# Patient Record
Sex: FEMALE | Race: WHITE | NOT HISPANIC OR LATINO | Employment: OTHER | URBAN - METROPOLITAN AREA
[De-identification: names, ages, dates, MRNs, and addresses within clinical notes are randomized per-mention and may not be internally consistent; named-entity substitution may affect disease eponyms.]

---

## 2017-01-19 ENCOUNTER — HOSPITAL ENCOUNTER (OUTPATIENT)
Dept: CARDIOLOGY | Facility: HOSPITAL | Age: 82
Setting detail: RECURRING SERIES
Discharge: HOME | End: 2017-01-19

## 2017-01-19 PROCEDURE — 85610 PROTHROMBIN TIME: CPT | Performed by: INTERNAL MEDICINE

## 2017-01-19 PROCEDURE — 36416 COLLJ CAPILLARY BLOOD SPEC: CPT | Performed by: INTERNAL MEDICINE

## 2017-01-25 RX ORDER — ANASTROZOLE 1 MG/1
TABLET ORAL
Qty: 30 TABLET | Refills: 3 | Status: SHIPPED | OUTPATIENT
Start: 2017-01-25 | End: 2017-06-14 | Stop reason: SDUPTHER

## 2017-02-16 ENCOUNTER — HOSPITAL ENCOUNTER (OUTPATIENT)
Dept: CARDIOLOGY | Facility: HOSPITAL | Age: 82
Setting detail: RECURRING SERIES
Discharge: HOME | End: 2017-02-16

## 2017-02-16 PROCEDURE — 36416 COLLJ CAPILLARY BLOOD SPEC: CPT

## 2017-02-16 PROCEDURE — 85610 PROTHROMBIN TIME: CPT

## 2017-02-21 ENCOUNTER — CLINICAL SUPPORT NO REQUIREMENTS (OUTPATIENT)
Dept: CARDIOLOGY | Facility: CLINIC | Age: 82
End: 2017-02-21

## 2017-02-27 ENCOUNTER — CLINICAL SUPPORT NO REQUIREMENTS (OUTPATIENT)
Dept: CARDIOLOGY | Facility: CLINIC | Age: 82
End: 2017-02-27

## 2017-02-27 DIAGNOSIS — I48.20 CHRONIC ATRIAL FIBRILLATION (HCC): Primary | ICD-10-CM

## 2017-02-27 PROCEDURE — 93296 REM INTERROG EVL PM/IDS: CPT | Performed by: INTERNAL MEDICINE

## 2017-02-27 PROCEDURE — 93294 REM INTERROG EVL PM/LDLS PM: CPT | Performed by: INTERNAL MEDICINE

## 2017-03-03 ENCOUNTER — HOSPITAL ENCOUNTER (OUTPATIENT)
Dept: CARDIOLOGY | Facility: HOSPITAL | Age: 82
Setting detail: RECURRING SERIES
Discharge: HOME | End: 2017-03-03

## 2017-03-03 PROCEDURE — 36416 COLLJ CAPILLARY BLOOD SPEC: CPT

## 2017-03-03 PROCEDURE — 85610 PROTHROMBIN TIME: CPT

## 2017-03-16 ENCOUNTER — HOSPITAL ENCOUNTER (OUTPATIENT)
Dept: CARDIOLOGY | Facility: HOSPITAL | Age: 82
Setting detail: RECURRING SERIES
Discharge: HOME | End: 2017-03-16

## 2017-03-16 PROCEDURE — 85610 PROTHROMBIN TIME: CPT

## 2017-03-16 PROCEDURE — 36416 COLLJ CAPILLARY BLOOD SPEC: CPT

## 2017-04-01 RX ORDER — WARFARIN SODIUM 2.5 MG/1
TABLET ORAL
Qty: 90 TABLET | Refills: 0 | Status: CANCELLED | OUTPATIENT
Start: 2017-04-01

## 2017-04-03 RX ORDER — WARFARIN SODIUM 2.5 MG/1
TABLET ORAL
Qty: 90 TABLET | Refills: 0 | Status: SHIPPED | OUTPATIENT
Start: 2017-04-03 | End: 2017-08-01 | Stop reason: SDUPTHER

## 2017-04-24 ENCOUNTER — HOSPITAL ENCOUNTER (OUTPATIENT)
Dept: CARDIOLOGY | Facility: HOSPITAL | Age: 82
Setting detail: RECURRING SERIES
Discharge: HOME | End: 2017-04-24

## 2017-04-24 PROCEDURE — 85610 PROTHROMBIN TIME: CPT

## 2017-04-24 PROCEDURE — 36416 COLLJ CAPILLARY BLOOD SPEC: CPT

## 2017-04-28 ENCOUNTER — OFFICE VISIT (OUTPATIENT)
Dept: ONCOLOGY | Facility: CLINIC | Age: 82
End: 2017-04-28

## 2017-04-28 ENCOUNTER — INFUSION (OUTPATIENT)
Dept: ONCOLOGY | Facility: HOSPITAL | Age: 82
End: 2017-04-28

## 2017-04-28 VITALS
TEMPERATURE: 97.5 F | DIASTOLIC BLOOD PRESSURE: 58 MMHG | WEIGHT: 148.6 LBS | HEART RATE: 82 BPM | OXYGEN SATURATION: 98 % | BODY MASS INDEX: 25.37 KG/M2 | SYSTOLIC BLOOD PRESSURE: 118 MMHG | RESPIRATION RATE: 16 BRPM | HEIGHT: 64 IN

## 2017-04-28 DIAGNOSIS — C50.112 MALIGNANT NEOPLASM OF CENTRAL PORTION OF LEFT FEMALE BREAST (HCC): Primary | ICD-10-CM

## 2017-04-28 DIAGNOSIS — M81.0 OSTEOPOROSIS: Primary | ICD-10-CM

## 2017-04-28 LAB
ALBUMIN SERPL-MCNC: 3.7 G/DL (ref 3.5–5.2)
ALBUMIN/GLOB SERPL: 1.1 G/DL (ref 1.1–2.4)
ALP SERPL-CCNC: 85 U/L (ref 38–116)
ALT SERPL W P-5'-P-CCNC: 13 U/L (ref 0–33)
ANION GAP SERPL CALCULATED.3IONS-SCNC: 12 MMOL/L
AST SERPL-CCNC: 29 U/L (ref 0–32)
BASOPHILS # BLD AUTO: 0.02 10*3/MM3 (ref 0–0.1)
BASOPHILS NFR BLD AUTO: 0.5 % (ref 0–1.1)
BILIRUB SERPL-MCNC: 0.7 MG/DL (ref 0.1–1.2)
BUN BLD-MCNC: 15 MG/DL (ref 6–20)
BUN/CREAT SERPL: 18.1 (ref 7.3–30)
CALCIUM SPEC-SCNC: 8.9 MG/DL (ref 8.5–10.2)
CHLORIDE SERPL-SCNC: 105 MMOL/L (ref 98–107)
CO2 SERPL-SCNC: 26 MMOL/L (ref 22–29)
CREAT BLD-MCNC: 0.83 MG/DL (ref 0.6–1.1)
DEPRECATED RDW RBC AUTO: 45.5 FL (ref 37–49)
EOSINOPHIL # BLD AUTO: 0.1 10*3/MM3 (ref 0–0.36)
EOSINOPHIL NFR BLD AUTO: 2.5 % (ref 1–5)
ERYTHROCYTE [DISTWIDTH] IN BLOOD BY AUTOMATED COUNT: 12.8 % (ref 11.7–14.5)
GFR SERPL CREATININE-BSD FRML MDRD: 65 ML/MIN/1.73
GLOBULIN UR ELPH-MCNC: 3.3 GM/DL (ref 1.8–3.5)
GLUCOSE BLD-MCNC: 79 MG/DL (ref 74–124)
HCT VFR BLD AUTO: 41.1 % (ref 34–45)
HGB BLD-MCNC: 13 G/DL (ref 11.5–14.9)
HOLD SPECIMEN: NORMAL
IMM GRANULOCYTES # BLD: 0.01 10*3/MM3 (ref 0–0.03)
IMM GRANULOCYTES NFR BLD: 0.2 % (ref 0–0.5)
LYMPHOCYTES # BLD AUTO: 1.43 10*3/MM3 (ref 1–3.5)
LYMPHOCYTES NFR BLD AUTO: 35.2 % (ref 20–49)
MCH RBC QN AUTO: 30.7 PG (ref 27–33)
MCHC RBC AUTO-ENTMCNC: 31.6 G/DL (ref 32–35)
MCV RBC AUTO: 96.9 FL (ref 83–97)
MONOCYTES # BLD AUTO: 0.67 10*3/MM3 (ref 0.25–0.8)
MONOCYTES NFR BLD AUTO: 16.5 % (ref 4–12)
NEUTROPHILS # BLD AUTO: 1.83 10*3/MM3 (ref 1.5–7)
NEUTROPHILS NFR BLD AUTO: 45.1 % (ref 39–75)
NRBC BLD MANUAL-RTO: 0 /100 WBC (ref 0–0)
PLATELET # BLD AUTO: 218 10*3/MM3 (ref 150–375)
PMV BLD AUTO: 9.7 FL (ref 8.9–12.1)
POTASSIUM BLD-SCNC: 4.3 MMOL/L (ref 3.5–4.7)
PROT SERPL-MCNC: 7 G/DL (ref 6.3–8)
RBC # BLD AUTO: 4.24 10*6/MM3 (ref 3.9–5)
SODIUM BLD-SCNC: 143 MMOL/L (ref 134–145)
WBC NRBC COR # BLD: 4.06 10*3/MM3 (ref 4–10)

## 2017-04-28 PROCEDURE — 99214 OFFICE O/P EST MOD 30 MIN: CPT | Performed by: INTERNAL MEDICINE

## 2017-04-28 PROCEDURE — 96365 THER/PROPH/DIAG IV INF INIT: CPT | Performed by: INTERNAL MEDICINE

## 2017-04-28 PROCEDURE — 36415 COLL VENOUS BLD VENIPUNCTURE: CPT | Performed by: INTERNAL MEDICINE

## 2017-04-28 PROCEDURE — 85025 COMPLETE CBC W/AUTO DIFF WBC: CPT | Performed by: INTERNAL MEDICINE

## 2017-04-28 PROCEDURE — 25010000002 ZOLEDRONIC ACID 5 MG/100ML SOLUTION: Performed by: INTERNAL MEDICINE

## 2017-04-28 PROCEDURE — 80053 COMPREHEN METABOLIC PANEL: CPT | Performed by: INTERNAL MEDICINE

## 2017-04-28 RX ORDER — ZOLEDRONIC ACID 5 MG/100ML
5 INJECTION, SOLUTION INTRAVENOUS ONCE
Status: CANCELLED | OUTPATIENT
Start: 2017-04-28

## 2017-04-28 RX ORDER — CYANOCOBALAMIN 1000 UG/ML
1000 INJECTION, SOLUTION INTRAMUSCULAR; SUBCUTANEOUS WEEKLY
COMMUNITY
Start: 2017-04-12 | End: 2017-10-16

## 2017-04-28 RX ORDER — ZOLEDRONIC ACID 5 MG/100ML
5 INJECTION, SOLUTION INTRAVENOUS ONCE
Status: COMPLETED | OUTPATIENT
Start: 2017-04-28 | End: 2017-04-28

## 2017-04-28 RX ORDER — ZOLEDRONIC ACID 5 MG/100ML
5 INJECTION, SOLUTION INTRAVENOUS ONCE
Status: CANCELLED | OUTPATIENT
Start: 2018-04-28

## 2017-04-28 RX ORDER — SODIUM CHLORIDE 9 MG/ML
250 INJECTION, SOLUTION INTRAVENOUS ONCE
Status: CANCELLED | OUTPATIENT
Start: 2017-04-28

## 2017-04-28 RX ORDER — SODIUM CHLORIDE 9 MG/ML
250 INJECTION, SOLUTION INTRAVENOUS ONCE
Status: COMPLETED | OUTPATIENT
Start: 2017-04-28 | End: 2017-04-28

## 2017-04-28 RX ORDER — SODIUM CHLORIDE 9 MG/ML
250 INJECTION, SOLUTION INTRAVENOUS ONCE
Status: CANCELLED | OUTPATIENT
Start: 2018-04-28

## 2017-04-28 RX ADMIN — ZOLEDRONIC ACID 5 MG: 0.05 INJECTION, SOLUTION INTRAVENOUS at 11:48

## 2017-04-28 RX ADMIN — SODIUM CHLORIDE 250 ML: 900 INJECTION, SOLUTION INTRAVENOUS at 11:48

## 2017-04-28 NOTE — PROGRESS NOTES
Subjective .     REASONS FOR FOLLOWUP:  Breast cancer    HISTORY OF PRESENT ILLNESS:  The patient is a 84 y.o. year old female  who is here for follow-up with the above-mentioned history.    Denies neurological symptoms.  Denies nausea   Denies weight loss.  Denies pain.  No significant problems with hot flashes.        Past Medical History:   Diagnosis Date   • Anemia    • Arthritis    • Asthma    • Atrial fibrillation     With a history of an atrial clot   • Breast cancer     Left, stage IIB   • Decreased cardiac ejection fraction    • Disease of thyroid gland     Hypothyroidism       HEMATOLOGIC/ONCOLOGIC HISTORY:  (History from previous dates can be found in the separate document.)    MEDICATIONS    Current Outpatient Prescriptions:   •  cyanocobalamin 1000 MCG/ML injection, Inject 1,000 mcg under the skin 1 (One) Time Per Week., Disp: , Rfl:   •  anastrozole (ARIMIDEX) 1 MG tablet, TAKE ONE TABLET BY MOUTH DAILY, Disp: 30 tablet, Rfl: 3  •  furosemide (LASIX) 20 MG tablet, TAKE ONE TABLET BY MOUTH DAILY, Disp: 90 tablet, Rfl: 1  •  levothyroxine (SYNTHROID, LEVOTHROID) 88 MCG tablet, Take  by mouth daily., Disp: , Rfl:   •  warfarin (COUMADIN) 2.5 MG tablet, TAKE 1 TABLET BY MOUTH DAILY OR AS DIRECTED, Disp: 90 tablet, Rfl: 0    ALLERGIES:     Allergies   Allergen Reactions   • Amiodarone Nausea Only   • Codeine Nausea Only   • Dabigatran Etexilate Mesylate Nausea Only   • Digoxin Nausea Only   • Iodinated Diagnostic Agents        SOCIAL HISTORY:       Social History     Social History   • Marital status:      Spouse name: N/A   • Number of children: N/A   • Years of education: N/A     Occupational History   •  Retired     Social History Main Topics   • Smoking status: Never Smoker   • Smokeless tobacco: Never Used   • Alcohol use 0.6 oz/week     1 Glasses of wine per week      Comment: social   • Drug use: Not on file   • Sexual activity: Not on file     Other Topics Concern   • Not on file     Social  "History Narrative         FAMILY HISTORY:  Family History   Problem Relation Age of Onset   • Colon cancer Mother 75   • Colon cancer Sister 79   • Hypertension Sister    • Cholelithiasis Sister      2 sisters       REVIEW OF SYSTEMS:  GENERAL: No change in appetite or weight;   No fevers, chills, sweats.    SKIN: No nonhealing lesions.   No rashes.  HEME/LYMPH: No easy bruising, bleeding.   No swollen nodes.   EYES: No vision changes or diplopia.   ENT: No tinnitus, hearing loss, gum bleeding, epistaxis, hoarseness or dysphagia.   RESPIRATORY: No cough, shortness of breath, hemoptysis or wheezing.   CVS: No chest pain, palpitations, orthopnea, dyspnea on exertion or PND.   GI: No melena or hematochezia.   No abdominal pain.  No nausea, vomiting, constipation, diarrhea  : No lower tract obstructive symptoms, dysuria or hematuria.   MUSCULOSKELETAL: No bone pain.  No joint stiffness.   NEUROLOGICAL: No global weakness, loss of consciousness or seizures.   PSYCHIATRIC: No increased nervousness, mood changes or depression.      Objective    Vitals:    04/28/17 1041   BP: 118/58   Pulse: 82   Resp: 16   Temp: 97.5 °F (36.4 °C)   TempSrc: Oral   SpO2: 98%   Weight: 148 lb 9.6 oz (67.4 kg)   Height: 64\" (162.6 cm)   PainSc: 0-No pain     Current Status 4/28/2017   ECOG score 0      PHYSICAL EXAM:    GENERAL:  Well-developed, well-nourished in no acute distress.   SKIN:  Warm, dry without rashes, purpura or petechiae.  HEAD:  Normocephalic.  EYES:  Pupils equal, round and reactive to light.  EOMs intact.  Conjunctivae normal.  EARS:  Hearing intact.  NOSE:  Septum midline.  No excoriations or nasal discharge.  MOUTH:  Tongue is well-papillated; no stomatitis or ulcers.  Lips normal.  THROAT:  Oropharynx without lesions or exudates.  NECK:  Supple with good range of motion; no thyromegaly or masses, no JVD.  LYMPHATICS:  No cervical, supraclavicular, axillary or inguinal adenopathy.  CHEST:  Lungs clear to percussion and " auscultation. Good airflow.  BREAST: no masses by palpation or inspection  CARDIAC:  Regular rate and rhythm without murmurs, rubs or gallops. Normal S1,S2.  ABDOMEN:  Soft, nontender with no organomegaly or masses.  EXTREMITIES:  No clubbing, cyanosis or edema.  NEUROLOGICAL:  Cranial Nerves II-XII grossly intact.  No focal neurological deficits.  PSYCHIATRIC:  Normal affect and mood.      RECENT LABS:        WBC   Date/Time Value Ref Range Status   04/28/2017 10:14 AM 4.06 4.00 - 10.00 10*3/mm3 Final     Hemoglobin   Date/Time Value Ref Range Status   04/28/2017 10:14 AM 13.0 11.5 - 14.9 g/dL Final     Platelets   Date/Time Value Ref Range Status   04/28/2017 10:14  150 - 375 10*3/mm3 Final       Assessment/Plan     ASSESSMENT:  Problem List Items Addressed This Visit        High    Malignant neoplasm of central portion of left female breast - Primary    Relevant Orders    CBC & Differential    Comprehensive Metabolic Panel         1. [id number] (stage IIB) left breast cancer, 5.1 cm, grade 2.  Arimidex through a planned 10/31/19 (did not tolerate tamoxifen due to altered taste with lack of desire to eat.  Has significant osteoporosis)     2.  Atrial fibrillation with a history of an atrial clot for which she is on Coumadin through other MDs    3.  Osteoporosis. Dr. Tinsley, her primary care physician, recommended Reclast annually 5 mg IV.  We administer.  She is on calcium and vitamin D.    PLAN:   · MD CBC CMP 6 months   · Annual Reclast (April)   · Today.  · (M.D. visit after next)  · Last mammogram, 11/9/16, BI-RADS 1  · Last DEXA, 11/9/16, average lumbar T score -2.4.  Osteopenia, without significant change.

## 2017-05-08 ENCOUNTER — HOSPITAL ENCOUNTER (OUTPATIENT)
Dept: CARDIOLOGY | Facility: HOSPITAL | Age: 82
Setting detail: RECURRING SERIES
Discharge: HOME | End: 2017-05-08

## 2017-05-08 PROCEDURE — 36416 COLLJ CAPILLARY BLOOD SPEC: CPT

## 2017-05-08 PROCEDURE — 85610 PROTHROMBIN TIME: CPT

## 2017-06-08 ENCOUNTER — HOSPITAL ENCOUNTER (OUTPATIENT)
Dept: CARDIOLOGY | Facility: HOSPITAL | Age: 82
Setting detail: RECURRING SERIES
Discharge: HOME | End: 2017-06-08

## 2017-06-08 PROCEDURE — 85610 PROTHROMBIN TIME: CPT

## 2017-06-08 PROCEDURE — 36416 COLLJ CAPILLARY BLOOD SPEC: CPT

## 2017-06-09 ENCOUNTER — CLINICAL SUPPORT NO REQUIREMENTS (OUTPATIENT)
Dept: CARDIOLOGY | Facility: CLINIC | Age: 82
End: 2017-06-09

## 2017-06-09 DIAGNOSIS — I48.20 CHRONIC ATRIAL FIBRILLATION (HCC): Primary | ICD-10-CM

## 2017-06-09 PROCEDURE — 93296 REM INTERROG EVL PM/IDS: CPT | Performed by: INTERNAL MEDICINE

## 2017-06-09 PROCEDURE — 93294 REM INTERROG EVL PM/LDLS PM: CPT | Performed by: INTERNAL MEDICINE

## 2017-06-14 RX ORDER — ANASTROZOLE 1 MG/1
TABLET ORAL
Qty: 30 TABLET | Refills: 2 | Status: SHIPPED | OUTPATIENT
Start: 2017-06-14 | End: 2017-06-19 | Stop reason: SDUPTHER

## 2017-06-19 ENCOUNTER — TELEPHONE (OUTPATIENT)
Dept: ONCOLOGY | Facility: CLINIC | Age: 82
End: 2017-06-19

## 2017-06-19 RX ORDER — ANASTROZOLE 1 MG/1
1 TABLET ORAL DAILY
Qty: 30 TABLET | Refills: 2 | Status: SHIPPED | OUTPATIENT
Start: 2017-06-19 | End: 2017-10-27 | Stop reason: SDUPTHER

## 2017-06-19 NOTE — TELEPHONE ENCOUNTER
Patient called saying [facility] can not get her Arimidex. Will send a new prescription to [facility] [place] [phone]. Patient will call if she has any other issues.

## 2017-06-22 ENCOUNTER — HOSPITAL ENCOUNTER (OUTPATIENT)
Dept: CARDIOLOGY | Facility: HOSPITAL | Age: 82
Setting detail: RECURRING SERIES
Discharge: HOME | End: 2017-06-22

## 2017-06-22 PROCEDURE — 36416 COLLJ CAPILLARY BLOOD SPEC: CPT

## 2017-06-22 PROCEDURE — 85610 PROTHROMBIN TIME: CPT

## 2017-07-03 ENCOUNTER — HOSPITAL ENCOUNTER (OUTPATIENT)
Dept: CARDIOLOGY | Facility: HOSPITAL | Age: 82
Setting detail: RECURRING SERIES
End: 2017-07-03

## 2017-07-06 ENCOUNTER — HOSPITAL ENCOUNTER (OUTPATIENT)
Dept: CARDIOLOGY | Facility: HOSPITAL | Age: 82
Setting detail: RECURRING SERIES
Discharge: HOME | End: 2017-07-06

## 2017-07-06 PROCEDURE — 36416 COLLJ CAPILLARY BLOOD SPEC: CPT

## 2017-07-06 PROCEDURE — 85610 PROTHROMBIN TIME: CPT

## 2017-07-14 ENCOUNTER — HOSPITAL ENCOUNTER (OUTPATIENT)
Dept: CARDIOLOGY | Facility: HOSPITAL | Age: 82
Setting detail: RECURRING SERIES
Discharge: HOME | End: 2017-07-14

## 2017-07-14 PROCEDURE — 36416 COLLJ CAPILLARY BLOOD SPEC: CPT

## 2017-07-14 PROCEDURE — 85610 PROTHROMBIN TIME: CPT

## 2017-07-23 RX ORDER — FUROSEMIDE 20 MG/1
TABLET ORAL
Qty: 90 TABLET | Refills: 0 | Status: SHIPPED | OUTPATIENT
Start: 2017-07-23 | End: 2017-10-27 | Stop reason: SDUPTHER

## 2017-07-27 ENCOUNTER — HOSPITAL ENCOUNTER (OUTPATIENT)
Dept: CARDIOLOGY | Facility: HOSPITAL | Age: 82
Setting detail: RECURRING SERIES
Discharge: HOME | End: 2017-07-27

## 2017-07-27 PROCEDURE — 85610 PROTHROMBIN TIME: CPT

## 2017-07-27 PROCEDURE — 36416 COLLJ CAPILLARY BLOOD SPEC: CPT

## 2017-08-01 RX ORDER — WARFARIN SODIUM 2.5 MG/1
TABLET ORAL
Qty: 90 TABLET | Refills: 0 | Status: SHIPPED | OUTPATIENT
Start: 2017-08-01 | End: 2017-11-27 | Stop reason: SDUPTHER

## 2017-08-24 ENCOUNTER — HOSPITAL ENCOUNTER (OUTPATIENT)
Dept: CARDIOLOGY | Facility: HOSPITAL | Age: 82
Setting detail: RECURRING SERIES
Discharge: HOME | End: 2017-08-24

## 2017-08-24 PROCEDURE — 36416 COLLJ CAPILLARY BLOOD SPEC: CPT

## 2017-08-24 PROCEDURE — 85610 PROTHROMBIN TIME: CPT

## 2017-09-07 ENCOUNTER — HOSPITAL ENCOUNTER (OUTPATIENT)
Dept: CARDIOLOGY | Facility: HOSPITAL | Age: 82
Setting detail: RECURRING SERIES
Discharge: HOME | End: 2017-09-07

## 2017-09-07 PROCEDURE — 85610 PROTHROMBIN TIME: CPT

## 2017-09-07 PROCEDURE — 36416 COLLJ CAPILLARY BLOOD SPEC: CPT

## 2017-09-29 ENCOUNTER — HOSPITAL ENCOUNTER (OUTPATIENT)
Dept: CARDIOLOGY | Facility: HOSPITAL | Age: 82
Setting detail: RECURRING SERIES
Discharge: HOME | End: 2017-09-29

## 2017-09-29 PROCEDURE — 36416 COLLJ CAPILLARY BLOOD SPEC: CPT

## 2017-09-29 PROCEDURE — 85610 PROTHROMBIN TIME: CPT

## 2017-10-06 ENCOUNTER — HOSPITAL ENCOUNTER (OUTPATIENT)
Dept: CARDIOLOGY | Facility: HOSPITAL | Age: 82
Setting detail: RECURRING SERIES
Discharge: HOME | End: 2017-10-06

## 2017-10-06 PROCEDURE — 36416 COLLJ CAPILLARY BLOOD SPEC: CPT

## 2017-10-06 PROCEDURE — 85610 PROTHROMBIN TIME: CPT

## 2017-10-16 ENCOUNTER — OFFICE VISIT (OUTPATIENT)
Dept: ONCOLOGY | Facility: CLINIC | Age: 82
End: 2017-10-16

## 2017-10-16 ENCOUNTER — TELEPHONE (OUTPATIENT)
Dept: ONCOLOGY | Facility: HOSPITAL | Age: 82
End: 2017-10-16

## 2017-10-16 ENCOUNTER — LAB (OUTPATIENT)
Dept: LAB | Facility: HOSPITAL | Age: 82
End: 2017-10-16

## 2017-10-16 VITALS
RESPIRATION RATE: 16 BRPM | SYSTOLIC BLOOD PRESSURE: 124 MMHG | TEMPERATURE: 97.4 F | DIASTOLIC BLOOD PRESSURE: 74 MMHG | HEART RATE: 79 BPM | OXYGEN SATURATION: 97 % | HEIGHT: 64 IN | BODY MASS INDEX: 24.34 KG/M2 | WEIGHT: 142.6 LBS

## 2017-10-16 DIAGNOSIS — C50.112 MALIGNANT NEOPLASM OF CENTRAL PORTION OF LEFT BREAST IN FEMALE, ESTROGEN RECEPTOR POSITIVE (HCC): Primary | ICD-10-CM

## 2017-10-16 DIAGNOSIS — C50.112 MALIGNANT NEOPLASM OF CENTRAL PORTION OF LEFT FEMALE BREAST (HCC): ICD-10-CM

## 2017-10-16 DIAGNOSIS — Z17.0 MALIGNANT NEOPLASM OF CENTRAL PORTION OF LEFT BREAST IN FEMALE, ESTROGEN RECEPTOR POSITIVE (HCC): Primary | ICD-10-CM

## 2017-10-16 LAB
ALBUMIN SERPL-MCNC: 4 G/DL (ref 3.5–5.2)
ALBUMIN/GLOB SERPL: 1.2 G/DL (ref 1.1–2.4)
ALP SERPL-CCNC: 74 U/L (ref 38–116)
ALT SERPL W P-5'-P-CCNC: 11 U/L (ref 0–33)
ANION GAP SERPL CALCULATED.3IONS-SCNC: 11.5 MMOL/L
AST SERPL-CCNC: 19 U/L (ref 0–32)
BASOPHILS # BLD AUTO: 0.03 10*3/MM3 (ref 0–0.1)
BASOPHILS NFR BLD AUTO: 0.5 % (ref 0–1.1)
BILIRUB SERPL-MCNC: 0.9 MG/DL (ref 0.1–1.2)
BUN BLD-MCNC: 16 MG/DL (ref 6–20)
BUN/CREAT SERPL: 14.7 (ref 7.3–30)
CALCIUM SPEC-SCNC: 10 MG/DL (ref 8.5–10.2)
CHLORIDE SERPL-SCNC: 101 MMOL/L (ref 98–107)
CO2 SERPL-SCNC: 27.5 MMOL/L (ref 22–29)
CREAT BLD-MCNC: 1.09 MG/DL (ref 0.6–1.1)
DEPRECATED RDW RBC AUTO: 48 FL (ref 37–49)
EOSINOPHIL # BLD AUTO: 0.08 10*3/MM3 (ref 0–0.36)
EOSINOPHIL NFR BLD AUTO: 1.2 % (ref 1–5)
ERYTHROCYTE [DISTWIDTH] IN BLOOD BY AUTOMATED COUNT: 13.4 % (ref 11.7–14.5)
GFR SERPL CREATININE-BSD FRML MDRD: 48 ML/MIN/1.73
GLOBULIN UR ELPH-MCNC: 3.3 GM/DL (ref 1.8–3.5)
GLUCOSE BLD-MCNC: 95 MG/DL (ref 74–124)
HCT VFR BLD AUTO: 44.3 % (ref 34–45)
HGB BLD-MCNC: 14.3 G/DL (ref 11.5–14.9)
IMM GRANULOCYTES # BLD: 0.01 10*3/MM3 (ref 0–0.03)
IMM GRANULOCYTES NFR BLD: 0.2 % (ref 0–0.5)
LYMPHOCYTES # BLD AUTO: 1.92 10*3/MM3 (ref 1–3.5)
LYMPHOCYTES NFR BLD AUTO: 29.2 % (ref 20–49)
MCH RBC QN AUTO: 31.2 PG (ref 27–33)
MCHC RBC AUTO-ENTMCNC: 32.3 G/DL (ref 32–35)
MCV RBC AUTO: 96.7 FL (ref 83–97)
MONOCYTES # BLD AUTO: 0.78 10*3/MM3 (ref 0.25–0.8)
MONOCYTES NFR BLD AUTO: 11.9 % (ref 4–12)
NEUTROPHILS # BLD AUTO: 3.75 10*3/MM3 (ref 1.5–7)
NEUTROPHILS NFR BLD AUTO: 57 % (ref 39–75)
NRBC BLD MANUAL-RTO: 0 /100 WBC (ref 0–0)
PLATELET # BLD AUTO: 223 10*3/MM3 (ref 150–375)
PMV BLD AUTO: 9.7 FL (ref 8.9–12.1)
POTASSIUM BLD-SCNC: 5.6 MMOL/L (ref 3.5–4.7)
PROT SERPL-MCNC: 7.3 G/DL (ref 6.3–8)
RBC # BLD AUTO: 4.58 10*6/MM3 (ref 3.9–5)
SODIUM BLD-SCNC: 140 MMOL/L (ref 134–145)
WBC NRBC COR # BLD: 6.57 10*3/MM3 (ref 4–10)

## 2017-10-16 PROCEDURE — 85025 COMPLETE CBC W/AUTO DIFF WBC: CPT | Performed by: INTERNAL MEDICINE

## 2017-10-16 PROCEDURE — 99214 OFFICE O/P EST MOD 30 MIN: CPT | Performed by: INTERNAL MEDICINE

## 2017-10-16 PROCEDURE — 36415 COLL VENOUS BLD VENIPUNCTURE: CPT | Performed by: INTERNAL MEDICINE

## 2017-10-16 PROCEDURE — 80053 COMPREHEN METABOLIC PANEL: CPT | Performed by: INTERNAL MEDICINE

## 2017-10-16 RX ORDER — UBIQUINOL 100 MG
750 CAPSULE ORAL
Status: ON HOLD | COMMUNITY
Start: 2017-06-21 | End: 2018-06-08

## 2017-10-16 RX ORDER — LANOLIN ALCOHOL/MO/W.PET/CERES
1000 CREAM (GRAM) TOPICAL DAILY
COMMUNITY
End: 2019-11-22

## 2017-10-16 NOTE — PROGRESS NOTES
Subjective .     REASONS FOR FOLLOWUP:  Breast cancer    HISTORY OF PRESENT ILLNESS:  The patient is a 85 y.o. year old female  who is here for follow-up with the above-mentioned history.  Denies neurological symptoms.  Denies nausea   Denies weight loss.  Denies pain.  No significant problems with hot flashes.        Past Medical History:   Diagnosis Date   • Anemia    • Arthritis    • Asthma    • Atrial fibrillation     With a history of an atrial clot   • Breast cancer     Left, stage IIB   • Decreased cardiac ejection fraction    • Disease of thyroid gland     Hypothyroidism   • Heart disease    • Heart failure    • Irritable bowel        HEMATOLOGIC/ONCOLOGIC HISTORY:  (History from previous dates can be found in the separate document.)    MEDICATIONS    Current Outpatient Prescriptions:   •  Glucosamine 750 MG tablet, Take 750 mg by mouth., Disp: , Rfl:   •  vitamin B-12 (CYANOCOBALAMIN) 1000 MCG tablet, Take 1,000 mcg by mouth Daily., Disp: , Rfl:   •  anastrozole (ARIMIDEX) 1 MG tablet, Take 1 tablet by mouth Daily., Disp: 30 tablet, Rfl: 2  •  furosemide (LASIX) 20 MG tablet, TAKE ONE TABLET BY MOUTH DAILY, Disp: 90 tablet, Rfl: 0  •  levothyroxine (SYNTHROID, LEVOTHROID) 88 MCG tablet, Take  by mouth daily., Disp: , Rfl:   •  warfarin (COUMADIN) 2.5 MG tablet, TAKE ONE TABLET BY MOUTH DAILY OR AS DIRECTED, Disp: 90 tablet, Rfl: 0    ALLERGIES:     Allergies   Allergen Reactions   • Amiodarone Nausea Only   • Codeine Nausea Only   • Dabigatran Etexilate Mesylate Nausea Only   • Digoxin Nausea Only   • Iodinated Diagnostic Agents        SOCIAL HISTORY:       Social History     Social History   • Marital status:      Spouse name: Rick   • Number of children: 8   • Years of education: N/A     Occupational History   •  Retired     Social History Main Topics   • Smoking status: Never Smoker   • Smokeless tobacco: Never Used   • Alcohol use 0.6 oz/week     1 Glasses of wine per week      Comment:  "social   • Drug use: No   • Sexual activity: Not on file     Other Topics Concern   • Not on file     Social History Narrative         FAMILY HISTORY:  Family History   Problem Relation Age of Onset   • Colon cancer Mother 75   • Colon cancer Sister 79   • Hypertension Sister    • Cholelithiasis Sister      2 sisters       REVIEW OF SYSTEMS:  GENERAL: No change in appetite or weight;   No fevers, chills, sweats.    SKIN: No nonhealing lesions.   No rashes.  HEME/LYMPH: No easy bruising, bleeding.   No swollen nodes.   EYES: No vision changes or diplopia.   ENT: No tinnitus, hearing loss, gum bleeding, epistaxis, hoarseness or dysphagia.   RESPIRATORY: No cough, shortness of breath, hemoptysis or wheezing.   CVS: No chest pain, palpitations, orthopnea, dyspnea on exertion or PND.   GI: No melena or hematochezia.   No abdominal pain.  No nausea, vomiting, constipation, diarrhea  : No lower tract obstructive symptoms, dysuria or hematuria.   MUSCULOSKELETAL: No bone pain.  No joint stiffness.   NEUROLOGICAL: No global weakness, loss of consciousness or seizures.   PSYCHIATRIC: No increased nervousness, mood changes or depression.      Objective    Vitals:    10/16/17 1437   BP: 124/74   Pulse: 79   Resp: 16   Temp: 97.4 °F (36.3 °C)   TempSrc: Oral   SpO2: 97%   Weight: 142 lb 9.6 oz (64.7 kg)   Height: 64\" (162.6 cm)   PainSc: 0-No pain     Current Status 10/16/2017   ECOG score 0      PHYSICAL EXAM:    GENERAL:  Well-developed, well-nourished in no acute distress.   SKIN:  Warm, dry without rashes, purpura or petechiae.  HEAD:  Normocephalic.  EYES:  Pupils equal, round and reactive to light.  EOMs intact.  Conjunctivae normal.  EARS:  Hearing intact.  NOSE:  Septum midline.  No excoriations or nasal discharge.  MOUTH:  Tongue is well-papillated; no stomatitis or ulcers.  Lips normal.  THROAT:  Oropharynx without lesions or exudates.  NECK:  Supple with good range of motion; no thyromegaly or masses, no " JVD.  LYMPHATICS:  No cervical, supraclavicular, axillary or inguinal adenopathy.  CHEST:  Lungs clear to percussion and auscultation. Good airflow.  BREAST: no masses by palpation or inspection  CARDIAC:  Regular rate and rhythm without murmurs, rubs or gallops. Normal S1,S2.  ABDOMEN:  Soft, nontender with no organomegaly or masses.  EXTREMITIES:  No clubbing, cyanosis or edema.  NEUROLOGICAL:  Cranial Nerves II-XII grossly intact.  No focal neurological deficits.  PSYCHIATRIC:  Normal affect and mood.      RECENT LABS:        WBC   Date/Time Value Ref Range Status   10/16/2017 02:24 PM 6.57 4.00 - 10.00 10*3/mm3 Final     Hemoglobin   Date/Time Value Ref Range Status   10/16/2017 02:24 PM 14.3 11.5 - 14.9 g/dL Final     Platelets   Date/Time Value Ref Range Status   10/16/2017 02:24  150 - 375 10*3/mm3 Final       Assessment/Plan     ASSESSMENT:  Problem List Items Addressed This Visit     None         1. [id number] (stage IIB) left breast cancer, 5.1 cm, grade 2.  Arimidex through a planned 10/31/19 (did not tolerate tamoxifen due to altered taste with lack of desire to eat.  Has significant osteoporosis)   Appears to remain in remission.    2.  Atrial fibrillation with a history of an atrial clot for which she is on Coumadin through other MDs    3.  Osteoporosis. Dr. Tinsley, her primary care physician, recommended Reclast annually 5 mg IV.  We administer.  She is on calcium and vitamin D.    PLAN:   · MD CBC CMP 6 months   · Annual Reclast (April)   · (Next M.D. visit)  · Last mammogram, 11/9/16, BI-RADS 1  · Last DEXA, 11/9/16, average lumbar T score -2.4.  Osteopenia, without significant change.

## 2017-10-16 NOTE — TELEPHONE ENCOUNTER
Labs faxed to patient's pcp. Notified patient. She is not taking potassium supplement. SHe will contact her pcp tomorrow to f/u on this.     ----- Message from Sherman Belcher II, MD sent at 10/16/2017  3:47 PM EDT -----   Please call her and tell her that the potassium was high from today.  Tell her she needs to follow this up, either with a recheck in our office or with her PCP.  I doubt her history of breast cancer or being on Arimidex would have anything to do with a high potassium level.  Therefore, she may be best served by following up with her PCP.  If that is what she wants to do, please fax these lab results to her PCP.  Also, copy this note for the patient's chart.  Thank you

## 2017-10-18 ENCOUNTER — HOSPITAL ENCOUNTER (OUTPATIENT)
Dept: CARDIOLOGY | Facility: HOSPITAL | Age: 82
Setting detail: RECURRING SERIES
Discharge: HOME | End: 2017-10-18

## 2017-10-18 PROCEDURE — 85610 PROTHROMBIN TIME: CPT

## 2017-10-18 PROCEDURE — 36416 COLLJ CAPILLARY BLOOD SPEC: CPT

## 2017-10-19 ENCOUNTER — TRANSCRIBE ORDERS (OUTPATIENT)
Dept: ADMINISTRATIVE | Facility: HOSPITAL | Age: 82
End: 2017-10-19

## 2017-10-19 DIAGNOSIS — Z12.31 ENCOUNTER FOR MAMMOGRAM TO ESTABLISH BASELINE MAMMOGRAM: Primary | ICD-10-CM

## 2017-10-27 RX ORDER — ANASTROZOLE 1 MG/1
TABLET ORAL
Qty: 30 TABLET | Refills: 0 | Status: SHIPPED | OUTPATIENT
Start: 2017-10-27 | End: 2017-11-25 | Stop reason: SDUPTHER

## 2017-10-27 RX ORDER — FUROSEMIDE 20 MG/1
TABLET ORAL
Qty: 90 TABLET | Refills: 0 | Status: SHIPPED | OUTPATIENT
Start: 2017-10-27 | End: 2018-02-16 | Stop reason: SDUPTHER

## 2017-11-15 ENCOUNTER — HOSPITAL ENCOUNTER (OUTPATIENT)
Dept: CARDIOLOGY | Facility: HOSPITAL | Age: 82
Setting detail: RECURRING SERIES
Discharge: HOME | End: 2017-11-15

## 2017-11-15 ENCOUNTER — OFFICE VISIT (OUTPATIENT)
Dept: CARDIOLOGY | Facility: CLINIC | Age: 82
End: 2017-11-15

## 2017-11-15 ENCOUNTER — CLINICAL SUPPORT NO REQUIREMENTS (OUTPATIENT)
Dept: CARDIOLOGY | Facility: CLINIC | Age: 82
End: 2017-11-15

## 2017-11-15 VITALS
SYSTOLIC BLOOD PRESSURE: 100 MMHG | DIASTOLIC BLOOD PRESSURE: 64 MMHG | BODY MASS INDEX: 24.07 KG/M2 | HEART RATE: 83 BPM | WEIGHT: 141 LBS | HEIGHT: 64 IN

## 2017-11-15 DIAGNOSIS — I34.0 NON-RHEUMATIC MITRAL REGURGITATION: ICD-10-CM

## 2017-11-15 DIAGNOSIS — I27.20 PULMONARY HYPERTENSION (HCC): ICD-10-CM

## 2017-11-15 DIAGNOSIS — I48.20 CHRONIC ATRIAL FIBRILLATION (HCC): Primary | ICD-10-CM

## 2017-11-15 DIAGNOSIS — I42.9 CARDIOMYOPATHY, UNSPECIFIED TYPE (HCC): ICD-10-CM

## 2017-11-15 DIAGNOSIS — Z95.0 PRESENCE OF CARDIAC PACEMAKER: ICD-10-CM

## 2017-11-15 PROCEDURE — 85610 PROTHROMBIN TIME: CPT

## 2017-11-15 PROCEDURE — 93279 PRGRMG DEV EVAL PM/LDLS PM: CPT | Performed by: INTERNAL MEDICINE

## 2017-11-15 PROCEDURE — 36416 COLLJ CAPILLARY BLOOD SPEC: CPT

## 2017-11-15 PROCEDURE — 99214 OFFICE O/P EST MOD 30 MIN: CPT | Performed by: INTERNAL MEDICINE

## 2017-11-15 NOTE — PROGRESS NOTES
Date of Office Visit: 11/15/2017  Encounter Provider: Rafaela Leija MD  Place of Service: [facility]  Patient Name: Diana Avalos  :1932      Patient ID:  Diana Avalos is a 85 y.o. female is here for  followup for a fib and CMP.         History of Present Illness    She was seen at [facility] on 2011 for atrial  fibrillation and was placed on IV Cardizem. She was reluctant to take  Coumadin but was willing to take Pradaxa. During her hospitalization her  ejection fraction was 45 to 50% with no significant valvular heart disease.  She had a transesophageal echocardiogram done on September 15, 2011 which  showed marked left atrial enlargement, and there was slow velocity through  the left atrial appendage and the left atrial appendage itself had a  thrombus. The left ventricle showed a moderate reduction in function with a  markedly dilated left atrium. At that time we decided to go ahead and  control her atrial fibrillation with rate and anticoagulation. However, we  had difficulty controlling her rate so she was put on amiodarone just for  rate control. She underwent a stress nuclear perfusion study on 2011 which was normal. On  she called back in because she  was having severe nausea. She was on Pradaxa, digoxin, and amiodarone at  that time. She went off of all those medications and felt better but on  2012 she started having atrial fibrillation with rapid  ventricular response, and was readmitted to the hospital.  She had a single chamber Medtronic generator placed with an AV node ablation, 2012.  She was in the hospital 2012 with acute bronchitis. She had an echocardiogram done  2012 showing an ejection fraction of 35-40%, severe left atrial volume enlargement,  severe dilation of the right atrium, moderate mitral insufficiency, mild tricuspid  insufficiency, pulmonary  artery pressure of 58 mmHg. She was started on Lasix 20 mg  daily, and this really does seem to help.      She had an echocardiogram done on 11/04/2015 which revealed an ejection fraction of 38%, moderate biatrial enlargement, mild-to-moderate mitral insufficiency, trivial aortic insufficiency, moderate-to-severe tricuspid insufficiency.  Her right ventricular systolic pressure was elevated at 58 mmHg.  This is unchanged from her prior echocardiogram in 09/2014.      She's here today for follow-up.  She did have a pacemaker interrogation today.  She is ventricularly pacing 98.5% at time.  She has underlying atrial fibrillation.  She has 4.5 years of battery left.  She has no difficulty breathing.  She's had no dizziness or syncope.  She's had no exertional chest tightness.  She overall is feeling quite well.  Her  did pass away within the last month and has been very difficult but she said he was very ill and suffering.    Past Medical History:   Diagnosis Date   • Anemia    • Arthritis    • Asthma    • Atrial fibrillation     With a history of an atrial clot   • Breast cancer     Left, stage IIB   • Decreased cardiac ejection fraction    • Disease of thyroid gland     Hypothyroidism   • Heart disease    • Heart failure    • Irritable bowel    • Mild tricuspid regurgitation    • Moderate mitral insufficiency          Past Surgical History:   Procedure Laterality Date   • CHOLECYSTECTOMY  2000   • EYE SURGERY  2005    cataracts, Dr. Miramontes   • MASTECTOMY Left 10/2014   • PACEMAKER IMPLANTATION  2012    Dr. Leija       Current Outpatient Prescriptions on File Prior to Visit   Medication Sig Dispense Refill   • anastrozole (ARIMIDEX) 1 MG tablet TAKE 1 TABLET BY MOUTH EVERY DAY 30 tablet 0   • furosemide (LASIX) 20 MG tablet TAKE ONE TABLET BY MOUTH DAILY 90 tablet 0   • Glucosamine 750 MG tablet Take 750 mg by mouth.     • levothyroxine (SYNTHROID, LEVOTHROID) 88 MCG tablet Take  by mouth daily.     • vitamin  "B-12 (CYANOCOBALAMIN) 1000 MCG tablet Take 1,000 mcg by mouth Daily.     • warfarin (COUMADIN) 2.5 MG tablet TAKE ONE TABLET BY MOUTH DAILY OR AS DIRECTED 90 tablet 0     No current facility-administered medications on file prior to visit.        Social History     Social History   • Marital status:      Spouse name: Rick   • Number of children: 8   • Years of education: N/A     Occupational History   •  Retired     Social History Main Topics   • Smoking status: Never Smoker   • Smokeless tobacco: Never Used   • Alcohol use 0.6 oz/week     1 Glasses of wine per week      Comment: social   • Drug use: No   • Sexual activity: Not on file     Other Topics Concern   • Not on file     Social History Narrative           Review of Systems   Constitution: Negative.   HENT: Negative for congestion.    Eyes: Negative for vision loss in left eye and vision loss in right eye.   Respiratory: Negative.  Negative for cough, hemoptysis, shortness of breath, sleep disturbances due to breathing, snoring, sputum production and wheezing.    Endocrine: Negative.    Hematologic/Lymphatic: Negative.    Skin: Negative for poor wound healing and rash.   Musculoskeletal: Negative for falls, gout, muscle cramps and myalgias.   Gastrointestinal: Negative for abdominal pain, diarrhea, dysphagia, hematemesis, melena, nausea and vomiting.   Neurological: Negative for excessive daytime sleepiness, dizziness, headaches, light-headedness, loss of balance, seizures and vertigo.   Psychiatric/Behavioral: Negative for depression and substance abuse. The patient is not nervous/anxious.        Procedures  Procedures       Objective:      Vitals:    11/15/17 1404   BP: 100/64   BP Location: Right arm   Patient Position: Sitting   Pulse: 83   Weight: 141 lb (64 kg)   Height: 64\" (162.6 cm)     Body mass index is 24.2 kg/(m^2).    Physical Exam   Constitutional: She is oriented to person, place, and time. She appears well-developed and " well-nourished. No distress.   HENT:   Head: Normocephalic and atraumatic.   Eyes: Conjunctivae are normal. No scleral icterus.   Neck: Neck supple. No JVD present. Carotid bruit is not present. No thyromegaly present.   Cardiovascular: Normal rate, regular rhythm, S1 normal, S2 normal, normal heart sounds and intact distal pulses.   No extrasystoles are present. PMI is not displaced.  Exam reveals no gallop.    No murmur heard.  Pulses:       Carotid pulses are 2+ on the right side, and 2+ on the left side.       Radial pulses are 2+ on the right side, and 2+ on the left side.        Dorsalis pedis pulses are 2+ on the right side, and 2+ on the left side.        Posterior tibial pulses are 2+ on the right side, and 2+ on the left side.   Pulmonary/Chest: Effort normal and breath sounds normal. No respiratory distress. She has no wheezes. She has no rhonchi. She has no rales. She exhibits no tenderness.   Abdominal: Soft. Bowel sounds are normal. She exhibits no distension, no abdominal bruit and no mass. There is no tenderness.   Musculoskeletal: She exhibits no edema or deformity.   Lymphadenopathy:     She has no cervical adenopathy.   Neurological: She is alert and oriented to person, place, and time. No cranial nerve deficit.   Skin: Skin is warm and dry. No rash noted. She is not diaphoretic. No cyanosis. No pallor. Nails show no clubbing.   Psychiatric: She has a normal mood and affect. Judgment normal.   Vitals reviewed.      Lab Review:       Assessment:      Diagnosis Plan   1. Chronic atrial fibrillation     2. Presence of cardiac pacemaker     3. Cardiomyopathy, unspecified type     4. Non-rheumatic mitral regurgitation     5. Pulmonary hypertension       1. Chronic permanent atrial fibrillation; status post AV node ablation with  Medtronic single chamber pacemaker on January 9, 2012. She is on chronic  Coumadin therapy only.   2. Hypertension, under good control. She is really on no medications  except  Lasix.   3. Moderate cardiomyopathy, nonischemic, LVEF 38%.    4. Moderate mitral insufficiency and moderate tricuspid insufficiency,  stable.   5. Hypothyroidism, stable.   6. Pulmonary hypertension, moderate.   7. Moderate pulmonary hypertension. This is source of dyspnea.      Plan:       See back in 1 year, no changes.     Atrial Fibrillation and Atrial Flutter  Assessment  • The patient has permanent atrial fibrillation  • This is non-valvular in etiology  • The patient's CHADS2-VASc score is 4  • A [id number] score of 2 or more is considered a high risk for a thromboembolic event  • Warfarin prescribed    Plan  • Continue in atrial fibrillation with rate control  • Continue warfarin for antithrombotic therapy, bleeding issues discussed

## 2017-11-22 ENCOUNTER — HOSPITAL ENCOUNTER (OUTPATIENT)
Dept: CARDIOLOGY | Facility: HOSPITAL | Age: 82
Setting detail: RECURRING SERIES
Discharge: HOME | End: 2017-11-22

## 2017-11-22 PROCEDURE — 36416 COLLJ CAPILLARY BLOOD SPEC: CPT

## 2017-11-22 PROCEDURE — 85610 PROTHROMBIN TIME: CPT

## 2017-11-27 RX ORDER — WARFARIN SODIUM 2.5 MG/1
2.5 TABLET ORAL
Qty: 90 TABLET | Refills: 0 | Status: SHIPPED | OUTPATIENT
Start: 2017-11-27 | End: 2018-02-25 | Stop reason: SDUPTHER

## 2017-11-27 RX ORDER — ANASTROZOLE 1 MG/1
TABLET ORAL
Qty: 30 TABLET | Refills: 0 | Status: SHIPPED | OUTPATIENT
Start: 2017-11-27 | End: 2018-01-10 | Stop reason: SDUPTHER

## 2017-12-06 ENCOUNTER — HOSPITAL ENCOUNTER (OUTPATIENT)
Dept: CARDIOLOGY | Facility: HOSPITAL | Age: 82
Setting detail: RECURRING SERIES
Discharge: HOME | End: 2017-12-06

## 2017-12-06 PROCEDURE — 85610 PROTHROMBIN TIME: CPT

## 2017-12-06 PROCEDURE — 36416 COLLJ CAPILLARY BLOOD SPEC: CPT

## 2018-01-04 ENCOUNTER — HOSPITAL ENCOUNTER (OUTPATIENT)
Dept: MAMMOGRAPHY | Facility: HOSPITAL | Age: 83
Discharge: HOME | End: 2018-01-04
Attending: INTERNAL MEDICINE | Admitting: INTERNAL MEDICINE

## 2018-01-04 ENCOUNTER — HOSPITAL ENCOUNTER (OUTPATIENT)
Dept: CARDIOLOGY | Facility: HOSPITAL | Age: 83
Setting detail: RECURRING SERIES
Discharge: HOME | End: 2018-01-04

## 2018-01-04 DIAGNOSIS — Z12.31 ENCOUNTER FOR MAMMOGRAM TO ESTABLISH BASELINE MAMMOGRAM: ICD-10-CM

## 2018-01-04 PROCEDURE — 77067 SCR MAMMO BI INCL CAD: CPT

## 2018-01-04 PROCEDURE — 77063 BREAST TOMOSYNTHESIS BI: CPT

## 2018-01-04 PROCEDURE — 85610 PROTHROMBIN TIME: CPT

## 2018-01-04 PROCEDURE — 36416 COLLJ CAPILLARY BLOOD SPEC: CPT

## 2018-01-10 RX ORDER — ANASTROZOLE 1 MG/1
TABLET ORAL
Qty: 30 TABLET | Refills: 0 | Status: SHIPPED | OUTPATIENT
Start: 2018-01-10 | End: 2018-02-08 | Stop reason: SDUPTHER

## 2018-02-01 ENCOUNTER — HOSPITAL ENCOUNTER (OUTPATIENT)
Dept: CARDIOLOGY | Facility: HOSPITAL | Age: 83
Setting detail: RECURRING SERIES
Discharge: HOME | End: 2018-02-01

## 2018-02-01 PROCEDURE — 85610 PROTHROMBIN TIME: CPT

## 2018-02-01 PROCEDURE — 36416 COLLJ CAPILLARY BLOOD SPEC: CPT

## 2018-02-09 RX ORDER — ANASTROZOLE 1 MG/1
TABLET ORAL
Qty: 30 TABLET | Refills: 6 | Status: SHIPPED | OUTPATIENT
Start: 2018-02-09 | End: 2018-10-04 | Stop reason: SDUPTHER

## 2018-02-16 RX ORDER — FUROSEMIDE 20 MG/1
TABLET ORAL
Qty: 90 TABLET | Refills: 0 | Status: SHIPPED | OUTPATIENT
Start: 2018-02-16 | End: 2018-05-24 | Stop reason: SDUPTHER

## 2018-02-26 RX ORDER — WARFARIN SODIUM 2.5 MG/1
TABLET ORAL
Qty: 90 TABLET | Refills: 0 | Status: SHIPPED | OUTPATIENT
Start: 2018-02-26 | End: 2018-06-05 | Stop reason: SDUPTHER

## 2018-02-28 ENCOUNTER — HOSPITAL ENCOUNTER (OUTPATIENT)
Dept: CARDIOLOGY | Facility: HOSPITAL | Age: 83
Setting detail: RECURRING SERIES
Discharge: HOME | End: 2018-02-28

## 2018-02-28 PROCEDURE — 36416 COLLJ CAPILLARY BLOOD SPEC: CPT

## 2018-02-28 PROCEDURE — 85610 PROTHROMBIN TIME: CPT

## 2018-03-28 ENCOUNTER — HOSPITAL ENCOUNTER (OUTPATIENT)
Dept: CARDIOLOGY | Facility: HOSPITAL | Age: 83
Setting detail: RECURRING SERIES
Discharge: HOME | End: 2018-03-28

## 2018-03-28 PROCEDURE — 36416 COLLJ CAPILLARY BLOOD SPEC: CPT

## 2018-03-28 PROCEDURE — 85610 PROTHROMBIN TIME: CPT

## 2018-04-25 ENCOUNTER — HOSPITAL ENCOUNTER (OUTPATIENT)
Dept: CARDIOLOGY | Facility: HOSPITAL | Age: 83
Setting detail: RECURRING SERIES
Discharge: HOME | End: 2018-04-25

## 2018-04-25 PROCEDURE — 36416 COLLJ CAPILLARY BLOOD SPEC: CPT

## 2018-04-25 PROCEDURE — 85610 PROTHROMBIN TIME: CPT

## 2018-04-30 DIAGNOSIS — C50.112 MALIGNANT NEOPLASM OF CENTRAL PORTION OF LEFT BREAST IN FEMALE, ESTROGEN RECEPTOR POSITIVE (HCC): Primary | ICD-10-CM

## 2018-04-30 DIAGNOSIS — M85.89 OSTEOPENIA OF MULTIPLE SITES: ICD-10-CM

## 2018-04-30 DIAGNOSIS — Z17.0 MALIGNANT NEOPLASM OF CENTRAL PORTION OF LEFT BREAST IN FEMALE, ESTROGEN RECEPTOR POSITIVE (HCC): Primary | ICD-10-CM

## 2018-04-30 RX ORDER — ZOLEDRONIC ACID 5 MG/100ML
5 INJECTION, SOLUTION INTRAVENOUS ONCE
Status: CANCELLED | OUTPATIENT
Start: 2018-05-02

## 2018-04-30 RX ORDER — SODIUM CHLORIDE 9 MG/ML
250 INJECTION, SOLUTION INTRAVENOUS ONCE
Status: CANCELLED | OUTPATIENT
Start: 2018-05-02

## 2018-05-01 ENCOUNTER — APPOINTMENT (OUTPATIENT)
Dept: ONCOLOGY | Facility: CLINIC | Age: 83
End: 2018-05-01

## 2018-05-01 ENCOUNTER — APPOINTMENT (OUTPATIENT)
Dept: LAB | Facility: HOSPITAL | Age: 83
End: 2018-05-01

## 2018-05-02 ENCOUNTER — APPOINTMENT (OUTPATIENT)
Dept: ONCOLOGY | Facility: HOSPITAL | Age: 83
End: 2018-05-02

## 2018-05-03 ENCOUNTER — TELEPHONE (OUTPATIENT)
Dept: CARDIOLOGY | Facility: CLINIC | Age: 83
End: 2018-05-03

## 2018-05-03 NOTE — TELEPHONE ENCOUNTER
Pt's daughter called and states that pt missed her pacemaker remote  Appointment. león wants to know when they can they do transmission...    [employer] can be reached at [phone]    Candice KENNEDY

## 2018-05-09 ENCOUNTER — CLINICAL SUPPORT NO REQUIREMENTS (OUTPATIENT)
Dept: CARDIOLOGY | Facility: CLINIC | Age: 83
End: 2018-05-09

## 2018-05-09 ENCOUNTER — HOSPITAL ENCOUNTER (OUTPATIENT)
Dept: CARDIOLOGY | Facility: HOSPITAL | Age: 83
Setting detail: RECURRING SERIES
Discharge: HOME | End: 2018-05-09

## 2018-05-09 DIAGNOSIS — I48.20 CHRONIC ATRIAL FIBRILLATION (HCC): Primary | ICD-10-CM

## 2018-05-09 PROCEDURE — 85610 PROTHROMBIN TIME: CPT

## 2018-05-09 PROCEDURE — 93294 REM INTERROG EVL PM/LDLS PM: CPT | Performed by: INTERNAL MEDICINE

## 2018-05-09 PROCEDURE — 36416 COLLJ CAPILLARY BLOOD SPEC: CPT

## 2018-05-09 PROCEDURE — 93296 REM INTERROG EVL PM/IDS: CPT | Performed by: INTERNAL MEDICINE

## 2018-05-16 ENCOUNTER — HOSPITAL ENCOUNTER (OUTPATIENT)
Dept: CARDIOLOGY | Facility: HOSPITAL | Age: 83
Setting detail: RECURRING SERIES
Discharge: HOME | End: 2018-05-16

## 2018-05-16 PROCEDURE — 36416 COLLJ CAPILLARY BLOOD SPEC: CPT

## 2018-05-16 PROCEDURE — 85610 PROTHROMBIN TIME: CPT

## 2018-05-22 ENCOUNTER — OFFICE VISIT (OUTPATIENT)
Dept: ONCOLOGY | Facility: CLINIC | Age: 83
End: 2018-05-22

## 2018-05-22 ENCOUNTER — INFUSION (OUTPATIENT)
Dept: ONCOLOGY | Facility: HOSPITAL | Age: 83
End: 2018-05-22

## 2018-05-22 VITALS
HEART RATE: 74 BPM | TEMPERATURE: 97.9 F | WEIGHT: 150.6 LBS | OXYGEN SATURATION: 97 % | SYSTOLIC BLOOD PRESSURE: 142 MMHG | RESPIRATION RATE: 14 BRPM | BODY MASS INDEX: 25.09 KG/M2 | HEIGHT: 65 IN | DIASTOLIC BLOOD PRESSURE: 86 MMHG

## 2018-05-22 DIAGNOSIS — Z17.0 MALIGNANT NEOPLASM OF CENTRAL PORTION OF LEFT BREAST IN FEMALE, ESTROGEN RECEPTOR POSITIVE (HCC): Primary | ICD-10-CM

## 2018-05-22 DIAGNOSIS — Z17.0 MALIGNANT NEOPLASM OF CENTRAL PORTION OF LEFT BREAST IN FEMALE, ESTROGEN RECEPTOR POSITIVE (HCC): ICD-10-CM

## 2018-05-22 DIAGNOSIS — C50.112 MALIGNANT NEOPLASM OF CENTRAL PORTION OF LEFT BREAST IN FEMALE, ESTROGEN RECEPTOR POSITIVE (HCC): Primary | ICD-10-CM

## 2018-05-22 DIAGNOSIS — C50.112 MALIGNANT NEOPLASM OF CENTRAL PORTION OF LEFT BREAST IN FEMALE, ESTROGEN RECEPTOR POSITIVE (HCC): ICD-10-CM

## 2018-05-22 DIAGNOSIS — M85.89 OSTEOPENIA OF MULTIPLE SITES: Primary | ICD-10-CM

## 2018-05-22 DIAGNOSIS — M81.0 OSTEOPOROSIS, UNSPECIFIED OSTEOPOROSIS TYPE, UNSPECIFIED PATHOLOGICAL FRACTURE PRESENCE: ICD-10-CM

## 2018-05-22 DIAGNOSIS — M85.89 OSTEOPENIA OF MULTIPLE SITES: ICD-10-CM

## 2018-05-22 LAB
ALBUMIN SERPL-MCNC: 4 G/DL (ref 3.5–5.2)
ALBUMIN/GLOB SERPL: 1.4 G/DL (ref 1.1–2.4)
ALP SERPL-CCNC: 77 U/L (ref 38–116)
ALT SERPL W P-5'-P-CCNC: 11 U/L (ref 0–33)
ANION GAP SERPL CALCULATED.3IONS-SCNC: 11 MMOL/L
AST SERPL-CCNC: 21 U/L (ref 0–32)
BASOPHILS # BLD AUTO: 0.03 10*3/MM3 (ref 0–0.1)
BASOPHILS NFR BLD AUTO: 0.7 % (ref 0–1.1)
BILIRUB SERPL-MCNC: 0.9 MG/DL (ref 0.1–1.2)
BUN BLD-MCNC: 16 MG/DL (ref 6–20)
BUN/CREAT SERPL: 16.3 (ref 7.3–30)
CALCIUM SPEC-SCNC: 9.1 MG/DL (ref 8.5–10.2)
CHLORIDE SERPL-SCNC: 103 MMOL/L (ref 98–107)
CO2 SERPL-SCNC: 27 MMOL/L (ref 22–29)
CREAT BLD-MCNC: 0.98 MG/DL (ref 0.6–1.1)
DEPRECATED RDW RBC AUTO: 46.5 FL (ref 37–49)
EOSINOPHIL # BLD AUTO: 0.1 10*3/MM3 (ref 0–0.36)
EOSINOPHIL NFR BLD AUTO: 2.3 % (ref 1–5)
ERYTHROCYTE [DISTWIDTH] IN BLOOD BY AUTOMATED COUNT: 13 % (ref 11.7–14.5)
GFR SERPL CREATININE-BSD FRML MDRD: 54 ML/MIN/1.73
GLOBULIN UR ELPH-MCNC: 2.9 GM/DL (ref 1.8–3.5)
GLUCOSE BLD-MCNC: 85 MG/DL (ref 74–124)
HCT VFR BLD AUTO: 44.6 % (ref 34–45)
HGB BLD-MCNC: 14.4 G/DL (ref 11.5–14.9)
IMM GRANULOCYTES # BLD: 0.01 10*3/MM3 (ref 0–0.03)
IMM GRANULOCYTES NFR BLD: 0.2 % (ref 0–0.5)
LYMPHOCYTES # BLD AUTO: 1.36 10*3/MM3 (ref 1–3.5)
LYMPHOCYTES NFR BLD AUTO: 30.6 % (ref 20–49)
MAGNESIUM SERPL-MCNC: 2 MG/DL (ref 1.8–2.5)
MCH RBC QN AUTO: 31.6 PG (ref 27–33)
MCHC RBC AUTO-ENTMCNC: 32.3 G/DL (ref 32–35)
MCV RBC AUTO: 97.8 FL (ref 83–97)
MONOCYTES # BLD AUTO: 0.74 10*3/MM3 (ref 0.25–0.8)
MONOCYTES NFR BLD AUTO: 16.7 % (ref 4–12)
NEUTROPHILS # BLD AUTO: 2.2 10*3/MM3 (ref 1.5–7)
NEUTROPHILS NFR BLD AUTO: 49.5 % (ref 39–75)
NRBC BLD MANUAL-RTO: 0 /100 WBC (ref 0–0)
PHOSPHATE SERPL-MCNC: 3.7 MG/DL (ref 2.5–4.5)
PLATELET # BLD AUTO: 206 10*3/MM3 (ref 150–375)
PMV BLD AUTO: 9.8 FL (ref 8.9–12.1)
POTASSIUM BLD-SCNC: 4.3 MMOL/L (ref 3.5–4.7)
PROT SERPL-MCNC: 6.9 G/DL (ref 6.3–8)
RBC # BLD AUTO: 4.56 10*6/MM3 (ref 3.9–5)
SODIUM BLD-SCNC: 141 MMOL/L (ref 134–145)
WBC NRBC COR # BLD: 4.44 10*3/MM3 (ref 4–10)

## 2018-05-22 PROCEDURE — 84100 ASSAY OF PHOSPHORUS: CPT

## 2018-05-22 PROCEDURE — 85025 COMPLETE CBC W/AUTO DIFF WBC: CPT

## 2018-05-22 PROCEDURE — 99214 OFFICE O/P EST MOD 30 MIN: CPT | Performed by: INTERNAL MEDICINE

## 2018-05-22 PROCEDURE — 25010000002 ZOLEDRONIC ACID 5 MG/100ML SOLUTION: Performed by: INTERNAL MEDICINE

## 2018-05-22 PROCEDURE — 80053 COMPREHEN METABOLIC PANEL: CPT

## 2018-05-22 PROCEDURE — 96365 THER/PROPH/DIAG IV INF INIT: CPT | Performed by: INTERNAL MEDICINE

## 2018-05-22 PROCEDURE — 83735 ASSAY OF MAGNESIUM: CPT

## 2018-05-22 RX ORDER — ZOLEDRONIC ACID 5 MG/100ML
5 INJECTION, SOLUTION INTRAVENOUS ONCE
Status: CANCELLED | OUTPATIENT
Start: 2018-05-22

## 2018-05-22 RX ORDER — SODIUM CHLORIDE 9 MG/ML
250 INJECTION, SOLUTION INTRAVENOUS ONCE
Status: COMPLETED | OUTPATIENT
Start: 2018-05-22 | End: 2018-05-22

## 2018-05-22 RX ORDER — ZOLEDRONIC ACID 5 MG/100ML
5 INJECTION, SOLUTION INTRAVENOUS ONCE
Status: COMPLETED | OUTPATIENT
Start: 2018-05-22 | End: 2018-05-22

## 2018-05-22 RX ORDER — SODIUM CHLORIDE 9 MG/ML
250 INJECTION, SOLUTION INTRAVENOUS ONCE
Status: CANCELLED | OUTPATIENT
Start: 2018-05-22

## 2018-05-22 RX ADMIN — SODIUM CHLORIDE 250 ML: 900 INJECTION, SOLUTION INTRAVENOUS at 14:08

## 2018-05-22 RX ADMIN — ZOLEDRONIC ACID 5 MG: 0.05 INJECTION, SOLUTION INTRAVENOUS at 14:08

## 2018-05-22 NOTE — PROGRESS NOTES
Subjective .     REASONS FOR FOLLOWUP:  Breast cancer    HISTORY OF PRESENT ILLNESS:  The patient is a 85 y.o. year old female  who is here for follow-up with the above-mentioned history.    Denies nausea, weight loss, pain, neurological symptoms.  Denies hot flashes.    Past Medical History:   Diagnosis Date   • Anemia    • Arthritis    • Asthma    • Atrial fibrillation     With a history of an atrial clot   • Breast cancer     Left, stage IIB   • Decreased cardiac ejection fraction    • Disease of thyroid gland     Hypothyroidism   • Heart disease    • Heart failure    • Irritable bowel    • Mild tricuspid regurgitation    • Moderate mitral insufficiency        HEMATOLOGIC/ONCOLOGIC HISTORY:  (History from previous dates can be found in the separate document.)    MEDICATIONS    Current Outpatient Prescriptions:   •  anastrozole (ARIMIDEX) 1 MG tablet, TAKE 1 TABLET BY MOUTH EVERY DAY, Disp: 30 tablet, Rfl: 6  •  furosemide (LASIX) 20 MG tablet, TAKE ONE TABLET BY MOUTH DAILY, Disp: 90 tablet, Rfl: 0  •  Glucosamine 750 MG tablet, Take 750 mg by mouth., Disp: , Rfl:   •  levothyroxine (SYNTHROID, LEVOTHROID) 88 MCG tablet, Take  by mouth daily., Disp: , Rfl:   •  vitamin B-12 (CYANOCOBALAMIN) 1000 MCG tablet, Take 1,000 mcg by mouth Daily., Disp: , Rfl:   •  warfarin (COUMADIN) 2.5 MG tablet, TAKE ONE TABLET BY MOUTH DAILY, Disp: 90 tablet, Rfl: 0    ALLERGIES:     Allergies   Allergen Reactions   • Amiodarone Nausea Only   • Codeine Nausea Only   • Dabigatran Etexilate Mesylate Nausea Only   • Digoxin Nausea Only   • Iodinated Diagnostic Agents        SOCIAL HISTORY:       Social History     Social History   • Marital status:      Spouse name: Rick   • Number of children: 8   • Years of education: N/A     Occupational History   •  Retired     Social History Main Topics   • Smoking status: Never Smoker   • Smokeless tobacco: Never Used   • Alcohol use 0.6 oz/week     1 Glasses of wine per week       "Comment: social   • Drug use: No   • Sexual activity: Not on file     Other Topics Concern   • Not on file     Social History Narrative   • No narrative on file         FAMILY HISTORY:  Family History   Problem Relation Age of Onset   • Colon cancer Mother 75   • Colon cancer Sister 79   • Hypertension Sister    • Cholelithiasis Sister         2 sisters       REVIEW OF SYSTEMS:  Review of Systems   Constitutional: Negative for activity change.   HENT: Negative for nosebleeds and trouble swallowing.    Respiratory: Negative for shortness of breath and wheezing.    Cardiovascular: Negative for chest pain and palpitations.   Gastrointestinal: Negative for constipation, diarrhea and nausea.   Genitourinary: Negative for dysuria and hematuria.   Musculoskeletal: Negative for arthralgias and myalgias.   Skin: Negative for rash and wound.   Neurological: Negative for seizures and syncope.   Hematological: Negative for adenopathy. Does not bruise/bleed easily.   Psychiatric/Behavioral: Negative for confusion.         Objective    Vitals:    05/22/18 1310   BP: 142/86   Pulse: 74   Resp: 14   Temp: 97.9 °F (36.6 °C)   TempSrc: Oral   SpO2: 97%   Weight: 68.3 kg (150 lb 9.6 oz)   Height: 164 cm (64.57\")   PainSc: 0-No pain     Current Status 5/22/2018   ECOG score 0      PHYSICAL EXAM:    CONSTITUTIONAL:  Vital signs reviewed.  No distress, looks comfortable.  EYES:  Conjunctiva and lids unremarkable.  PERRLA  EARS,NOSE,MOUTH,THROAT:  Ears and nose appear unremarkable.  Lips, teeth, gums appear unremarkable.  RESPIRATORY:  Normal respiratory effort.  Lungs clear to auscultation bilaterally.  CARDIOVASCULAR:  Normal S1, S2.  No murmurs rubs or gallops.  No significant lower extremity edema.  BREAST: no masses by palpation or inspection   GASTROINTESTINAL: Abdomen appears unremarkable.  Nontender.  No hepatomegaly.  No splenomegaly.  LYMPHATIC:  No cervical, supraclavicular, axillary lymphadenopathy.  SKIN:  Warm.  No " vianca.  PSYCHIATRIC:  Normal judgment and insight.  Normal mood and affect.     RECENT LABS:        WBC   Date/Time Value Ref Range Status   05/22/2018 12:52 PM 4.44 4.00 - 10.00 10*3/mm3 Final     Hemoglobin   Date/Time Value Ref Range Status   05/22/2018 12:52 PM 14.4 11.5 - 14.9 g/dL Final     Platelets   Date/Time Value Ref Range Status   05/22/2018 12:52  150 - 375 10*3/mm3 Final       Assessment/Plan     ASSESSMENT:  Problem List Items Addressed This Visit        High    Malignant neoplasm of central portion of left female breast - Primary    Relevant Orders    CBC & Differential    Comprehensive Metabolic Panel    DEXA Bone Density Axial       Unprioritized    Osteoporosis    Relevant Orders    CBC & Differential    Comprehensive Metabolic Panel    DEXA Bone Density Axial         * gB3P1O8 (stage IIB) left breast cancer, 5.1 cm, grade 2.  Arimidex through a planned 10/31/19 (did not tolerate tamoxifen due to altered taste with lack of desire to eat.  Has significant osteoporosis)   Appears to remain in remission.    *Atrial fibrillation with a history of an atrial clot for which she is on Coumadin through other MDs    *Osteoporosis. Dr. Tinsley, her primary care physician, recommended Reclast annually 5 mg IV.  We administer.  She is on calcium and vitamin D.    PLAN:   · MD CBC CMP 6 months; One week prior: DEXA scan  · Annual Reclast (April) (today)  · (M.D. after next visit)  · Last mammogram, 1/4/18, BI-RADS 1  · Last DEXA, 11/9/16, average lumbar T score -2.4.  Osteopenia, without significant change.     Daughter assisted with history.

## 2018-05-24 RX ORDER — FUROSEMIDE 20 MG/1
TABLET ORAL
Qty: 90 TABLET | Refills: 0 | Status: ON HOLD | OUTPATIENT
Start: 2018-05-24 | End: 2018-06-08

## 2018-05-30 ENCOUNTER — APPOINTMENT (OUTPATIENT)
Dept: GENERAL RADIOLOGY | Facility: HOSPITAL | Age: 83
End: 2018-05-30

## 2018-05-30 ENCOUNTER — HOSPITAL ENCOUNTER (EMERGENCY)
Facility: HOSPITAL | Age: 83
Discharge: HOME | End: 2018-05-30
Attending: EMERGENCY MEDICINE | Admitting: EMERGENCY MEDICINE

## 2018-05-30 ENCOUNTER — HOSPITAL ENCOUNTER (OUTPATIENT)
Dept: CARDIOLOGY | Facility: HOSPITAL | Age: 83
Setting detail: RECURRING SERIES
Discharge: HOME | End: 2018-05-30

## 2018-05-30 VITALS
BODY MASS INDEX: 21.69 KG/M2 | HEART RATE: 74 BPM | OXYGEN SATURATION: 97 % | RESPIRATION RATE: 18 BRPM | DIASTOLIC BLOOD PRESSURE: 90 MMHG | HEIGHT: 66 IN | SYSTOLIC BLOOD PRESSURE: 172 MMHG | WEIGHT: 135 LBS | TEMPERATURE: 98.1 F

## 2018-05-30 DIAGNOSIS — D68.9 COAGULOPATHY (HCC): ICD-10-CM

## 2018-05-30 DIAGNOSIS — I42.8 NONISCHEMIC CARDIOMYOPATHY (HCC): ICD-10-CM

## 2018-05-30 DIAGNOSIS — I27.20 PULMONARY HYPERTENSION (HCC): Primary | ICD-10-CM

## 2018-05-30 LAB
ALBUMIN SERPL-MCNC: 3.8 G/DL (ref 3.5–5.2)
ALBUMIN/GLOB SERPL: 1.3 G/DL
ALP SERPL-CCNC: 78 U/L (ref 39–117)
ALT SERPL W P-5'-P-CCNC: 11 U/L (ref 1–33)
ANION GAP SERPL CALCULATED.3IONS-SCNC: 10.1 MMOL/L
AST SERPL-CCNC: 21 U/L (ref 1–32)
BACTERIA UR QL AUTO: NORMAL /HPF
BASOPHILS # BLD AUTO: 0.02 10*3/MM3 (ref 0–0.2)
BASOPHILS NFR BLD AUTO: 0.3 % (ref 0–1.5)
BILIRUB SERPL-MCNC: 0.6 MG/DL (ref 0.1–1.2)
BILIRUB UR QL STRIP: NEGATIVE
BUN BLD-MCNC: 13 MG/DL (ref 8–23)
BUN/CREAT SERPL: 15.5 (ref 7–25)
CALCIUM SPEC-SCNC: 9 MG/DL (ref 8.6–10.5)
CHLORIDE SERPL-SCNC: 102 MMOL/L (ref 98–107)
CLARITY UR: CLEAR
CO2 SERPL-SCNC: 28.9 MMOL/L (ref 22–29)
COLOR UR: YELLOW
CREAT BLD-MCNC: 0.84 MG/DL (ref 0.57–1)
DEPRECATED RDW RBC AUTO: 46.9 FL (ref 37–54)
EOSINOPHIL # BLD AUTO: 0.1 10*3/MM3 (ref 0–0.7)
EOSINOPHIL NFR BLD AUTO: 1.7 % (ref 0.3–6.2)
ERYTHROCYTE [DISTWIDTH] IN BLOOD BY AUTOMATED COUNT: 13.2 % (ref 11.7–13)
GFR SERPL CREATININE-BSD FRML MDRD: 64 ML/MIN/1.73
GLOBULIN UR ELPH-MCNC: 3 GM/DL
GLUCOSE BLD-MCNC: 86 MG/DL (ref 65–99)
GLUCOSE UR STRIP-MCNC: NEGATIVE MG/DL
HCT VFR BLD AUTO: 42.6 % (ref 35.6–45.5)
HGB BLD-MCNC: 14.3 G/DL (ref 11.9–15.5)
HGB UR QL STRIP.AUTO: ABNORMAL
HOLD SPECIMEN: NORMAL
HOLD SPECIMEN: NORMAL
HYALINE CASTS UR QL AUTO: NORMAL /LPF
IMM GRANULOCYTES # BLD: 0.01 10*3/MM3 (ref 0–0.03)
IMM GRANULOCYTES NFR BLD: 0.2 % (ref 0–0.5)
INR PPP: 2.26 (ref 0.9–1.1)
KETONES UR QL STRIP: NEGATIVE
LEUKOCYTE ESTERASE UR QL STRIP.AUTO: NEGATIVE
LYMPHOCYTES # BLD AUTO: 1.98 10*3/MM3 (ref 0.9–4.8)
LYMPHOCYTES NFR BLD AUTO: 34 % (ref 19.6–45.3)
MAGNESIUM SERPL-MCNC: 2.2 MG/DL (ref 1.6–2.4)
MCH RBC QN AUTO: 32.4 PG (ref 26.9–32)
MCHC RBC AUTO-ENTMCNC: 33.6 G/DL (ref 32.4–36.3)
MCV RBC AUTO: 96.4 FL (ref 80.5–98.2)
MONOCYTES # BLD AUTO: 0.68 10*3/MM3 (ref 0.2–1.2)
MONOCYTES NFR BLD AUTO: 11.7 % (ref 5–12)
NEUTROPHILS # BLD AUTO: 3.04 10*3/MM3 (ref 1.9–8.1)
NEUTROPHILS NFR BLD AUTO: 52.3 % (ref 42.7–76)
NITRITE UR QL STRIP: NEGATIVE
PH UR STRIP.AUTO: <=5 [PH] (ref 5–8)
PLATELET # BLD AUTO: 204 10*3/MM3 (ref 140–500)
PMV BLD AUTO: 10.6 FL (ref 6–12)
POTASSIUM BLD-SCNC: 4.4 MMOL/L (ref 3.5–5.2)
PROT SERPL-MCNC: 6.8 G/DL (ref 6–8.5)
PROT UR QL STRIP: NEGATIVE
PROTHROMBIN TIME: 24.6 SECONDS (ref 11.7–14.2)
RBC # BLD AUTO: 4.42 10*6/MM3 (ref 3.9–5.2)
RBC # UR: NORMAL /HPF
REF LAB TEST METHOD: NORMAL
SODIUM BLD-SCNC: 141 MMOL/L (ref 136–145)
SP GR UR STRIP: 1.01 (ref 1–1.03)
SQUAMOUS #/AREA URNS HPF: NORMAL /HPF
TROPONIN T SERPL-MCNC: <0.01 NG/ML (ref 0–0.03)
UROBILINOGEN UR QL STRIP: ABNORMAL
WBC NRBC COR # BLD: 5.82 10*3/MM3 (ref 4.5–10.7)
WBC UR QL AUTO: NORMAL /HPF
WHOLE BLOOD HOLD SPECIMEN: NORMAL
WHOLE BLOOD HOLD SPECIMEN: NORMAL

## 2018-05-30 PROCEDURE — 93010 ELECTROCARDIOGRAM REPORT: CPT | Performed by: INTERNAL MEDICINE

## 2018-05-30 PROCEDURE — 93005 ELECTROCARDIOGRAM TRACING: CPT

## 2018-05-30 PROCEDURE — 85025 COMPLETE CBC W/AUTO DIFF WBC: CPT

## 2018-05-30 PROCEDURE — 71045 X-RAY EXAM CHEST 1 VIEW: CPT

## 2018-05-30 PROCEDURE — 71046 X-RAY EXAM CHEST 2 VIEWS: CPT

## 2018-05-30 PROCEDURE — 93005 ELECTROCARDIOGRAM TRACING: CPT | Performed by: EMERGENCY MEDICINE

## 2018-05-30 PROCEDURE — 85610 PROTHROMBIN TIME: CPT

## 2018-05-30 PROCEDURE — 99284 EMERGENCY DEPT VISIT MOD MDM: CPT

## 2018-05-30 PROCEDURE — 36416 COLLJ CAPILLARY BLOOD SPEC: CPT

## 2018-05-30 PROCEDURE — 80053 COMPREHEN METABOLIC PANEL: CPT

## 2018-05-30 PROCEDURE — 36415 COLL VENOUS BLD VENIPUNCTURE: CPT

## 2018-05-30 PROCEDURE — 83735 ASSAY OF MAGNESIUM: CPT

## 2018-05-30 PROCEDURE — 81001 URINALYSIS AUTO W/SCOPE: CPT

## 2018-05-30 PROCEDURE — 84484 ASSAY OF TROPONIN QUANT: CPT

## 2018-05-30 RX ORDER — SODIUM CHLORIDE 0.9 % (FLUSH) 0.9 %
10 SYRINGE (ML) INJECTION AS NEEDED
Status: DISCONTINUED | OUTPATIENT
Start: 2018-05-30 | End: 2018-05-30 | Stop reason: HOSPADM

## 2018-06-05 RX ORDER — WARFARIN SODIUM 2.5 MG/1
TABLET ORAL
Qty: 90 TABLET | Refills: 0 | Status: SHIPPED | OUTPATIENT
Start: 2018-06-05 | End: 2018-06-07 | Stop reason: SDUPTHER

## 2018-06-07 RX ORDER — WARFARIN SODIUM 2.5 MG/1
TABLET ORAL
Qty: 90 TABLET | Refills: 0 | Status: SHIPPED | OUTPATIENT
Start: 2018-06-07 | End: 2019-05-13 | Stop reason: SDUPTHER

## 2018-06-08 ENCOUNTER — APPOINTMENT (OUTPATIENT)
Dept: GENERAL RADIOLOGY | Facility: HOSPITAL | Age: 83
End: 2018-06-08
Attending: INTERNAL MEDICINE

## 2018-06-08 ENCOUNTER — HOSPITAL ENCOUNTER (OUTPATIENT)
Dept: CARDIOLOGY | Facility: HOSPITAL | Age: 83
Discharge: HOME | End: 2018-06-08

## 2018-06-08 ENCOUNTER — OFFICE VISIT (OUTPATIENT)
Dept: CARDIOLOGY | Facility: CLINIC | Age: 83
End: 2018-06-08

## 2018-06-08 ENCOUNTER — HOSPITAL ENCOUNTER (INPATIENT)
Facility: HOSPITAL | Age: 83
LOS: 4 days | Discharge: HOME | End: 2018-06-13
Attending: INTERNAL MEDICINE | Admitting: INTERNAL MEDICINE

## 2018-06-08 VITALS
BODY MASS INDEX: 24.33 KG/M2 | HEART RATE: 74 BPM | HEIGHT: 66 IN | SYSTOLIC BLOOD PRESSURE: 124 MMHG | WEIGHT: 151.4 LBS | OXYGEN SATURATION: 98 % | DIASTOLIC BLOOD PRESSURE: 70 MMHG

## 2018-06-08 VITALS
WEIGHT: 151 LBS | BODY MASS INDEX: 24.27 KG/M2 | SYSTOLIC BLOOD PRESSURE: 124 MMHG | HEIGHT: 66 IN | HEART RATE: 72 BPM | DIASTOLIC BLOOD PRESSURE: 70 MMHG

## 2018-06-08 DIAGNOSIS — I42.9 CARDIOMYOPATHY, UNSPECIFIED TYPE (HCC): ICD-10-CM

## 2018-06-08 DIAGNOSIS — I34.0 NON-RHEUMATIC MITRAL REGURGITATION: ICD-10-CM

## 2018-06-08 DIAGNOSIS — Z79.01 ON WARFARIN THERAPY: ICD-10-CM

## 2018-06-08 DIAGNOSIS — I36.1 NON-RHEUMATIC TRICUSPID VALVE INSUFFICIENCY: ICD-10-CM

## 2018-06-08 DIAGNOSIS — R06.02 SHORTNESS OF BREATH: ICD-10-CM

## 2018-06-08 DIAGNOSIS — Z95.0 PRESENCE OF CARDIAC PACEMAKER: ICD-10-CM

## 2018-06-08 DIAGNOSIS — E03.9 HYPOTHYROIDISM, UNSPECIFIED TYPE: ICD-10-CM

## 2018-06-08 DIAGNOSIS — I48.20 CHRONIC ATRIAL FIBRILLATION (HCC): ICD-10-CM

## 2018-06-08 DIAGNOSIS — R06.02 SHORTNESS OF BREATH: Primary | ICD-10-CM

## 2018-06-08 PROBLEM — I26.99 PULMONARY EMBOLISM: Status: ACTIVE | Noted: 2017-04-11

## 2018-06-08 PROBLEM — R91.1 PULMONARY NODULE: Status: ACTIVE | Noted: 2017-04-03

## 2018-06-08 PROBLEM — I07.1 TRICUSPID REGURGITATION: Status: ACTIVE | Noted: 2018-06-08

## 2018-06-08 LAB
ASCENDING AORTA: 2.5 CM
BH CV ECHO MEAS - ACS: 1.8 CM
BH CV ECHO MEAS - AO MAX PG (FULL): 2.6 MMHG
BH CV ECHO MEAS - AO MAX PG: 4.1 MMHG
BH CV ECHO MEAS - AO MEAN PG (FULL): 1.6 MMHG
BH CV ECHO MEAS - AO MEAN PG: 2.3 MMHG
BH CV ECHO MEAS - AO ROOT AREA (BSA CORRECTED): 1.8
BH CV ECHO MEAS - AO ROOT AREA: 7.5 CM^2
BH CV ECHO MEAS - AO ROOT DIAM: 3.1 CM
BH CV ECHO MEAS - AO V2 MAX: 101 CM/SEC
BH CV ECHO MEAS - AO V2 MEAN: 71.2 CM/SEC
BH CV ECHO MEAS - AO V2 VTI: 21.8 CM
BH CV ECHO MEAS - AVA(I,A): 1.8 CM^2
BH CV ECHO MEAS - AVA(I,D): 1.8 CM^2
BH CV ECHO MEAS - AVA(V,A): 2.1 CM^2
BH CV ECHO MEAS - AVA(V,D): 2.1 CM^2
BH CV ECHO MEAS - BSA(HAYCOCK): 1.7 M^2
BH CV ECHO MEAS - BSA: 1.7 M^2
BH CV ECHO MEAS - BZI_BMI: 21.8 KILOGRAMS/M^2
BH CV ECHO MEAS - BZI_METRIC_HEIGHT: 167.6 CM
BH CV ECHO MEAS - BZI_METRIC_WEIGHT: 61.2 KG
BH CV ECHO MEAS - CONTRAST EF (2CH): 38.9 ML/M^2
BH CV ECHO MEAS - CONTRAST EF 4CH: 35.3 ML/M^2
BH CV ECHO MEAS - EDV(MOD-SP2): 72 ML
BH CV ECHO MEAS - EDV(MOD-SP4): 51 ML
BH CV ECHO MEAS - EDV(TEICH): 140 ML
BH CV ECHO MEAS - EF(CUBED): 43.1 %
BH CV ECHO MEAS - EF(MOD-BP): 36 %
BH CV ECHO MEAS - EF(MOD-SP2): 38.9 %
BH CV ECHO MEAS - EF(MOD-SP4): 35.3 %
BH CV ECHO MEAS - EF(TEICH): 35.5 %
BH CV ECHO MEAS - ESV(MOD-SP2): 44 ML
BH CV ECHO MEAS - ESV(MOD-SP4): 33 ML
BH CV ECHO MEAS - ESV(TEICH): 90.3 ML
BH CV ECHO MEAS - FS: 17.1 %
BH CV ECHO MEAS - IVS/LVPW: 0.96
BH CV ECHO MEAS - IVSD: 0.85 CM
BH CV ECHO MEAS - LAT PEAK E' VEL: 8 CM/SEC
BH CV ECHO MEAS - LV DIASTOLIC VOL/BSA (35-75): 30.1 ML/M^2
BH CV ECHO MEAS - LV MASS(C)D: 169.8 GRAMS
BH CV ECHO MEAS - LV MASS(C)DI: 100.3 GRAMS/M^2
BH CV ECHO MEAS - LV MAX PG: 1.5 MMHG
BH CV ECHO MEAS - LV MEAN PG: 0.69 MMHG
BH CV ECHO MEAS - LV SYSTOLIC VOL/BSA (12-30): 19.5 ML/M^2
BH CV ECHO MEAS - LV V1 MAX: 60.6 CM/SEC
BH CV ECHO MEAS - LV V1 MEAN: 37.5 CM/SEC
BH CV ECHO MEAS - LV V1 VTI: 11.4 CM
BH CV ECHO MEAS - LVIDD: 5.4 CM
BH CV ECHO MEAS - LVIDS: 4.5 CM
BH CV ECHO MEAS - LVLD AP2: 6.5 CM
BH CV ECHO MEAS - LVLD AP4: 5.9 CM
BH CV ECHO MEAS - LVLS AP2: 6.3 CM
BH CV ECHO MEAS - LVLS AP4: 5.5 CM
BH CV ECHO MEAS - LVOT AREA (M): 3.5 CM^2
BH CV ECHO MEAS - LVOT AREA: 3.5 CM^2
BH CV ECHO MEAS - LVOT DIAM: 2.1 CM
BH CV ECHO MEAS - LVPWD: 0.88 CM
BH CV ECHO MEAS - MED PEAK E' VEL: 5 CM/SEC
BH CV ECHO MEAS - MR MAX PG: 39 MMHG
BH CV ECHO MEAS - MR MAX VEL: 312.4 CM/SEC
BH CV ECHO MEAS - MV A DUR: 0.15 SEC
BH CV ECHO MEAS - MV A MAX VEL: 33.5 CM/SEC
BH CV ECHO MEAS - MV DEC SLOPE: 398.9 CM/SEC^2
BH CV ECHO MEAS - MV DEC TIME: 0.19 SEC
BH CV ECHO MEAS - MV E MAX VEL: 68.9 CM/SEC
BH CV ECHO MEAS - MV E/A: 2.1
BH CV ECHO MEAS - MV MAX PG: 1.7 MMHG
BH CV ECHO MEAS - MV MEAN PG: 0.79 MMHG
BH CV ECHO MEAS - MV P1/2T MAX VEL: 69.8 CM/SEC
BH CV ECHO MEAS - MV P1/2T: 51.3 MSEC
BH CV ECHO MEAS - MV V2 MAX: 64.5 CM/SEC
BH CV ECHO MEAS - MV V2 MEAN: 41.6 CM/SEC
BH CV ECHO MEAS - MV V2 VTI: 17 CM
BH CV ECHO MEAS - MVA P1/2T LCG: 3.1 CM^2
BH CV ECHO MEAS - MVA(P1/2T): 4.3 CM^2
BH CV ECHO MEAS - MVA(VTI): 2.3 CM^2
BH CV ECHO MEAS - PA ACC TIME: 0.1 SEC
BH CV ECHO MEAS - PA MAX PG (FULL): 0.88 MMHG
BH CV ECHO MEAS - PA MAX PG: 1.7 MMHG
BH CV ECHO MEAS - PA PR(ACCEL): 33.1 MMHG
BH CV ECHO MEAS - PA V2 MAX: 65.2 CM/SEC
BH CV ECHO MEAS - PVA(V,A): 2 CM^2
BH CV ECHO MEAS - PVA(V,D): 2 CM^2
BH CV ECHO MEAS - QP/QS: 0.56
BH CV ECHO MEAS - RAP SYSTOLE: 15 MMHG
BH CV ECHO MEAS - RV MAX PG: 0.81 MMHG
BH CV ECHO MEAS - RV MEAN PG: 0.42 MMHG
BH CV ECHO MEAS - RV V1 MAX: 45.1 CM/SEC
BH CV ECHO MEAS - RV V1 MEAN: 29.5 CM/SEC
BH CV ECHO MEAS - RV V1 VTI: 7.8 CM
BH CV ECHO MEAS - RVOT AREA: 2.8 CM^2
BH CV ECHO MEAS - RVOT DIAM: 1.9 CM
BH CV ECHO MEAS - RVSP: 46 MMHG
BH CV ECHO MEAS - SI(AO): 96.8 ML/M^2
BH CV ECHO MEAS - SI(CUBED): 39.6 ML/M^2
BH CV ECHO MEAS - SI(LVOT): 23.3 ML/M^2
BH CV ECHO MEAS - SI(MOD-SP2): 16.5 ML/M^2
BH CV ECHO MEAS - SI(MOD-SP4): 10.6 ML/M^2
BH CV ECHO MEAS - SI(TEICH): 29.3 ML/M^2
BH CV ECHO MEAS - SUP REN AO DIAM: 1.5 CM
BH CV ECHO MEAS - SV(AO): 163.8 ML
BH CV ECHO MEAS - SV(CUBED): 67 ML
BH CV ECHO MEAS - SV(LVOT): 39.4 ML
BH CV ECHO MEAS - SV(MOD-SP2): 28 ML
BH CV ECHO MEAS - SV(MOD-SP4): 18 ML
BH CV ECHO MEAS - SV(RVOT): 22.1 ML
BH CV ECHO MEAS - SV(TEICH): 49.6 ML
BH CV ECHO MEAS - TAPSE (>1.6): 1.5 CM2
BH CV ECHO MEAS - TR MAX VEL: 281 CM/SEC
BH CV ECHO MEASUREMENTS AVERAGE E/E' RATIO: 10.6
BH CV XLRA - TDI S': 7 CM/SEC
D DIMER PPP FEU-MCNC: 0.34 MCGFEU/ML (ref 0–0.49)
INR PPP: 2.01 (ref 0.9–1.1)
LEFT ATRIUM VOLUME INDEX: 48 ML/M2
MAXIMAL PREDICTED HEART RATE: 135 BPM
NT-PROBNP SERPL-MCNC: 3186 PG/ML (ref 0–1800)
PROTHROMBIN TIME: 22.4 SECONDS (ref 11.7–14.2)
SINUS: 2.9 CM
STJ: 2.6 CM
STRESS TARGET HR: 115 BPM
TSH SERPL DL<=0.05 MIU/L-ACNC: 0.21 MIU/ML (ref 0.27–4.2)

## 2018-06-08 PROCEDURE — 93306 TTE W/DOPPLER COMPLETE: CPT

## 2018-06-08 PROCEDURE — 36415 COLL VENOUS BLD VENIPUNCTURE: CPT

## 2018-06-08 PROCEDURE — 36415 COLL VENOUS BLD VENIPUNCTURE: CPT | Performed by: NURSE PRACTITIONER

## 2018-06-08 PROCEDURE — G0378 HOSPITAL OBSERVATION PER HR: HCPCS

## 2018-06-08 PROCEDURE — 85610 PROTHROMBIN TIME: CPT | Performed by: NURSE PRACTITIONER

## 2018-06-08 PROCEDURE — 99220 PR INITIAL OBSERVATION CARE/DAY 70 MINUTES: CPT | Performed by: NURSE PRACTITIONER

## 2018-06-08 PROCEDURE — 0399T ADULT TRANSTHORACIC ECHO COMPLETE W/ CONT IF NECESSARY PER PROTOCOL: CPT | Performed by: INTERNAL MEDICINE

## 2018-06-08 PROCEDURE — 93306 TTE W/DOPPLER COMPLETE: CPT | Performed by: INTERNAL MEDICINE

## 2018-06-08 PROCEDURE — 84443 ASSAY THYROID STIM HORMONE: CPT | Performed by: NURSE PRACTITIONER

## 2018-06-08 PROCEDURE — 71046 X-RAY EXAM CHEST 2 VIEWS: CPT

## 2018-06-08 PROCEDURE — 93000 ELECTROCARDIOGRAM COMPLETE: CPT | Performed by: NURSE PRACTITIONER

## 2018-06-08 PROCEDURE — 85379 FIBRIN DEGRADATION QUANT: CPT | Performed by: NURSE PRACTITIONER

## 2018-06-08 PROCEDURE — 25010000002 FUROSEMIDE PER 20 MG: Performed by: INTERNAL MEDICINE

## 2018-06-08 PROCEDURE — 0399T HC MYOCARDL STRAIN IMAG QUAN ASSMT PER SESS: CPT

## 2018-06-08 PROCEDURE — 83880 ASSAY OF NATRIURETIC PEPTIDE: CPT | Performed by: NURSE PRACTITIONER

## 2018-06-08 RX ORDER — WARFARIN SODIUM 2.5 MG/1
1.25 TABLET ORAL NIGHTLY
COMMUNITY
End: 2019-05-13 | Stop reason: SDUPTHER

## 2018-06-08 RX ORDER — WARFARIN SODIUM 2.5 MG/1
1.25 TABLET ORAL 3 TIMES WEEKLY
Status: DISCONTINUED | OUTPATIENT
Start: 2018-06-08 | End: 2018-06-08

## 2018-06-08 RX ORDER — FUROSEMIDE 10 MG/ML
20 INJECTION INTRAMUSCULAR; INTRAVENOUS EVERY 12 HOURS
Status: DISCONTINUED | OUTPATIENT
Start: 2018-06-08 | End: 2018-06-10

## 2018-06-08 RX ORDER — WARFARIN SODIUM 2.5 MG/1
1.25 TABLET ORAL
Status: DISCONTINUED | OUTPATIENT
Start: 2018-06-08 | End: 2018-06-11

## 2018-06-08 RX ORDER — CHOLECALCIFEROL (VITAMIN D3) 125 MCG
1000 CAPSULE ORAL DAILY
Status: DISCONTINUED | OUTPATIENT
Start: 2018-06-08 | End: 2018-06-12

## 2018-06-08 RX ORDER — ANASTROZOLE 1 MG/1
1 TABLET ORAL DAILY
Status: DISCONTINUED | OUTPATIENT
Start: 2018-06-08 | End: 2018-06-13 | Stop reason: HOSPADM

## 2018-06-08 RX ORDER — TEMAZEPAM 7.5 MG/1
7.5 CAPSULE ORAL NIGHTLY PRN
Status: DISCONTINUED | OUTPATIENT
Start: 2018-06-08 | End: 2018-06-13 | Stop reason: HOSPADM

## 2018-06-08 RX ORDER — WARFARIN SODIUM 2.5 MG/1
2.5 TABLET ORAL
Status: DISCONTINUED | OUTPATIENT
Start: 2018-06-09 | End: 2018-06-08

## 2018-06-08 RX ORDER — WARFARIN SODIUM 2.5 MG/1
2.5 TABLET ORAL
Status: DISCONTINUED | OUTPATIENT
Start: 2018-06-09 | End: 2018-06-11

## 2018-06-08 RX ORDER — LEVOTHYROXINE SODIUM 0.07 MG/1
75 TABLET ORAL
Status: DISCONTINUED | OUTPATIENT
Start: 2018-06-09 | End: 2018-06-13 | Stop reason: HOSPADM

## 2018-06-08 RX ORDER — ACETAMINOPHEN 325 MG/1
650 TABLET ORAL EVERY 6 HOURS PRN
Status: DISCONTINUED | OUTPATIENT
Start: 2018-06-08 | End: 2018-06-13 | Stop reason: HOSPADM

## 2018-06-08 RX ADMIN — FUROSEMIDE 20 MG: 10 INJECTION, SOLUTION INTRAMUSCULAR; INTRAVENOUS at 17:58

## 2018-06-08 RX ADMIN — WARFARIN SODIUM 1.25 MG: 2.5 TABLET ORAL at 21:53

## 2018-06-08 RX ADMIN — TEMAZEPAM 7.5 MG: 7.5 CAPSULE ORAL at 22:24

## 2018-06-08 NOTE — PROGRESS NOTES
Date of Office Visit: 2018  Encounter Provider: KIKA Vidal  Place of Service: [facility] CARDIOLOGY  Patient Name: Diana Avalos  :1932    Chief Complaint   Patient presents with   • Follow-up   :     HPI: Diana Avalos is a 85 y.o. female who presents today for shortness of breath. She has a history of atrial fibrillation, AV node ablation (2012), and status post single-chamber [employer] pacemaker placement (2012).  She has a history of cardiomyopathy dating back to  with ejection fraction as low as 35% at one point.  She has a history of mitral valve insufficiency, tricuspid insufficiency, and pulmonary hypertension per echocardiogram 2015.  She is an established patient of Dr. Rafaela Leija and was last in the office 2017.    On  she presented to the [facility] emergency department with acute shortness of breath.  Upon review of the records, O2 sat 98%, blood pressure 156/94, heart rate 79 and temperature 98.1.  Comprehensive metabolic panel, troponin, magnesium, hemoglobin and hematocrit all normal.  INR was 2.26 and she is on warfarin.  Chest x-ray showed the heart size mildly enlarged and lungs clear.  EKG showed paced rhythm.  Orthostatic blood pressures were negative.  She was recommended to follow-up in our office for further evaluation.    She presents today for follow-up with her daughter accompanying her.  She is having worsening shortness of breath both with rest and exertion, as well as fatigue.  She denies chest pain, PND, orthopnea, palpitations, edema, dizziness, or syncope.  Her blood pressure and heart rate are both normal.  Her weights at home have been stable and she does not feel that she is filling up with fluid.    The following portion of the patient's history were reviewed and updated as appropriate: past medical history, past surgical history, past social history, past family history, allergies,  current medications, and problem list.    Past Medical History:   Diagnosis Date   • Anemia    • Arthritis    • Asthma    • Atrial fibrillation     With a history of an atrial clot   • Breast cancer     Left, stage IIB   • Decreased cardiac ejection fraction    • Disease of thyroid gland     Hypothyroidism   • Heart disease    • Heart failure    • Irritable bowel    • Mild tricuspid regurgitation    • Moderate mitral insufficiency        Past Surgical History:   Procedure Laterality Date   • CHOLECYSTECTOMY  2000   • EYE SURGERY  2005    cataracts, Dr. Miramontes   • MASTECTOMY Left 10/2014   • PACEMAKER IMPLANTATION  2012    Dr. Leija       Social History     Social History   • Marital status:      Spouse name: Rick   • Number of children: 8   • Years of education: N/A     Occupational History   •  Retired     Social History Main Topics   • Smoking status: Never Smoker   • Smokeless tobacco: Never Used      Comment: caffeine use-tea   • Alcohol use 0.6 oz/week     1 Glasses of wine per week      Comment: social   • Drug use: No   • Sexual activity: Not on file     Other Topics Concern   • Not on file     Social History Narrative   • No narrative on file       Family History   Problem Relation Age of Onset   • Colon cancer Mother 75   • Colon cancer Sister 79   • Hypertension Sister    • Cholelithiasis Sister         2 sisters       Review of Systems   Constitution: Positive for malaise/fatigue. Negative for chills, diaphoresis, fever, night sweats, weight gain and weight loss.   HENT: Negative for hearing loss, nosebleeds, sore throat and tinnitus.    Eyes: Negative for blurred vision, double vision, pain and visual disturbance.   Cardiovascular: Positive for dyspnea on exertion. Negative for chest pain, claudication, cyanosis, irregular heartbeat, leg swelling, near-syncope, orthopnea, palpitations, paroxysmal nocturnal dyspnea and syncope.   Respiratory: Positive for shortness of breath. Negative for  "cough, hemoptysis, snoring and wheezing.    Endocrine: Negative for cold intolerance, heat intolerance and polyuria.   Hematologic/Lymphatic: Negative for bleeding problem. Does not bruise/bleed easily.   Skin: Negative for color change, dry skin, flushing and itching.   Musculoskeletal: Positive for joint pain. Negative for falls, joint swelling, muscle cramps, muscle weakness and myalgias.   Gastrointestinal: Negative for abdominal pain, constipation, heartburn, melena, nausea and vomiting.   Genitourinary: Negative for dysuria and hematuria.   Neurological: Positive for headaches. Negative for excessive daytime sleepiness, dizziness, light-headedness, loss of balance, numbness, paresthesias, seizures and vertigo.   Psychiatric/Behavioral: Negative for altered mental status, depression, memory loss and substance abuse. The patient does not have insomnia and is not nervous/anxious.    Allergic/Immunologic: Negative for environmental allergies.       Allergies   Allergen Reactions   • Amiodarone Nausea Only   • Codeine Nausea Only   • Dabigatran Etexilate Mesylate Nausea Only   • Digoxin Nausea Only   • Iodinated Diagnostic Agents          Current Outpatient Prescriptions:   •  anastrozole (ARIMIDEX) 1 MG tablet, TAKE 1 TABLET BY MOUTH EVERY DAY, Disp: 30 tablet, Rfl: 6  •  furosemide (LASIX) 20 MG tablet, TAKE ONE TABLET BY MOUTH DAILY, Disp: 90 tablet, Rfl: 0  •  Glucosamine 750 MG tablet, Take 750 mg by mouth., Disp: , Rfl:   •  levothyroxine (SYNTHROID, LEVOTHROID) 88 MCG tablet, Take  by mouth daily., Disp: , Rfl:   •  vitamin B-12 (CYANOCOBALAMIN) 1000 MCG tablet, Take 1,000 mcg by mouth Daily., Disp: , Rfl:   •  warfarin (COUMADIN) 2.5 MG tablet, TAKE ONE TABLET BY MOUTH DAILY, Disp: 90 tablet, Rfl: 0      Objective:     Vitals:    06/08/18 1158 06/08/18 1159 06/08/18 1200 06/08/18 1225   BP:    124/70   Pulse: 71 78 74 74   SpO2: 98% 98% 98%    Weight:    68.7 kg (151 lb 6.4 oz)   Height:    167.6 cm (66\") "     Body mass index is 24.44 kg/m².    PHYSICAL EXAM:    Vitals Reviewed.   General Appearance: No acute distress, well developed and well nourished.   Eyes: Conjunctiva and lids: No erythema, swelling, or discharge. Sclera non-icteric.   HENT: Atraumatic, normocephalic. External eyes, ears, and nose normal. No hearing loss noted. Mucous membranes normal. Lips not cyanotic. Neck supple with no tenderness.  Respiratory: No signs of respiratory distress. Respiration rhythm and depth normal.   Clear to auscultation. No rales, crackles, rhonchi, or wheezing auscultated.   Cardiovascular:  Jugular Venous Pressure: Normal  Heart Rate and Rhythm: Normal, Heart Sounds: Normal S1 and S2. No S3 or S4 noted.  Murmurs: Left lower sternal border grade 2/6 diastolic murmurs noted. No rubs, thrills, or gallops.   Pacemaker present.  Arterial Pulses: Carotid pulses normal. No carotid bruit noted. Posterior tibialis and dorsalis pedis pulses normal.   Lower Extremities: No edema noted.  Gastrointestinal:  Abdomen soft, non-distended, non-tender. Normal bowel sounds. No hepatomegaly.   Musculoskeletal: Normal movement of extremities  Skin and Nails: General appearance normal. No pallor, cyanosis, diaphoresis. Skin temperature normal. No clubbing of fingernails.   Psychiatric: Patient alert and oriented to person, place, and time. Speech and behavior appropriate. Normal mood and affect.       ECG 12 Lead  Date/Time: 2018 11:14 AM  Performed by: RADHA CAIN  Authorized by: RADHA CAIN   Comparison: compared with previous ECG from 2018  Similar to previous ECG  Rhythm: paced  Rate: normal  BPM: 74  Conduction: conduction normal  Pacin% capture  Clinical impression: abnormal ECG              Assessment:       Diagnosis Plan   1. Shortness of breath  proBNP    D-dimer, Quantitative    Adult Transthoracic Echo Complete W/ Cont if Necessary Per Protocol    TSH    TSH   2. Cardiomyopathy, unspecified type     3.  Chronic atrial fibrillation  ECG 12 Lead   4. Presence of cardiac pacemaker     5. On warfarin therapy  Protime-INR   6. Non-rheumatic mitral regurgitation     7. Non-rheumatic tricuspid valve insufficiency     8. Hypothyroidism, unspecified type  TSH    TSH          Plan:       She presents today with acute shortness of breath over the past week.  She was initially evaluated in the emergency department and then sent home.  She says that her shortness of breath worsened yesterday.  She denies orthopnea, weight gain, lower extremity edema, or fluid retention.  I ordered a d-dimer which was normal, Pro BNP elevated at 3186, TSH abnormal at 0.207, pro time 22.4, and INR 2.01. 2-D echocardiogram revealed an EF of 36%, longitudinal LV strain 13%, left ventricle mildly dilated, LVH, marked dyssynchrony of the septum/apex/anterior wall presumably due to RV apical pacing, apex dyskinetic, moderate to severe mitral valve regurgitation, moderate to severe tricuspid valve regurgitation, and RVSP elevated at 46 mmHg.    I discussed the plan of care with Dr. Klever Melgar (Dr. Rafaela Leija is out of the office) and we have recommended admission to the hospital for further treatment of shortness of breath.  Our plan is for gentle diuresis with IV furosemide 20 mg twice daily and EP consultation for possible upgrade to a biventricular pacemaker.  I will also obtain a chest x-ray.  We do not feel that we need to proceed with any ischemic testing during the hospitalization.  Her TSH was abnormal lower her levothyroxine to 75 MCG daily.    All this was well explained to both the patient and her daughter, both verbalize understanding.  She will be transferred to [facility] via wheelchair.    As always, it has been a pleasure to participate in your patient's care.      Sincerely,         KIKA Castelan

## 2018-06-08 NOTE — H&P
This is copied/pasted from Ms. Phipps's note today.    Office Visit     2018  [facility] CARDIOLOGY   KIKA Jon   Cardiology   Shortness of breath +7 more   Dx   Follow-up   Reason for Visit    Progress Notes     Procedure Orders:   1. ECG 12 Lead [[id number]] ordered by KIKA Jon at 18 1105   Expand All Collapse All       Date of Office Visit: 2018  Encounter Provider: KIKA Vidal  Place of Service: [facility] CARDIOLOGY  Patient Name: Diana Avalos  :1932         Chief Complaint   Patient presents with   • Follow-up   :      HPI: Diana Avalos is a 85 y.o. female who presents today for shortness of breath. She has a history of atrial fibrillation, AV node ablation (2012), and status post single-chamber [employer] pacemaker placement (2012).  She has a history of cardiomyopathy dating back to  with ejection fraction as low as 35% at one point.  She has a history of mitral valve insufficiency, tricuspid insufficiency, and pulmonary hypertension per echocardiogram 2015.  She is an established patient of Dr. Rafaela Leija and was last in the office 2017.     On  she presented to the [facility] emergency department with acute shortness of breath.  Upon review of the records, O2 sat 98%, blood pressure 156/94, heart rate 79 and temperature 98.1.  Comprehensive metabolic panel, troponin, magnesium, hemoglobin and hematocrit all normal.  INR was 2.26 and she is on warfarin.  Chest x-ray showed the heart size mildly enlarged and lungs clear.  EKG showed paced rhythm.  Orthostatic blood pressures were negative.  She was recommended to follow-up in our office for further evaluation.     She presents today for follow-up with her daughter accompanying her.  She is having worsening shortness of breath both with rest and exertion, as well as fatigue.  She denies chest pain, PND,  orthopnea, palpitations, edema, dizziness, or syncope.  Her blood pressure and heart rate are both normal.  Her weights at home have been stable and she does not feel that she is filling up with fluid.     The following portion of the patient's history were reviewed and updated as appropriate: past medical history, past surgical history, past social history, past family history, allergies, current medications, and problem list.     Medical History        Past Medical History:   Diagnosis Date   • Anemia     • Arthritis     • Asthma     • Atrial fibrillation       With a history of an atrial clot   • Breast cancer       Left, stage IIB   • Decreased cardiac ejection fraction     • Disease of thyroid gland       Hypothyroidism   • Heart disease     • Heart failure     • Irritable bowel     • Mild tricuspid regurgitation     • Moderate mitral insufficiency              Surgical History         Past Surgical History:   Procedure Laterality Date   • CHOLECYSTECTOMY   2000   • EYE SURGERY   2005     cataracts, Dr. Miramontes   • MASTECTOMY Left 10/2014   • PACEMAKER IMPLANTATION   2012     Dr. Leija            Social History   Social History            Social History   • Marital status:        Spouse name: Rick   • Number of children: 8   • Years of education: N/A           Occupational History   •   Retired             Social History Main Topics   • Smoking status: Never Smoker   • Smokeless tobacco: Never Used         Comment: caffeine use-tea   • Alcohol use 0.6 oz/week       1 Glasses of wine per week         Comment: social   • Drug use: No   • Sexual activity: Not on file           Other Topics Concern   • Not on file          Social History Narrative   • No narrative on file                  Family History   Problem Relation Age of Onset   • Colon cancer Mother 75   • Colon cancer Sister 79   • Hypertension Sister     • Cholelithiasis Sister           2 sisters         Review of Systems   Constitution:  Positive for malaise/fatigue. Negative for chills, diaphoresis, fever, night sweats, weight gain and weight loss.   HENT: Negative for hearing loss, nosebleeds, sore throat and tinnitus.    Eyes: Negative for blurred vision, double vision, pain and visual disturbance.   Cardiovascular: Positive for dyspnea on exertion. Negative for chest pain, claudication, cyanosis, irregular heartbeat, leg swelling, near-syncope, orthopnea, palpitations, paroxysmal nocturnal dyspnea and syncope.   Respiratory: Positive for shortness of breath. Negative for cough, hemoptysis, snoring and wheezing.    Endocrine: Negative for cold intolerance, heat intolerance and polyuria.   Hematologic/Lymphatic: Negative for bleeding problem. Does not bruise/bleed easily.   Skin: Negative for color change, dry skin, flushing and itching.   Musculoskeletal: Positive for joint pain. Negative for falls, joint swelling, muscle cramps, muscle weakness and myalgias.   Gastrointestinal: Negative for abdominal pain, constipation, heartburn, melena, nausea and vomiting.   Genitourinary: Negative for dysuria and hematuria.   Neurological: Positive for headaches. Negative for excessive daytime sleepiness, dizziness, light-headedness, loss of balance, numbness, paresthesias, seizures and vertigo.   Psychiatric/Behavioral: Negative for altered mental status, depression, memory loss and substance abuse. The patient does not have insomnia and is not nervous/anxious.    Allergic/Immunologic: Negative for environmental allergies.              Allergies   Allergen Reactions   • Amiodarone Nausea Only   • Codeine Nausea Only   • Dabigatran Etexilate Mesylate Nausea Only   • Digoxin Nausea Only   • Iodinated Diagnostic Agents              Current Outpatient Prescriptions:   •  anastrozole (ARIMIDEX) 1 MG tablet, TAKE 1 TABLET BY MOUTH EVERY DAY, Disp: 30 tablet, Rfl: 6  •  furosemide (LASIX) 20 MG tablet, TAKE ONE TABLET BY MOUTH DAILY, Disp: 90 tablet, Rfl: 0  •   "Glucosamine 750 MG tablet, Take 750 mg by mouth., Disp: , Rfl:   •  levothyroxine (SYNTHROID, LEVOTHROID) 88 MCG tablet, Take  by mouth daily., Disp: , Rfl:   •  vitamin B-12 (CYANOCOBALAMIN) 1000 MCG tablet, Take 1,000 mcg by mouth Daily., Disp: , Rfl:   •  warfarin (COUMADIN) 2.5 MG tablet, TAKE ONE TABLET BY MOUTH DAILY, Disp: 90 tablet, Rfl: 0      Objective:      Vitals          Vitals:     06/08/18 1158 06/08/18 1159 06/08/18 1200 06/08/18 1225   BP:       124/70   Pulse: 71 78 74 74   SpO2: 98% 98% 98%     Weight:       68.7 kg (151 lb 6.4 oz)   Height:       167.6 cm (66\")         Body mass index is 24.44 kg/m².     PHYSICAL EXAM:     Vitals Reviewed.   General Appearance: No acute distress, well developed and well nourished.   Eyes: Conjunctiva and lids: No erythema, swelling, or discharge. Sclera non-icteric.   HENT: Atraumatic, normocephalic. External eyes, ears, and nose normal. No hearing loss noted. Mucous membranes normal. Lips not cyanotic. Neck supple with no tenderness.  Respiratory: No signs of respiratory distress. Respiration rhythm and depth normal.   Clear to auscultation. No rales, crackles, rhonchi, or wheezing auscultated.   Cardiovascular:  Jugular Venous Pressure: Normal  Heart Rate and Rhythm: Normal, Heart Sounds: Normal S1 and S2. No S3 or S4 noted.  Murmurs: Left lower sternal border grade 2/6 diastolic murmurs noted. No rubs, thrills, or gallops.   Pacemaker present.  Arterial Pulses: Carotid pulses normal. No carotid bruit noted. Posterior tibialis and dorsalis pedis pulses normal.   Lower Extremities: No edema noted.  Gastrointestinal:  Abdomen soft, non-distended, non-tender. Normal bowel sounds. No hepatomegaly.   Musculoskeletal: Normal movement of extremities  Skin and Nails: General appearance normal. No pallor, cyanosis, diaphoresis. Skin temperature normal. No clubbing of fingernails.   Psychiatric: Patient alert and oriented to person, place, and time. Speech and behavior " appropriate. Normal mood and affect.        ECG 12 Lead  Date/Time: 2018 11:14 AM  Performed by: RADHA CAIN  Authorized by: RADHA CAIN   Comparison: compared with previous ECG from 2018  Similar to previous ECG  Rhythm: paced  Rate: normal  BPM: 74  Conduction: conduction normal  Pacin% capture  Clinical impression: abnormal ECG             Assessment:        Diagnosis Plan   1. Shortness of breath  proBNP     D-dimer, Quantitative     Adult Transthoracic Echo Complete W/ Cont if Necessary Per Protocol     TSH     TSH   2. Cardiomyopathy, unspecified type      3. Chronic atrial fibrillation  ECG 12 Lead   4. Presence of cardiac pacemaker      5. On warfarin therapy  Protime-INR   6. Non-rheumatic mitral regurgitation      7. Non-rheumatic tricuspid valve insufficiency      8. Hypothyroidism, unspecified type  TSH     TSH            Plan:       She presents today with acute shortness of breath over the past week.  She was initially evaluated in the emergency department and then sent home.  She says that her shortness of breath worsened yesterday.  She denies orthopnea, weight gain, lower extremity edema, or fluid retention.  I ordered a d-dimer which was normal, Pro BNP elevated at 3186, TSH abnormal at 0.207, pro time 22.4, and INR 2.01. 2-D echocardiogram revealed an EF of 36%, longitudinal LV strain 13%, left ventricle mildly dilated, LVH, marked dyssynchrony of the septum/apex/anterior wall presumably due to RV apical pacing, apex dyskinetic, moderate to severe mitral valve regurgitation, moderate to severe tricuspid valve regurgitation, and RVSP elevated at 46 mmHg.     I discussed the plan of care with Dr. Klever Melgar (Dr. Rafaela Leija is out of the office) and we have recommended admission to the hospital for further treatment of shortness of breath.  Our plan is for gentle diuresis with IV furosemide 20 mg twice daily and EP consultation for possible upgrade to a biventricular  "pacemaker.  I will also obtain a chest x-ray.  We do not feel that we need to proceed with any ischemic testing during the hospitalization.  Her TSH was abnormal lower her levothyroxine to 75 MCG daily.     All this was well explained to both the patient and her daughter, both verbalize understanding.  She will be transferred to [facility] via wheelchair.     As always, it has been a pleasure to participate in your patient's care.        Sincerely,            KIKA Castelan                           Additional Documentation     Vitals:    /70    Pulse 74    Ht 167.6 cm (66\")    Wt 68.7 kg (151 lb 6.4 oz)    SpO2 98%    BMI 24.44 kg/m²    BSA 1.78 m²         More Vitals    Flowsheets:    Custom Formula Data       Encounter Info:    Billing Info,    History,    Allergies,    Detailed Report,    Prep for Surgery Order Report,    PAF,    Chart Review: Routing History,    Coding Summary,    Encounter Facesheet,    Link Facesheet       Orders Placed         proBNP       Protime-INR       TSH       D-dimer, Quantitative       Adult Transthoracic Echo Complete W/ Cont if Necessary Per Protocol       ECG 12 Lead      All Encounter Results   Medication Changes        None      Medication List   Visit Diagnoses         Shortness of breath      Cardiomyopathy, unspecified type      Chronic atrial fibrillation      Presence of cardiac pacemaker      On warfarin therapy      Non-rheumatic mitral regurgitation      Non-rheumatic tricuspid valve insufficiency      Hypothyroidism, unspecified type      Problem List    Current Medications     anastrozole (ARIMIDEX) 1 MG tablet   Disp: 30 tablet   Start: 2/9/2018   Sig: TAKE 1 TABLET BY MOUTH EVERY DAY   furosemide (LASIX) 20 MG tablet   Disp: 90 tablet   Start: 5/24/2018   Sig: TAKE ONE TABLET BY MOUTH DAILY   Glucosamine 750 MG tablet   Start: 6/21/2017   Sig - Route: Take 750 mg by mouth. - Oral   Class: Historical Med   levothyroxine (SYNTHROID, LEVOTHROID) 88 MCG " tablet   Start: 7/30/2012   Sig - Route: Take  by mouth daily. - Oral   Class: Historical Med   vitamin B-12 (CYANOCOBALAMIN) 1000 MCG tablet   Sig - Route: Take 1,000 mcg by mouth Daily. - Oral   Class: Historical Med   warfarin (COUMADIN) 2.5 MG tablet   Disp: 90 tablet   Start: 6/7/2018   Sig: TAKE ONE TABLET BY MOUTH DAILY

## 2018-06-08 NOTE — PLAN OF CARE
Problem: Patient Care Overview  Goal: Plan of Care Review  Outcome: Ongoing (interventions implemented as appropriate)   06/08/18 3597   Coping/Psychosocial   Plan of Care Reviewed With patient;daughter   Plan of Care Review   Progress no change   OTHER   Outcome Summary Direct admit from cardiology office. SOA on exertion. V Paced with underlying AFib and PVCs. Very pleasant and cooperative. Room Air. 20mg Lasix IV q12h. Family at bedside. Safety matntained, continue to monitor.      Goal: Individualization and Mutuality  Outcome: Ongoing (interventions implemented as appropriate)    Goal: Discharge Needs Assessment  Outcome: Ongoing (interventions implemented as appropriate)    Goal: Interprofessional Rounds/Family Conf  Outcome: Ongoing (interventions implemented as appropriate)      Problem: Fall Risk (Adult)  Goal: Identify Related Risk Factors and Signs and Symptoms  Outcome: Ongoing (interventions implemented as appropriate)    Goal: Absence of Fall  Outcome: Ongoing (interventions implemented as appropriate)      Problem: Arrhythmia/Dysrhythmia (Symptomatic) (Adult)  Goal: Signs and Symptoms of Listed Potential Problems Will be Absent, Minimized or Managed (Arrhythmia/Dysrhythmia)  Outcome: Ongoing (interventions implemented as appropriate)

## 2018-06-09 PROBLEM — I50.9 HEART FAILURE: Status: ACTIVE | Noted: 2018-06-09

## 2018-06-09 LAB
ANION GAP SERPL CALCULATED.3IONS-SCNC: 12.6 MMOL/L
BUN BLD-MCNC: 13 MG/DL (ref 8–23)
BUN/CREAT SERPL: 16.7 (ref 7–25)
CALCIUM SPEC-SCNC: 9.1 MG/DL (ref 8.6–10.5)
CHLORIDE SERPL-SCNC: 103 MMOL/L (ref 98–107)
CO2 SERPL-SCNC: 25.4 MMOL/L (ref 22–29)
CREAT BLD-MCNC: 0.78 MG/DL (ref 0.57–1)
DEPRECATED RDW RBC AUTO: 46.5 FL (ref 37–54)
ERYTHROCYTE [DISTWIDTH] IN BLOOD BY AUTOMATED COUNT: 12.9 % (ref 11.7–13)
GFR SERPL CREATININE-BSD FRML MDRD: 70 ML/MIN/1.73
GLUCOSE BLD-MCNC: 88 MG/DL (ref 65–99)
HCT VFR BLD AUTO: 44.2 % (ref 35.6–45.5)
HGB BLD-MCNC: 14.2 G/DL (ref 11.9–15.5)
INR PPP: 1.99 (ref 0.9–1.1)
MCH RBC QN AUTO: 31.4 PG (ref 26.9–32)
MCHC RBC AUTO-ENTMCNC: 32.1 G/DL (ref 32.4–36.3)
MCV RBC AUTO: 97.8 FL (ref 80.5–98.2)
PLATELET # BLD AUTO: 216 10*3/MM3 (ref 140–500)
PMV BLD AUTO: 10.4 FL (ref 6–12)
POTASSIUM BLD-SCNC: 3.7 MMOL/L (ref 3.5–5.2)
PROTHROMBIN TIME: 22.3 SECONDS (ref 11.7–14.2)
RBC # BLD AUTO: 4.52 10*6/MM3 (ref 3.9–5.2)
SODIUM BLD-SCNC: 141 MMOL/L (ref 136–145)
T4 FREE SERPL-MCNC: 1.79 NG/DL (ref 0.93–1.7)
TSH SERPL DL<=0.05 MIU/L-ACNC: 0.23 MIU/ML (ref 0.27–4.2)
WBC NRBC COR # BLD: 5.58 10*3/MM3 (ref 4.5–10.7)

## 2018-06-09 PROCEDURE — 84443 ASSAY THYROID STIM HORMONE: CPT | Performed by: INTERNAL MEDICINE

## 2018-06-09 PROCEDURE — 80048 BASIC METABOLIC PNL TOTAL CA: CPT | Performed by: INTERNAL MEDICINE

## 2018-06-09 PROCEDURE — 25010000002 FUROSEMIDE PER 20 MG: Performed by: INTERNAL MEDICINE

## 2018-06-09 PROCEDURE — 85610 PROTHROMBIN TIME: CPT | Performed by: INTERNAL MEDICINE

## 2018-06-09 PROCEDURE — 84439 ASSAY OF FREE THYROXINE: CPT | Performed by: INTERNAL MEDICINE

## 2018-06-09 PROCEDURE — 99233 SBSQ HOSP IP/OBS HIGH 50: CPT | Performed by: INTERNAL MEDICINE

## 2018-06-09 PROCEDURE — 85027 COMPLETE CBC AUTOMATED: CPT | Performed by: INTERNAL MEDICINE

## 2018-06-09 RX ADMIN — LEVOTHYROXINE SODIUM 75 MCG: 75 TABLET ORAL at 08:04

## 2018-06-09 RX ADMIN — TEMAZEPAM 7.5 MG: 7.5 CAPSULE ORAL at 21:20

## 2018-06-09 RX ADMIN — FUROSEMIDE 20 MG: 10 INJECTION, SOLUTION INTRAMUSCULAR; INTRAVENOUS at 05:20

## 2018-06-09 RX ADMIN — WARFARIN SODIUM 2.5 MG: 2.5 TABLET ORAL at 21:21

## 2018-06-09 RX ADMIN — FUROSEMIDE 20 MG: 10 INJECTION, SOLUTION INTRAMUSCULAR; INTRAVENOUS at 16:48

## 2018-06-09 RX ADMIN — ANASTROZOLE 1 MG: 1 TABLET, COATED ORAL at 11:09

## 2018-06-09 RX ADMIN — Medication 1000 MCG: at 11:09

## 2018-06-09 NOTE — PLAN OF CARE
Problem: Patient Care Overview  Goal: Plan of Care Review  Outcome: Ongoing (interventions implemented as appropriate)   06/09/18 0610   Coping/Psychosocial   Plan of Care Reviewed With patient   Plan of Care Review   Progress no change   OTHER   Outcome Summary NO ACUTE DISTRESS NOTED THIS SHIFT, VSS, NO COMPLAINT, SLEPT, CONTINUE PLAN OF CARE     Goal: Individualization and Mutuality  Outcome: Ongoing (interventions implemented as appropriate)    Goal: Discharge Needs Assessment  Outcome: Ongoing (interventions implemented as appropriate)      Problem: Fall Risk (Adult)  Goal: Identify Related Risk Factors and Signs and Symptoms  Outcome: Outcome(s) achieved Date Met: 06/09/18    Goal: Absence of Fall  Outcome: Ongoing (interventions implemented as appropriate)      Problem: Arrhythmia/Dysrhythmia (Symptomatic) (Adult)  Goal: Signs and Symptoms of Listed Potential Problems Will be Absent, Minimized or Managed (Arrhythmia/Dysrhythmia)  Outcome: Ongoing (interventions implemented as appropriate)

## 2018-06-09 NOTE — PROGRESS NOTES
"CC: Heart failure    Interval History:   States she is breathing better today no events last night.    Vital Signs  Temp:  [97.5 °F (36.4 °C)-98.1 °F (36.7 °C)] 97.8 °F (36.6 °C)  Heart Rate:  [] 71  Resp:  [18] 18  BP: (124-161)/(63-92) 128/74    Intake/Output Summary (Last 24 hours) at 06/09/18 0902  Last data filed at 06/08/18 1750   Gross per 24 hour   Intake              210 ml   Output                0 ml   Net              210 ml     Flowsheet Rows      First Filed Value   Admission Height  167 cm (65.75\") Documented at 06/08/2018 1506   Admission Weight  66.4 kg (146 lb 6.4 oz) Documented at 06/08/2018 1506          PHYSICAL EXAM:  General: No acute distress  Resp:NL Rate, unlabored, Basilar rales  CV:NL rate and rhythm, NL PMI, Nl S1 and S2,2/6 systolic Mumur, no gallop, no rub, No JVD. Normal pedal pulses  ABD:Nl sounds, no masses or tenderness, nondistended, no quarding or rebound  Neuro: alert,cooperative and oriented  Extr: No edema or cyanosis, moves all extremities      Results Review:      Results from last 7 days  Lab Units 06/09/18  0506   SODIUM mmol/L 141   POTASSIUM mmol/L 3.7   CHLORIDE mmol/L 103   CO2 mmol/L 25.4   BUN mg/dL 13   CREATININE mg/dL 0.78   GLUCOSE mg/dL 88   CALCIUM mg/dL 9.1           Results from last 7 days  Lab Units 06/09/18  0506   WBC 10*3/mm3 5.58   HEMOGLOBIN g/dL 14.2   HEMATOCRIT % 44.2   PLATELETS 10*3/mm3 216       Results from last 7 days  Lab Units 06/09/18  0506 06/08/18  1149   INR  1.99* 2.01*                 I reviewed the patient's new clinical results.  I personally viewed and interpreted the patient's EKG/Telemetry data        Medication Review:   Meds reviewed         Assessment/Plan  1.  Acute on chronic systolic heart failure.  Echocardiogram June 2018 left ventricular ejection fraction of 35%.  Intake and output not accurate.  We'll continue IV diuretics switched by mouth in the morning and recheck renal function the morning.  2.  Age fibrillation " chronic status post AV node ablation and single-chamber pacemaker.  INR therapeutic recheck that the morning.  3.  Valvular heart disease with moderate to severe mitral insufficiency, pulse tricuspid insufficiency with mild pulmonary hypertension.  4.  Hypothyroidism TSH is low recheck that today may need to decrease replacement.      Greg Woodward MD  06/09/18  9:02 AM

## 2018-06-10 LAB
ANION GAP SERPL CALCULATED.3IONS-SCNC: 10.2 MMOL/L
BUN BLD-MCNC: 19 MG/DL (ref 8–23)
BUN/CREAT SERPL: 18.3 (ref 7–25)
CALCIUM SPEC-SCNC: 9.1 MG/DL (ref 8.6–10.5)
CHLORIDE SERPL-SCNC: 100 MMOL/L (ref 98–107)
CO2 SERPL-SCNC: 30.8 MMOL/L (ref 22–29)
CREAT BLD-MCNC: 1.04 MG/DL (ref 0.57–1)
GFR SERPL CREATININE-BSD FRML MDRD: 50 ML/MIN/1.73
GLUCOSE BLD-MCNC: 93 MG/DL (ref 65–99)
POTASSIUM BLD-SCNC: 3.9 MMOL/L (ref 3.5–5.2)
SODIUM BLD-SCNC: 141 MMOL/L (ref 136–145)

## 2018-06-10 PROCEDURE — 80048 BASIC METABOLIC PNL TOTAL CA: CPT | Performed by: INTERNAL MEDICINE

## 2018-06-10 PROCEDURE — 99233 SBSQ HOSP IP/OBS HIGH 50: CPT | Performed by: INTERNAL MEDICINE

## 2018-06-10 PROCEDURE — 25010000002 FUROSEMIDE PER 20 MG: Performed by: INTERNAL MEDICINE

## 2018-06-10 RX ORDER — FUROSEMIDE 20 MG/1
20 TABLET ORAL DAILY
Status: DISCONTINUED | OUTPATIENT
Start: 2018-06-10 | End: 2018-06-13 | Stop reason: HOSPADM

## 2018-06-10 RX ADMIN — WARFARIN SODIUM 2.5 MG: 2.5 TABLET ORAL at 20:20

## 2018-06-10 RX ADMIN — FUROSEMIDE 20 MG: 10 INJECTION, SOLUTION INTRAMUSCULAR; INTRAVENOUS at 06:04

## 2018-06-10 RX ADMIN — ANASTROZOLE 1 MG: 1 TABLET, COATED ORAL at 08:15

## 2018-06-10 RX ADMIN — Medication 1000 MCG: at 08:15

## 2018-06-10 RX ADMIN — LEVOTHYROXINE SODIUM 75 MCG: 75 TABLET ORAL at 07:17

## 2018-06-10 NOTE — PROGRESS NOTES
"CC: Heart Failure    Interval History:   No complaints still breathing better.    Vital Signs  Temp:  [97.5 °F (36.4 °C)-98.2 °F (36.8 °C)] 97.5 °F (36.4 °C)  Heart Rate:  [69-78] 69  Resp:  [18] 18  BP: (124-142)/(67-79) 129/69    Intake/Output Summary (Last 24 hours) at 06/10/18 0905  Last data filed at 06/10/18 0640   Gross per 24 hour   Intake              210 ml   Output              900 ml   Net             -690 ml     Flowsheet Rows      First Filed Value   Admission Height  167 cm (65.75\") Documented at 06/08/2018 1506   Admission Weight  66.4 kg (146 lb 6.4 oz) Documented at 06/08/2018 1506          PHYSICAL EXAM:  General: No acute distress  Resp:NL Rate, unlabored, clear  CV:NL rate and rhythm, NL PMI, Nl S1 and S2, no Mumur, no gallop, no rub, No JVD. Normal pedal pulses  ABD:Nl sounds, no masses or tenderness, nondistended, no quarding or rebound  Neuro: alert,cooperative and oriented  Extr: No edema or cyanosis, moves all extremities      Results Review:      Results from last 7 days  Lab Units 06/10/18  0500   SODIUM mmol/L 141   POTASSIUM mmol/L 3.9   CHLORIDE mmol/L 100   CO2 mmol/L 30.8*   BUN mg/dL 19   CREATININE mg/dL 1.04*   GLUCOSE mg/dL 93   CALCIUM mg/dL 9.1           Results from last 7 days  Lab Units 06/09/18  0506   WBC 10*3/mm3 5.58   HEMOGLOBIN g/dL 14.2   HEMATOCRIT % 44.2   PLATELETS 10*3/mm3 216       Results from last 7 days  Lab Units 06/09/18  0506 06/08/18  1149   INR  1.99* 2.01*                 I reviewed the patient's new clinical results.  I personally viewed and interpreted the patient's EKG/Telemetry data        Medication Review:   Meds reviewed         Assessment/Plan  1.  Acute on chronic systolic heart failure.  Echocardiogram June 2018 left ventricular ejection fraction of 35%.  Output greater than input.  Switch to by mouth diuretics possible discharge in the morning.  2.  Atrial fibrillation chronic status post AV node ablation and single-chamber pacemaker.  INR " therapeutic recheck that the morning.  3.  Valvular heart disease with moderate to severe mitral insufficiency, pulse tricuspid insufficiency with mild pulmonary hypertension.  4.  Hypothyroidism TSH is low recheck that today may need to decrease replacement.    5.  Renal failure this is worse recheck this in the morning if stable discharge her.        Greg Woodward MD  06/10/18  9:05 AM

## 2018-06-10 NOTE — PLAN OF CARE
Problem: Patient Care Overview  Goal: Plan of Care Review  Outcome: Ongoing (interventions implemented as appropriate)   06/09/18 2038   Coping/Psychosocial   Plan of Care Reviewed With patient   Plan of Care Review   Progress improving   OTHER   Outcome Summary no falls. underlying afib and paced rhythm       Problem: Fall Risk (Adult)  Goal: Absence of Fall  Outcome: Ongoing (interventions implemented as appropriate)      Problem: Arrhythmia/Dysrhythmia (Symptomatic) (Adult)  Goal: Signs and Symptoms of Listed Potential Problems Will be Absent, Minimized or Managed (Arrhythmia/Dysrhythmia)  Outcome: Ongoing (interventions implemented as appropriate)

## 2018-06-10 NOTE — PLAN OF CARE
Problem: Patient Care Overview  Goal: Plan of Care Review  Outcome: Ongoing (interventions implemented as appropriate)   06/10/18 1770   Coping/Psychosocial   Plan of Care Reviewed With patient   Plan of Care Review   Progress improving   OTHER   Outcome Summary no resp distress. tired after sitting in chair. encourage out of bed/amb. no falls. vpaced and underlying afib rate controlled. watching creatinine and maybe home am.       Problem: Fall Risk (Adult)  Goal: Absence of Fall  Outcome: Ongoing (interventions implemented as appropriate)      Problem: Arrhythmia/Dysrhythmia (Symptomatic) (Adult)  Goal: Signs and Symptoms of Listed Potential Problems Will be Absent, Minimized or Managed (Arrhythmia/Dysrhythmia)  Outcome: Ongoing (interventions implemented as appropriate)

## 2018-06-10 NOTE — PLAN OF CARE
Problem: Patient Care Overview  Goal: Plan of Care Review  Outcome: Ongoing (interventions implemented as appropriate)   06/09/18 2115 06/10/18 0719   Coping/Psychosocial   Plan of Care Reviewed With patient --    Plan of Care Review   Progress --  improving   OTHER   Outcome Summary --  No acute events overnight. IV Lasix as ordered-MD mentioned he would probably switch to PO today. Lungs diminished-O2 stable on RA. POssible D/C soon     Goal: Individualization and Mutuality  Outcome: Ongoing (interventions implemented as appropriate)      Problem: Fall Risk (Adult)  Goal: Absence of Fall  Outcome: Ongoing (interventions implemented as appropriate)   06/10/18 0719   Fall Risk (Adult)   Absence of Fall achieves outcome  (SAFETY MAINTAINED)       Problem: Arrhythmia/Dysrhythmia (Symptomatic) (Adult)  Goal: Signs and Symptoms of Listed Potential Problems Will be Absent, Minimized or Managed (Arrhythmia/Dysrhythmia)  Outcome: Ongoing (interventions implemented as appropriate)   06/10/18 0719   Goal/Outcome Evaluation   Problems Assessed (Arrhythmia/Dysrhythmia) all   Problems Present (Dysrhythmia) none

## 2018-06-11 LAB
ANION GAP SERPL CALCULATED.3IONS-SCNC: 13.3 MMOL/L
BUN BLD-MCNC: 26 MG/DL (ref 8–23)
BUN/CREAT SERPL: 26.8 (ref 7–25)
CALCIUM SPEC-SCNC: 9 MG/DL (ref 8.6–10.5)
CHLORIDE SERPL-SCNC: 101 MMOL/L (ref 98–107)
CO2 SERPL-SCNC: 27.7 MMOL/L (ref 22–29)
CREAT BLD-MCNC: 0.97 MG/DL (ref 0.57–1)
GFR SERPL CREATININE-BSD FRML MDRD: 55 ML/MIN/1.73
GLUCOSE BLD-MCNC: 88 MG/DL (ref 65–99)
INR PPP: 1.93 (ref 0.9–1.1)
POTASSIUM BLD-SCNC: 4 MMOL/L (ref 3.5–5.2)
PROTHROMBIN TIME: 21.7 SECONDS (ref 11.7–14.2)
SODIUM BLD-SCNC: 142 MMOL/L (ref 136–145)

## 2018-06-11 PROCEDURE — 99221 1ST HOSP IP/OBS SF/LOW 40: CPT | Performed by: NURSE PRACTITIONER

## 2018-06-11 PROCEDURE — 80048 BASIC METABOLIC PNL TOTAL CA: CPT | Performed by: INTERNAL MEDICINE

## 2018-06-11 PROCEDURE — 85610 PROTHROMBIN TIME: CPT | Performed by: INTERNAL MEDICINE

## 2018-06-11 PROCEDURE — 0JH607Z INSERTION OF CARDIAC RESYNCHRONIZATION PACEMAKER PULSE GENERATOR INTO CHEST SUBCUTANEOUS TISSUE AND FASCIA, OPEN APPROACH: ICD-10-PCS | Performed by: INTERNAL MEDICINE

## 2018-06-11 PROCEDURE — 02HL3JZ INSERTION OF PACEMAKER LEAD INTO LEFT VENTRICLE, PERCUTANEOUS APPROACH: ICD-10-PCS | Performed by: INTERNAL MEDICINE

## 2018-06-11 PROCEDURE — 99232 SBSQ HOSP IP/OBS MODERATE 35: CPT | Performed by: NURSE PRACTITIONER

## 2018-06-11 PROCEDURE — 0JPT0PZ REMOVAL OF CARDIAC RHYTHM RELATED DEVICE FROM TRUNK SUBCUTANEOUS TISSUE AND FASCIA, OPEN APPROACH: ICD-10-PCS | Performed by: INTERNAL MEDICINE

## 2018-06-11 RX ORDER — VANCOMYCIN HYDROCHLORIDE 1 G/200ML
1000 INJECTION, SOLUTION INTRAVENOUS ONCE
Status: DISCONTINUED | OUTPATIENT
Start: 2018-06-12 | End: 2018-06-12

## 2018-06-11 RX ADMIN — LEVOTHYROXINE SODIUM 75 MCG: 75 TABLET ORAL at 09:55

## 2018-06-11 RX ADMIN — Medication 1000 MCG: at 09:55

## 2018-06-11 RX ADMIN — TEMAZEPAM 7.5 MG: 7.5 CAPSULE ORAL at 22:49

## 2018-06-11 RX ADMIN — ANASTROZOLE 1 MG: 1 TABLET, COATED ORAL at 13:01

## 2018-06-11 RX ADMIN — FUROSEMIDE 20 MG: 20 TABLET ORAL at 09:55

## 2018-06-11 NOTE — PLAN OF CARE
Problem: Patient Care Overview  Goal: Plan of Care Review  Outcome: Ongoing (interventions implemented as appropriate)   06/10/18 2020 06/11/18 0544   Coping/Psychosocial   Plan of Care Reviewed With patient --    Plan of Care Review   Progress --  improving   OTHER   Outcome Summary --  VSS. No new complaints from pt. Monitoring renal function, if better, will probably be D/C'd.      Goal: Individualization and Mutuality  Outcome: Ongoing (interventions implemented as appropriate)      Problem: Fall Risk (Adult)  Goal: Absence of Fall  Outcome: Ongoing (interventions implemented as appropriate)   06/11/18 0544   Fall Risk (Adult)   Absence of Fall achieves outcome  (SAFETY MAINTAINED)       Problem: Arrhythmia/Dysrhythmia (Symptomatic) (Adult)  Goal: Signs and Symptoms of Listed Potential Problems Will be Absent, Minimized or Managed (Arrhythmia/Dysrhythmia)  Outcome: Ongoing (interventions implemented as appropriate)   06/11/18 0544   Goal/Outcome Evaluation   Problems Assessed (Arrhythmia/Dysrhythmia) all   Problems Present (Dysrhythmia) none  (STABLE)

## 2018-06-11 NOTE — CONSULTS
Electrophysiology Hospital Consult            Patient Name: Diana Avalos  Age/Sex: 85 y.o. female  : 1932  MRN: [id number]    Date of Admission: 2018  Date of Encounter Visit: 18  Encounter Provider: KIKA Collado  Referring Provider: Klever Melgar MD  Place of Service: [facility]  Patient Care Team:  Javi Brand MD as PCP - General (Family Medicine)  Sherman Belcher II, MD as Consulting Physician (Hematology and Oncology)  Sidney Castellanos MD as Referring Physician (General Surgery)      Subjective:   EP Consultation for: BiV upgrade    Chief Complaint: SOB    History of Present Illness:  Diana Avalos is a 85 y.o. female with a history of permanent AF, s/p AV node ablation in 2012 (warfarin), s/p single chamber PPM in  (MDT), cardiomyopathy  with EF of 35% and pulmonary HTN. She is a patient of Dr. Riggs, who was admitted from office on 18 with increasing shortness of breath and fatigue. She had been seen in the ED on  with the same symptoms. Labs showed a proBNP of 3186. Renal function okay and INR 1.9 today. She has been diuresed and is now on her daily diuretic. Her echo shows dyssynchrony of the left ventricle. She ventricularly paces 100%. We have been asked to see for possible upgrade to CRT-P device.     She is very functional and independent at home. She has not had any chest pain, palpitations, dizziness of syncope.     Cardiac Testing:    Echo 18  · Left ventricular systolic function is moderately decreased. Calculated EF = 36%. Estimated EF was in agreement with the calculated EF. Global Longitudinal LV strain = -13%. Strain data was reviewed and speckle tracking was considered accurate. The left ventricular cavity is mildly dilated. Left ventricular wall thickness is consistent with moderate concentric hypertrophy. Left ventricular diastolic function was indeterminate. Elevated left atrial  pressure.  · There is marked dyssynchrony of the septum/apex/anterior wall, presumably due to RV apical pacing. However, the apex is essentially dyskinetic.  · Moderate-to-severe mitral valve regurgitation is present.  · Moderate to severe tricuspid valve regurgitation is present. Estimated right ventricular systolic pressure from tricuspid regurgitation is moderately elevated (45-55 mmHg). Calculated right ventricular systolic pressure from tricuspid regurgitation is 46 mmHg.    Past Medical History:  Past Medical History:   Diagnosis Date   • Anemia    • Arthritis    • Asthma    • Atrial fibrillation     With a history of an atrial clot   • Breast cancer     Left, stage IIB   • Decreased cardiac ejection fraction    • Disease of thyroid gland     Hypothyroidism   • Heart disease    • Heart failure    • Irritable bowel    • Mild tricuspid regurgitation    • Moderate mitral insufficiency        Past Surgical History:   Procedure Laterality Date   • CHOLECYSTECTOMY  2000   • EYE SURGERY  2005    cataracts, Dr. Miramontes   • MASTECTOMY Left 10/2014   • PACEMAKER IMPLANTATION  2012    Dr. Leija       Home Medications:   Prescriptions Prior to Admission   Medication Sig Dispense Refill Last Dose   • anastrozole (ARIMIDEX) 1 MG tablet TAKE 1 TABLET BY MOUTH EVERY DAY 30 tablet 6 6/8/2018 at Unknown time   • levothyroxine (SYNTHROID, LEVOTHROID) 88 MCG tablet Take  by mouth daily.   6/8/2018 at Unknown time   • vitamin B-12 (CYANOCOBALAMIN) 1000 MCG tablet Take 1,000 mcg by mouth Daily.   6/8/2018 at Unknown time   • warfarin (COUMADIN) 2.5 MG tablet TAKE ONE TABLET BY MOUTH DAILY (Patient taking differently: Nightly, Whole Tablet, 2.5 mg, Tues, Thurs, Sat, Sun) 90 tablet 0 6/7/2018 at 2330   • warfarin (COUMADIN) 2.5 MG tablet Take 1.25 mg by mouth Every Night. Monday, Wednesday, Friday   Past Week at Unknown time       Allergies:  Allergies   Allergen Reactions   • Amiodarone Nausea Only   • Codeine Nausea Only   •  Dabigatran Etexilate Mesylate Nausea Only   • Digoxin Nausea Only   • Iodinated Diagnostic Agents        Past Social History:  Social History     Social History   • Marital status:      Spouse name: Rick   • Number of children: 8   • Years of education: N/A     Occupational History   •  Retired     Social History Main Topics   • Smoking status: Never Smoker   • Smokeless tobacco: Never Used      Comment: caffeine use-tea   • Alcohol use 0.6 oz/week     1 Glasses of wine per week      Comment: social   • Drug use: No   • Sexual activity: Not on file     Other Topics Concern   • Not on file     Social History Narrative   • No narrative on file       Past Family History:  Family History   Problem Relation Age of Onset   • Colon cancer Mother 75   • Colon cancer Sister 79   • Hypertension Sister    • Cholelithiasis Sister         2 sisters       Review of Systems: All systems reviewed. Pertinent positives identified in HPI. All other systems are negative.     14 point ROS was performed and is negative except as outlined in HPI.     Objective:     Objective:  Vital Signs (last 24 hours)       06/10 0700  -  06/11 0659 06/11 0700  -  06/11 1437   Most Recent    Temp (°F) 97.5 -  98.3      97.4     97.4 (36.3)    Heart Rate 69 -  77      80     80    Resp   18      18     18    /60 -  129/72      135/75     135/75    SpO2 (%) 94 -  97      96     96        Temp:  [97.4 °F (36.3 °C)-98.3 °F (36.8 °C)] 98 °F (36.7 °C)  Heart Rate:  [73-80] 73  Resp:  [18] 18  BP: (125-135)/(65-75) 129/75  Body mass index is 23.77 kg/m².        Physical Exam:   Physical Exam   Constitutional: She is oriented to person, place, and time. She appears well-developed and well-nourished. No distress.   HENT:   Head: Normocephalic and atraumatic.   Eyes: Conjunctivae are normal.   Neck: Neck supple.   Cardiovascular: Normal rate and regular rhythm.    V paced   Pulmonary/Chest: Effort normal and breath sounds normal. No respiratory  distress.   Abdominal: Soft.   Musculoskeletal: Normal range of motion.   Neurological: She is alert and oriented to person, place, and time.   Skin: Skin is warm and dry.   Psychiatric: She has a normal mood and affect. Her behavior is normal.        Labs:   Lab Review:       Results from last 7 days  Lab Units 06/11/18  0529 06/10/18  0500 06/09/18  0506   SODIUM mmol/L 142 141 141   POTASSIUM mmol/L 4.0 3.9 3.7   CHLORIDE mmol/L 101 100 103   CO2 mmol/L 27.7 30.8* 25.4   BUN mg/dL 26* 19 13   CREATININE mg/dL 0.97 1.04* 0.78   GLUCOSE mg/dL 88 93 88   CALCIUM mg/dL 9.0 9.1 9.1           Results from last 7 days  Lab Units 06/09/18  0506   WBC 10*3/mm3 5.58   HEMOGLOBIN g/dL 14.2   HEMATOCRIT % 44.2   PLATELETS 10*3/mm3 216       Results from last 7 days  Lab Units 06/11/18  1500 06/09/18  0506 06/08/18  1149   INR  1.93* 1.99* 2.01*                   Results from last 7 days  Lab Units 06/08/18  1149   PROBNP pg/mL 3,186.0*           Results from last 7 days  Lab Units 06/09/18  0506   TSH mIU/mL 0.228*           Imaging:     Imaging Results (most recent)     Procedure Component Value Units Date/Time    XR Chest 2 View [[id number]] Collected:  06/08/18 1902     Updated:  06/08/18 1915    Narrative:       PA AND LATERAL CHEST     HISTORY: Shortness of air. History of breast cancer and dysrhythmia.     COMPARISON: Two-view chest 05/30/2018, 03/30/2012. CT chest without  contrast 05/14/2010.     FINDINGS: The heart size is mildly enlarged. There are areas of abnormal  air lucency and air cyst formation extending posterior to the right  hilum and overlying the superior segment of the right lower lobe. This  correlates with pneumatocele and bulla formation demonstrated on  previous CT 05/14/2010 and is a chronic finding. The lungs are otherwise  clear. There is no evidence for pulmonary edema or pleural effusion or  significant change compared to previous exams. A singe lead left  subclavian cardiac pacer is present.  Aortic vascular calcifications are  noted. There are cholecystectomy clips.       Impression:       No evidence for active disease in the chest     This report was finalized on 6/8/2018 7:12 PM by Dr. Garth Guidry M.D.               EKG:         I personally viewed and interpreted the patient's EKG/Telemetry data.    Assessment:     Active Problems:    Permanent atrial fibrillation    Presence of cardiac pacemaker    Mitral valve insufficiency    Shortness of breath    Tricuspid regurgitation    Heart failure        Plan:     Dr. David and I saw Ms. Avalos. Echo shows a severely dyssynchronous LV with decreased LV systolic function. She has class II-III HF. She ventricularly paces 100% of the time (hx of AV node ablation). Agree with Dr. Melgar that upgrading her to a CRT device could help her. Discussed with patient and family and she would like to proceed. Her INR is 1.9 so will hold her warfarin today and plan for procedure tomorrow.     Thank you for allowing me to participate in the care of Diana Avalos. Feel free to contact me directly with any further questions or concerns.    KIKA Collado  [facility]  06/11/18  5:25 PM

## 2018-06-11 NOTE — PROGRESS NOTES
"    Patient Name: Diana Avalos  :1932  85 y.o.      Patient Care Team:  Javi Brand MD as PCP - General (Family Medicine)  Sherman Belcher II, MD as Consulting Physician (Hematology and Oncology)  Sidney Castellanos MD as Referring Physician (General Surgery)    Chief Complaint: follow up heart failure    Interval History: she feels great. No dyspnea. No edema. She denies PND, orthopnea.        Objective   Vital Signs  Temp:  [97.4 °F (36.3 °C)-98.3 °F (36.8 °C)] 98 °F (36.7 °C)  Heart Rate:  [73-80] 73  Resp:  [18] 18  BP: (125-135)/(65-75) 129/75    Intake/Output Summary (Last 24 hours) at 18 1519  Last data filed at 18 0900   Gross per 24 hour   Intake              600 ml   Output              300 ml   Net              300 ml     Flowsheet Rows      First Filed Value   Admission Height  167 cm (65.75\") Documented at 2018 1506   Admission Weight  66.4 kg (146 lb 6.4 oz) Documented at 2018 1506          Physical Exam:   General Appearance:    Alert, cooperative, in no acute distress   Lungs:     Clear to auscultation.  Normal respiratory effort and rate.      Heart:    Regular rhythm and normal rate, normal S1 and S2, 3/6 systolic murmur, no gallops or rubs.     Chest Wall:    No abnormalities observed   Abdomen:     Soft, nontender, positive bowel sounds.     Extremities:   no cyanosis, clubbing or edema.  No marked joint deformities.  Adequate musculoskeletal strength.       Results Review:      Results from last 7 days  Lab Units 18  0529   SODIUM mmol/L 142   POTASSIUM mmol/L 4.0   CHLORIDE mmol/L 101   CO2 mmol/L 27.7   BUN mg/dL 26*   CREATININE mg/dL 0.97   GLUCOSE mg/dL 88   CALCIUM mg/dL 9.0           Results from last 7 days  Lab Units 18  0506   WBC 10*3/mm3 5.58   HEMOGLOBIN g/dL 14.2   HEMATOCRIT % 44.2   PLATELETS 10*3/mm3 216       Results from last 7 days  Lab Units 18  0506 18  1149   INR  1.99* 2.01*                       Medication " Review:     anastrozole 1 mg Oral Daily   furosemide 20 mg Oral Daily   levothyroxine 75 mcg Oral Q AM   vitamin B-12 1,000 mcg Oral Daily   warfarin 1.25 mg Oral Once per day on Mon Wed Fri   warfarin 2.5 mg Oral Once per day on Sun Tue Thu Sat             Assessment/Plan   1. Acute on chronic systolic heart failure - diuresed and now stable. Echocardiogram on 6/8 with EF 36%. RV pacing, low output. Awaiting EP evaluation for possible upgrade.     2. Atrial fibrillation s/p AVJ ablation and single chamber PPM. On warfarin. INR 1.99.     3. Hypothyroidism - TSH low. Dose was adjusted on admission. Follow up as an outpatient.     4. Valvular heart disease - moderate to severe MR, moderate to severe TR.     5. Pulmonary HTN RVSP 46 mmHg    KIKA Urbano  [facility]  06/11/18  3:19 PM

## 2018-06-11 NOTE — PROGRESS NOTES
Discharge Planning Assessment  [facility]     Patient Name: Diana Avalos  MRN: [id number]  Today's Date: 6/11/2018    Admit Date: 6/8/2018          Discharge Needs Assessment     Row Name 06/11/18 1417       Living Environment    Lives With child(mat), adult    Name(s) of Who Lives With Patient son lives in the basement    Current Living Arrangements home/apartment/condo    Primary Care Provided by self;child(mat)    Provides Primary Care For no one    Family Caregiver if Needed child(mat), adult    Quality of Family Relationships supportive    Able to Return to Prior Arrangements yes       Resource/Environmental Concerns    Resource/Environmental Concerns none    Transportation Concerns car, none       Transition Planning    Patient/Family Anticipates Transition to home    Patient/Family Anticipated Services at Transition none    Transportation Anticipated family or friend will provide       Discharge Needs Assessment    Concerns to be Addressed no discharge needs identified    Equipment Currently Used at Home bath bench    Anticipated Changes Related to Illness none    Equipment Needed After Discharge none            Discharge Plan    No documentation.       Destination     No service coordination in this encounter.      Durable Medical Equipment     No service coordination in this encounter.      Dialysis/Infusion     No service coordination in this encounter.      Home Medical Care     No service coordination in this encounter.      Social Care     No service coordination in this encounter.                Demographic Summary    No documentation.           Functional Status     Row Name 06/11/18 1417       Functional Status, IADL    Medications independent    Meal Preparation independent    Housekeeping independent    Laundry independent    Shopping independent       Mental Status    General Appearance WDL WDL       Mental Status Summary    Recent Changes in Mental Status/Cognitive Functioning no changes        Employment/    Employment Status retired            Psychosocial    No documentation.           Abuse/Neglect    No documentation.           Legal    No documentation.           Substance Abuse    No documentation.           Patient Forms    No documentation.         Mana Berrios RN

## 2018-06-12 ENCOUNTER — APPOINTMENT (OUTPATIENT)
Dept: GENERAL RADIOLOGY | Facility: HOSPITAL | Age: 83
End: 2018-06-12

## 2018-06-12 LAB
INR PPP: 1.87 (ref 0.9–1.1)
PROTHROMBIN TIME: 21.2 SECONDS (ref 11.7–14.2)

## 2018-06-12 PROCEDURE — C1892 INTRO/SHEATH,FIXED,PEEL-AWAY: HCPCS | Performed by: INTERNAL MEDICINE

## 2018-06-12 PROCEDURE — 93010 ELECTROCARDIOGRAM REPORT: CPT | Performed by: INTERNAL MEDICINE

## 2018-06-12 PROCEDURE — 0 IOPAMIDOL PER 1 ML: Performed by: INTERNAL MEDICINE

## 2018-06-12 PROCEDURE — 93005 ELECTROCARDIOGRAM TRACING: CPT | Performed by: INTERNAL MEDICINE

## 2018-06-12 PROCEDURE — C1769 GUIDE WIRE: HCPCS | Performed by: INTERNAL MEDICINE

## 2018-06-12 PROCEDURE — C2621 PMKR, OTHER THAN SING/DUAL: HCPCS | Performed by: INTERNAL MEDICINE

## 2018-06-12 PROCEDURE — 33229 REMV&REPLC PM GEN MULT LEADS: CPT | Performed by: INTERNAL MEDICINE

## 2018-06-12 PROCEDURE — 25010000002 VANCOMYCIN PER 500 MG: Performed by: INTERNAL MEDICINE

## 2018-06-12 PROCEDURE — 99153 MOD SED SAME PHYS/QHP EA: CPT | Performed by: INTERNAL MEDICINE

## 2018-06-12 PROCEDURE — 25010000002 FENTANYL CITRATE (PF) 100 MCG/2ML SOLUTION: Performed by: INTERNAL MEDICINE

## 2018-06-12 PROCEDURE — C1887 CATHETER, GUIDING: HCPCS | Performed by: INTERNAL MEDICINE

## 2018-06-12 PROCEDURE — C1900 LEAD, CORONARY VENOUS: HCPCS | Performed by: INTERNAL MEDICINE

## 2018-06-12 PROCEDURE — C1894 INTRO/SHEATH, NON-LASER: HCPCS | Performed by: INTERNAL MEDICINE

## 2018-06-12 PROCEDURE — 99152 MOD SED SAME PHYS/QHP 5/>YRS: CPT | Performed by: INTERNAL MEDICINE

## 2018-06-12 PROCEDURE — 33225 L VENTRIC PACING LEAD ADD-ON: CPT | Performed by: INTERNAL MEDICINE

## 2018-06-12 PROCEDURE — 71045 X-RAY EXAM CHEST 1 VIEW: CPT

## 2018-06-12 PROCEDURE — 25010000002 MIDAZOLAM PER 1 MG: Performed by: INTERNAL MEDICINE

## 2018-06-12 PROCEDURE — 25010000002 METHYLPREDNISOLONE PER 125 MG: Performed by: INTERNAL MEDICINE

## 2018-06-12 PROCEDURE — C1731 CATH, EP, 20 OR MORE ELEC: HCPCS | Performed by: INTERNAL MEDICINE

## 2018-06-12 PROCEDURE — 85610 PROTHROMBIN TIME: CPT | Performed by: INTERNAL MEDICINE

## 2018-06-12 DEVICE — LD ATTAIN PERFORMA S SHAPE LV 4598 88CM: Type: IMPLANTABLE DEVICE | Status: FUNCTIONAL

## 2018-06-12 DEVICE — GEN PM PERCEPTA MRI QUAD CRTP: Type: IMPLANTABLE DEVICE | Status: FUNCTIONAL

## 2018-06-12 RX ORDER — VANCOMYCIN HYDROCHLORIDE 1 G/200ML
INJECTION, SOLUTION INTRAVENOUS CONTINUOUS PRN
Status: COMPLETED | OUTPATIENT
Start: 2018-06-12 | End: 2018-06-12

## 2018-06-12 RX ORDER — HYDROCODONE BITARTRATE AND ACETAMINOPHEN 5; 325 MG/1; MG/1
1 TABLET ORAL EVERY 6 HOURS PRN
Status: DISCONTINUED | OUTPATIENT
Start: 2018-06-12 | End: 2018-06-13 | Stop reason: HOSPADM

## 2018-06-12 RX ORDER — METHYLPREDNISOLONE SODIUM SUCCINATE 125 MG/2ML
INJECTION, POWDER, LYOPHILIZED, FOR SOLUTION INTRAMUSCULAR; INTRAVENOUS AS NEEDED
Status: DISCONTINUED | OUTPATIENT
Start: 2018-06-12 | End: 2018-06-12 | Stop reason: HOSPADM

## 2018-06-12 RX ORDER — FENTANYL CITRATE 50 UG/ML
INJECTION, SOLUTION INTRAMUSCULAR; INTRAVENOUS AS NEEDED
Status: DISCONTINUED | OUTPATIENT
Start: 2018-06-12 | End: 2018-06-12 | Stop reason: HOSPADM

## 2018-06-12 RX ORDER — MIDAZOLAM HYDROCHLORIDE 1 MG/ML
INJECTION INTRAMUSCULAR; INTRAVENOUS AS NEEDED
Status: DISCONTINUED | OUTPATIENT
Start: 2018-06-12 | End: 2018-06-12 | Stop reason: HOSPADM

## 2018-06-12 RX ORDER — SODIUM CHLORIDE 9 MG/ML
INJECTION, SOLUTION INTRAVENOUS CONTINUOUS PRN
Status: COMPLETED | OUTPATIENT
Start: 2018-06-12 | End: 2018-06-12

## 2018-06-12 RX ORDER — LIDOCAINE HYDROCHLORIDE AND EPINEPHRINE 10; 10 MG/ML; UG/ML
INJECTION, SOLUTION INFILTRATION; PERINEURAL AS NEEDED
Status: DISCONTINUED | OUTPATIENT
Start: 2018-06-12 | End: 2018-06-12 | Stop reason: HOSPADM

## 2018-06-12 RX ADMIN — FUROSEMIDE 20 MG: 20 TABLET ORAL at 17:59

## 2018-06-12 RX ADMIN — LEVOTHYROXINE SODIUM 75 MCG: 75 TABLET ORAL at 08:43

## 2018-06-12 RX ADMIN — ACETAMINOPHEN 650 MG: 325 TABLET, FILM COATED ORAL at 14:19

## 2018-06-12 RX ADMIN — ANASTROZOLE 1 MG: 1 TABLET, COATED ORAL at 08:43

## 2018-06-12 RX ADMIN — HYDROCODONE BITARTRATE AND ACETAMINOPHEN 1 TABLET: 5; 325 TABLET ORAL at 15:59

## 2018-06-12 RX ADMIN — HYDROCODONE BITARTRATE AND ACETAMINOPHEN 1 TABLET: 5; 325 TABLET ORAL at 23:09

## 2018-06-12 NOTE — PLAN OF CARE
Problem: Patient Care Overview  Goal: Plan of Care Review  Outcome: Ongoing (interventions implemented as appropriate)   06/12/18 8026   Coping/Psychosocial   Plan of Care Reviewed With patient   Plan of Care Review   Progress improving   OTHER   Outcome Summary No changes during night. VSS. Sleeping pill given. NPO for pacemaker wire change (CRT-P).

## 2018-06-12 NOTE — PLAN OF CARE
Problem: Fall Risk (Adult)  Goal: Absence of Fall  Outcome: Ongoing (interventions implemented as appropriate)   06/12/18 1903   Fall Risk (Adult)   Absence of Fall making progress toward outcome       Problem: Arrhythmia/Dysrhythmia (Symptomatic) (Adult)  Goal: Signs and Symptoms of Listed Potential Problems Will be Absent, Minimized or Managed (Arrhythmia/Dysrhythmia)  Outcome: Ongoing (interventions implemented as appropriate)

## 2018-06-13 VITALS
HEIGHT: 66 IN | OXYGEN SATURATION: 95 % | SYSTOLIC BLOOD PRESSURE: 143 MMHG | RESPIRATION RATE: 16 BRPM | TEMPERATURE: 97.8 F | WEIGHT: 143 LBS | BODY MASS INDEX: 22.98 KG/M2 | HEART RATE: 94 BPM | DIASTOLIC BLOOD PRESSURE: 73 MMHG

## 2018-06-13 PROBLEM — I50.23 ACUTE ON CHRONIC SYSTOLIC CONGESTIVE HEART FAILURE: Status: ACTIVE | Noted: 2018-06-13

## 2018-06-13 PROCEDURE — 93010 ELECTROCARDIOGRAM REPORT: CPT | Performed by: INTERNAL MEDICINE

## 2018-06-13 PROCEDURE — 93005 ELECTROCARDIOGRAM TRACING: CPT | Performed by: INTERNAL MEDICINE

## 2018-06-13 PROCEDURE — 99239 HOSP IP/OBS DSCHRG MGMT >30: CPT | Performed by: INTERNAL MEDICINE

## 2018-06-13 RX ORDER — CARVEDILOL 3.12 MG/1
3.12 TABLET ORAL 2 TIMES DAILY
Qty: 60 TABLET | Refills: 1 | Status: SHIPPED | OUTPATIENT
Start: 2018-06-13 | End: 2018-06-17 | Stop reason: HOSPADM

## 2018-06-13 RX ORDER — ACETAMINOPHEN 325 MG/1
650 TABLET ORAL EVERY 6 HOURS PRN
Start: 2018-06-13 | End: 2021-03-02

## 2018-06-13 RX ORDER — FUROSEMIDE 20 MG/1
20 TABLET ORAL DAILY
Qty: 30 TABLET | Refills: 1 | Status: SHIPPED | OUTPATIENT
Start: 2018-06-14 | End: 2018-09-21 | Stop reason: SDUPTHER

## 2018-06-13 RX ADMIN — HYDROCODONE BITARTRATE AND ACETAMINOPHEN 1 TABLET: 5; 325 TABLET ORAL at 13:07

## 2018-06-13 RX ADMIN — ANASTROZOLE 1 MG: 1 TABLET, COATED ORAL at 08:22

## 2018-06-13 RX ADMIN — HYDROCODONE BITARTRATE AND ACETAMINOPHEN 1 TABLET: 5; 325 TABLET ORAL at 08:22

## 2018-06-13 RX ADMIN — FUROSEMIDE 20 MG: 20 TABLET ORAL at 08:21

## 2018-06-13 RX ADMIN — LEVOTHYROXINE SODIUM 75 MCG: 75 TABLET ORAL at 08:21

## 2018-06-13 NOTE — PLAN OF CARE
Problem: Patient Care Overview  Goal: Plan of Care Review  Outcome: Ongoing (interventions implemented as appropriate)   06/13/18 0511   Coping/Psychosocial   Plan of Care Reviewed With patient   Plan of Care Review   Progress improving   OTHER   Outcome Summary Minimal complaints of pain. VSS. AMbulating well with stand by assistance. Possible d/c home today. Continue to monitor.        Problem: Fall Risk (Adult)  Goal: Absence of Fall  Outcome: Ongoing (interventions implemented as appropriate)      Problem: Arrhythmia/Dysrhythmia (Symptomatic) (Adult)  Goal: Signs and Symptoms of Listed Potential Problems Will be Absent, Minimized or Managed (Arrhythmia/Dysrhythmia)  Outcome: Ongoing (interventions implemented as appropriate)

## 2018-06-13 NOTE — PLAN OF CARE
Problem: Patient Care Overview  Goal: Individualization and Mutuality  Outcome: Outcome(s) achieved Date Met: 06/13/18    Goal: Discharge Needs Assessment  Outcome: Outcome(s) achieved Date Met: 06/13/18    Goal: Interprofessional Rounds/Family Conf  Outcome: Outcome(s) achieved Date Met: 06/13/18

## 2018-06-13 NOTE — PLAN OF CARE
Problem: Cardiac: Heart Failure (Adult)  Goal: Signs and Symptoms of Listed Potential Problems Will be Absent, Minimized or Managed (Cardiac: Heart Failure)  Outcome: Outcome(s) achieved Date Met: 06/13/18

## 2018-06-13 NOTE — DISCHARGE SUMMARY
Date of Discharge:  6/13/2018  Date of Admit: 6/8/2018    Discharge Diagnosis:    1.  Acute on chronic systolic congestive heart failure  2.  NICM  3.  Permanent atrial fibrillation s/p AVJ ablation and single chamber PPM, complicated by pacemaker syndrome  3.  Moderately severe MR/TR/PHTN    Hospital Course:     She was admitted for optimization and did well with diuresis.  She was seen by Dr. David who agreed that she had profound LV dyssynchrony which was likely causing a much lower cardiac output than would be presumed, especially with significant MR/TR.  She was upgraded to CRT without issue.    She was started on low dose carvedilol and furosemide.  As an outpatient, we can see if she can tolerate ACE/ARB/spironolactone. Her LVEF is currently ~35%.    Procedures Performed  Procedure(s):  Device Upgrade-Upgrade to CRT-P-BIV Mytrus Has existing single chamber Medtronic pacemaker       Consults     Date and Time Order Name Status Description    6/11/2018 0739 Cardiac Electrophysiologist Inpatient Consult Completed     5/30/2018 2135 LCG (on-call MD unless specified) Completed             Physical Exam   Constitutional: She is oriented to person, place, and time. She appears well-developed and well-nourished.   HENT:   Head: Normocephalic.   Nose: Nose normal.   Mouth/Throat: Oropharynx is clear and moist.   Eyes: Conjunctivae and EOM are normal. Pupils are equal, round, and reactive to light.   Neck: Normal range of motion. No JVD present.   Cardiovascular: Normal rate, regular rhythm and intact distal pulses.    Murmur heard.   Systolic murmur is present with a grade of 2/6   Her incision looks great, she has mild bruising   Pulmonary/Chest: Effort normal and breath sounds normal.   Abdominal: Soft. She exhibits no mass. There is no tenderness.   Musculoskeletal: Normal range of motion. She exhibits no edema.   Neurological: She is alert and oriented to person, place, and time. No cranial nerve  deficit.   Skin: Skin is warm and dry. No erythema.   Psychiatric: She has a normal mood and affect. Her behavior is normal. Judgment and thought content normal.   Vitals reviewed.        Condition on Discharge: stable    Discharge Medications     Discharge Medications      New Medications      Instructions Start Date   acetaminophen 325 MG tablet  Commonly known as:  TYLENOL   650 mg, Oral, Every 6 Hours PRN      carvedilol 3.125 MG tablet  Commonly known as:  COREG   3.125 mg, Oral, 2 Times Daily      furosemide 20 MG tablet  Commonly known as:  LASIX   20 mg, Oral, Daily   Start Date:  6/14/2018        Changes to Medications      Instructions Start Date   warfarin 2.5 MG tablet  Commonly known as:  COUMADIN  What changed:  Another medication with the same name was changed. Make sure you understand how and when to take each.   1.25 mg, Oral, Nightly, Monday, Wednesday, Friday       warfarin 2.5 MG tablet  Commonly known as:  COUMADIN  What changed:  See the new instructions.   TAKE ONE TABLET BY MOUTH DAILY         Continue These Medications      Instructions Start Date   anastrozole 1 MG tablet  Commonly known as:  ARIMIDEX   TAKE 1 TABLET BY MOUTH EVERY DAY      levothyroxine 88 MCG tablet  Commonly known as:  SYNTHROID, LEVOTHROID   Oral, Daily      vitamin B-12 1000 MCG tablet  Commonly known as:  CYANOCOBALAMIN   1,000 mcg, Oral, Daily             Discharge Diet: no added sodium    Activity at Discharge: usual pacemaker precautions    Discharge disposition: home    Follow-up Appointments    She needs an INR check on Friday    Future Appointments  Date Time Provider Department Center   8/16/2018 12:00 AM MICAELA REMOTE, DARLIN WILHELMK CD [facility] None   11/15/2018 1:00 PM MICAELA IN OFFICE, DARLIN WILHELMK EILEEN [facility] None   11/15/2018 1:30 PM MD CHRIS Schwarz CD [facility] None   11/19/2018 1:00 PM NELSON DEXA 1  NELSON DEXA NELSON   11/26/2018 1:00 PM LAB CHAIR 3 HITESH HAGEN  LAB [facility]   11/26/2018 1:40 PM Sherman LONG  Code II, MD PEREZ CBC KRES  CBC Sadaf Melgar MD  06/13/18  11:09 AM    Time: Discharge 45 min

## 2018-06-14 ENCOUNTER — APPOINTMENT (OUTPATIENT)
Dept: CT IMAGING | Facility: HOSPITAL | Age: 83
End: 2018-06-14
Attending: EMERGENCY MEDICINE

## 2018-06-14 ENCOUNTER — HOSPITAL ENCOUNTER (INPATIENT)
Facility: HOSPITAL | Age: 83
LOS: 3 days | Discharge: HOME | End: 2018-06-17
Attending: EMERGENCY MEDICINE | Admitting: INTERNAL MEDICINE

## 2018-06-14 ENCOUNTER — APPOINTMENT (OUTPATIENT)
Dept: GENERAL RADIOLOGY | Facility: HOSPITAL | Age: 83
End: 2018-06-14

## 2018-06-14 DIAGNOSIS — D68.32 WARFARIN-INDUCED COAGULOPATHY (HCC): ICD-10-CM

## 2018-06-14 DIAGNOSIS — T45.515A WARFARIN-INDUCED COAGULOPATHY (HCC): ICD-10-CM

## 2018-06-14 DIAGNOSIS — J93.9 PNEUMOTHORAX, UNSPECIFIED TYPE: Primary | ICD-10-CM

## 2018-06-14 DIAGNOSIS — F03.90 DEMENTIA WITHOUT BEHAVIORAL DISTURBANCE, UNSPECIFIED DEMENTIA TYPE: ICD-10-CM

## 2018-06-14 LAB
ALBUMIN SERPL-MCNC: 4 G/DL (ref 3.5–5.2)
ALBUMIN/GLOB SERPL: 1.2 G/DL
ALP SERPL-CCNC: 79 U/L (ref 39–117)
ALT SERPL W P-5'-P-CCNC: 27 U/L (ref 1–33)
ANION GAP SERPL CALCULATED.3IONS-SCNC: 12.5 MMOL/L
AST SERPL-CCNC: 39 U/L (ref 1–32)
BASOPHILS # BLD AUTO: 0.03 10*3/MM3 (ref 0–0.2)
BASOPHILS NFR BLD AUTO: 0.4 % (ref 0–1.5)
BILIRUB SERPL-MCNC: 0.7 MG/DL (ref 0.1–1.2)
BUN BLD-MCNC: 25 MG/DL (ref 8–23)
BUN/CREAT SERPL: 29.1 (ref 7–25)
CALCIUM SPEC-SCNC: 9 MG/DL (ref 8.6–10.5)
CHLORIDE SERPL-SCNC: 101 MMOL/L (ref 98–107)
CO2 SERPL-SCNC: 26.5 MMOL/L (ref 22–29)
CREAT BLD-MCNC: 0.86 MG/DL (ref 0.57–1)
DEPRECATED RDW RBC AUTO: 46.3 FL (ref 37–54)
EOSINOPHIL # BLD AUTO: 0.07 10*3/MM3 (ref 0–0.7)
EOSINOPHIL NFR BLD AUTO: 0.9 % (ref 0.3–6.2)
ERYTHROCYTE [DISTWIDTH] IN BLOOD BY AUTOMATED COUNT: 13 % (ref 11.7–13)
GFR SERPL CREATININE-BSD FRML MDRD: 63 ML/MIN/1.73
GLOBULIN UR ELPH-MCNC: 3.3 GM/DL
GLUCOSE BLD-MCNC: 95 MG/DL (ref 65–99)
HCT VFR BLD AUTO: 47.2 % (ref 35.6–45.5)
HGB BLD-MCNC: 15.7 G/DL (ref 11.9–15.5)
IMM GRANULOCYTES # BLD: 0.02 10*3/MM3 (ref 0–0.03)
IMM GRANULOCYTES NFR BLD: 0.2 % (ref 0–0.5)
INR PPP: 1.61 (ref 0.9–1.1)
INR PPP: 1.66 (ref 0.9–1.1)
LYMPHOCYTES # BLD AUTO: 1.64 10*3/MM3 (ref 0.9–4.8)
LYMPHOCYTES NFR BLD AUTO: 19.9 % (ref 19.6–45.3)
MCH RBC QN AUTO: 32.3 PG (ref 26.9–32)
MCHC RBC AUTO-ENTMCNC: 33.3 G/DL (ref 32.4–36.3)
MCV RBC AUTO: 97.1 FL (ref 80.5–98.2)
MONOCYTES # BLD AUTO: 1.02 10*3/MM3 (ref 0.2–1.2)
MONOCYTES NFR BLD AUTO: 12.4 % (ref 5–12)
NEUTROPHILS # BLD AUTO: 5.47 10*3/MM3 (ref 1.9–8.1)
NEUTROPHILS NFR BLD AUTO: 66.4 % (ref 42.7–76)
NT-PROBNP SERPL-MCNC: 1707 PG/ML (ref 0–1800)
PLATELET # BLD AUTO: 209 10*3/MM3 (ref 140–500)
PMV BLD AUTO: 10.9 FL (ref 6–12)
POTASSIUM BLD-SCNC: 4.8 MMOL/L (ref 3.5–5.2)
PROT SERPL-MCNC: 7.3 G/DL (ref 6–8.5)
PROTHROMBIN TIME: 18.8 SECONDS (ref 11.7–14.2)
PROTHROMBIN TIME: 19.3 SECONDS (ref 11.7–14.2)
RBC # BLD AUTO: 4.86 10*6/MM3 (ref 3.9–5.2)
SODIUM BLD-SCNC: 140 MMOL/L (ref 136–145)
TROPONIN T SERPL-MCNC: <0.01 NG/ML (ref 0–0.03)
WBC NRBC COR # BLD: 8.23 10*3/MM3 (ref 4.5–10.7)

## 2018-06-14 PROCEDURE — 0W9B30Z DRAINAGE OF LEFT PLEURAL CAVITY WITH DRAINAGE DEVICE, PERCUTANEOUS APPROACH: ICD-10-PCS | Performed by: INTERNAL MEDICINE

## 2018-06-14 PROCEDURE — 99220 PR INITIAL OBSERVATION CARE/DAY 70 MINUTES: CPT | Performed by: NURSE PRACTITIONER

## 2018-06-14 PROCEDURE — C1729 CATH, DRAINAGE: HCPCS

## 2018-06-14 PROCEDURE — 80053 COMPREHEN METABOLIC PANEL: CPT | Performed by: EMERGENCY MEDICINE

## 2018-06-14 PROCEDURE — 99285 EMERGENCY DEPT VISIT HI MDM: CPT

## 2018-06-14 PROCEDURE — 77012 CT SCAN FOR NEEDLE BIOPSY: CPT

## 2018-06-14 PROCEDURE — 85610 PROTHROMBIN TIME: CPT | Performed by: EMERGENCY MEDICINE

## 2018-06-14 PROCEDURE — 83880 ASSAY OF NATRIURETIC PEPTIDE: CPT | Performed by: EMERGENCY MEDICINE

## 2018-06-14 PROCEDURE — 84484 ASSAY OF TROPONIN QUANT: CPT | Performed by: EMERGENCY MEDICINE

## 2018-06-14 PROCEDURE — 99218 PR INITIAL OBSERVATION CARE/DAY 30 MINUTES: CPT | Performed by: INTERNAL MEDICINE

## 2018-06-14 PROCEDURE — 93010 ELECTROCARDIOGRAM REPORT: CPT | Performed by: INTERNAL MEDICINE

## 2018-06-14 PROCEDURE — 85610 PROTHROMBIN TIME: CPT | Performed by: INTERNAL MEDICINE

## 2018-06-14 PROCEDURE — 93005 ELECTROCARDIOGRAM TRACING: CPT | Performed by: EMERGENCY MEDICINE

## 2018-06-14 PROCEDURE — 71045 X-RAY EXAM CHEST 1 VIEW: CPT

## 2018-06-14 PROCEDURE — 85025 COMPLETE CBC W/AUTO DIFF WBC: CPT | Performed by: EMERGENCY MEDICINE

## 2018-06-14 PROCEDURE — G0378 HOSPITAL OBSERVATION PER HR: HCPCS

## 2018-06-14 RX ORDER — HYDROCODONE BITARTRATE AND ACETAMINOPHEN 5; 325 MG/1; MG/1
1 TABLET ORAL EVERY 6 HOURS PRN
Status: DISCONTINUED | OUTPATIENT
Start: 2018-06-14 | End: 2018-06-17 | Stop reason: HOSPADM

## 2018-06-14 RX ORDER — ANASTROZOLE 1 MG/1
1 TABLET ORAL DAILY
Status: DISCONTINUED | OUTPATIENT
Start: 2018-06-14 | End: 2018-06-17 | Stop reason: HOSPADM

## 2018-06-14 RX ORDER — FUROSEMIDE 20 MG/1
20 TABLET ORAL DAILY
Status: DISCONTINUED | OUTPATIENT
Start: 2018-06-14 | End: 2018-06-17 | Stop reason: HOSPADM

## 2018-06-14 RX ORDER — CARVEDILOL 6.25 MG/1
6.25 TABLET ORAL 2 TIMES DAILY
Status: DISCONTINUED | OUTPATIENT
Start: 2018-06-14 | End: 2018-06-17 | Stop reason: HOSPADM

## 2018-06-14 RX ORDER — LIDOCAINE HYDROCHLORIDE 10 MG/ML
20 INJECTION, SOLUTION INFILTRATION; PERINEURAL ONCE
Status: DISCONTINUED | OUTPATIENT
Start: 2018-06-14 | End: 2018-06-14

## 2018-06-14 RX ORDER — WARFARIN SODIUM 2.5 MG/1
1.25 TABLET ORAL
Status: DISCONTINUED | OUTPATIENT
Start: 2018-06-15 | End: 2018-06-17 | Stop reason: HOSPADM

## 2018-06-14 RX ORDER — WARFARIN SODIUM 2.5 MG/1
2.5 TABLET ORAL
Status: DISCONTINUED | OUTPATIENT
Start: 2018-06-14 | End: 2018-06-17 | Stop reason: HOSPADM

## 2018-06-14 RX ORDER — ACETAMINOPHEN 325 MG/1
650 TABLET ORAL EVERY 6 HOURS PRN
Status: DISCONTINUED | OUTPATIENT
Start: 2018-06-14 | End: 2018-06-17 | Stop reason: HOSPADM

## 2018-06-14 RX ADMIN — HYDROCODONE BITARTRATE AND ACETAMINOPHEN 1 TABLET: 5; 325 TABLET ORAL at 17:27

## 2018-06-14 RX ADMIN — WARFARIN SODIUM 2.5 MG: 2.5 TABLET ORAL at 17:27

## 2018-06-14 RX ADMIN — ANASTROZOLE 1 MG: 1 TABLET, COATED ORAL at 17:27

## 2018-06-14 RX ADMIN — CARVEDILOL 6.25 MG: 6.25 TABLET, FILM COATED ORAL at 22:24

## 2018-06-15 ENCOUNTER — APPOINTMENT (OUTPATIENT)
Dept: GENERAL RADIOLOGY | Facility: HOSPITAL | Age: 83
End: 2018-06-15

## 2018-06-15 LAB
INR PPP: 1.51 (ref 0.9–1.1)
PROTHROMBIN TIME: 18 SECONDS (ref 11.7–14.2)

## 2018-06-15 PROCEDURE — 99233 SBSQ HOSP IP/OBS HIGH 50: CPT | Performed by: NURSE PRACTITIONER

## 2018-06-15 PROCEDURE — 99024 POSTOP FOLLOW-UP VISIT: CPT | Performed by: NURSE PRACTITIONER

## 2018-06-15 PROCEDURE — 71045 X-RAY EXAM CHEST 1 VIEW: CPT

## 2018-06-15 PROCEDURE — 85610 PROTHROMBIN TIME: CPT | Performed by: INTERNAL MEDICINE

## 2018-06-15 PROCEDURE — 94799 UNLISTED PULMONARY SVC/PX: CPT

## 2018-06-15 RX ORDER — WARFARIN SODIUM 2.5 MG/1
1.25 TABLET ORAL
Status: COMPLETED | OUTPATIENT
Start: 2018-06-15 | End: 2018-06-15

## 2018-06-15 RX ADMIN — HYDROCODONE BITARTRATE AND ACETAMINOPHEN 1 TABLET: 5; 325 TABLET ORAL at 05:14

## 2018-06-15 RX ADMIN — ACETAMINOPHEN 650 MG: 325 TABLET, FILM COATED ORAL at 15:06

## 2018-06-15 RX ADMIN — CARVEDILOL 6.25 MG: 6.25 TABLET, FILM COATED ORAL at 08:27

## 2018-06-15 RX ADMIN — ANASTROZOLE 1 MG: 1 TABLET, COATED ORAL at 11:05

## 2018-06-15 RX ADMIN — CARVEDILOL 6.25 MG: 6.25 TABLET, FILM COATED ORAL at 20:25

## 2018-06-15 RX ADMIN — FUROSEMIDE 20 MG: 20 TABLET ORAL at 08:27

## 2018-06-15 RX ADMIN — WARFARIN SODIUM 1.25 MG: 2.5 TABLET ORAL at 17:47

## 2018-06-15 RX ADMIN — WARFARIN SODIUM 1.25 MG: 2.5 TABLET ORAL at 17:48

## 2018-06-16 ENCOUNTER — APPOINTMENT (OUTPATIENT)
Dept: GENERAL RADIOLOGY | Facility: HOSPITAL | Age: 83
End: 2018-06-16

## 2018-06-16 LAB
INR PPP: 1.67 (ref 0.9–1.1)
PROTHROMBIN TIME: 19.4 SECONDS (ref 11.7–14.2)

## 2018-06-16 PROCEDURE — 99232 SBSQ HOSP IP/OBS MODERATE 35: CPT | Performed by: INTERNAL MEDICINE

## 2018-06-16 PROCEDURE — 85610 PROTHROMBIN TIME: CPT | Performed by: INTERNAL MEDICINE

## 2018-06-16 PROCEDURE — 71045 X-RAY EXAM CHEST 1 VIEW: CPT

## 2018-06-16 RX ADMIN — FUROSEMIDE 20 MG: 20 TABLET ORAL at 09:15

## 2018-06-16 RX ADMIN — HYDROCODONE BITARTRATE AND ACETAMINOPHEN 1 TABLET: 5; 325 TABLET ORAL at 09:21

## 2018-06-16 RX ADMIN — CARVEDILOL 6.25 MG: 6.25 TABLET, FILM COATED ORAL at 09:15

## 2018-06-16 RX ADMIN — CARVEDILOL 6.25 MG: 6.25 TABLET, FILM COATED ORAL at 20:21

## 2018-06-16 RX ADMIN — ANASTROZOLE 1 MG: 1 TABLET, COATED ORAL at 09:16

## 2018-06-16 RX ADMIN — WARFARIN SODIUM 2.5 MG: 2.5 TABLET ORAL at 17:26

## 2018-06-17 ENCOUNTER — APPOINTMENT (OUTPATIENT)
Dept: GENERAL RADIOLOGY | Facility: HOSPITAL | Age: 83
End: 2018-06-17

## 2018-06-17 VITALS
RESPIRATION RATE: 16 BRPM | SYSTOLIC BLOOD PRESSURE: 102 MMHG | BODY MASS INDEX: 24.66 KG/M2 | OXYGEN SATURATION: 95 % | HEART RATE: 72 BPM | TEMPERATURE: 97.7 F | WEIGHT: 148 LBS | DIASTOLIC BLOOD PRESSURE: 55 MMHG | HEIGHT: 65 IN

## 2018-06-17 LAB
ANION GAP SERPL CALCULATED.3IONS-SCNC: 9.3 MMOL/L
BASOPHILS # BLD AUTO: 0.04 10*3/MM3 (ref 0–0.2)
BASOPHILS NFR BLD AUTO: 0.5 % (ref 0–1.5)
BUN BLD-MCNC: 20 MG/DL (ref 8–23)
BUN/CREAT SERPL: 22.2 (ref 7–25)
CALCIUM SPEC-SCNC: 8.8 MG/DL (ref 8.6–10.5)
CHLORIDE SERPL-SCNC: 99 MMOL/L (ref 98–107)
CO2 SERPL-SCNC: 28.7 MMOL/L (ref 22–29)
CREAT BLD-MCNC: 0.9 MG/DL (ref 0.57–1)
DEPRECATED RDW RBC AUTO: 44 FL (ref 37–54)
EOSINOPHIL # BLD AUTO: 0.19 10*3/MM3 (ref 0–0.7)
EOSINOPHIL NFR BLD AUTO: 2.4 % (ref 0.3–6.2)
ERYTHROCYTE [DISTWIDTH] IN BLOOD BY AUTOMATED COUNT: 12.4 % (ref 11.7–13)
GFR SERPL CREATININE-BSD FRML MDRD: 60 ML/MIN/1.73
GLUCOSE BLD-MCNC: 92 MG/DL (ref 65–99)
HCT VFR BLD AUTO: 46.1 % (ref 35.6–45.5)
HGB BLD-MCNC: 15.2 G/DL (ref 11.9–15.5)
IMM GRANULOCYTES # BLD: 0 10*3/MM3 (ref 0–0.03)
IMM GRANULOCYTES NFR BLD: 0 % (ref 0–0.5)
INR PPP: 1.76 (ref 0.9–1.1)
LYMPHOCYTES # BLD AUTO: 1.99 10*3/MM3 (ref 0.9–4.8)
LYMPHOCYTES NFR BLD AUTO: 25 % (ref 19.6–45.3)
MCH RBC QN AUTO: 32.1 PG (ref 26.9–32)
MCHC RBC AUTO-ENTMCNC: 33 G/DL (ref 32.4–36.3)
MCV RBC AUTO: 97.5 FL (ref 80.5–98.2)
MONOCYTES # BLD AUTO: 1.01 10*3/MM3 (ref 0.2–1.2)
MONOCYTES NFR BLD AUTO: 12.7 % (ref 5–12)
NEUTROPHILS # BLD AUTO: 4.74 10*3/MM3 (ref 1.9–8.1)
NEUTROPHILS NFR BLD AUTO: 59.4 % (ref 42.7–76)
PLATELET # BLD AUTO: 180 10*3/MM3 (ref 140–500)
PMV BLD AUTO: 10.8 FL (ref 6–12)
POTASSIUM BLD-SCNC: 4.1 MMOL/L (ref 3.5–5.2)
PROTHROMBIN TIME: 20.3 SECONDS (ref 11.7–14.2)
RBC # BLD AUTO: 4.73 10*6/MM3 (ref 3.9–5.2)
SODIUM BLD-SCNC: 137 MMOL/L (ref 136–145)
WBC NRBC COR # BLD: 7.97 10*3/MM3 (ref 4.5–10.7)

## 2018-06-17 PROCEDURE — 80048 BASIC METABOLIC PNL TOTAL CA: CPT | Performed by: INTERNAL MEDICINE

## 2018-06-17 PROCEDURE — 85610 PROTHROMBIN TIME: CPT | Performed by: INTERNAL MEDICINE

## 2018-06-17 PROCEDURE — 99239 HOSP IP/OBS DSCHRG MGMT >30: CPT | Performed by: INTERNAL MEDICINE

## 2018-06-17 PROCEDURE — 85025 COMPLETE CBC W/AUTO DIFF WBC: CPT | Performed by: INTERNAL MEDICINE

## 2018-06-17 PROCEDURE — 71045 X-RAY EXAM CHEST 1 VIEW: CPT

## 2018-06-17 RX ORDER — CARVEDILOL 6.25 MG/1
6.25 TABLET ORAL 2 TIMES DAILY
Qty: 60 TABLET | Refills: 3 | Status: SHIPPED | OUTPATIENT
Start: 2018-06-17 | End: 2018-10-11

## 2018-06-17 RX ADMIN — ACETAMINOPHEN 650 MG: 325 TABLET, FILM COATED ORAL at 04:50

## 2018-06-17 RX ADMIN — FUROSEMIDE 20 MG: 20 TABLET ORAL at 09:14

## 2018-06-17 RX ADMIN — ANASTROZOLE 1 MG: 1 TABLET, COATED ORAL at 09:15

## 2018-06-22 ENCOUNTER — CLINICAL SUPPORT NO REQUIREMENTS (OUTPATIENT)
Dept: CARDIOLOGY | Facility: CLINIC | Age: 83
End: 2018-06-22

## 2018-06-22 DIAGNOSIS — I44.2 THIRD DEGREE AV BLOCK (HCC): ICD-10-CM

## 2018-06-22 DIAGNOSIS — I50.22 CHRONIC SYSTOLIC CONGESTIVE HEART FAILURE (HCC): Primary | ICD-10-CM

## 2018-06-22 DIAGNOSIS — I48.20 CHRONIC ATRIAL FIBRILLATION (HCC): ICD-10-CM

## 2018-06-22 PROCEDURE — 93280 PM DEVICE PROGR EVAL DUAL: CPT | Performed by: INTERNAL MEDICINE

## 2018-06-28 ENCOUNTER — HOSPITAL ENCOUNTER (OUTPATIENT)
Dept: CARDIOLOGY | Facility: HOSPITAL | Age: 83
Setting detail: RECURRING SERIES
Discharge: HOME | End: 2018-06-28

## 2018-06-28 ENCOUNTER — OFFICE VISIT (OUTPATIENT)
Dept: SURGERY | Facility: CLINIC | Age: 83
End: 2018-06-28

## 2018-06-28 ENCOUNTER — HOSPITAL ENCOUNTER (OUTPATIENT)
Dept: GENERAL RADIOLOGY | Facility: HOSPITAL | Age: 83
Discharge: HOME | End: 2018-06-28
Attending: THORACIC SURGERY (CARDIOTHORACIC VASCULAR SURGERY) | Admitting: THORACIC SURGERY (CARDIOTHORACIC VASCULAR SURGERY)

## 2018-06-28 VITALS
HEIGHT: 65 IN | WEIGHT: 148 LBS | HEART RATE: 79 BPM | DIASTOLIC BLOOD PRESSURE: 68 MMHG | BODY MASS INDEX: 24.66 KG/M2 | OXYGEN SATURATION: 98 % | SYSTOLIC BLOOD PRESSURE: 137 MMHG

## 2018-06-28 DIAGNOSIS — J95.811 POSTPROCEDURAL PNEUMOTHORAX: Primary | ICD-10-CM

## 2018-06-28 DIAGNOSIS — J93.9 PNEUMOTHORAX, UNSPECIFIED TYPE: ICD-10-CM

## 2018-06-28 PROCEDURE — 36416 COLLJ CAPILLARY BLOOD SPEC: CPT

## 2018-06-28 PROCEDURE — 99213 OFFICE O/P EST LOW 20 MIN: CPT | Performed by: NURSE PRACTITIONER

## 2018-06-28 PROCEDURE — 85610 PROTHROMBIN TIME: CPT

## 2018-06-28 PROCEDURE — 71046 X-RAY EXAM CHEST 2 VIEWS: CPT

## 2018-06-28 NOTE — PROGRESS NOTES
Subjective   Patient ID: Diana Avalos is a 85 y.o. female is here today for follow-up.    History of Present Illness  Dear Colleague,  Diana Avalos was seen in our office today for continued follow up and surveillance for recent hospitalization due to left pneumothorax.  She denies any complaints of fever, chills, cough, hemoptysis, pleuritic chest pain, night sweats, hoarseness, or unintentional weight loss.  She does admit to shortness of air and dyspnea with exertion which is baseline and is no worse than prior to her pneumothorax. Underlying medical conditions including her cardiac conditions and other diagnoses listed below remain stable.  She has no other somatic complaints or alleviating or exacerbating factors aside from those mentioned above.    The following portions of the patient's history were reviewed and updated as appropriate: allergies, current medications, past family history, past medical history, past social history, past surgical history and problem list.  Review of Systems   Constitution: Negative.   HENT: Negative.    Eyes: Negative.    Cardiovascular: Negative.    Respiratory: Positive for shortness of breath.         Dyspnea with exertion   Endocrine: Negative.    Hematologic/Lymphatic: Negative.    Skin: Negative.    Musculoskeletal: Negative.    Gastrointestinal: Negative.    Genitourinary: Negative.    Neurological: Negative.    Psychiatric/Behavioral: Negative.      Patient Active Problem List   Diagnosis   • Malignant neoplasm of central portion of left female breast   • Osteoporosis   • Permanent atrial fibrillation   • Presence of cardiac pacemaker   • Mitral valve insufficiency   • Pulmonary hypertension   • Osteopenia of multiple sites   • Malignant neoplasm of central portion of left breast in female, estrogen receptor positive   • Hypothyroidism   • Pulmonary embolism   • Pulmonary nodule   • Tricuspid regurgitation   • Acute on chronic systolic congestive heart failure   •  Pneumothorax     Past Medical History:   Diagnosis Date   • Anemia    • Arthritis    • Asthma    • Atrial fibrillation     With a history of an atrial clot   • Breast cancer     Left, stage IIB   • Decreased cardiac ejection fraction    • Disease of thyroid gland     Hypothyroidism   • Heart disease    • Heart failure    • Irritable bowel    • Mild tricuspid regurgitation    • Moderate mitral insufficiency      Past Surgical History:   Procedure Laterality Date   • CARDIAC ELECTROPHYSIOLOGY PROCEDURE N/A 6/12/2018    Procedure: Device Upgrade-Upgrade to CRT-P-BIV Pacamaker Medtronic Has existing single chamber Medtronic pacemaker;  Surgeon: Leonardo David MD;  Location: [facility] INVASIVE LOCATION;  Service: Cardiovascular   • CHOLECYSTECTOMY  2000   • EYE SURGERY  2005    cataracts, Dr. Miramontes   • MASTECTOMY Left 10/2014   • PACEMAKER IMPLANTATION  2012    Dr. Leija     Family History   Problem Relation Age of Onset   • Colon cancer Mother 75   • Colon cancer Sister 79   • Hypertension Sister    • Cholelithiasis Sister         2 sisters     Social History     Social History   • Marital status:      Spouse name: Rick   • Number of children: 8   • Years of education: N/A     Occupational History   •  Retired     Social History Main Topics   • Smoking status: Never Smoker   • Smokeless tobacco: Never Used      Comment: caffeine use-tea   • Alcohol use 0.6 oz/week     1 Glasses of wine per week      Comment: social   • Drug use: No   • Sexual activity: Not on file     Other Topics Concern   • Not on file     Social History Narrative   • No narrative on file       Current Outpatient Prescriptions:   •  acetaminophen (TYLENOL) 325 MG tablet, Take 2 tablets by mouth Every 6 (Six) Hours As Needed for Mild Pain ., Disp: , Rfl:   •  anastrozole (ARIMIDEX) 1 MG tablet, TAKE 1 TABLET BY MOUTH EVERY DAY, Disp: 30 tablet, Rfl: 6  •  carvedilol (COREG) 6.25 MG tablet, Take 1 tablet by mouth 2 (Two) Times a Day.,  Disp: 60 tablet, Rfl: 3  •  furosemide (LASIX) 20 MG tablet, Take 1 tablet by mouth Daily., Disp: 30 tablet, Rfl: 1  •  levothyroxine (SYNTHROID, LEVOTHROID) 88 MCG tablet, Take  by mouth daily., Disp: , Rfl:   •  vitamin B-12 (CYANOCOBALAMIN) 1000 MCG tablet, Take 1,000 mcg by mouth Daily., Disp: , Rfl:   •  warfarin (COUMADIN) 2.5 MG tablet, TAKE ONE TABLET BY MOUTH DAILY (Patient taking differently: Nightly, Whole Tablet, 2.5 mg, Tues, Thurs, Sat, Sun), Disp: 90 tablet, Rfl: 0  •  warfarin (COUMADIN) 2.5 MG tablet, Take 1.25 mg by mouth Every Night. Monday, Wednesday, Friday, Disp: , Rfl:   Allergies   Allergen Reactions   • Amiodarone Nausea Only   • Codeine Nausea Only   • Dabigatran Etexilate Mesylate Nausea Only   • Digoxin Nausea Only   • Iodinated Diagnostic Agents         Objective   Vitals:    06/28/18 1449   BP: 137/68   Pulse: 79   SpO2: 98%     Physical Exam   Constitutional: She is oriented to person, place, and time. She appears well-developed and well-nourished.   HENT:   Head: Normocephalic and atraumatic.   Eyes: Conjunctivae and EOM are normal. Pupils are equal, round, and reactive to light. No scleral icterus.   Neck: Normal range of motion. Neck supple. No JVD present. No tracheal deviation present.   Cardiovascular: Normal rate, regular rhythm, normal heart sounds and intact distal pulses.    No murmur heard.  Pulmonary/Chest: Effort normal and breath sounds normal. No stridor. No respiratory distress. She has no wheezes.   Abdominal: Soft. Bowel sounds are normal. She exhibits no distension and no mass.   Musculoskeletal: Normal range of motion.   Neurological: She is alert and oriented to person, place, and time.   Skin: Skin is warm and dry. Capillary refill takes less than 2 seconds.   Psychiatric: She has a normal mood and affect. Her behavior is normal. Judgment and thought content normal.     Independent Review of Radiographic Studies:    I have independently reviewed the patient's  chest x-ray performed today prior to her office appointment.  There is no pneumothorax and lungs are well-expanded.    Assessment/Plan   Assessment: Patient is being seen for follow-up visit after hospitalization due to a left pneumothorax which occurred after pacemaker was adjusted.  Chest x-ray has been reviewed and is stable.  Patient has no complaints at this time.      Plan: At this point the patient only needs to follow-up when necessary or if symptoms worsen or recur.    Diagnoses and all orders for this visit:    Postprocedural pneumothorax

## 2018-07-05 ENCOUNTER — LAB (OUTPATIENT)
Dept: LAB | Facility: HOSPITAL | Age: 83
End: 2018-07-05

## 2018-07-05 ENCOUNTER — OFFICE VISIT (OUTPATIENT)
Dept: CARDIOLOGY | Facility: CLINIC | Age: 83
End: 2018-07-05

## 2018-07-05 ENCOUNTER — TELEPHONE (OUTPATIENT)
Dept: CARDIOLOGY | Facility: CLINIC | Age: 83
End: 2018-07-05

## 2018-07-05 ENCOUNTER — CLINICAL SUPPORT NO REQUIREMENTS (OUTPATIENT)
Dept: CARDIOLOGY | Facility: CLINIC | Age: 83
End: 2018-07-05

## 2018-07-05 VITALS
WEIGHT: 149.8 LBS | BODY MASS INDEX: 24.96 KG/M2 | SYSTOLIC BLOOD PRESSURE: 108 MMHG | DIASTOLIC BLOOD PRESSURE: 60 MMHG | HEART RATE: 85 BPM | HEIGHT: 65 IN

## 2018-07-05 DIAGNOSIS — Z95.0 PRESENCE OF CARDIAC PACEMAKER: ICD-10-CM

## 2018-07-05 DIAGNOSIS — R39.89 SUSPECTED URINARY TRACT INFECTION: ICD-10-CM

## 2018-07-05 DIAGNOSIS — I48.20 CHRONIC ATRIAL FIBRILLATION (HCC): Primary | ICD-10-CM

## 2018-07-05 DIAGNOSIS — I27.20 PULMONARY HYPERTENSION (HCC): ICD-10-CM

## 2018-07-05 DIAGNOSIS — I48.21 PERMANENT ATRIAL FIBRILLATION (HCC): Primary | ICD-10-CM

## 2018-07-05 LAB
BACTERIA UR QL AUTO: ABNORMAL /HPF
BILIRUB UR QL STRIP: NEGATIVE
CLARITY UR: CLEAR
COLOR UR: YELLOW
GLUCOSE UR STRIP-MCNC: NEGATIVE MG/DL
HGB UR QL STRIP.AUTO: ABNORMAL
HYALINE CASTS UR QL AUTO: ABNORMAL /LPF
KETONES UR QL STRIP: NEGATIVE
LEUKOCYTE ESTERASE UR QL STRIP.AUTO: ABNORMAL
NITRITE UR QL STRIP: NEGATIVE
PH UR STRIP.AUTO: <=5 [PH] (ref 5–8)
PROT UR QL STRIP: NEGATIVE
RBC # UR: ABNORMAL /HPF
REF LAB TEST METHOD: ABNORMAL
SP GR UR STRIP: 1.01 (ref 1–1.03)
SQUAMOUS #/AREA URNS HPF: ABNORMAL /HPF
UROBILINOGEN UR QL STRIP: ABNORMAL
WBC UR QL AUTO: ABNORMAL /HPF

## 2018-07-05 PROCEDURE — 99214 OFFICE O/P EST MOD 30 MIN: CPT | Performed by: INTERNAL MEDICINE

## 2018-07-05 PROCEDURE — 81001 URINALYSIS AUTO W/SCOPE: CPT

## 2018-07-05 NOTE — TELEPHONE ENCOUNTER
----- Message from Rafaela Leija MD sent at 7/5/2018  1:50 PM EDT -----  pls let pt know that her urine looks like UTI - call pcp

## 2018-07-05 NOTE — PROGRESS NOTES
Date of Office Visit: 2018  Encounter Provider: Rafaela Leija MD  Place of Service: [facility]  Patient Name: Diana Avalos  :1932      Patient ID:  Diana Avalos is a 86 y.o. female is here for  followup for hospital follow-up        History of Present Illness  She was seen at [facility] on 2011 for atrial  fibrillation and was placed on IV Cardizem. She was reluctant to take  Coumadin but was willing to take Pradaxa. During her hospitalization her  ejection fraction was 45 to 50% with no significant valvular heart disease.  She had a transesophageal echocardiogram done on September 15, 2011 which  showed marked left atrial enlargement, and there was slow velocity through  the left atrial appendage and the left atrial appendage itself had a  thrombus. The left ventricle showed a moderate reduction in function with a  markedly dilated left atrium. At that time we decided to go ahead and  control her atrial fibrillation with rate and anticoagulation. However, we  had difficulty controlling her rate so she was put on amiodarone just for  rate control. She underwent a stress nuclear perfusion study on 2011 which was normal. On  she called back in because she  was having severe nausea. She was on Pradaxa, digoxin, and amiodarone at  that time. She went off of all those medications and felt better but on  2012 she started having atrial fibrillation with rapid  ventricular response, and was readmitted to the hospital.  She had a single chamber Medtronic generator placed with an AV node ablation, 2012.  She was in the hospital 2012 with acute bronchitis. She had an echocardiogram done  2012 showing an ejection fraction of 35-40%, severe left atrial volume enlargement,  severe dilation of the right atrium, moderate mitral insufficiency, mild tricuspid  insufficiency, pulmonary  artery pressure of 58 mmHg. She was started on Lasix 20 mg  daily, and this really does seem to help.       She had an echocardiogram done on 11/04/2015 which revealed an ejection fraction of 38%, moderate biatrial enlargement, mild-to-moderate mitral insufficiency, trivial aortic insufficiency, moderate-to-severe tricuspid insufficiency.  Her right ventricular systolic pressure was elevated at 58 mmHg.  This is unchanged from her prior echocardiogram in 09/2014.       She was admitted June 2018 with acute on chronic systolic heart failure.  She was found to have profound lethargy due to synchrony on echo.  We felt that upgrading her pacing device to CRT would be helpful.  Urinary had a single lead device in status post AV junction ablation for permanent atrial fibrillation which is not well rate controlled.  Her echocardiogram which was done 6/8/18 showed ejection fraction 36% with moderate concentric LVH, marked synchrony of the septum and chronic apex to the RV pacing, moderate to severe mitral insufficiency, moderate to severe tricuspid insufficiency and RVSP of 46 mmHg.  After the upgrade to CRT, she did develop a pneumothorax.  She was hospitalized for a left iatrogenic pneumothorax requiring a chest tube placement.  This did resolve.    She now has very mild exertional dyspnea that she's noted over the past couple of days.  She is more active and is definitely better than she was the beginning of June.  She does believe she is getting urinary tract infection.  She's had no dizziness or syncope.  She's had no chest pain or pressure.  She's had no falls.  She is taking her medications as directed.    Past Medical History:   Diagnosis Date   • Anemia    • Arthritis    • Asthma    • Atrial fibrillation (CMS/HCC)     With a history of an atrial clot   • Breast cancer (CMS/HCC)     Left, stage IIB   • Decreased cardiac ejection fraction    • Disease of thyroid gland     Hypothyroidism   • Heart disease    • Heart  failure (CMS/HCC)    • Hypothyroidism    • Irritable bowel    • Mild tricuspid regurgitation    • Moderate mitral insufficiency    • NICM (nonischemic cardiomyopathy) (CMS/HCC)    • Non-rheumatic mitral regurgitation    • On warfarin therapy    • Pulmonary hypertension    • Shortness of breath          Past Surgical History:   Procedure Laterality Date   • CARDIAC ELECTROPHYSIOLOGY PROCEDURE N/A 6/12/2018    Procedure: Device Upgrade-Upgrade to CRT-P-BIV Pacamaker Medtronic Has existing single chamber Medtronic pacemaker;  Surgeon: Leonardo David MD;  Location: [facility] INVASIVE LOCATION;  Service: Cardiovascular   • CHOLECYSTECTOMY  2000   • EYE SURGERY  2005    cataracts, Dr. Miramontes   • MASTECTOMY Left 10/2014   • PACEMAKER IMPLANTATION  2012    Dr. Leija       Current Outpatient Prescriptions on File Prior to Visit   Medication Sig Dispense Refill   • acetaminophen (TYLENOL) 325 MG tablet Take 2 tablets by mouth Every 6 (Six) Hours As Needed for Mild Pain .     • anastrozole (ARIMIDEX) 1 MG tablet TAKE 1 TABLET BY MOUTH EVERY DAY 30 tablet 6   • carvedilol (COREG) 6.25 MG tablet Take 1 tablet by mouth 2 (Two) Times a Day. 60 tablet 3   • furosemide (LASIX) 20 MG tablet Take 1 tablet by mouth Daily. 30 tablet 1   • levothyroxine (SYNTHROID, LEVOTHROID) 88 MCG tablet Take  by mouth daily.     • vitamin B-12 (CYANOCOBALAMIN) 1000 MCG tablet Take 1,000 mcg by mouth Daily.     • warfarin (COUMADIN) 2.5 MG tablet TAKE ONE TABLET BY MOUTH DAILY (Patient taking differently: Nightly, Whole Tablet, 2.5 mg, Tues, Thurs, Sat, Sun) 90 tablet 0   • warfarin (COUMADIN) 2.5 MG tablet Take 1.25 mg by mouth Every Night. Monday, Wednesday, Friday       No current facility-administered medications on file prior to visit.        Social History     Social History   • Marital status:      Spouse name: Rick   • Number of children: 8   • Years of education: N/A     Occupational History   •  Retired     Social History Main  "Topics   • Smoking status: Never Smoker   • Smokeless tobacco: Never Used      Comment: caffeine use-tea   • Alcohol use 0.6 oz/week     1 Glasses of wine per week      Comment: social   • Drug use: No   • Sexual activity: Not on file     Other Topics Concern   • Not on file     Social History Narrative   • No narrative on file           Review of Systems   Constitution: Negative.   HENT: Negative for congestion.    Eyes: Negative for vision loss in left eye and vision loss in right eye.   Cardiovascular: Positive for dyspnea on exertion.   Respiratory: Negative.  Negative for cough, hemoptysis, shortness of breath, sleep disturbances due to breathing, snoring, sputum production and wheezing.    Endocrine: Negative.    Hematologic/Lymphatic: Negative.    Skin: Negative for poor wound healing and rash.   Musculoskeletal: Negative for falls, gout, muscle cramps and myalgias.   Gastrointestinal: Negative for abdominal pain, diarrhea, dysphagia, hematemesis, melena, nausea and vomiting.   Neurological: Negative for excessive daytime sleepiness, dizziness, headaches, light-headedness, loss of balance, seizures and vertigo.   Psychiatric/Behavioral: Negative for depression and substance abuse. The patient is not nervous/anxious.        Procedures  Procedures        Objective:      Vitals:    07/05/18 1132   BP: 108/60   BP Location: Right arm   Patient Position: Sitting   Pulse: 85   Weight: 67.9 kg (149 lb 12.8 oz)   Height: 165.1 cm (65\")     Body mass index is 24.93 kg/m².    Physical Exam   Constitutional: She is oriented to person, place, and time. She appears well-developed and well-nourished. No distress.   HENT:   Head: Normocephalic and atraumatic.   Eyes: Conjunctivae are normal. No scleral icterus.   Neck: Neck supple. No JVD present. Carotid bruit is not present. No thyromegaly present.   Cardiovascular: Normal rate, regular rhythm, S1 normal, S2 normal, normal heart sounds and intact distal pulses.   No " extrasystoles are present. PMI is not displaced.    No murmur heard.  Pulses:       Carotid pulses are 2+ on the right side, and 2+ on the left side.       Radial pulses are 2+ on the right side, and 2+ on the left side.        Dorsalis pedis pulses are 2+ on the right side, and 2+ on the left side.        Posterior tibial pulses are 2+ on the right side, and 2+ on the left side.   Pulmonary/Chest: Effort normal and breath sounds normal. No respiratory distress. She has no wheezes. She has no rhonchi. She has no rales. She exhibits no tenderness.   Abdominal: Soft. Bowel sounds are normal. She exhibits no distension, no abdominal bruit and no mass. There is no tenderness.   Musculoskeletal: She exhibits no edema or deformity.   Lymphadenopathy:     She has no cervical adenopathy.   Neurological: She is alert and oriented to person, place, and time. No cranial nerve deficit.   Skin: Skin is warm and dry. No rash noted. She is not diaphoretic. No cyanosis. No pallor. Nails show no clubbing.   Psychiatric: She has a normal mood and affect. Judgment normal.   Vitals reviewed.      Lab Review:       Assessment:      Diagnosis Plan   1. Permanent atrial fibrillation (CMS/HCC)     2. Presence of cardiac pacemaker     3. Pulmonary hypertension       1. Chronic permanent atrial fibrillation; status post AV node ablation with  Medtronic single chamber pacemaker on January 9, 2012 with upgrade to CRT 6/2018. . She is on chronic  Coumadin therapy only.   2. Hypertension, under good control. She is really on no medications except  Lasix.   3. Moderate cardiomyopathy, nonischemic, LVEF 38%.  has septal dyssynchrony.   4. Moderate mitral insufficiency and moderate tricuspid insufficiency,  stable.   5. Hypothyroidism, stable.   6. Pulmonary hypertension, moderate.   7. Moderate pulmonary hypertension. This is source of dyspnea.      Plan:       Take extra 20mg lasix for the next 2 days then go back to 20mg daily - thinks she might  have a UTI, ordered a U/A.      See yan in 6 weeks.     Atrial Fibrillation and Atrial Flutter  Assessment  • The patient has permanent atrial fibrillation  • This is non-valvular in etiology  • The patient's CHADS2-VASc score is 4  • A [id number] score of 2 or more is considered a high risk for a thromboembolic event  • Warfarin prescribed    Plan  • Continue in atrial fibrillation with rate control  • Continue warfarin for antithrombotic therapy, bleeding issues discussed  • Continue beta blocker for rate control    Heart Failure  Assessment  • NYHA class III-A - There is limitation of physical activity. The patient is comfortable at rest, but ordinary activity causes fatigue, palpitations or shortness of breath.  • Beta blocker prescribed  • Diuretics prescribed  • Left ventricular function is moderately reduced by qualitative assessment    Plan  • The heart failure care plan was discussed with the patient today including: continuing the current program    Subjective/Objective    • Physical exam findings negative for rales, peripheral edema and elevated JVP.  • The patient has a CRT implant

## 2018-07-26 ENCOUNTER — HOSPITAL ENCOUNTER (OUTPATIENT)
Dept: CARDIOLOGY | Facility: HOSPITAL | Age: 83
Setting detail: RECURRING SERIES
Discharge: HOME | End: 2018-07-26

## 2018-07-26 PROCEDURE — 36416 COLLJ CAPILLARY BLOOD SPEC: CPT

## 2018-07-26 PROCEDURE — 85610 PROTHROMBIN TIME: CPT

## 2018-07-31 ENCOUNTER — CLINICAL SUPPORT NO REQUIREMENTS (OUTPATIENT)
Dept: CARDIOLOGY | Facility: CLINIC | Age: 83
End: 2018-07-31

## 2018-07-31 ENCOUNTER — OFFICE VISIT (OUTPATIENT)
Dept: CARDIOLOGY | Facility: CLINIC | Age: 83
End: 2018-07-31

## 2018-07-31 VITALS
HEIGHT: 65 IN | SYSTOLIC BLOOD PRESSURE: 122 MMHG | WEIGHT: 149 LBS | DIASTOLIC BLOOD PRESSURE: 80 MMHG | BODY MASS INDEX: 24.83 KG/M2 | HEART RATE: 73 BPM

## 2018-07-31 DIAGNOSIS — Z95.0 STATUS POST BIVENTRICULAR PACEMAKER: ICD-10-CM

## 2018-07-31 DIAGNOSIS — I50.23 ACUTE ON CHRONIC SYSTOLIC CONGESTIVE HEART FAILURE (HCC): ICD-10-CM

## 2018-07-31 DIAGNOSIS — Z98.890 S/P AV NODAL ABLATION: ICD-10-CM

## 2018-07-31 DIAGNOSIS — I44.2 THIRD DEGREE AV BLOCK (HCC): Primary | ICD-10-CM

## 2018-07-31 DIAGNOSIS — I48.21 PERMANENT ATRIAL FIBRILLATION (HCC): Primary | ICD-10-CM

## 2018-07-31 PROCEDURE — 93000 ELECTROCARDIOGRAM COMPLETE: CPT | Performed by: NURSE PRACTITIONER

## 2018-07-31 PROCEDURE — 99024 POSTOP FOLLOW-UP VISIT: CPT | Performed by: NURSE PRACTITIONER

## 2018-07-31 NOTE — PROGRESS NOTES
Date of Office Visit: 2018  Encounter Provider: KIKA Collado  Place of Service: [facility] CARDIOLOGY  Patient Name: Diana Avalos  :1932    Chief Complaint   Patient presents with   • Atrial Fibrillation   • Pacemaker Check   • Congestive Heart Failure   :     HPI: Diana Avalos is a 86 y.o. female who is a patient of Dr. Leija's with a history of perm. AF, s/p AV node ablation and PPM. She had recent issues with recurrent heart failure and it was felt this may be due to/exacerbated by her 100% ventricular pacing. Dr. David saw for evaluation for upgrade to CRT-P. She was felt to be an appropriate candidate and underwent upgrade to CRT-P on 2018. She did well post procedure and was discharged home the next day but did return to ED with acute onset shortness of breath and was found to have a pneumothorax. She had a CT placed, pneumothorax resolved and she was discharged home. She presents today for follow up and device check.    She is doing well. She says she definitely feels better since the device upgrade. Her breathing is good although she still has some dyspnea with exertion. She has no chest pain, edema, dizziness of syncope.    Device interrogation shows normal device function and testing. She had 1 NST episode, 7 beats in duration on 18, asymptomatic.       Past Medical History:   Diagnosis Date   • Anemia    • Arthritis    • Asthma    • Atrial fibrillation (CMS/HCC)     With a history of an atrial clot   • Breast cancer (CMS/HCC)     Left, stage IIB   • Decreased cardiac ejection fraction    • Disease of thyroid gland     Hypothyroidism   • Heart disease    • Heart failure (CMS/HCC)    • Hypothyroidism    • Irritable bowel    • Mild tricuspid regurgitation    • Moderate mitral insufficiency    • NICM (nonischemic cardiomyopathy) (CMS/HCC)    • Non-rheumatic mitral regurgitation    • On warfarin therapy    • Pulmonary hypertension    •  Shortness of breath        Past Surgical History:   Procedure Laterality Date   • CARDIAC ELECTROPHYSIOLOGY PROCEDURE N/A 6/12/2018    Procedure: Device Upgrade-Upgrade to CRT-P-BIV Pacamaker Medtronic Has existing single chamber Medtronic pacemaker;  Surgeon: Leonardo David MD;  Location: [facility] INVASIVE LOCATION;  Service: Cardiovascular   • CHOLECYSTECTOMY  2000   • EYE SURGERY  2005    cataracts, Dr. Miramontes   • MASTECTOMY Left 10/2014   • PACEMAKER IMPLANTATION  2012    Dr. Leija       Social History     Social History   • Marital status:      Spouse name: Rick   • Number of children: 8   • Years of education: N/A     Occupational History   •  Retired     Social History Main Topics   • Smoking status: Never Smoker   • Smokeless tobacco: Never Used      Comment: caffeine use-tea   • Alcohol use 0.6 oz/week     1 Glasses of wine per week      Comment: social   • Drug use: No   • Sexual activity: Not on file     Other Topics Concern   • Not on file     Social History Narrative   • No narrative on file       Family History   Problem Relation Age of Onset   • Colon cancer Mother 75   • Colon cancer Sister 79   • Hypertension Sister    • Cholelithiasis Sister         2 sisters       Review of Systems   Constitution: Negative for chills, fever and malaise/fatigue.   Cardiovascular: Positive for dyspnea on exertion (improved). Negative for chest pain, leg swelling, near-syncope, orthopnea, palpitations, paroxysmal nocturnal dyspnea and syncope.   Respiratory: Negative for cough and shortness of breath.    Hematologic/Lymphatic: Negative.    Musculoskeletal: Negative for joint pain, joint swelling and myalgias.   Gastrointestinal: Negative for abdominal pain, diarrhea, melena, nausea and vomiting.   Genitourinary: Negative for frequency and hematuria.   Neurological: Negative for light-headedness, numbness, paresthesias and seizures.   Allergic/Immunologic: Negative.    All other systems reviewed and  "are negative.      Allergies   Allergen Reactions   • Amiodarone Nausea Only   • Codeine Nausea Only   • Dabigatran Etexilate Mesylate Nausea Only   • Digoxin Nausea Only   • Iodinated Diagnostic Agents          Current Outpatient Prescriptions:   •  acetaminophen (TYLENOL) 325 MG tablet, Take 2 tablets by mouth Every 6 (Six) Hours As Needed for Mild Pain ., Disp: , Rfl:   •  anastrozole (ARIMIDEX) 1 MG tablet, TAKE 1 TABLET BY MOUTH EVERY DAY, Disp: 30 tablet, Rfl: 6  •  carvedilol (COREG) 6.25 MG tablet, Take 1 tablet by mouth 2 (Two) Times a Day., Disp: 60 tablet, Rfl: 3  •  furosemide (LASIX) 20 MG tablet, Take 1 tablet by mouth Daily., Disp: 30 tablet, Rfl: 1  •  levothyroxine (SYNTHROID, LEVOTHROID) 88 MCG tablet, Take  by mouth daily., Disp: , Rfl:   •  vitamin B-12 (CYANOCOBALAMIN) 1000 MCG tablet, Take 1,000 mcg by mouth Daily., Disp: , Rfl:   •  warfarin (COUMADIN) 2.5 MG tablet, TAKE ONE TABLET BY MOUTH DAILY (Patient taking differently: Nightly, Whole Tablet, 2.5 mg, Tues, Thurs, Sat, Sun), Disp: 90 tablet, Rfl: 0  •  warfarin (COUMADIN) 2.5 MG tablet, Take 1.25 mg by mouth Every Night. Monday, Wednesday, Friday, Disp: , Rfl:       Objective:     Vitals:    07/31/18 1030   BP: 122/80   Pulse: 73   Weight: 67.6 kg (149 lb)   Height: 165.1 cm (65\")     Body mass index is 24.79 kg/m².    PHYSICAL EXAM:    Vitals Reviewed.   General Appearance: No acute distress, well developed and well nourished.   Eyes: Conjunctiva and lids: No erythema, swelling, or discharge. Sclera non-icteric.   HENT: Atraumatic, normocephalic. External eyes, ears, and nose normal.   Respiratory: No signs of respiratory distress. Respiration rhythm and depth normal.   Clear to auscultation. No rales, crackles, rhonchi, or wheezing auscultated.   Cardiovascular:  Jugular Venous Pressure: Normal  Heart Rate and Rhythm: Normal, Heart Sounds: Normal S1 and S2. No S3 or S4 noted.  Murmurs: No murmurs noted. No rubs, thrills, or gallops. "   Arterial Pulses:  Posterior tibialis and dorsalis pedis pulses normal.   Lower Extremities: No edema noted.  Gastrointestinal:  Abdomen soft, non-distended, non-tender.   Musculoskeletal: Normal movement of extremities  Skin and Nails: General appearance normal. No pallor, cyanosis, diaphoresis. Skin temperature normal. No clubbing of fingernails.   Psychiatric: Patient alert and oriented to person, place, and time. Speech and behavior appropriate. Normal mood and affect.       ECG 12 Lead  Date/Time: 2018 11:11 AM  Performed by: JOE GRIMALDO  Authorized by: JOE GRIMALDO   Comparison: compared with previous ECG from 2018  Similar to previous ECG  Rhythm: atrial fibrillation and paced  BPM: 73  Pacin% capture  Comments: Good CRT pacing              Assessment:       Diagnosis Plan   1. Permanent atrial fibrillation (CMS/HCC)     2. S/P AV daniela ablation     3. Status post biventricular pacemaker     4. Acute on chronic systolic congestive heart failure (CMS/HCC)            Plan:       1.-3. Atrial Fibrillation and Atrial Flutter  Assessment  • The patient has permanent atrial fibrillation  • The patient's CHADS2-VASc score is 4  • A [id number] score of 2 or more is considered a high risk for a thromboembolic event  • Warfarin prescribed  • Permanent AF    Hx of AV node ablation, recent upgrade to CRT-P device.    Warfarin for AC, no bleeding issues.     4. Heart Failure  Assessment  • NYHA class III-A - There is limitation of physical activity. The patient is comfortable at rest, but ordinary activity causes fatigue, palpitations or shortness of breath.  • Beta blocker prescribed  • Diuretics prescribed  • The most recent ejection fraction is 36%  • The left ventricle was last assessed on 2018    Plan  • The heart failure care plan was discussed with the patient today including: continuing the current program    Subjective/Objective    • Physical exam findings negative for rales,  peripheral edema and elevated JVP.  • The patient has a CRT implanted on 6/12/2018      Follow up with KIKA Castelan as scheduled. Follow up with Dr. David in 1 year.     As always, it has been a pleasure to participate in your patient's care.      Sincerely,         KIKA Marcial

## 2018-08-23 ENCOUNTER — HOSPITAL ENCOUNTER (OUTPATIENT)
Dept: CARDIOLOGY | Facility: HOSPITAL | Age: 83
Setting detail: RECURRING SERIES
Discharge: HOME | End: 2018-08-23

## 2018-08-23 PROCEDURE — 85610 PROTHROMBIN TIME: CPT

## 2018-08-23 PROCEDURE — 36416 COLLJ CAPILLARY BLOOD SPEC: CPT

## 2018-09-04 ENCOUNTER — HOSPITAL ENCOUNTER (EMERGENCY)
Facility: HOSPITAL | Age: 83
Discharge: HOME | End: 2018-09-04
Attending: EMERGENCY MEDICINE | Admitting: EMERGENCY MEDICINE

## 2018-09-04 ENCOUNTER — APPOINTMENT (OUTPATIENT)
Dept: GENERAL RADIOLOGY | Facility: HOSPITAL | Age: 83
End: 2018-09-04

## 2018-09-04 VITALS
OXYGEN SATURATION: 96 % | RESPIRATION RATE: 18 BRPM | SYSTOLIC BLOOD PRESSURE: 157 MMHG | BODY MASS INDEX: 23.3 KG/M2 | WEIGHT: 145 LBS | HEART RATE: 75 BPM | DIASTOLIC BLOOD PRESSURE: 90 MMHG | TEMPERATURE: 97.7 F | HEIGHT: 66 IN

## 2018-09-04 DIAGNOSIS — R06.09 DYSPNEA ON EXERTION: Primary | ICD-10-CM

## 2018-09-04 LAB
ALBUMIN SERPL-MCNC: 3.9 G/DL (ref 3.5–5.2)
ALBUMIN/GLOB SERPL: 1.4 G/DL
ALP SERPL-CCNC: 71 U/L (ref 39–117)
ALT SERPL W P-5'-P-CCNC: 12 U/L (ref 1–33)
ANION GAP SERPL CALCULATED.3IONS-SCNC: 9 MMOL/L
AST SERPL-CCNC: 22 U/L (ref 1–32)
BASOPHILS # BLD AUTO: 0.03 10*3/MM3 (ref 0–0.2)
BASOPHILS NFR BLD AUTO: 0.5 % (ref 0–1.5)
BILIRUB SERPL-MCNC: 0.8 MG/DL (ref 0.1–1.2)
BUN BLD-MCNC: 12 MG/DL (ref 8–23)
BUN/CREAT SERPL: 15.2 (ref 7–25)
CALCIUM SPEC-SCNC: 8.8 MG/DL (ref 8.6–10.5)
CHLORIDE SERPL-SCNC: 106 MMOL/L (ref 98–107)
CO2 SERPL-SCNC: 27 MMOL/L (ref 22–29)
CREAT BLD-MCNC: 0.79 MG/DL (ref 0.57–1)
DEPRECATED RDW RBC AUTO: 46 FL (ref 37–54)
EOSINOPHIL # BLD AUTO: 0.14 10*3/MM3 (ref 0–0.7)
EOSINOPHIL NFR BLD AUTO: 2.5 % (ref 0.3–6.2)
ERYTHROCYTE [DISTWIDTH] IN BLOOD BY AUTOMATED COUNT: 13.1 % (ref 11.7–13)
GFR SERPL CREATININE-BSD FRML MDRD: 69 ML/MIN/1.73
GLOBULIN UR ELPH-MCNC: 2.7 GM/DL
GLUCOSE BLD-MCNC: 97 MG/DL (ref 65–99)
HCT VFR BLD AUTO: 44.3 % (ref 35.6–45.5)
HGB BLD-MCNC: 14.3 G/DL (ref 11.9–15.5)
HOLD SPECIMEN: NORMAL
HOLD SPECIMEN: NORMAL
IMM GRANULOCYTES # BLD: 0 10*3/MM3 (ref 0–0.03)
IMM GRANULOCYTES NFR BLD: 0 % (ref 0–0.5)
INR PPP: 2.2 (ref 0.9–1.1)
LYMPHOCYTES # BLD AUTO: 1.93 10*3/MM3 (ref 0.9–4.8)
LYMPHOCYTES NFR BLD AUTO: 34.4 % (ref 19.6–45.3)
MAGNESIUM SERPL-MCNC: 2.2 MG/DL (ref 1.6–2.4)
MCH RBC QN AUTO: 31.3 PG (ref 26.9–32)
MCHC RBC AUTO-ENTMCNC: 32.3 G/DL (ref 32.4–36.3)
MCV RBC AUTO: 96.9 FL (ref 80.5–98.2)
MONOCYTES # BLD AUTO: 0.7 10*3/MM3 (ref 0.2–1.2)
MONOCYTES NFR BLD AUTO: 12.5 % (ref 5–12)
NEUTROPHILS # BLD AUTO: 2.81 10*3/MM3 (ref 1.9–8.1)
NEUTROPHILS NFR BLD AUTO: 50.1 % (ref 42.7–76)
NT-PROBNP SERPL-MCNC: 2022 PG/ML (ref 0–1800)
PLATELET # BLD AUTO: 201 10*3/MM3 (ref 140–500)
PMV BLD AUTO: 10.6 FL (ref 6–12)
POTASSIUM BLD-SCNC: 4.7 MMOL/L (ref 3.5–5.2)
PROT SERPL-MCNC: 6.6 G/DL (ref 6–8.5)
PROTHROMBIN TIME: 24.1 SECONDS (ref 11.7–14.2)
RBC # BLD AUTO: 4.57 10*6/MM3 (ref 3.9–5.2)
SODIUM BLD-SCNC: 142 MMOL/L (ref 136–145)
TROPONIN T SERPL-MCNC: <0.01 NG/ML (ref 0–0.03)
WBC NRBC COR # BLD: 5.61 10*3/MM3 (ref 4.5–10.7)
WHOLE BLOOD HOLD SPECIMEN: NORMAL
WHOLE BLOOD HOLD SPECIMEN: NORMAL

## 2018-09-04 PROCEDURE — 85610 PROTHROMBIN TIME: CPT | Performed by: NURSE PRACTITIONER

## 2018-09-04 PROCEDURE — 80053 COMPREHEN METABOLIC PANEL: CPT | Performed by: NURSE PRACTITIONER

## 2018-09-04 PROCEDURE — 83735 ASSAY OF MAGNESIUM: CPT | Performed by: NURSE PRACTITIONER

## 2018-09-04 PROCEDURE — 85025 COMPLETE CBC W/AUTO DIFF WBC: CPT | Performed by: NURSE PRACTITIONER

## 2018-09-04 PROCEDURE — 93010 ELECTROCARDIOGRAM REPORT: CPT | Performed by: INTERNAL MEDICINE

## 2018-09-04 PROCEDURE — 93005 ELECTROCARDIOGRAM TRACING: CPT | Performed by: NURSE PRACTITIONER

## 2018-09-04 PROCEDURE — 84484 ASSAY OF TROPONIN QUANT: CPT | Performed by: NURSE PRACTITIONER

## 2018-09-04 PROCEDURE — 83880 ASSAY OF NATRIURETIC PEPTIDE: CPT | Performed by: NURSE PRACTITIONER

## 2018-09-04 PROCEDURE — 71046 X-RAY EXAM CHEST 2 VIEWS: CPT

## 2018-09-04 PROCEDURE — 99284 EMERGENCY DEPT VISIT MOD MDM: CPT

## 2018-09-04 RX ORDER — SODIUM CHLORIDE 0.9 % (FLUSH) 0.9 %
10 SYRINGE (ML) INJECTION AS NEEDED
Status: DISCONTINUED | OUTPATIENT
Start: 2018-09-04 | End: 2018-09-04 | Stop reason: HOSPADM

## 2018-09-04 NOTE — ED NOTES
Patient presents with shortness of breath since Thursday intermittently.  SOA worsened this am upon waking at 0400 this am per patient and family.  Patient notes a worsening of shortness of breath with exertion.  Patient denies any fever, cough, or chills.  Denies any chest pain.  Patient has history of CHF.      Gorge Garza RN  09/04/18 104

## 2018-09-04 NOTE — DISCHARGE INSTRUCTIONS
Follow-up with your primary care doctor later this week if symptoms persist.  Return to the emergency department for worsening shortness of breath, chest pain, leg swelling, weight gain, or other concern.

## 2018-09-04 NOTE — ED PROVIDER NOTES
EMERGENCY DEPARTMENT ENCOUNTER    CHIEF COMPLAINT  Chief Complaint: SOA  History given by: pt   History limited by: none  Room Number: 30/30  PMD: Javi Brand MD      HPI:  Pt is a 86 y.o. female who presents complaining of SOA that worsened at 0300. Per daughter, she started having SOA since Thursday. Pt denies fever and chills. Pt states that exerting herself makes the SOA worse. When the pt relaxes the SOA is relieved. Pt has headaches. Denies weight change and chest pain. Pt has hx of heart problems.    Duration:  X 4-5 days  Onset: gradual  Timing: intermittent  Intensity/Severity: moderate  Progression: worsened  Associated Symptoms: headach  Aggravating Factors: exerting herself   Alleviating Factors: Resting makes her sx better   Previous Episodes: Pt has hx of heart problems.      PAST MEDICAL HISTORY  Active Ambulatory Problems     Diagnosis Date Noted   • Malignant neoplasm of central portion of left female breast (CMS/HCC) 04/25/2016   • Osteoporosis 04/27/2016   • Permanent atrial fibrillation (CMS/HCC) 10/02/2013   • Status post biventricular pacemaker 10/02/2013   • Mitral valve insufficiency 11/15/2016   • Pulmonary hypertension 11/15/2016   • Osteopenia of multiple sites 04/30/2018   • Malignant neoplasm of central portion of left breast in female, estrogen receptor positive (CMS/HCC) 04/30/2018   • Hypothyroidism 10/02/2013   • Pulmonary embolism (CMS/HCC) 04/11/2017   • Pulmonary nodule 04/03/2017   • Tricuspid regurgitation 06/08/2018   • Acute on chronic systolic congestive heart failure (CMS/HCC) 06/13/2018   • Pneumothorax 06/14/2018   • S/P AV daniela ablation 07/31/2018     Resolved Ambulatory Problems     Diagnosis Date Noted   • Cardiomyopathy (CMS/HCC) 11/15/2016     Past Medical History:   Diagnosis Date   • Anemia    • Arthritis    • Asthma    • Atrial fibrillation (CMS/HCC)    • Breast cancer (CMS/HCC)    • Decreased cardiac ejection fraction    • Disease of thyroid gland     • Heart disease    • Heart failure (CMS/HCC)    • Hypothyroidism    • Irritable bowel    • Mild tricuspid regurgitation    • Moderate mitral insufficiency    • NICM (nonischemic cardiomyopathy) (CMS/HCC)    • Non-rheumatic mitral regurgitation    • On warfarin therapy    • Pulmonary hypertension    • Shortness of breath        PAST SURGICAL HISTORY  Past Surgical History:   Procedure Laterality Date   • CARDIAC ELECTROPHYSIOLOGY PROCEDURE N/A 6/12/2018    Procedure: Device Upgrade-Upgrade to CRT-P-BIV Pacamaker Medtronic Has existing single chamber Medtronic pacemaker;  Surgeon: Leonardo David MD;  Location: [facility] INVASIVE LOCATION;  Service: Cardiovascular   • CHOLECYSTECTOMY  2000   • EYE SURGERY  2005    cataracts, Dr. Miramontes   • MASTECTOMY Left 10/2014   • PACEMAKER IMPLANTATION  2012    Dr. Leija       FAMILY HISTORY  Family History   Problem Relation Age of Onset   • Colon cancer Mother 75   • Colon cancer Sister 79   • Hypertension Sister    • Cholelithiasis Sister         2 sisters       SOCIAL HISTORY  Social History     Social History   • Marital status:      Spouse name: Rick   • Number of children: 8   • Years of education: N/A     Occupational History   •  Retired     Social History Main Topics   • Smoking status: Never Smoker   • Smokeless tobacco: Never Used      Comment: caffeine use-tea   • Alcohol use 0.6 oz/week     1 Glasses of wine per week      Comment: social   • Drug use: No   • Sexual activity: Not on file     Other Topics Concern   • Not on file     Social History Narrative   • No narrative on file       ALLERGIES  Amiodarone; Codeine; Dabigatran etexilate mesylate; Digoxin; and Iodinated diagnostic agents    REVIEW OF SYSTEMS  Review of Systems   Constitutional: Negative for fever.   HENT: Negative for sore throat.    Eyes: Negative.    Respiratory: Positive for shortness of breath. Negative for cough.    Cardiovascular: Negative for chest pain.   Gastrointestinal:  Negative for abdominal pain, diarrhea and vomiting.   Genitourinary: Negative for dysuria.   Musculoskeletal: Negative for neck pain.   Skin: Negative for rash.   Allergic/Immunologic: Negative.    Neurological: Positive for headaches. Negative for weakness and numbness.   Hematological: Negative.    Psychiatric/Behavioral: Negative.    All other systems reviewed and are negative.      PHYSICAL EXAM  ED Triage Vitals [09/04/18 0910]   Temp Heart Rate Resp BP SpO2   97.7 °F (36.5 °C) 79 18 147/88 98 %      Temp src Heart Rate Source Patient Position BP Location FiO2 (%)   Tympanic -- -- -- --       Physical Exam   Constitutional: She is oriented to person, place, and time. No distress.   HENT:   Head: Normocephalic and atraumatic.   Eyes: Pupils are equal, round, and reactive to light. EOM are normal.   Neck: Normal range of motion. Neck supple.   Cardiovascular: Normal rate, regular rhythm and normal heart sounds.    Pulmonary/Chest: Effort normal and breath sounds normal. No respiratory distress.   Abdominal: Soft. There is no tenderness. There is no rebound and no guarding.   Musculoskeletal: Normal range of motion. She exhibits no edema.   Neurological: She is alert and oriented to person, place, and time. She has normal sensation and normal strength.   Skin: Skin is warm and dry. No rash noted.   Psychiatric: Mood and affect normal.   Nursing note and vitals reviewed.      LAB RESULTS  Lab Results (last 24 hours)     Procedure Component Value Units Date/Time    CBC & Differential [[id number]] Collected:  09/04/18 0923    Specimen:  Blood Updated:  09/04/18 0956    Narrative:       The following orders were created for panel order CBC & Differential.  Procedure                               Abnormality         Status                     ---------                               -----------         ------                     CBC Auto Differential[[id number]]        Abnormal            Final result                  Please view results for these tests on the individual orders.    Comprehensive Metabolic Panel [[id number]] Collected:  09/04/18 0923    Specimen:  Blood Updated:  09/04/18 1016     Glucose 97 mg/dL      BUN 12 mg/dL      Creatinine 0.79 mg/dL      Sodium 142 mmol/L      Potassium 4.7 mmol/L      Chloride 106 mmol/L      CO2 27.0 mmol/L      Calcium 8.8 mg/dL      Total Protein 6.6 g/dL      Albumin 3.90 g/dL      ALT (SGPT) 12 U/L      AST (SGOT) 22 U/L      Comment: Specimen hemolyzed.  Results may be affected.        Alkaline Phosphatase 71 U/L      Total Bilirubin 0.8 mg/dL      eGFR Non African Amer 69 mL/min/1.73      Globulin 2.7 gm/dL      A/G Ratio 1.4 g/dL      BUN/Creatinine Ratio 15.2     Anion Gap 9.0 mmol/L     Narrative:       The MDRD GFR formula is only valid for adults with stable renal function between ages 18 and 70.    Protime-INR [[id number]]  (Abnormal) Collected:  09/04/18 0923    Specimen:  Blood Updated:  09/04/18 1015     Protime 24.1 (H) Seconds      INR 2.20 (H)    BNP [[id number]]  (Abnormal) Collected:  09/04/18 0923    Specimen:  Blood Updated:  09/04/18 1007     proBNP 2,022.0 (H) pg/mL     Narrative:       Among patients with dyspnea, NT-proBNP is highly sensitive for the detection of acute congestive heart failure. In addition NT-proBNP of <300 pg/ml effectively rules out acute congestive heart failure with 99% negative predictive value.    Troponin [[id number]]  (Normal) Collected:  09/04/18 0923    Specimen:  Blood Updated:  09/04/18 1008     Troponin T <0.010 ng/mL     Narrative:       Troponin T Reference Ranges:  Less than 0.03 ng/mL:    Negative for AMI  0.03 to 0.09 ng/mL:      Indeterminant for AMI  Greater than 0.09 ng/mL: Positive for AMI    Magnesium [[id number]]  (Normal) Collected:  09/04/18 0923    Specimen:  Blood Updated:  09/04/18 1008     Magnesium 2.2 mg/dL     CBC Auto Differential [[id number]]  (Abnormal) Collected:  09/04/18 0923    Specimen:  Blood Updated:   09/04/18 0956     WBC 5.61 10*3/mm3      RBC 4.57 10*6/mm3      Hemoglobin 14.3 g/dL      Hematocrit 44.3 %      MCV 96.9 fL      MCH 31.3 pg      MCHC 32.3 (L) g/dL      RDW 13.1 (H) %      RDW-SD 46.0 fl      MPV 10.6 fL      Platelets 201 10*3/mm3      Neutrophil % 50.1 %      Lymphocyte % 34.4 %      Monocyte % 12.5 (H) %      Eosinophil % 2.5 %      Basophil % 0.5 %      Immature Grans % 0.0 %      Neutrophils, Absolute 2.81 10*3/mm3      Lymphocytes, Absolute 1.93 10*3/mm3      Monocytes, Absolute 0.70 10*3/mm3      Eosinophils, Absolute 0.14 10*3/mm3      Basophils, Absolute 0.03 10*3/mm3      Immature Grans, Absolute 0.00 10*3/mm3           I ordered the above labs and reviewed the results    RADIOLOGY  XR Chest 2 View   Final Result   Nothing acute        I ordered the above noted radiological studies. Interpreted by radiologist.. Reviewed by me in PACS.       PROCEDURES  Procedures  EKG           EKG time: 1051  Rhythm/Rate: Ventricular paced rhythm, 73   P waves and FL: irregular P, vary KRIS  QRS, axis: LAD, IVCD   ST and T waves: non specific S     Interpreted Contemporaneously by me, independently viewed  unchanged compared to prior 6/14/18      PROGRESS AND CONSULTS  ED Course as of Sep 04 1511   Tue Sep 04, 2018   0938 SOA x 5 days, no cough , no cold symptoms. Hx CHF. On coumadin for afib.   [JS]   1233 1700 two months ago proBNP: (!) 2,022.0 []      ED Course User Index  [JS] Christianne Valverde APRN  [] Inder Moody MD     1323- Rechecked pt.Pt ambulated with ease, and is feeling better. Notified pt of BNP level and radiology results . Discussed the plan to follow up with Dr. Brand. Pt understands and agrees with the plan, all questions answered.      MEDICAL DECISION MAKING  Results were reviewed/discussed with the patient and they were also made aware of online access. Pt also made aware that some labs, such as cultures, will not be resulted during ER visit and follow up with PMD  is necessary.     MDM  Number of Diagnoses or Management Options  Dyspnea on exertion:   Diagnosis management comments: Patient complained of dyspnea on exertion.  Oxygen saturation was normal on room air.  On exam, her lungs were clear.  There was no pedal edema.  Chest x-ray did not show any evidence of CHF.  ProBNP was minimally elevated.  EKG was unchanged.  Patient was able ambulate in the ER and her sats remained in the upper 90s.  Patient will be discharged and advised to follow-up with her primary care doctor.       Amount and/or Complexity of Data Reviewed  Clinical lab tests: reviewed and ordered (Troponin- negative  INR- 2.20  BNP- elevated)  Tests in the radiology section of CPT®: reviewed and ordered (CXR- nothing acute)  Decide to obtain previous medical records or to obtain history from someone other than the patient: yes  Review and summarize past medical records: yes (Saw cariology in July for A-fib and CHF. In June, pt had a new pace maker placed by Frankie (cardiology). )  Independent visualization of images, tracings, or specimens: yes           DIAGNOSIS  Final diagnoses:   Dyspnea on exertion       DISPOSITION  DISCHARGE    Patient discharged in stable condition.    Reviewed implications of results, diagnosis, meds, responsibility to follow up, warning signs and symptoms of possible worsening, potential complications and reasons to return to ER.    Patient/Family voiced understanding of above instructions.    Discussed plan for discharge, as there is no emergent indication for admission. Patient referred to primary care provider for BP management due to today's BP. Pt/family is agreeable and understands need for follow up and repeat testing.  Pt is aware that discharge does not mean that nothing is wrong but it indicates no emergency is present that requires admission and they must continue care with follow-up as given below or physician of their choice.     FOLLOW-UP  Javi Brand,  MD  [address] [facility] [address]  [facility] [address]  [phone]    Call in 2 days  If symptoms persist         Medication List      Changed    * warfarin 2.5 MG tablet  Commonly known as:  COUMADIN  What changed:  Another medication with the same name was changed. Make   sure you understand how and when to take each.     * warfarin 2.5 MG tablet  Commonly known as:  COUMADIN  TAKE ONE TABLET BY MOUTH DAILY  What changed:  See the new instructions.        * This list has 2 medication(s) that are the same as other medications   prescribed for you. Read the directions carefully, and ask your doctor or   other care provider to review them with you.                  Latest Documented Vital Signs:  As of 3:11 PM  BP- 157/90 HR- 75 Temp- 97.7 °F (36.5 °C) (Tympanic) O2 sat- 96%    --  Documentation assistance provided by roro Olivares for Dr. Moody.  Information recorded by the scribe was done at my direction and has been verified and validated by me.            Jose Olivares  09/04/18 1321       Inder Moody MD  09/04/18 7672

## 2018-09-05 ENCOUNTER — TELEPHONE (OUTPATIENT)
Dept: CARDIOLOGY | Facility: CLINIC | Age: 83
End: 2018-09-05

## 2018-09-05 NOTE — PROGRESS NOTES
Date of Office Visit: 2018  Encounter Provider: KIKA Vidal  Place of Service: [facility] CARDIOLOGY  Patient Name: Diana Avalos  :1932    Chief Complaint   Patient presents with   • Shortness of Breath   :     HPI: Diana Avalos is a 86 y.o. female who presents today for cardiac evaluation.  She is a new patient to me and her previous records have been reviewed.  She has a history of anemia, asthma, breast cancer, thyroid disease, and irritable bowel syndrome.    She was diagnosed with atrial fibrillation in 2011 and a rate control strategy and anticoagulation were elected.  She was placed on amiodarone, digoxin, and dabigatran.  She then underwent AV node ablation and single-chamber Medtronic pacemaker in 2012.  In 2012 she was hospitalized and noted to have a reduced ejection fraction of 35-40%, mitral insufficiency, and pulmonary artery hypertension.      She did well on her medication until 2018 she was admitted with chronic systolic heart failure.  She had profound lethargy due to take her knee on echocardiogram.  She underwent upgrade to a CRT device.  Echocardiogram in 2018 revealed an EF of 36%, moderate LVH, marked dysynchrony of the septum and chronic apex to the RV pacing, moderate to severe mitral insufficiency, moderate to severe tricuspid insufficiency, and moderate pulmonary hypertension.  After the upgrade to CRT she developed a pneumothorax requiring a chest tube placement.  This ultimately resolved.  She then followed up with Dr. Rafaela Leija on  and reported some mild dyspnea.  Furosemide was increased her couple of days and she was recommended to follow-up with me in 6 weeks.    She then was evaluated by KIKA Marcial on  and reported that she was feeling better with the device upgrade.  She reported some mild dyspnea with exertion.  Device interrogation showed normal device function and  one nonsustained episode 7 beats in duration on 7/20 in which she was asymptomatic.  She was recommended to follow-up with the electrophysiology team, Dr. Leonardo David in one year.    On 9/4 she presented to the [facility] emergency department for evaluation of shortness of breath.  She reports that she woke up at 3 AM feeling short of breath and did not report that to her daughter until 8 AM the following morning.  Upon review of the ED records, blood pressure was 147/88,, and oxygen saturation 98%.  Comprehensive metabolic panel was normal, pro BNP elevated at 2022, troponin normal, magnesium normal, hemoglobin and hematocrit normal.  EKG was interpreted as a paced heart rhythm.  PA and lateral chest x-ray showed lungs well-expanded, free of infiltrate or masses, no pleural effusions, pacemaker in satisfactory position, and heart mildly enlarged.  She was recommended to follow-up in our office for further evaluation.    She continues to experience dyspnea upon exertion and talking, paroxysmal nocturnal dyspnea, and orthopnea.  She denies chest pain, cough, edema, palpitations, dizziness, or syncope.  She remains on warfarin and her INRs are checked here at [facility].  Her blood pressure and heart rate are within normal limits today and her weight is up 1 pound since her visit on 7/5 (utilizing the same scale).    The following portion of the patient's history were reviewed and updated as appropriate: past medical history, past surgical history, past social history, past family history, allergies, current medications, and problem list.    Past Medical History:   Diagnosis Date   • Anemia    • Arthritis    • Asthma    • Atrial fibrillation (CMS/HCC)     With a history of an atrial clot   • Breast cancer (CMS/HCC)     Left, stage IIB   • Disease of thyroid gland     Hypothyroidism   • Heart failure (CMS/HCC)    • Irritable bowel    • Mild tricuspid regurgitation    • Moderate mitral insufficiency    • NICM  (nonischemic cardiomyopathy) (CMS/HCC)    • On warfarin therapy    • Pulmonary hypertension    • Shortness of breath        Past Surgical History:   Procedure Laterality Date   • CARDIAC ELECTROPHYSIOLOGY PROCEDURE N/A 6/12/2018    Procedure: Device Upgrade-Upgrade to CRT-P-BIV Pacamaker Medtronic Has existing single chamber Medtronic pacemaker;  Surgeon: Leonardo David MD;  Location: [facility] INVASIVE LOCATION;  Service: Cardiovascular   • CHOLECYSTECTOMY  2000   • EYE SURGERY  2005    cataracts, Dr. Miramontes   • MASTECTOMY Left 10/2014   • PACEMAKER IMPLANTATION  2012    Dr. Leija       Social History     Social History   • Marital status:      Spouse name: Rick   • Number of children: 8   • Years of education: N/A     Occupational History   •  Retired     Social History Main Topics   • Smoking status: Never Smoker   • Smokeless tobacco: Never Used      Comment: caffeine use-tea   • Alcohol use 0.6 oz/week     1 Glasses of wine per week      Comment: social   • Drug use: No   • Sexual activity: Not on file       Family History   Problem Relation Age of Onset   • Colon cancer Mother 75   • Colon cancer Sister 79   • Hypertension Sister    • Cholelithiasis Sister         2 sisters       Review of Systems   Constitution: Positive for malaise/fatigue. Negative for chills, diaphoresis, fever, night sweats, weight gain and weight loss.   HENT: Negative for hearing loss, nosebleeds, sore throat and tinnitus.    Eyes: Negative for blurred vision, double vision, pain and visual disturbance.   Cardiovascular: Positive for dyspnea on exertion, orthopnea and paroxysmal nocturnal dyspnea. Negative for chest pain, claudication, cyanosis, irregular heartbeat, leg swelling, near-syncope, palpitations and syncope.   Respiratory: Positive for shortness of breath. Negative for cough, hemoptysis, snoring and wheezing.    Endocrine: Negative for cold intolerance, heat intolerance and polyuria.   Hematologic/Lymphatic:  Negative for bleeding problem. Does not bruise/bleed easily.   Skin: Negative for color change, dry skin, flushing and itching.   Musculoskeletal: Negative for falls, joint pain, joint swelling, muscle cramps, muscle weakness and myalgias.   Gastrointestinal: Negative for abdominal pain, constipation, heartburn, melena, nausea and vomiting.   Genitourinary: Negative for dysuria and hematuria.   Neurological: Positive for excessive daytime sleepiness and headaches. Negative for dizziness, light-headedness, loss of balance, numbness, paresthesias, seizures and vertigo.   Psychiatric/Behavioral: Negative for altered mental status, depression, memory loss and substance abuse. The patient does not have insomnia and is not nervous/anxious.    Allergic/Immunologic: Negative for environmental allergies.       Allergies   Allergen Reactions   • Amiodarone Nausea Only   • Codeine Nausea Only   • Dabigatran Etexilate Mesylate Nausea Only   • Digoxin Nausea Only   • Iodinated Diagnostic Agents          Current Outpatient Prescriptions:   •  acetaminophen (TYLENOL) 325 MG tablet, Take 2 tablets by mouth Every 6 (Six) Hours As Needed for Mild Pain ., Disp: , Rfl:   •  anastrozole (ARIMIDEX) 1 MG tablet, TAKE 1 TABLET BY MOUTH EVERY DAY, Disp: 30 tablet, Rfl: 6  •  carvedilol (COREG) 6.25 MG tablet, Take 1 tablet by mouth 2 (Two) Times a Day., Disp: 60 tablet, Rfl: 3  •  furosemide (LASIX) 20 MG tablet, Take 1 tablet by mouth Daily., Disp: 30 tablet, Rfl: 1  •  levothyroxine (SYNTHROID, LEVOTHROID) 88 MCG tablet, Take  by mouth daily., Disp: , Rfl:   •  vitamin B-12 (CYANOCOBALAMIN) 1000 MCG tablet, Take 1,000 mcg by mouth Daily., Disp: , Rfl:   •  warfarin (COUMADIN) 2.5 MG tablet, TAKE ONE TABLET BY MOUTH DAILY (Patient taking differently: Nightly, Whole Tablet, 2.5 mg, Tues, Thurs, Sat, Sun), Disp: 90 tablet, Rfl: 0  •  warfarin (COUMADIN) 2.5 MG tablet, Take 1.25 mg by mouth Every Night. Monday, Wednesday, Friday, Disp: , Rfl:  "      Objective:     Vitals:    18 1311   BP: 120/70   Pulse: 71   Weight: 68 kg (150 lb)   Height: 167.6 cm (66\")     Body mass index is 24.21 kg/m².    PHYSICAL EXAM:    Vitals Reviewed.   General Appearance: No acute distress, well developed and well nourished.   Eyes: Conjunctiva and lids: No erythema, swelling, or discharge. Sclera non-icteric.  Glasses.  HENT: Atraumatic, normocephalic. External eyes, ears, and nose normal. No hearing loss noted. Mucous membranes normal. Lips not cyanotic. Neck supple with no tenderness.  Respiratory: No signs of respiratory distress. Respiration rhythm and depth normal.   Clear to auscultation. No rales, crackles, rhonchi, or wheezing auscultated.   Cardiovascular:  Jugular Venous Pressure: Normal  Heart Rate and Rhythm: Irregular, paced rhythm.  Heart Sounds: Normal S1 and S2. No S3 or S4 noted.  Murmurs: No murmurs noted. No rubs, thrills, or gallops.   Arterial Pulses: Carotid pulses normal. No carotid bruit noted. Posterior tibialis and dorsalis pedis pulses normal.   Lower Extremities: No edema noted.  Gastrointestinal:  Abdomen soft, non-distended, non-tender. Normal bowel sounds. No hepatomegaly.   Musculoskeletal: Normal movement of extremities  Skin and Nails: General appearance normal. No pallor, cyanosis, diaphoresis. Skin temperature normal. No clubbing of fingernails.   Psychiatric: Patient alert and oriented to person, place, and time. Speech and behavior appropriate. Normal mood and affect.       ECG 12 Lead  Date/Time: 2018 1:08 PM  Performed by: RADHA CAIN  Authorized by: RADHA CAIN   Comparison: compared with previous ECG from 2018  Similar to previous ECG  Rhythm: atrial fibrillation  Rate: normal  BPM: 71  Conduction: conduction normal  ST Segments: ST segments normal  Pacin% capture                Assessment:       Diagnosis Plan   1. Acute on chronic systolic congestive heart failure (CMS/HCC)     2. Dyspnea on exertion   "   3. Permanent atrial fibrillation (CMS/HCC)     4. On warfarin therapy     5. Status post biventricular pacemaker     6. Non-rheumatic mitral regurgitation     7. Non-rheumatic tricuspid valve insufficiency     8. Pulmonary hypertension            Plan:       1.  Acute on Chronic Systolic Heart Failure: Ejection fraction noted to be 36% on last echocardiogram.  Her pro-BMP was recently elevated above 2000.  She continues to experience dyspnea when talking and with exertion, paroxysmal nocturnal dyspnea, and orthopnea.  I've recommended that we increase her furosemide to 40 mg daily.  She will need a repeat BMP in  one week as well as a follow-up appointment with me.  In the event that she has any worsening symptoms, I have asked her to contact the office.  At the upgrade of the CRT device, I will discuss with Dr. Leija if and when we need to recheck a 2-D echocardiogram.    Heart Failure  Assessment  • NYHA class III-B - There is significant limitation of physical activity. The patient is comfortable at rest, but minimal activity causes fatigue, palpitations or shortness of breath.  • Beta blocker prescribed  • Diuretics prescribed  • Left ventricular function is moderately reduced by qualitative assessment    Plan  • The patient has received heart failure education on the following topics: dietary sodium restriction, medication instructions, symptom management and weight monitoring    Subjective/Objective  • The patient reports dyspnea and orthopnea        2.  Permanent Atrial Fibrillation: Status post AV node ablation with Medtronic single chamber pacemaker January 2012 and recent upgrade to CRT device in June 2018.  She remains on chronic warfarin therapy with INRs checked here at [facility].    Atrial Fibrillation and Atrial Flutter  Assessment  • The patient has permanent atrial fibrillation  • The patient's CHADS2-VASc score is 5  • A [id number] score of 2 or more is considered a high risk for a  thromboembolic event  • Warfarin prescribed    Plan  • Continue in atrial fibrillation with rate control  • Continue warfarin for antithrombotic therapy, bleeding issues discussed  • Continue beta blocker for rate control    Subjective - Objective  • The patient had atrial fibrillation ablation         3.  Hypertension: Blood pressure well-controlled today.    4.  Valvular Disease: Noted to have moderate to severe mitral insufficiency and tricuspid insufficiency.  Continue to monitor.    5.  Pulmonary Hypertension: Moderate per echocardiogram and may also be contributing to the shortness of breath.  Continue diuretic therapy.    6.  Follow-up with me in 7-10 days.    As always, it has been a pleasure to participate in your patient's care.      Sincerely,         KIKA Castelan

## 2018-09-05 NOTE — TELEPHONE ENCOUNTER
----- Message from Sarmad Tatum sent at 9/5/2018  9:31 AM EDT -----  PT was seen in the ER yesterday for SOA and was told to follow up with RM within the next few days. Is there anything I can do?     Thanks  Jacqueline Garcia

## 2018-09-05 NOTE — TELEPHONE ENCOUNTER
Pt has already been scheduled with KIKA Castelan on Thurs 09/06 at 1:00.  Confirmed date and time with pt, she verbalized understanding.

## 2018-09-06 ENCOUNTER — OFFICE VISIT (OUTPATIENT)
Dept: CARDIOLOGY | Facility: CLINIC | Age: 83
End: 2018-09-06

## 2018-09-06 VITALS
HEART RATE: 71 BPM | BODY MASS INDEX: 24.11 KG/M2 | WEIGHT: 150 LBS | SYSTOLIC BLOOD PRESSURE: 120 MMHG | DIASTOLIC BLOOD PRESSURE: 70 MMHG | HEIGHT: 66 IN

## 2018-09-06 DIAGNOSIS — R06.09 DYSPNEA ON EXERTION: ICD-10-CM

## 2018-09-06 DIAGNOSIS — I50.23 ACUTE ON CHRONIC SYSTOLIC CONGESTIVE HEART FAILURE (HCC): Primary | ICD-10-CM

## 2018-09-06 DIAGNOSIS — I48.21 PERMANENT ATRIAL FIBRILLATION (HCC): ICD-10-CM

## 2018-09-06 DIAGNOSIS — I34.0 NON-RHEUMATIC MITRAL REGURGITATION: ICD-10-CM

## 2018-09-06 DIAGNOSIS — I27.20 PULMONARY HYPERTENSION (HCC): ICD-10-CM

## 2018-09-06 DIAGNOSIS — Z79.01 ON WARFARIN THERAPY: ICD-10-CM

## 2018-09-06 DIAGNOSIS — I36.1 NON-RHEUMATIC TRICUSPID VALVE INSUFFICIENCY: ICD-10-CM

## 2018-09-06 DIAGNOSIS — Z95.0 STATUS POST BIVENTRICULAR PACEMAKER: ICD-10-CM

## 2018-09-06 PROCEDURE — 93000 ELECTROCARDIOGRAM COMPLETE: CPT | Performed by: NURSE PRACTITIONER

## 2018-09-06 PROCEDURE — 99214 OFFICE O/P EST MOD 30 MIN: CPT | Performed by: NURSE PRACTITIONER

## 2018-09-11 PROBLEM — I26.99 PULMONARY EMBOLISM: Status: RESOLVED | Noted: 2017-04-11 | Resolved: 2018-09-11

## 2018-09-11 PROBLEM — Z98.890 S/P AV NODAL ABLATION: Status: RESOLVED | Noted: 2018-07-31 | Resolved: 2018-09-11

## 2018-09-14 ENCOUNTER — LAB (OUTPATIENT)
Dept: LAB | Facility: HOSPITAL | Age: 83
End: 2018-09-14

## 2018-09-14 ENCOUNTER — CLINICAL SUPPORT NO REQUIREMENTS (OUTPATIENT)
Dept: CARDIOLOGY | Facility: CLINIC | Age: 83
End: 2018-09-14

## 2018-09-14 ENCOUNTER — OFFICE VISIT (OUTPATIENT)
Dept: CARDIOLOGY | Facility: CLINIC | Age: 83
End: 2018-09-14

## 2018-09-14 VITALS
DIASTOLIC BLOOD PRESSURE: 72 MMHG | HEIGHT: 66 IN | SYSTOLIC BLOOD PRESSURE: 124 MMHG | OXYGEN SATURATION: 99 % | WEIGHT: 148 LBS | HEART RATE: 71 BPM | BODY MASS INDEX: 23.78 KG/M2

## 2018-09-14 DIAGNOSIS — I50.23 ACUTE ON CHRONIC SYSTOLIC CONGESTIVE HEART FAILURE (HCC): Primary | ICD-10-CM

## 2018-09-14 DIAGNOSIS — I48.21 PERMANENT ATRIAL FIBRILLATION (HCC): Primary | ICD-10-CM

## 2018-09-14 DIAGNOSIS — I50.23 ACUTE ON CHRONIC SYSTOLIC CONGESTIVE HEART FAILURE (HCC): ICD-10-CM

## 2018-09-14 DIAGNOSIS — R06.09 DYSPNEA ON EXERTION: ICD-10-CM

## 2018-09-14 LAB
ANION GAP SERPL CALCULATED.3IONS-SCNC: 9.9 MMOL/L
BUN BLD-MCNC: 17 MG/DL (ref 8–23)
BUN/CREAT SERPL: 19.1 (ref 7–25)
CALCIUM SPEC-SCNC: 9.2 MG/DL (ref 8.6–10.5)
CHLORIDE SERPL-SCNC: 103 MMOL/L (ref 98–107)
CO2 SERPL-SCNC: 28.1 MMOL/L (ref 22–29)
CREAT BLD-MCNC: 0.89 MG/DL (ref 0.57–1)
GFR SERPL CREATININE-BSD FRML MDRD: 60 ML/MIN/1.73
GLUCOSE BLD-MCNC: 89 MG/DL (ref 65–99)
POTASSIUM BLD-SCNC: 4.2 MMOL/L (ref 3.5–5.2)
SODIUM BLD-SCNC: 141 MMOL/L (ref 136–145)

## 2018-09-14 PROCEDURE — 93283 PRGRMG EVAL IMPLANTABLE DFB: CPT | Performed by: INTERNAL MEDICINE

## 2018-09-14 PROCEDURE — 80048 BASIC METABOLIC PNL TOTAL CA: CPT

## 2018-09-14 PROCEDURE — 93000 ELECTROCARDIOGRAM COMPLETE: CPT | Performed by: PHYSICIAN ASSISTANT

## 2018-09-14 PROCEDURE — 99214 OFFICE O/P EST MOD 30 MIN: CPT | Performed by: PHYSICIAN ASSISTANT

## 2018-09-14 PROCEDURE — 36415 COLL VENOUS BLD VENIPUNCTURE: CPT

## 2018-09-14 NOTE — PROGRESS NOTES
Date of Office Visit: 2018  Encounter Provider: TORSTEN Hinojosa  Place of Service: [facility] CARDIOLOGY  Patient Name: Diana Avalos  :1932    Chief Complaint   Patient presents with   • Congestive Heart Failure     1 week follow up   • Atrial Fibrillation   :     HPI: Diana Avalos is a 86 y.o. female , new to me, who presents today for follow-up.  Old records have been obtained and reviewed by me.  She is a patient of Dr. Leija's with a past cardiac history significant for atrial fibrillation and chronic systolic heart failure.  She is status post AV node ablation and single chamber pacemaker implantation in 2012.  She eventually underwent an upgrade to a CRT device in 2018.  At that time, she had an echocardiogram which showed an EF of 36%, moderate to severe mitral regurgitation, moderate to severe tricuspid regurgitation, and moderate pulmonary hypertension.  This prompted the CRT upgrade.  She did develop a pneumothorax after her CRT upgrade and required chest tube placement, this ultimately resolved.  Her diuretics were titrated up around the same time with good result.  She was last in our office to see Mercedes Winston on 2018.  At that visit, she continued to experience shortness of breath on exertion and with talking.  As well as PND and orthopnea.  Her weight was up only 1 pound since her visit in July, this was on the same scale.  At that visit, Mercedes increased her furosemide to 40 mg daily and recommended a repeat BMP in 1 week as well as a 1 week follow-up appointment.  She is here today for that visit.  She did have a BMP today, this was unremarkable.  She did have her device interrogated today, this showed biventricular pacing at 85 bpm with effective CRT pacing 98.7% of the time.  She had 2 episodes of nonsustained tachycardia the longest of which was 18 beats at 168 bpm.  I did have Dr. David review her ECG as well as her  pacemaker interrogation, from his standpoint her CRT is functioning very well.   Since her Lasix was increased from 20 mg to 40 mg daily, she has not felt any difference.  She is still short of breath with minimal exertion.  She still has orthopnea and PND, and has to sleep propped up.  She denies any chest pain, edema, dizziness, or syncope.  She has lost about 2 pounds over the last week.      Past Medical History:   Diagnosis Date   • Anemia    • Arthritis    • Asthma    • Atrial fibrillation (CMS/HCC)     With a history of an atrial clot   • Breast cancer (CMS/HCC)     Left, stage IIB   • Disease of thyroid gland     Hypothyroidism   • Heart failure (CMS/HCC)    • Irritable bowel    • Mild tricuspid regurgitation    • Moderate mitral insufficiency    • NICM (nonischemic cardiomyopathy) (CMS/HCC)    • On warfarin therapy    • Pulmonary hypertension    • Shortness of breath        Past Surgical History:   Procedure Laterality Date   • CARDIAC ELECTROPHYSIOLOGY PROCEDURE N/A 6/12/2018    Procedure: Device Upgrade-Upgrade to CRT-P-BIV Pacamaker Medtronic Has existing single chamber Medtronic pacemaker;  Surgeon: Leonardo David MD;  Location: [facility] INVASIVE LOCATION;  Service: Cardiovascular   • CHOLECYSTECTOMY  2000   • EYE SURGERY  2005    cataracts, Dr. Miramontes   • MASTECTOMY Left 10/2014   • PACEMAKER IMPLANTATION  2012    Dr. Leija       Social History     Social History   • Marital status:      Spouse name: Rick   • Number of children: 8   • Years of education: N/A     Occupational History   •  Retired     Social History Main Topics   • Smoking status: Never Smoker   • Smokeless tobacco: Never Used      Comment: caffeine use-tea   • Alcohol use 0.6 oz/week     1 Glasses of wine per week      Comment: social   • Drug use: No   • Sexual activity: Defer     Other Topics Concern   • Not on file     Social History Narrative   • No narrative on file       Family History   Problem Relation Age of  Onset   • Colon cancer Mother 75   • Colon cancer Sister 79   • Hypertension Sister    • Cholelithiasis Sister         2 sisters       Review of Systems   Constitution: Negative for chills, fever and malaise/fatigue.   Cardiovascular: Positive for dyspnea on exertion, orthopnea and paroxysmal nocturnal dyspnea. Negative for chest pain, leg swelling, near-syncope, palpitations and syncope.   Respiratory: Positive for shortness of breath. Negative for cough.    Musculoskeletal: Negative for joint pain, joint swelling and myalgias.   Gastrointestinal: Negative for abdominal pain, diarrhea, melena, nausea and vomiting.   Genitourinary: Negative for frequency and hematuria.   Neurological: Negative for light-headedness, numbness, paresthesias and seizures.   Allergic/Immunologic: Negative.    All other systems reviewed and are negative.      Allergies   Allergen Reactions   • Amiodarone Nausea Only   • Codeine Nausea Only   • Dabigatran Etexilate Mesylate Nausea Only   • Digoxin Nausea Only   • Iodinated Diagnostic Agents          Current Outpatient Prescriptions:   •  acetaminophen (TYLENOL) 325 MG tablet, Take 2 tablets by mouth Every 6 (Six) Hours As Needed for Mild Pain ., Disp: , Rfl:   •  anastrozole (ARIMIDEX) 1 MG tablet, TAKE 1 TABLET BY MOUTH EVERY DAY, Disp: 30 tablet, Rfl: 6  •  carvedilol (COREG) 6.25 MG tablet, Take 1 tablet by mouth 2 (Two) Times a Day., Disp: 60 tablet, Rfl: 3  •  furosemide (LASIX) 20 MG tablet, Take 1 tablet by mouth Daily. (Patient taking differently: Take 40 mg by mouth Daily.), Disp: 30 tablet, Rfl: 1  •  levothyroxine (SYNTHROID, LEVOTHROID) 88 MCG tablet, Take 88 mcg by mouth Daily., Disp: , Rfl:   •  vitamin B-12 (CYANOCOBALAMIN) 1000 MCG tablet, Take 1,000 mcg by mouth Daily., Disp: , Rfl:   •  warfarin (COUMADIN) 2.5 MG tablet, TAKE ONE TABLET BY MOUTH DAILY (Patient taking differently: Nightly, Whole Tablet, 2.5 mg, Tues, Thurs, Sat, Sun), Disp: 90 tablet, Rfl: 0  •  warfarin  "(COUMADIN) 2.5 MG tablet, Take 1.25 mg by mouth Every Night. Monday, Wednesday, Friday, Disp: , Rfl:       Objective:     Vitals:    09/14/18 1417   BP: 124/72   BP Location: Right arm   Pulse: 71   SpO2: 99%   Weight: 67.1 kg (148 lb)   Height: 167.6 cm (66\")     Body mass index is 23.89 kg/m².    PHYSICAL EXAM:    Physical Exam   Constitutional: She is oriented to person, place, and time. She appears well-developed and well-nourished. No distress.   HENT:   Head: Normocephalic and atraumatic.   Eyes: Pupils are equal, round, and reactive to light.   Neck: No JVD present. No thyromegaly present.   Cardiovascular: Normal rate, regular rhythm, normal heart sounds and intact distal pulses.    No murmur heard.  Pulmonary/Chest: Effort normal and breath sounds normal. No respiratory distress. She has no rales.   Abdominal: Soft. Bowel sounds are normal. She exhibits no distension and no ascites. There is no splenomegaly or hepatomegaly. There is no tenderness.   Musculoskeletal: Normal range of motion. She exhibits no edema.   Neurological: She is alert and oriented to person, place, and time.   Skin: Skin is warm and dry. She is not diaphoretic. No erythema.   Psychiatric: She has a normal mood and affect. Her behavior is normal. Judgment normal.         ECG 12 Lead  Date/Time: 9/14/2018 3:23 PM  Performed by: MEJIA CASTAÑEDA  Authorized by: MEJIA CASTAÑEDA   Comparison: compared with previous ECG from 9/6/2018  Similar to previous ECG  Rhythm: paced  BPM: 73  Clinical impression: abnormal ECG  Comments: Indication: Atrial fibrillation              Assessment:       Diagnosis Plan   1. Permanent atrial fibrillation (CMS/HCC)  ECG 12 Lead   2. Acute on chronic systolic congestive heart failure (CMS/HCC)  ECG 12 Lead     Orders Placed This Encounter   Procedures   • ECG 12 Lead     This order was created via procedure documentation          Plan:       1.  Atrial Fibrillation and Atrial Flutter  Assessment  • The " patient has permanent atrial fibrillation  • This is valvular in etiology  • The patient's CHADS2-VASc score is 4  • A [id number] score of 2 or more is considered a high risk for a thromboembolic event  • Warfarin prescribed    Plan  • Continue in atrial fibrillation with rate control  • Continue warfarin for antithrombotic therapy, bleeding issues discussed    Subjective - Objective  • She is status post AV node ablation with recent CRT upgrade.  She is ventricularly paced.  She is anticoagulated with warfarin.  Continue current medical regimen.    2.  Heart Failure  Assessment  • NYHA class III-A - There is limitation of physical activity. The patient is comfortable at rest, but ordinary activity causes fatigue, palpitations or shortness of breath.  • Beta blocker prescribed  • Diuretics prescribed  • The most recent ejection fraction is 36%  • Left ventricular function is moderately reduced by qualitative assessment    Plan  • The heart failure care plan was discussed with the patient today including: continuing the current program    Subjective/Objective  • The patient reports dyspnea and orthopnea    • Physical exam findings negative for rales, peripheral edema, elevated JVP, hepatomegaly and ascites.  • The patient has a CRT implant  • She will does not feel much better since her CRT upgrade or increase in Lasix.  According to Dr. David, her CRT device is optimized and functioning well.  At this point, I think that her shortness of breath, orthopnea, and PND are either related to her valvular disease or possibly coronary disease.  I cannot see that she's ever had an ischemic workup.  I did discuss the plan of care with Mercedes Winston.  As of right now, I think that she stable.  I'm going to keep her on the Lasix 40 mg daily, her BMP today was unremarkable.  Mercedes is then going to discuss this with Dr. Leija next week and come up with a plan.  Certainly an ischemic workup or a repeat echocardiogram is  probably on the horizon.  I discussed this at length with the patient and her daughter, they indicated that they understood.      As always, it has been a pleasure to participate in your patient's care.      Sincerely,         Natalia Burt PA-C

## 2018-09-17 ENCOUNTER — TELEPHONE (OUTPATIENT)
Dept: CARDIOLOGY | Facility: CLINIC | Age: 83
End: 2018-09-17

## 2018-09-17 NOTE — TELEPHONE ENCOUNTER
----- Message from TORSTEN Hinojosa sent at 9/14/2018  3:27 PM EDT -----  I copied you on this note.  When you and Dr. Leija decide what your next move is, the patient would like you to call her daughter Magui Cassidy.  The number is area code [phone].    Thanks!  P

## 2018-09-17 NOTE — TELEPHONE ENCOUNTER
"  BMP normal. Patient informed.     Patient was seen again in the office this past Friday and still not feeling well.  I offered changing her medications or further testing and she has declined.  She states \"it's just my age and I'm fine\".    Zackery-please arrange a follow-up appointment with Dr. Leija in the next 3-4 weeks.  Please let me know when this is scheduled.  "

## 2018-09-21 RX ORDER — FUROSEMIDE 20 MG/1
40 TABLET ORAL DAILY
Qty: 60 TABLET | Refills: 0 | Status: SHIPPED | OUTPATIENT
Start: 2018-09-21 | End: 2018-10-23 | Stop reason: SDUPTHER

## 2018-09-21 NOTE — TELEPHONE ENCOUNTER
Diana is really SOB with minimal exertion. When I talked to her last, she did not want to do anything further. Please call daughter and arrange an appt for Dr. Leija to see the patient next available. I am worried about her SOB. Thanks.

## 2018-09-21 NOTE — PROGRESS NOTES
I was worried about her shortness of breath when I saw her.  Natalia then saw her.  Please review her note.  I called her and she said she is not worried about her shortness of breath anymore and she is fine.  She is scheduled to see you in October. Do you want to do anything before then?

## 2018-09-25 ENCOUNTER — TELEPHONE (OUTPATIENT)
Dept: CARDIOLOGY | Facility: CLINIC | Age: 83
End: 2018-09-25

## 2018-09-25 DIAGNOSIS — I48.21 PERMANENT ATRIAL FIBRILLATION (HCC): ICD-10-CM

## 2018-09-25 DIAGNOSIS — I50.23 ACUTE ON CHRONIC SYSTOLIC CONGESTIVE HEART FAILURE (HCC): Primary | ICD-10-CM

## 2018-09-25 DIAGNOSIS — I27.20 PULMONARY HYPERTENSION (HCC): ICD-10-CM

## 2018-09-25 DIAGNOSIS — I34.0 NON-RHEUMATIC MITRAL REGURGITATION: ICD-10-CM

## 2018-09-25 NOTE — TELEPHONE ENCOUNTER
Dr. Leija would like the patient to have a cardiac PET scan and 2-D echocardiogram completed before her appointment with her.    Please call patient and schedule.

## 2018-09-27 ENCOUNTER — ANTICOAGULATION VISIT (OUTPATIENT)
Dept: PHARMACY | Facility: HOSPITAL | Age: 83
End: 2018-09-27

## 2018-09-27 DIAGNOSIS — Z79.01 LONG-TERM (CURRENT) USE OF ANTICOAGULANTS: ICD-10-CM

## 2018-09-27 LAB
INR PPP: 2 (ref 0.91–1.09)
PROTHROMBIN TIME: 24.1 SECONDS (ref 10–13.8)

## 2018-09-27 PROCEDURE — 85610 PROTHROMBIN TIME: CPT

## 2018-09-27 PROCEDURE — G0463 HOSPITAL OUTPT CLINIC VISIT: HCPCS

## 2018-09-27 PROCEDURE — 36416 COLLJ CAPILLARY BLOOD SPEC: CPT

## 2018-09-27 NOTE — PROGRESS NOTES
Anticoagulation Clinic Progress Note  Anticoagulation Summary  As of 2018    INR goal:   2.0-3.0   TTR:   --   Today's INR:   2.0   Warfarin maintenance plan:   1.25 mg on Mon, Wed, Fri; 2.5 mg all other days   Weekly warfarin total:   13.75 mg   Plan last modified:   Nikole Tiwari RPH (2018)   Next INR check:   10/25/2018   Priority:   Maintenance   Target end date:   Indefinite    Indications    Long-term (current) use of anticoagulants [Z79.01]             Anticoagulation Episode Summary     INR check location:       Preferred lab:       Send INR reminders to:    [facility]    Comments:         Anticoagulation Care Providers     Provider Role Specialty Phone number    Rafaela Leija MD Referring Cardiology [phone]    Nikole Tiwari RP Responsible Pharmacy             Drug interactions: NONE  Diet: CONSISTENT    Clinic Interview:  Patient Findings     Positives:   Change in medications    Negatives:   Signs/symptoms of thrombosis, Signs/symptoms of bleeding,   Laboratory test error suspected, Change in health, Change in alcohol use,   Change in activity, Upcoming invasive procedure, Emergency department   visit, Upcoming dental procedure, Missed doses, Extra doses, Change in   diet/appetite, Hospital admission, Bruising, Other complaints    Comments:   Patient has increased her furosemide from 20mg daily to 40mg   daily      Clinical Outcomes     Negatives:   Major bleeding event, Thromboembolic event,   Anticoagulation-related hospital admission, Anticoagulation-related ED   visit, Anticoagulation-related fatality    Comments:   Patient has increased her furosemide from 20mg daily to 40mg   daily        Education:  Diana Avalos is a new start in the [facility]. We discussed the followin) Warfarin's indication, mechanism, and dosing  2) Enforced the importance of taking warfarin as instructed and at the same time every day, preferably in the  evening so that we can make dose adjustments more easily following subsequent clinic visits  3) What she should do about a missed dose; pts can take missed doses within about 12 hours of their usual scheduled dose, but she was instructed on the importance of not doubling up on doses unless told to do so by the [facility]  4) Explained possible side effects of warfarin therapy, including increased risk of bleeding, s/sx of bleeding and s/sx of any additional clots/PE/CVA.   5) Discussed monitoring of warfarin, the INR, goal INR range, and the frequency of monitoring  6) Reviewed drug/food/tobacco/EtOH interactions and provided written information covering these topics in more detail, explaining that green, leafy vegetables interact most heavily with warfarin  7) Instructed the pt not to take or discontinue any medications without informing her physician/pharmacist and reminded her to inform us of any dietary changes, as well  8) Explained that she would be coming into the clinic more frequently in these first few weeks of therapy as we try to adjust her dose and achieve a therapeutic INR x 2 consecutive readings. Once that is achieved, patient will follow up in clinic every 4 weeks, on average.    She stated no problems with transportation or scheduling clinic appts in this manner. she expressed understanding of the information provided and has no additional questions at this time.    Diana Avalos was presented with a copy of the Patients Rights and Responsibilities. she expressed verbal consent and agreement to receive care in the [facility] under the current collaborative care agreement with [facility].       INR History:  Previous INR data from [facility] is recorded in [facility] and scanned into the patient's chart in [facility]. These results have been analyzed and reviewed.      Plan:  1. INR is Therapeutic today- see above in Anticoagulation Summary.    Will instruct Diana Avalos to Continue their warfarin regimen- see above in Anticoagulation Summary.  2. Follow up in 1 month  3. Patient declines warfarin refills.  4. Verbal and written information provided. Patient expresses understanding and has no further questions at this time.    Nikole Tiwari, [facility]

## 2018-09-28 NOTE — TELEPHONE ENCOUNTER
Pts daughter was contacted today with appt dates for her echo and PET. These are scheduled for 10/4/18.    Magui pts daughter would like to know if you feel that these tests are appropriate    Magui can be reached at [phone]

## 2018-10-04 ENCOUNTER — HOSPITAL ENCOUNTER (OUTPATIENT)
Dept: CARDIOLOGY | Facility: HOSPITAL | Age: 83
Discharge: HOME | End: 2018-10-04

## 2018-10-04 ENCOUNTER — HOSPITAL ENCOUNTER (OUTPATIENT)
Dept: CARDIOLOGY | Facility: HOSPITAL | Age: 83
Discharge: HOME | End: 2018-10-04
Admitting: NURSE PRACTITIONER

## 2018-10-04 VITALS
SYSTOLIC BLOOD PRESSURE: 140 MMHG | HEART RATE: 95 BPM | BODY MASS INDEX: 23.78 KG/M2 | HEIGHT: 66 IN | DIASTOLIC BLOOD PRESSURE: 80 MMHG | WEIGHT: 148 LBS

## 2018-10-04 DIAGNOSIS — I27.20 PULMONARY HYPERTENSION (HCC): ICD-10-CM

## 2018-10-04 DIAGNOSIS — I34.0 NON-RHEUMATIC MITRAL REGURGITATION: ICD-10-CM

## 2018-10-04 DIAGNOSIS — I48.21 PERMANENT ATRIAL FIBRILLATION (HCC): ICD-10-CM

## 2018-10-04 DIAGNOSIS — I50.23 ACUTE ON CHRONIC SYSTOLIC CONGESTIVE HEART FAILURE (HCC): ICD-10-CM

## 2018-10-04 LAB
ASCENDING AORTA: 2.7 CM
BH CV ECHO MEAS - ACS: 1.6 CM
BH CV ECHO MEAS - AO MAX PG (FULL): 4.9 MMHG
BH CV ECHO MEAS - AO MAX PG: 7.1 MMHG
BH CV ECHO MEAS - AO MEAN PG (FULL): 2.3 MMHG
BH CV ECHO MEAS - AO MEAN PG: 3.8 MMHG
BH CV ECHO MEAS - AO ROOT AREA (BSA CORRECTED): 1.9
BH CV ECHO MEAS - AO ROOT AREA: 9.1 CM^2
BH CV ECHO MEAS - AO ROOT DIAM: 3.4 CM
BH CV ECHO MEAS - AO V2 MAX: 133.6 CM/SEC
BH CV ECHO MEAS - AO V2 MEAN: 92.2 CM/SEC
BH CV ECHO MEAS - AO V2 VTI: 28.2 CM
BH CV ECHO MEAS - AVA(I,A): 1.6 CM^2
BH CV ECHO MEAS - AVA(I,D): 1.6 CM^2
BH CV ECHO MEAS - AVA(V,A): 1.7 CM^2
BH CV ECHO MEAS - AVA(V,D): 1.7 CM^2
BH CV ECHO MEAS - BSA(HAYCOCK): 1.8 M^2
BH CV ECHO MEAS - BSA: 1.8 M^2
BH CV ECHO MEAS - BZI_BMI: 23.9 KILOGRAMS/M^2
BH CV ECHO MEAS - BZI_METRIC_HEIGHT: 167.6 CM
BH CV ECHO MEAS - BZI_METRIC_WEIGHT: 67.1 KG
BH CV ECHO MEAS - EDV(MOD-SP2): 82 ML
BH CV ECHO MEAS - EDV(MOD-SP4): 63 ML
BH CV ECHO MEAS - EDV(TEICH): 159.4 ML
BH CV ECHO MEAS - EF(CUBED): 49.7 %
BH CV ECHO MEAS - EF(MOD-BP): 39 %
BH CV ECHO MEAS - EF(MOD-SP2): 36.6 %
BH CV ECHO MEAS - EF(MOD-SP4): 39.7 %
BH CV ECHO MEAS - EF(TEICH): 41.2 %
BH CV ECHO MEAS - ESV(MOD-SP2): 52 ML
BH CV ECHO MEAS - ESV(MOD-SP4): 38 ML
BH CV ECHO MEAS - ESV(TEICH): 93.7 ML
BH CV ECHO MEAS - IVS/LVPW: 1.1
BH CV ECHO MEAS - IVSD: 0.96 CM
BH CV ECHO MEAS - LAT PEAK E' VEL: 10 CM/SEC
BH CV ECHO MEAS - LV DIASTOLIC VOL/BSA (35-75): 35.8 ML/M^2
BH CV ECHO MEAS - LV MASS(C)D: 201.1 GRAMS
BH CV ECHO MEAS - LV MASS(C)DI: 114.3 GRAMS/M^2
BH CV ECHO MEAS - LV MAX PG: 2.2 MMHG
BH CV ECHO MEAS - LV MEAN PG: 1.5 MMHG
BH CV ECHO MEAS - LV SYSTOLIC VOL/BSA (12-30): 21.6 ML/M^2
BH CV ECHO MEAS - LV V1 MAX: 74.7 CM/SEC
BH CV ECHO MEAS - LV V1 MEAN: 59.2 CM/SEC
BH CV ECHO MEAS - LV V1 VTI: 14.9 CM
BH CV ECHO MEAS - LVIDD: 5.7 CM
BH CV ECHO MEAS - LVIDS: 4.5 CM
BH CV ECHO MEAS - LVLD AP2: 6.4 CM
BH CV ECHO MEAS - LVLD AP4: 6.5 CM
BH CV ECHO MEAS - LVLS AP2: 6 CM
BH CV ECHO MEAS - LVLS AP4: 5.9 CM
BH CV ECHO MEAS - LVOT AREA (M): 3.1 CM^2
BH CV ECHO MEAS - LVOT AREA: 3.1 CM^2
BH CV ECHO MEAS - LVOT DIAM: 2 CM
BH CV ECHO MEAS - LVPWD: 0.87 CM
BH CV ECHO MEAS - MED PEAK E' VEL: 9 CM/SEC
BH CV ECHO MEAS - MR MAX PG: 45.3 MMHG
BH CV ECHO MEAS - MR MAX VEL: 336.6 CM/SEC
BH CV ECHO MEAS - MV DEC SLOPE: 482.2 CM/SEC^2
BH CV ECHO MEAS - MV DEC TIME: 0.15 SEC
BH CV ECHO MEAS - MV E MAX VEL: 80.5 CM/SEC
BH CV ECHO MEAS - MV MAX PG: 3.2 MMHG
BH CV ECHO MEAS - MV MEAN PG: 1.3 MMHG
BH CV ECHO MEAS - MV P1/2T MAX VEL: 81.5 CM/SEC
BH CV ECHO MEAS - MV P1/2T: 49.5 MSEC
BH CV ECHO MEAS - MV V2 MAX: 89.2 CM/SEC
BH CV ECHO MEAS - MV V2 MEAN: 53.2 CM/SEC
BH CV ECHO MEAS - MV V2 VTI: 21.9 CM
BH CV ECHO MEAS - MVA P1/2T LCG: 2.7 CM^2
BH CV ECHO MEAS - MVA(P1/2T): 4.4 CM^2
BH CV ECHO MEAS - MVA(VTI): 2.1 CM^2
BH CV ECHO MEAS - PA ACC TIME: 0.11 SEC
BH CV ECHO MEAS - PA MAX PG (FULL): 0.58 MMHG
BH CV ECHO MEAS - PA MAX PG: 1.7 MMHG
BH CV ECHO MEAS - PA PR(ACCEL): 31.5 MMHG
BH CV ECHO MEAS - PA V2 MAX: 65.2 CM/SEC
BH CV ECHO MEAS - PVA(V,A): 2.5 CM^2
BH CV ECHO MEAS - PVA(V,D): 2.5 CM^2
BH CV ECHO MEAS - QP/QS: 0.54
BH CV ECHO MEAS - RAP SYSTOLE: 15 MMHG
BH CV ECHO MEAS - RV MAX PG: 1.1 MMHG
BH CV ECHO MEAS - RV MEAN PG: 0.71 MMHG
BH CV ECHO MEAS - RV V1 MAX: 53 CM/SEC
BH CV ECHO MEAS - RV V1 MEAN: 40.1 CM/SEC
BH CV ECHO MEAS - RV V1 VTI: 8.2 CM
BH CV ECHO MEAS - RVOT AREA: 3.1 CM^2
BH CV ECHO MEAS - RVOT DIAM: 2 CM
BH CV ECHO MEAS - RVSP: 49 MMHG
BH CV ECHO MEAS - SI(AO): 145.3 ML/M^2
BH CV ECHO MEAS - SI(CUBED): 52 ML/M^2
BH CV ECHO MEAS - SI(LVOT): 26.1 ML/M^2
BH CV ECHO MEAS - SI(MOD-SP2): 17 ML/M^2
BH CV ECHO MEAS - SI(MOD-SP4): 14.2 ML/M^2
BH CV ECHO MEAS - SI(TEICH): 37.3 ML/M^2
BH CV ECHO MEAS - SUP REN AO DIAM: 1.6 CM
BH CV ECHO MEAS - SV(AO): 255.7 ML
BH CV ECHO MEAS - SV(CUBED): 91.5 ML
BH CV ECHO MEAS - SV(LVOT): 45.9 ML
BH CV ECHO MEAS - SV(MOD-SP2): 30 ML
BH CV ECHO MEAS - SV(MOD-SP4): 25 ML
BH CV ECHO MEAS - SV(RVOT): 24.9 ML
BH CV ECHO MEAS - SV(TEICH): 65.7 ML
BH CV ECHO MEAS - TAPSE (>1.6): 1.2 CM2
BH CV ECHO MEAS - TR MAX VEL: 294 CM/SEC
BH CV ECHO MEASUREMENTS AVERAGE E/E' RATIO: 8.47
BH CV NUCLEAR PRIOR STUDY: 2
BH CV STRESS BP STAGE 1: NORMAL
BH CV STRESS COMMENTS STAGE 1: NORMAL
BH CV STRESS DOSE REGADENOSON STAGE 1: 0.4
BH CV STRESS DURATION MIN STAGE 1: 0
BH CV STRESS DURATION SEC STAGE 1: 10
BH CV STRESS HR STAGE 1: 71
BH CV STRESS PROTOCOL 1: NORMAL
BH CV STRESS RECOVERY BP: NORMAL MMHG
BH CV STRESS RECOVERY HR: 69 BPM
BH CV STRESS STAGE 1: 1
BH CV XLRA - TDI S': 8 CM/SEC
LEFT ATRIUM VOLUME INDEX: 43 ML/M2
LV EF 2D ECHO EST: 40 %
LV EF NUC BP: 54 %
MAXIMAL PREDICTED HEART RATE: 134 BPM
MAXIMAL PREDICTED HEART RATE: 134 BPM
PERCENT MAX PREDICTED HR: 52.99 %
SINUS: 3 CM
STJ: 2.6 CM
STRESS BASELINE BP: NORMAL MMHG
STRESS BASELINE HR: 69 BPM
STRESS PERCENT HR: 62 %
STRESS POST EXERCISE DUR SEC: 10 SEC
STRESS POST PEAK BP: NORMAL MMHG
STRESS POST PEAK HR: 71 BPM
STRESS TARGET HR: 114 BPM
STRESS TARGET HR: 114 BPM

## 2018-10-04 PROCEDURE — 0 RUBIDIUM CHLORIDE

## 2018-10-04 PROCEDURE — 25010000002 PERFLUTREN (DEFINITY) 8.476 MG IN SODIUM CHLORIDE 0.9 % 10 ML INJECTION: Performed by: NURSE PRACTITIONER

## 2018-10-04 PROCEDURE — 78492 MYOCRD IMG PET MLT RST&STRS: CPT

## 2018-10-04 PROCEDURE — 93018 CV STRESS TEST I&R ONLY: CPT | Performed by: INTERNAL MEDICINE

## 2018-10-04 PROCEDURE — 25010000002 REGADENOSON 0.4 MG/5ML SOLUTION: Performed by: NURSE PRACTITIONER

## 2018-10-04 PROCEDURE — A9555 RB82 RUBIDIUM: HCPCS | Performed by: NURSE PRACTITIONER

## 2018-10-04 PROCEDURE — 0 RUBIDIUM CHLORIDE: Performed by: NURSE PRACTITIONER

## 2018-10-04 PROCEDURE — 93306 TTE W/DOPPLER COMPLETE: CPT

## 2018-10-04 PROCEDURE — 78492 MYOCRD IMG PET MLT RST&STRS: CPT | Performed by: INTERNAL MEDICINE

## 2018-10-04 PROCEDURE — 0 TECHNETIUM TETROFOSMIN KIT: Performed by: NURSE PRACTITIONER

## 2018-10-04 PROCEDURE — 93306 TTE W/DOPPLER COMPLETE: CPT | Performed by: INTERNAL MEDICINE

## 2018-10-04 PROCEDURE — A9502 TC99M TETROFOSMIN: HCPCS | Performed by: NURSE PRACTITIONER

## 2018-10-04 PROCEDURE — A9555 RB82 RUBIDIUM: HCPCS

## 2018-10-04 PROCEDURE — 93017 CV STRESS TEST TRACING ONLY: CPT

## 2018-10-04 PROCEDURE — 93016 CV STRESS TEST SUPVJ ONLY: CPT | Performed by: INTERNAL MEDICINE

## 2018-10-04 RX ORDER — WARFARIN SODIUM 2.5 MG/1
TABLET ORAL
Qty: 90 TABLET | Refills: 0 | Status: SHIPPED | OUTPATIENT
Start: 2018-10-04 | End: 2019-01-05

## 2018-10-04 RX ORDER — ANASTROZOLE 1 MG/1
TABLET ORAL
Qty: 30 TABLET | Refills: 2 | Status: SHIPPED | OUTPATIENT
Start: 2018-10-04 | End: 2019-01-05

## 2018-10-04 RX ADMIN — TETROFOSMIN 1 DOSE: 1.38 INJECTION, POWDER, LYOPHILIZED, FOR SOLUTION INTRAVENOUS at 11:30

## 2018-10-04 RX ADMIN — REGADENOSON 0.4 MG: 0.08 INJECTION, SOLUTION INTRAVENOUS at 11:42

## 2018-10-04 RX ADMIN — RUBIDIUM CHLORIDE RB-82 1 DOSE: 150 INJECTION, SOLUTION INTRAVENOUS at 11:42

## 2018-10-04 RX ADMIN — PERFLUTREN 1.5 ML: 6.52 INJECTION, SUSPENSION INTRAVENOUS at 11:13

## 2018-10-05 ENCOUNTER — TELEPHONE (OUTPATIENT)
Dept: CARDIOLOGY | Facility: CLINIC | Age: 83
End: 2018-10-05

## 2018-10-05 NOTE — TELEPHONE ENCOUNTER
Echocardiogram results:    · Estimated EF = 40%. There is hypokinesis of the mid and basal lateral walls.  · Right ventricular cavity is mild-to-moderately dilated.  · Left atrial cavity size is moderately dilated.  · Right atrial cavity size is severely dilated.  · Mild mitral valve regurgitation is present  · Severe tricuspid valve regurgitation is present.  · Calculated right ventricular systolic pressure from tricuspid regurgitation is 49 mmHg.    Echo 6/8/18: EF 36%, moderate to severe MR, moderate to severe TR, and RVSP 46 mmHg    Cardiac PET Scan Results:    · Left ventricular ejection fraction is normal (Calculated EF = 54%).  · Myocardial perfusion imaging indicates a normal myocardial perfusion  study with no evidence of ischemia.  · Impressions are consistent with a low risk study  · LVEF with stress is 60%.    ______________________________________    Here are her results.  Please advise next steps.

## 2018-10-05 NOTE — TELEPHONE ENCOUNTER
----- Message from KIKA Jon sent at 9/25/2018  4:34 PM EDT -----    Follow-up on cardiac PET scan and 2-D echocardiogram

## 2018-10-11 ENCOUNTER — OFFICE VISIT (OUTPATIENT)
Dept: CARDIOLOGY | Facility: CLINIC | Age: 83
End: 2018-10-11

## 2018-10-11 VITALS
BODY MASS INDEX: 24.04 KG/M2 | HEART RATE: 73 BPM | DIASTOLIC BLOOD PRESSURE: 68 MMHG | SYSTOLIC BLOOD PRESSURE: 108 MMHG | WEIGHT: 153.2 LBS | HEIGHT: 67 IN

## 2018-10-11 DIAGNOSIS — I50.22 CHRONIC SYSTOLIC CONGESTIVE HEART FAILURE (HCC): ICD-10-CM

## 2018-10-11 DIAGNOSIS — I48.21 PERMANENT ATRIAL FIBRILLATION (HCC): Primary | ICD-10-CM

## 2018-10-11 PROBLEM — I50.23 ACUTE ON CHRONIC SYSTOLIC CONGESTIVE HEART FAILURE: Status: RESOLVED | Noted: 2018-06-13 | Resolved: 2018-10-11

## 2018-10-11 PROCEDURE — 99214 OFFICE O/P EST MOD 30 MIN: CPT | Performed by: INTERNAL MEDICINE

## 2018-10-11 RX ORDER — CARVEDILOL 3.12 MG/1
3.12 TABLET ORAL 2 TIMES DAILY
Qty: 180 TABLET | Refills: 0 | Status: SHIPPED | OUTPATIENT
Start: 2018-10-11 | End: 2019-01-05

## 2018-10-11 NOTE — PROGRESS NOTES
Date of Office Visit: 10/11/2018  Encounter Provider: Rafaela Leija MD  Place of Service: [facility] CARDIOLOGY  Patient Name: Diana Avalos  :1932      Patient ID:  Diana Avalos is a 86 y.o. female is here for  followup for CHF and atrial fibrillation.         History of Present Illness    She was seen at [facility] on 2011 for atrial  fibrillation and was placed on IV Cardizem. She was reluctant to take  Coumadin but was willing to take Pradaxa. During her hospitalization her  ejection fraction was 45 to 50% with no significant valvular heart disease.  She had a transesophageal echocardiogram done on September 15, 2011 which  showed marked left atrial enlargement, and there was slow velocity through  the left atrial appendage and the left atrial appendage itself had a  thrombus. The left ventricle showed a moderate reduction in function with a  markedly dilated left atrium. At that time we decided to go ahead and  control her atrial fibrillation with rate and anticoagulation. However, we  had difficulty controlling her rate so she was put on amiodarone just for  rate control. She underwent a stress nuclear perfusion study on 2011 which was normal. On  she called back in because she  was having severe nausea. She was on Pradaxa, digoxin, and amiodarone at  that time. She went off of all those medications and felt better but on  2012 she started having atrial fibrillation with rapid  ventricular response, and was readmitted to the hospital.  She had a single chamber Medtronic generator placed with an AV node ablation, 2012.  She was in the hospital 2012 with acute bronchitis. She had an echocardiogram done  2012 showing an ejection fraction of 35-40%, severe left atrial volume enlargement,  severe dilation of the right atrium, moderate mitral insufficiency, mild tricuspid  insufficiency,  pulmonary artery pressure of 58 mmHg. She was started on Lasix 20 mg  daily, and this really does seem to help.         She was admitted June 2018 with acute on chronic systolic heart failure.  She was found to have profound septal dyssynchrony on echo.  We felt that upgrading her pacing device to CRT would be helpful.  she had a prior single lead pacer with AV node ablation for permanent atrial fibrillation which is not well rate controlled.  Her echocardiogram which was done 6/8/18 showed ejection fraction 36% with moderate concentric LVH, marked synchrony of the septum and chronic apex to the RV pacing, moderate to severe mitral insufficiency, moderate to severe tricuspid insufficiency and RVSP of 46 mmHg.  After the upgrade to CRT, she did develop a pneumothorax.  She was hospitalized for a left iatrogenic pneumothorax requiring a chest tube placement.  This did resolve.     She had a nonischemic stress PET done 10/4/18.  She had an echo done 10/4/18 showing LVEF 40%, hypokinesis of the mid and basal lateral walls, mild to moderate right ventricular dilation, moderate left atrial dilation, severe right atrial dilation, mild mitral insufficiency, severe tricuspid insufficiency and RVSP of 49 mmHg.    She still has dyspnea but does activities that she enjoys and rests more frequently.  She's had no tachycardia.  She has no orthopnea or PND.  She has no lower extremity edema.  She is somewhat fatigued.  She has no chest tightness or pressure.  She's had no dizziness or syncope.    Past Medical History:   Diagnosis Date   • Acute on chronic systolic congestive heart failure (CMS/HCC)    • Anemia    • Arthritis    • Asthma    • Atrial fibrillation (CMS/HCC)     With a history of an atrial clot   • Breast cancer (CMS/HCC)     Left, stage IIB   • Disease of thyroid gland     Hypothyroidism   • Heart failure (CMS/HCC)    • Irritable bowel    • Mild tricuspid regurgitation    • Moderate mitral insufficiency    • NICM  (nonischemic cardiomyopathy) (CMS/HCC)    • On warfarin therapy    • Pulmonary hypertension (CMS/HCC)    • Shortness of breath          Past Surgical History:   Procedure Laterality Date   • CARDIAC ELECTROPHYSIOLOGY PROCEDURE N/A 6/12/2018    Procedure: Device Upgrade-Upgrade to CRT-P-BIV Pacamaker Medtronic Has existing single chamber Medtronic pacemaker;  Surgeon: Leonardo David MD;  Location: [facility] INVASIVE LOCATION;  Service: Cardiovascular   • CHOLECYSTECTOMY  2000   • EYE SURGERY  2005    cataracts, Dr. Miramontes   • MASTECTOMY Left 10/2014   • PACEMAKER IMPLANTATION  2012    Dr. Leija       Current Outpatient Prescriptions on File Prior to Visit   Medication Sig Dispense Refill   • acetaminophen (TYLENOL) 325 MG tablet Take 2 tablets by mouth Every 6 (Six) Hours As Needed for Mild Pain .     • anastrozole (ARIMIDEX) 1 MG tablet TAKE ONE TABLET BY MOUTH DAILY 30 tablet 2   • carvedilol (COREG) 6.25 MG tablet Take 1 tablet by mouth 2 (Two) Times a Day. (Patient taking differently: Take 3.125 mg by mouth 2 (Two) Times a Day.) 60 tablet 3   • furosemide (LASIX) 20 MG tablet Take 2 tablets by mouth Daily. 60 tablet 0   • levothyroxine (SYNTHROID, LEVOTHROID) 88 MCG tablet Take 88 mcg by mouth Daily.     • vitamin B-12 (CYANOCOBALAMIN) 1000 MCG tablet Take 1,000 mcg by mouth Daily.     • warfarin (COUMADIN) 2.5 MG tablet TAKE ONE TABLET BY MOUTH DAILY (Patient taking differently: Nightly, Whole Tablet, 2.5 mg, Tues, Thurs, Sat, Sun) 90 tablet 0   • warfarin (COUMADIN) 2.5 MG tablet Take 1.25 mg by mouth Every Night. Monday, Wednesday, Friday     • warfarin (COUMADIN) 2.5 MG tablet TAKE ONE TABLET BY MOUTH DAILY EXCEPT TAKE ONE-HALF TABLET DAILY ON MONDAY, WEDNESDAY, AND FRIDAY 90 tablet 0     No current facility-administered medications on file prior to visit.        Social History     Social History   • Marital status:      Spouse name: Rick   • Number of children: 8   • Years of education: N/A  "    Occupational History   •  Retired     Social History Main Topics   • Smoking status: Never Smoker   • Smokeless tobacco: Never Used      Comment: caffeine use-tea   • Alcohol use 0.6 oz/week     1 Glasses of wine per week      Comment: social   • Drug use: No   • Sexual activity: Defer     Other Topics Concern   • Not on file     Social History Narrative   • No narrative on file           Review of Systems   Constitution: Positive for malaise/fatigue.   HENT: Negative for congestion.    Eyes: Negative for vision loss in left eye and vision loss in right eye.   Cardiovascular: Positive for dyspnea on exertion.   Respiratory: Negative.  Negative for cough, hemoptysis, shortness of breath, sleep disturbances due to breathing, snoring, sputum production and wheezing.    Endocrine: Negative.    Hematologic/Lymphatic: Negative.    Skin: Negative for poor wound healing and rash.   Musculoskeletal: Positive for joint pain. Negative for falls, gout, muscle cramps and myalgias.   Gastrointestinal: Negative for abdominal pain, diarrhea, dysphagia, hematemesis, melena, nausea and vomiting.   Neurological: Negative for excessive daytime sleepiness, dizziness, headaches, light-headedness, loss of balance, seizures and vertigo.   Psychiatric/Behavioral: Negative for depression and substance abuse. The patient is not nervous/anxious.        Procedures  Procedures        Objective:      Vitals:    10/11/18 1225   BP: 108/68   BP Location: Right arm   Patient Position: Sitting   Pulse: 73   Weight: 69.5 kg (153 lb 3.2 oz)   Height: 170.2 cm (67\")     Body mass index is 23.99 kg/m².    Physical Exam   Constitutional: She is oriented to person, place, and time. She appears well-developed and well-nourished. No distress.   HENT:   Head: Normocephalic and atraumatic.   Eyes: Conjunctivae are normal. No scleral icterus.   Neck: Neck supple. No JVD present. Carotid bruit is not present. No thyromegaly present.   Cardiovascular: Normal " rate, regular rhythm, S1 normal, S2 normal, normal heart sounds and intact distal pulses.   No extrasystoles are present. PMI is not displaced.  Exam reveals no gallop.    No murmur heard.  Pulses:       Carotid pulses are 2+ on the right side, and 2+ on the left side.       Radial pulses are 2+ on the right side, and 2+ on the left side.        Dorsalis pedis pulses are 2+ on the right side, and 2+ on the left side.        Posterior tibial pulses are 2+ on the right side, and 2+ on the left side.   Pulmonary/Chest: Effort normal and breath sounds normal. No respiratory distress. She has no wheezes. She has no rhonchi. She has no rales. She exhibits no tenderness.   Abdominal: Soft. Bowel sounds are normal. She exhibits no distension, no abdominal bruit and no mass. There is no tenderness.   Musculoskeletal: She exhibits no edema or deformity.   Lymphadenopathy:     She has no cervical adenopathy.   Neurological: She is alert and oriented to person, place, and time. No cranial nerve deficit.   Skin: Skin is warm and dry. No rash noted. She is not diaphoretic. No cyanosis. No pallor. Nails show no clubbing.   Psychiatric: She has a normal mood and affect. Judgment normal.   Vitals reviewed.      Lab Review:       Assessment:      Diagnosis Plan   1. Permanent atrial fibrillation (CMS/HCC)     2. Chronic systolic congestive heart failure (CMS/HCC)       1. Chronic permanent atrial fibrillation; status post AV node ablation with  Medtronic single chamber pacemaker on January 9, 2012 with upgrade to CRT 6/2018 . She is on chronic  Coumadin therapy only.   2. Hypertension, under good control. She is really on no medications except  Lasix.   3. Mild to moderate cardiomyopathy, nonischemic, LVEF 40%.  has septal dyssynchrony.   4. Moderate mitral insufficiency and moderate tricuspid insufficiency,  stable.   5. Hypothyroidism, stable.   6. Pulmonary hypertension, moderate.   7. Moderate pulmonary hypertension. This is  source of dyspnea.      Plan:       See ayn in 3 months.  Decrease coreg to 3.125mg BID.     Atrial Fibrillation and Atrial Flutter  Assessment  • The patient has permanent atrial fibrillation  • This is non-valvular in etiology  • The patient's CHADS2-VASc score is 4  • A [id number]-JQUe score of 2 or more is considered a high risk for a thromboembolic event  • Warfarin prescribed    Plan  • Continue in atrial fibrillation with rate control  • Continue warfarin for antithrombotic therapy, bleeding issues discussed  • Continue beta blocker for rate control      Heart Failure  Assessment  • NYHA class II - There is slight limitation of physical activity. The patient is comfortable at rest, but physical activity results in fatigue, palpitations or shortness of breath.  • Beta blocker prescribed  • Diuretics prescribed  • Left ventricular function is mildly reduced by qualitative assessment    Plan  • The heart failure care plan was discussed with the patient today including: continuing the current program    Subjective/Objective    • Physical exam findings negative for rales and elevated JVP.  • The patient has a CRT implant

## 2018-10-11 NOTE — TELEPHONE ENCOUNTER
Daughter returned my call this am. Patient is scheduled to see Dr. Leija today for further evaluation and discussion of test findings.

## 2018-10-24 RX ORDER — FUROSEMIDE 20 MG/1
TABLET ORAL
Qty: 60 TABLET | Refills: 3 | Status: SHIPPED | OUTPATIENT
Start: 2018-10-24 | End: 2019-01-05

## 2018-10-25 ENCOUNTER — ANTICOAGULATION VISIT (OUTPATIENT)
Dept: PHARMACY | Facility: HOSPITAL | Age: 83
End: 2018-10-25

## 2018-10-25 DIAGNOSIS — Z79.01 LONG TERM CURRENT USE OF ANTICOAGULANT THERAPY: ICD-10-CM

## 2018-10-25 LAB
INR PPP: 2.2 (ref 0.91–1.09)
PROTHROMBIN TIME: 26 SECONDS (ref 10–13.8)

## 2018-10-25 PROCEDURE — 85610 PROTHROMBIN TIME: CPT

## 2018-10-25 PROCEDURE — 36416 COLLJ CAPILLARY BLOOD SPEC: CPT

## 2018-10-25 NOTE — PROGRESS NOTES
Anticoagulation Clinic Progress Note    Anticoagulation Summary  As of 10/25/2018    INR goal:   2.0-3.0   TTR:   100.0 % (2.6 wk)   Today's INR:   2.2   Warfarin maintenance plan:   1.25 mg on Mon, Wed, Fri; 2.5 mg all other days   Weekly warfarin total:   13.75 mg   No change documented:   Debi Tyler   Plan last modified:   Nikole Tiwari [facility] (9/27/2018)   Next INR check:   11/22/2018   Priority:   Maintenance   Target end date:   Indefinite    Indications    Long-term (current) use of anticoagulants [Z79.01]             Anticoagulation Episode Summary     INR check location:       Preferred lab:       Send INR reminders to:    [facility]    Comments:         Anticoagulation Care Providers     Provider Role Specialty Phone number    Rafaela Leija MD Referring Cardiology [phone]    Nikole Tiwari [facility] Responsible Pharmacy           Drug interactions: has remained unchanged.  Diet: has remained unchanged.    Clinic Interview:  Patient Findings     Negatives:   Signs/symptoms of thrombosis, Signs/symptoms of bleeding,   Laboratory test error suspected, Change in health, Change in alcohol use,   Change in activity, Upcoming invasive procedure, Emergency department   visit, Upcoming dental procedure, Missed doses, Extra doses, Change in   medications, Change in diet/appetite, Hospital admission, Bruising, Other   complaints      Clinical Outcomes     Negatives:   Major bleeding event, Thromboembolic event,   Anticoagulation-related hospital admission, Anticoagulation-related ED   visit, Anticoagulation-related fatality        INR History:  Anticoagulation Monitoring 9/27/2018 10/25/2018   INR 2.0 2.2   INR Date 9/27/2018 10/25/2018   INR Goal 2.0-3.0 2.0-3.0   Trend - Same   Last Week Total 0 mg 13.75 mg   Next Week Total 13.75 mg 13.75 mg   Sun 2.5 mg 2.5 mg   Mon 1.25 mg 1.25 mg   Tue 2.5 mg 2.5 mg   Wed 1.25 mg 1.25 mg   Thu 2.5 mg 2.5 mg   Fri 1.25 mg 1.25 mg   Sat 2.5 mg 2.5  mg   Visit Report - -       Plan:  1. INR is therapeutic today- see above in Anticoagulation Summary.   Will instruct Diana Avalos to continue their warfarin regimen- see above in Anticoagulation Summary.  2. Follow up in 4 weeks.  3. Patient declines warfarin refills.  4. Verbal and written information provided. Patient expresses understanding and has no further questions at this time.    Debi Tyler

## 2018-10-29 RX ORDER — CARVEDILOL 6.25 MG/1
TABLET ORAL
Qty: 60 TABLET | Refills: 2 | Status: SHIPPED | OUTPATIENT
Start: 2018-10-29 | End: 2019-01-05

## 2018-11-07 ENCOUNTER — CLINICAL SUPPORT NO REQUIREMENTS (OUTPATIENT)
Dept: CARDIOLOGY | Facility: CLINIC | Age: 83
End: 2018-11-07

## 2018-11-07 DIAGNOSIS — I50.9 CONGESTIVE HEART FAILURE, UNSPECIFIED HF CHRONICITY, UNSPECIFIED HEART FAILURE TYPE (HCC): Primary | ICD-10-CM

## 2018-11-07 PROCEDURE — 93294 REM INTERROG EVL PM/LDLS PM: CPT | Performed by: INTERNAL MEDICINE

## 2018-11-07 PROCEDURE — 93296 REM INTERROG EVL PM/IDS: CPT | Performed by: INTERNAL MEDICINE

## 2018-11-07 PROCEDURE — 93297 REM INTERROG DEV EVAL ICPMS: CPT | Performed by: INTERNAL MEDICINE

## 2018-11-19 ENCOUNTER — HOSPITAL ENCOUNTER (OUTPATIENT)
Dept: BONE DENSITY | Facility: HOSPITAL | Age: 83
Discharge: HOME | End: 2018-11-19
Attending: INTERNAL MEDICINE | Admitting: INTERNAL MEDICINE

## 2018-11-19 DIAGNOSIS — M81.0 OSTEOPOROSIS, UNSPECIFIED OSTEOPOROSIS TYPE, UNSPECIFIED PATHOLOGICAL FRACTURE PRESENCE: ICD-10-CM

## 2018-11-19 DIAGNOSIS — C50.112 MALIGNANT NEOPLASM OF CENTRAL PORTION OF LEFT BREAST IN FEMALE, ESTROGEN RECEPTOR POSITIVE (HCC): ICD-10-CM

## 2018-11-19 DIAGNOSIS — Z17.0 MALIGNANT NEOPLASM OF CENTRAL PORTION OF LEFT BREAST IN FEMALE, ESTROGEN RECEPTOR POSITIVE (HCC): ICD-10-CM

## 2018-11-19 PROCEDURE — 77080 DXA BONE DENSITY AXIAL: CPT

## 2018-11-26 ENCOUNTER — APPOINTMENT (OUTPATIENT)
Dept: ONCOLOGY | Facility: CLINIC | Age: 83
End: 2018-11-26

## 2018-11-26 ENCOUNTER — APPOINTMENT (OUTPATIENT)
Dept: LAB | Facility: HOSPITAL | Age: 83
End: 2018-11-26

## 2018-12-03 ENCOUNTER — ANTICOAGULATION VISIT (OUTPATIENT)
Dept: PHARMACY | Facility: HOSPITAL | Age: 83
End: 2018-12-03

## 2018-12-03 DIAGNOSIS — Z79.01 LONG TERM CURRENT USE OF ANTICOAGULANT THERAPY: ICD-10-CM

## 2018-12-03 LAB
INR PPP: 3 (ref 0.91–1.09)
PROTHROMBIN TIME: 36.6 SECONDS (ref 10–13.8)

## 2018-12-03 PROCEDURE — 85610 PROTHROMBIN TIME: CPT

## 2018-12-03 PROCEDURE — 36416 COLLJ CAPILLARY BLOOD SPEC: CPT

## 2018-12-03 NOTE — PROGRESS NOTES
Anticoagulation Clinic Progress Note    Anticoagulation Summary  As of 12/3/2018    INR goal:   2.0-3.0   TTR:   100.0 % (1.9 mo)   INR used for dosing:   3.0 (12/3/2018)   Warfarin maintenance plan:   1.25 mg on Mon, Wed, Fri; 2.5 mg all other days   Weekly warfarin total:   13.75 mg   No change documented:   Debi Tyler   Plan last modified:   Nikole Tiwari [facility] (9/27/2018)   Next INR check:   1/7/2019   Priority:   Maintenance   Target end date:   Indefinite    Indications    Long-term (current) use of anticoagulants [Z79.01]             Anticoagulation Episode Summary     INR check location:       Preferred lab:       Send INR reminders to:    [facility]EL[facility]    Comments:         Anticoagulation Care Providers     Provider Role Specialty Phone number    Rafaela Leija MD Referring Cardiology [phone]    Nikole Tiwari [facility] Responsible Pharmacy           Drug interactions: has remained unchanged.  Diet: has remained unchanged.    Clinic Interview:  Patient Findings     Negatives:   Signs/symptoms of thrombosis, Signs/symptoms of bleeding,   Laboratory test error suspected, Change in health, Change in alcohol use,   Change in activity, Upcoming invasive procedure, Emergency department   visit, Upcoming dental procedure, Missed doses, Extra doses, Change in   medications, Change in diet/appetite, Hospital admission, Bruising, Other   complaints      Clinical Outcomes     Negatives:   Major bleeding event, Thromboembolic event,   Anticoagulation-related hospital admission, Anticoagulation-related ED   visit, Anticoagulation-related fatality        INR History:  Anticoagulation Monitoring 9/27/2018 10/25/2018 12/3/2018   INR 2.0 2.2 3.0   INR Date 9/27/2018 10/25/2018 12/3/2018   INR Goal 2.0-3.0 2.0-3.0 2.0-3.0   Trend - Same Same   Last Week Total 0 mg 13.75 mg 13.75 mg   Next Week Total 13.75 mg 13.75 mg 13.75 mg   Sun 2.5 mg 2.5 mg 2.5 mg   Mon 1.25 mg 1.25 mg 1.25 mg   Tue 2.5 mg  2.5 mg 2.5 mg   Wed 1.25 mg 1.25 mg 1.25 mg   Thu 2.5 mg 2.5 mg 2.5 mg   Fri 1.25 mg 1.25 mg 1.25 mg   Sat 2.5 mg 2.5 mg 2.5 mg   Visit Report - - -   Some recent data might be hidden       Plan:  1. INR is therapeutic today- see above in Anticoagulation Summary.   Will instruct Diana Avalos to continue their warfarin regimen- see above in Anticoagulation Summary.  2. Follow up in 4 weeks.  3. Patient declines warfarin refills.  4. Verbal and written information provided. Patient expresses understanding and has no further questions at this time.    Debi Tyler

## 2018-12-17 ENCOUNTER — APPOINTMENT (OUTPATIENT)
Dept: LAB | Facility: HOSPITAL | Age: 83
End: 2018-12-17

## 2018-12-17 ENCOUNTER — APPOINTMENT (OUTPATIENT)
Dept: ONCOLOGY | Facility: CLINIC | Age: 83
End: 2018-12-17

## 2018-12-17 ENCOUNTER — TELEPHONE (OUTPATIENT)
Dept: ONCOLOGY | Facility: HOSPITAL | Age: 83
End: 2018-12-17

## 2018-12-17 NOTE — TELEPHONE ENCOUNTER
----- Message from Chelsie Tomas sent at 12/17/2018 11:28 AM EST -----  Contact: [phone]  Pt's daughter is calling about her PET Scan results, because she cancelled her appointment today and says she is not coming in here today.  Magui Cassidy

## 2018-12-18 ENCOUNTER — DOCUMENTATION (OUTPATIENT)
Dept: ONCOLOGY | Facility: CLINIC | Age: 83
End: 2018-12-18

## 2018-12-18 ENCOUNTER — TELEPHONE (OUTPATIENT)
Dept: ONCOLOGY | Facility: HOSPITAL | Age: 83
End: 2018-12-18

## 2018-12-18 NOTE — TELEPHONE ENCOUNTER
Called and notified daughter. She will make sure pt gets back in to see MD soon.       ----- Message from Sherman Belcher II, MD sent at 12/18/2018 12:02 PM EST -----  Contact: [phone]  Please call her back and tell her the bone density test looks about the same overall.  I would maintain the same plan.  Try to make sure follow-up is arranged with me sometime in the next month or so if she is able.  ----- Message -----  From: Asya Morton RN  Sent: 12/18/2018  11:49 AM  To: Sherman Belcher II, MD    Patients daughter calling to go over bone scan results. She had to cancel her appnt for yesterday.

## 2018-12-18 NOTE — TELEPHONE ENCOUNTER
Called daughter back, wanting bone scan results. Told her we would send a msg to  for him to call and go over or get back with us to interpret results.         ----- Message from Felix Ramirez sent at 12/18/2018 11:04 AM EST -----  Contact: [phone]  Return call from today daughter  blossom

## 2018-12-18 NOTE — PROGRESS NOTES
Please call her back and tell her the bone density test looks about the same overall.  I would maintain the same plan.  Try to make sure follow-up is arranged with me sometime in the next month or so if she is able.  ===View-only below this line===    ----- Message -----  From: Asya Morton RN  Sent: 12/18/2018  11:49 AM  To: Sherman Belcher II, MD    Patients daughter calling to go over bone scan results. She had to cancel her appnt for yesterday.

## 2019-01-05 ENCOUNTER — APPOINTMENT (OUTPATIENT)
Dept: GENERAL RADIOLOGY | Facility: HOSPITAL | Age: 84
End: 2019-01-05

## 2019-01-05 ENCOUNTER — CLINICAL SUPPORT NO REQUIREMENTS (OUTPATIENT)
Dept: CARDIOLOGY | Facility: CLINIC | Age: 84
End: 2019-01-05

## 2019-01-05 ENCOUNTER — HOSPITAL ENCOUNTER (OUTPATIENT)
Facility: HOSPITAL | Age: 84
Setting detail: OBSERVATION
Discharge: HOME | End: 2019-01-07
Attending: EMERGENCY MEDICINE | Admitting: EMERGENCY MEDICINE

## 2019-01-05 DIAGNOSIS — I50.9 CONGESTIVE HEART FAILURE, UNSPECIFIED HF CHRONICITY, UNSPECIFIED HEART FAILURE TYPE (HCC): Primary | ICD-10-CM

## 2019-01-05 DIAGNOSIS — R07.9 CHEST PAIN, UNSPECIFIED TYPE: Primary | ICD-10-CM

## 2019-01-05 LAB
ALBUMIN SERPL-MCNC: 3.6 G/DL (ref 3.5–5.2)
ALBUMIN/GLOB SERPL: 1.1 G/DL
ALP SERPL-CCNC: 84 U/L (ref 39–117)
ALT SERPL W P-5'-P-CCNC: 10 U/L (ref 1–33)
ANION GAP SERPL CALCULATED.3IONS-SCNC: 12.5 MMOL/L
APTT PPP: 26.4 SECONDS (ref 22.7–35.4)
AST SERPL-CCNC: 22 U/L (ref 1–32)
BASOPHILS # BLD AUTO: 0.04 10*3/MM3 (ref 0–0.2)
BASOPHILS NFR BLD AUTO: 0.6 % (ref 0–1.5)
BILIRUB SERPL-MCNC: 0.6 MG/DL (ref 0.1–1.2)
BUN BLD-MCNC: 16 MG/DL (ref 8–23)
BUN/CREAT SERPL: 16.7 (ref 7–25)
CALCIUM SPEC-SCNC: 9 MG/DL (ref 8.6–10.5)
CHLORIDE SERPL-SCNC: 103 MMOL/L (ref 98–107)
CO2 SERPL-SCNC: 27.5 MMOL/L (ref 22–29)
CREAT BLD-MCNC: 0.96 MG/DL (ref 0.57–1)
DEPRECATED RDW RBC AUTO: 44.9 FL (ref 37–54)
EOSINOPHIL # BLD AUTO: 0.15 10*3/MM3 (ref 0–0.7)
EOSINOPHIL NFR BLD AUTO: 2.3 % (ref 0.3–6.2)
ERYTHROCYTE [DISTWIDTH] IN BLOOD BY AUTOMATED COUNT: 13.1 % (ref 11.7–13)
GFR SERPL CREATININE-BSD FRML MDRD: 55 ML/MIN/1.73
GLOBULIN UR ELPH-MCNC: 3.3 GM/DL
GLUCOSE BLD-MCNC: 83 MG/DL (ref 65–99)
HCT VFR BLD AUTO: 39.6 % (ref 35.6–45.5)
HGB BLD-MCNC: 13.5 G/DL (ref 11.9–15.5)
IMM GRANULOCYTES # BLD AUTO: 0.01 10*3/MM3 (ref 0–0.03)
IMM GRANULOCYTES NFR BLD AUTO: 0.2 % (ref 0–0.5)
INR PPP: 1.75 (ref 0.9–1.1)
LYMPHOCYTES # BLD AUTO: 2.18 10*3/MM3 (ref 0.9–4.8)
LYMPHOCYTES NFR BLD AUTO: 33.7 % (ref 19.6–45.3)
MCH RBC QN AUTO: 32 PG (ref 26.9–32)
MCHC RBC AUTO-ENTMCNC: 34.1 G/DL (ref 32.4–36.3)
MCV RBC AUTO: 93.8 FL (ref 80.5–98.2)
MONOCYTES # BLD AUTO: 0.74 10*3/MM3 (ref 0.2–1.2)
MONOCYTES NFR BLD AUTO: 11.5 % (ref 5–12)
NEUTROPHILS # BLD AUTO: 3.35 10*3/MM3 (ref 1.9–8.1)
NEUTROPHILS NFR BLD AUTO: 51.9 % (ref 42.7–76)
NT-PROBNP SERPL-MCNC: 1326 PG/ML (ref 0–1800)
PLATELET # BLD AUTO: 202 10*3/MM3 (ref 140–500)
PMV BLD AUTO: 10 FL (ref 6–12)
POTASSIUM BLD-SCNC: 3.6 MMOL/L (ref 3.5–5.2)
PROT SERPL-MCNC: 6.9 G/DL (ref 6–8.5)
PROTHROMBIN TIME: 20.1 SECONDS (ref 11.7–14.2)
RBC # BLD AUTO: 4.22 10*6/MM3 (ref 3.9–5.2)
SODIUM BLD-SCNC: 143 MMOL/L (ref 136–145)
TROPONIN T SERPL-MCNC: <0.01 NG/ML (ref 0–0.03)
WBC NRBC COR # BLD: 6.46 10*3/MM3 (ref 4.5–10.7)

## 2019-01-05 PROCEDURE — 85025 COMPLETE CBC W/AUTO DIFF WBC: CPT | Performed by: EMERGENCY MEDICINE

## 2019-01-05 PROCEDURE — 99285 EMERGENCY DEPT VISIT HI MDM: CPT

## 2019-01-05 PROCEDURE — G0378 HOSPITAL OBSERVATION PER HR: HCPCS

## 2019-01-05 PROCEDURE — 93005 ELECTROCARDIOGRAM TRACING: CPT | Performed by: EMERGENCY MEDICINE

## 2019-01-05 PROCEDURE — 71046 X-RAY EXAM CHEST 2 VIEWS: CPT

## 2019-01-05 PROCEDURE — 93010 ELECTROCARDIOGRAM REPORT: CPT | Performed by: INTERNAL MEDICINE

## 2019-01-05 PROCEDURE — 83880 ASSAY OF NATRIURETIC PEPTIDE: CPT | Performed by: EMERGENCY MEDICINE

## 2019-01-05 PROCEDURE — 85610 PROTHROMBIN TIME: CPT | Performed by: EMERGENCY MEDICINE

## 2019-01-05 PROCEDURE — 80053 COMPREHEN METABOLIC PANEL: CPT | Performed by: EMERGENCY MEDICINE

## 2019-01-05 PROCEDURE — 84484 ASSAY OF TROPONIN QUANT: CPT | Performed by: EMERGENCY MEDICINE

## 2019-01-05 PROCEDURE — 85730 THROMBOPLASTIN TIME PARTIAL: CPT | Performed by: EMERGENCY MEDICINE

## 2019-01-05 RX ORDER — FUROSEMIDE 20 MG/1
40 TABLET ORAL DAILY
COMMUNITY
End: 2019-01-17 | Stop reason: SDUPTHER

## 2019-01-05 RX ORDER — FUROSEMIDE 20 MG/1
20 TABLET ORAL DAILY
Status: DISCONTINUED | OUTPATIENT
Start: 2019-01-06 | End: 2019-01-07 | Stop reason: HOSPADM

## 2019-01-05 RX ORDER — CARVEDILOL 6.25 MG/1
6.25 TABLET ORAL EVERY 12 HOURS SCHEDULED
Status: DISCONTINUED | OUTPATIENT
Start: 2019-01-05 | End: 2019-01-07 | Stop reason: HOSPADM

## 2019-01-05 RX ORDER — WARFARIN SODIUM 3 MG/1
1.5 TABLET ORAL 3 TIMES WEEKLY
Status: DISCONTINUED | OUTPATIENT
Start: 2019-01-05 | End: 2019-01-07 | Stop reason: HOSPADM

## 2019-01-05 RX ORDER — NITROGLYCERIN 0.4 MG/1
0.4 TABLET SUBLINGUAL
Status: DISCONTINUED | OUTPATIENT
Start: 2019-01-05 | End: 2019-01-07 | Stop reason: HOSPADM

## 2019-01-05 RX ORDER — ANASTROZOLE 1 MG/1
1 TABLET ORAL DAILY
COMMUNITY
End: 2019-01-17

## 2019-01-05 RX ORDER — SODIUM CHLORIDE 0.9 % (FLUSH) 0.9 %
10 SYRINGE (ML) INJECTION AS NEEDED
Status: DISCONTINUED | OUTPATIENT
Start: 2019-01-05 | End: 2019-01-07 | Stop reason: HOSPADM

## 2019-01-05 RX ORDER — ANASTROZOLE 1 MG/1
1 TABLET ORAL DAILY
Status: DISCONTINUED | OUTPATIENT
Start: 2019-01-06 | End: 2019-01-07 | Stop reason: HOSPADM

## 2019-01-05 RX ORDER — CARVEDILOL 6.25 MG/1
6.25 TABLET ORAL 2 TIMES DAILY WITH MEALS
COMMUNITY
End: 2019-01-17 | Stop reason: SDUPTHER

## 2019-01-05 RX ORDER — SODIUM PHOSPHATE,MONO-DIBASIC 19G-7G/118
1 ENEMA (ML) RECTAL DAILY
COMMUNITY
End: 2019-11-04

## 2019-01-05 RX ORDER — CHOLECALCIFEROL (VITAMIN D3) 125 MCG
1000 CAPSULE ORAL DAILY
Status: DISCONTINUED | OUTPATIENT
Start: 2019-01-06 | End: 2019-01-07 | Stop reason: HOSPADM

## 2019-01-05 RX ORDER — ACETAMINOPHEN 325 MG/1
650 TABLET ORAL EVERY 6 HOURS PRN
Status: DISCONTINUED | OUTPATIENT
Start: 2019-01-05 | End: 2019-01-07 | Stop reason: HOSPADM

## 2019-01-05 RX ORDER — WARFARIN SODIUM 2.5 MG/1
2.5 TABLET ORAL
Status: DISCONTINUED | OUTPATIENT
Start: 2019-01-06 | End: 2019-01-07 | Stop reason: HOSPADM

## 2019-01-05 RX ORDER — LEVOTHYROXINE SODIUM 88 UG/1
88 TABLET ORAL
Status: DISCONTINUED | OUTPATIENT
Start: 2019-01-06 | End: 2019-01-07 | Stop reason: HOSPADM

## 2019-01-05 RX ADMIN — CARVEDILOL 6.25 MG: 6.25 TABLET, FILM COATED ORAL at 23:07

## 2019-01-05 RX ADMIN — NITROGLYCERIN 0.4 MG: 0.4 TABLET, ORALLY DISINTEGRATING SUBLINGUAL at 20:15

## 2019-01-05 RX ADMIN — WARFARIN SODIUM 1.5 MG: 3 TABLET ORAL at 23:07

## 2019-01-05 NOTE — ED PROVIDER NOTES
EMERGENCY DEPARTMENT ENCOUNTER    CHIEF COMPLAINT  Chief Complaint: Chest pain  History given by: patient   History limited by: n/a  Room Number: E661/1  PMD: Javi Brand MD      HPI:  Pt is a 86 y.o. female who presents complaining of left sided chest pain that began this afternoon while at rest. Pt also complains of pain radiating down the RUE. Pt states that the left sided chest pain has partially improved. Pt reports associated SOA and fatigue. She denies nausea, vomiting, or any other sx. Hx of a-fib, on coumadin     Onset: gradual  Timing: constant   Location: left chest   Radiation: into the RUE  Quality: pain  Intensity/Severity: moderate  Progression: unchanged   Associated Symptoms: SOA, fatigue   Aggravating Factors: none   Alleviating Factors: none    PAST MEDICAL HISTORY  Active Ambulatory Problems     Diagnosis Date Noted   • Malignant neoplasm of central portion of left female breast (CMS/HCC) 04/25/2016   • Osteoporosis 04/27/2016   • Permanent atrial fibrillation (CMS/HCC) 10/02/2013   • Status post biventricular pacemaker 10/02/2013   • Mitral valve insufficiency 11/15/2016   • Pulmonary hypertension (CMS/HCC) 11/15/2016   • Osteopenia of multiple sites 04/30/2018   • Malignant neoplasm of central portion of left breast in female, estrogen receptor positive (CMS/HCC) 04/30/2018   • Hypothyroidism 10/02/2013   • Pulmonary nodule 04/03/2017   • Tricuspid regurgitation 06/08/2018   • Chronic systolic congestive heart failure (CMS/HCC) 06/13/2018   • Pneumothorax 06/14/2018   • Long-term (current) use of anticoagulants 09/27/2018     Resolved Ambulatory Problems     Diagnosis Date Noted   • Cardiomyopathy (CMS/HCC) 11/15/2016   • Pulmonary embolism (CMS/HCC) 04/11/2017   • S/P AV daniela ablation 07/31/2018     Past Medical History:   Diagnosis Date   • Acute on chronic systolic congestive heart failure (CMS/HCC)    • Anemia    • Arthritis    • Asthma    • Atrial fibrillation (CMS/HCC)    •  Breast cancer (CMS/HCC)    • Disease of thyroid gland    • Heart failure (CMS/HCC)    • Irritable bowel    • Mild tricuspid regurgitation    • Moderate mitral insufficiency    • NICM (nonischemic cardiomyopathy) (CMS/HCC)    • On warfarin therapy    • Pulmonary hypertension (CMS/HCC)    • Shortness of breath        PAST SURGICAL HISTORY  Past Surgical History:   Procedure Laterality Date   • CARDIAC ELECTROPHYSIOLOGY PROCEDURE N/A 6/12/2018    Procedure: Device Upgrade-Upgrade to CRT-P-BIV Pacamaker Medtronic Has existing single chamber Medtronic pacemaker;  Surgeon: Leonardo David MD;  Location: [facility] INVASIVE LOCATION;  Service: Cardiovascular   • CHOLECYSTECTOMY  2000   • EYE SURGERY  2005    cataracts, Dr. Miramontes   • MASTECTOMY Left 10/2014   • PACEMAKER IMPLANTATION  2012    Dr. Leija       FAMILY HISTORY  Family History   Problem Relation Age of Onset   • Colon cancer Mother 75   • Colon cancer Sister 79   • Hypertension Sister    • Cholelithiasis Sister         2 sisters       SOCIAL HISTORY  Social History     Socioeconomic History   • Marital status:      Spouse name: Rick   • Number of children: 8   • Years of education: Not on file   • Highest education level: Not on file   Social Needs   • Financial resource strain: Not on file   • Food insecurity - worry: Not on file   • Food insecurity - inability: Not on file   • Transportation needs - medical: Not on file   • Transportation needs - non-medical: Not on file   Occupational History     Employer: RETIRED   Tobacco Use   • Smoking status: Never Smoker   • Smokeless tobacco: Never Used   • Tobacco comment: caffeine use-tea   Substance and Sexual Activity   • Alcohol use: Yes     Alcohol/week: 0.6 oz     Types: 1 Glasses of wine per week     Comment: social   • Drug use: No   • Sexual activity: Defer   Other Topics Concern   • Not on file   Social History Narrative   • Not on file       ALLERGIES  Iodinated diagnostic agents; Amiodarone;  Codeine; Dabigatran etexilate mesylate; and Digoxin    REVIEW OF SYSTEMS  Review of Systems   Constitutional: Positive for fatigue. Negative for fever.   HENT: Negative for sore throat.    Eyes: Negative.    Respiratory: Positive for shortness of breath. Negative for cough.    Cardiovascular: Positive for chest pain.   Gastrointestinal: Negative for abdominal pain, diarrhea and vomiting.   Genitourinary: Negative for dysuria.   Musculoskeletal: Negative for neck pain.   Skin: Negative for rash.   Allergic/Immunologic: Negative.    Neurological: Negative for weakness, numbness and headaches.   Hematological: Negative.    Psychiatric/Behavioral: Negative.    All other systems reviewed and are negative.      PHYSICAL EXAM  ED Triage Vitals   Temp Heart Rate Resp BP SpO2   01/05/19 1759 01/05/19 1759 01/05/19 1759 01/05/19 1818 01/05/19 1759   97.4 °F (36.3 °C) 79 16 150/77 94 %      Temp src Heart Rate Source Patient Position BP Location FiO2 (%)   01/05/19 1759 01/05/19 1759 01/05/19 1818 01/05/19 1818 --   Tympanic Monitor Sitting Right arm        Physical Exam   Constitutional: She is oriented to person, place, and time. No distress.   HENT:   Head: Normocephalic and atraumatic.   Eyes: EOM are normal. Pupils are equal, round, and reactive to light.   Neck: Normal range of motion. Neck supple.   Cardiovascular: Normal rate, regular rhythm and normal heart sounds.   Pulmonary/Chest: Effort normal and breath sounds normal. No respiratory distress.   Abdominal: Soft. There is no tenderness. There is no rebound and no guarding.   Musculoskeletal: Normal range of motion. She exhibits no edema.   Neurological: She is alert and oriented to person, place, and time. She has normal sensation and normal strength.   Skin: Skin is warm and dry. No rash noted.   Psychiatric: Mood and affect normal.   Nursing note and vitals reviewed.      LAB RESULTS  Lab Results (last 24 hours)     Procedure Component Value Units Date/Time     CBC & Differential [[id number]] Collected:  01/05/19 1836    Specimen:  Blood Updated:  01/05/19 1854    Narrative:       The following orders were created for panel order CBC & Differential.  Procedure                               Abnormality         Status                     ---------                               -----------         ------                     CBC Auto Differential[[id number]]        Abnormal            Final result                 Please view results for these tests on the individual orders.    Comprehensive Metabolic Panel [[id number]]  (Abnormal) Collected:  01/05/19 1836    Specimen:  Blood Updated:  01/05/19 1919     Glucose 83 mg/dL      BUN 16 mg/dL      Creatinine 0.96 mg/dL      Sodium 143 mmol/L      Potassium 3.6 mmol/L      Chloride 103 mmol/L      CO2 27.5 mmol/L      Calcium 9.0 mg/dL      Total Protein 6.9 g/dL      Albumin 3.60 g/dL      ALT (SGPT) 10 U/L      AST (SGOT) 22 U/L      Alkaline Phosphatase 84 U/L      Total Bilirubin 0.6 mg/dL      eGFR Non African Amer 55 mL/min/1.73      Globulin 3.3 gm/dL      A/G Ratio 1.1 g/dL      BUN/Creatinine Ratio 16.7     Anion Gap 12.5 mmol/L     Narrative:       The MDRD GFR formula is only valid for adults with stable renal function between ages 18 and 70.    Protime-INR [[id number]]  (Abnormal) Collected:  01/05/19 1836    Specimen:  Blood Updated:  01/05/19 1912     Protime 20.1 Seconds      INR 1.75    aPTT [[id number]]  (Normal) Collected:  01/05/19 1836    Specimen:  Blood Updated:  01/05/19 1912     PTT 26.4 seconds     BNP [[id number]]  (Normal) Collected:  01/05/19 1836    Specimen:  Blood Updated:  01/05/19 1917     proBNP 1,326.0 pg/mL     Narrative:       Among patients with dyspnea, NT-proBNP is highly sensitive for the detection of acute congestive heart failure. In addition NT-proBNP of <300 pg/ml effectively rules out acute congestive heart failure with 99% negative predictive value.    Troponin [[id number]]  (Normal)  Collected:  01/05/19 1836    Specimen:  Blood Updated:  01/05/19 1918     Troponin T <0.010 ng/mL     Narrative:       Troponin T Reference Ranges:  Less than 0.03 ng/mL:    Negative for AMI  0.03 to 0.09 ng/mL:      Indeterminant for AMI  Greater than 0.09 ng/mL: Positive for AMI    CBC Auto Differential [[id number]]  (Abnormal) Collected:  01/05/19 1836    Specimen:  Blood Updated:  01/05/19 1854     WBC 6.46 10*3/mm3      RBC 4.22 10*6/mm3      Hemoglobin 13.5 g/dL      Hematocrit 39.6 %      MCV 93.8 fL      MCH 32.0 pg      MCHC 34.1 g/dL      RDW 13.1 %      RDW-SD 44.9 fl      MPV 10.0 fL      Platelets 202 10*3/mm3      Neutrophil % 51.9 %      Lymphocyte % 33.7 %      Monocyte % 11.5 %      Eosinophil % 2.3 %      Basophil % 0.6 %      Immature Grans % 0.2 %      Neutrophils, Absolute 3.35 10*3/mm3      Lymphocytes, Absolute 2.18 10*3/mm3      Monocytes, Absolute 0.74 10*3/mm3      Eosinophils, Absolute 0.15 10*3/mm3      Basophils, Absolute 0.04 10*3/mm3      Immature Grans, Absolute 0.01 10*3/mm3           I ordered the above labs and reviewed the results    RADIOLOGY  XR Chest 2 View   Final Result   COPD, no active disease       This report was finalized on 1/5/2019 8:24 PM by Joao Meade M.D.               I ordered the above noted radiological studies. Interpreted by radiologist. Reviewed by me in PACS.       PROCEDURES  Procedures    EKG          EKG time: 18:10  Rhythm/Rate: Ventricular paced 78  P waves and IL: n/a  QRS, axis: widened QRS, LAD   ST and T waves: nml     Interpreted Contemporaneously by me, independently viewed  unchanged compared to prior 9/4/18    PROGRESS AND CONSULTS       18:06  CMP, CBC, PT/INR, APTT, BNP, troponin, EKG, and CXR ordered.     18:35  BP- 150/77 HR- 69 Temp- 97.4 °F (36.3 °C) (Tympanic) O2 sat- 98%  Informed pt and family that the EKG shows  of the plan for labs and CXR. Pt understands and agrees with the plan, all questions answered.    19:31  NTG ordered to  treat CP.     20:33  Call placed to cardiology for consult.     20:53  Discussed pt's case with Dr Fung (cardiology), who agrees to admit the pt.     20:58  BP- 160/90 HR- 74 Temp- 97.4 °F (36.3 °C) (Tympanic) O2 sat- 98%  Rechecked the patient who is in NAD and is resting comfortably. Pt denies CP at this time. Informed pt that the workup in the ED shows NAD. Informed her of my consult with Dr Fung, and the plan for admission. Pt understands and agrees with the plan, all questions answered.    MEDICAL DECISION MAKING  Results were reviewed/discussed with the patient and they were also made aware of online access. Pt also made aware that some labs, such as cultures, will not be resulted during ER visit and follow up with PMD is necessary.     MDM  Number of Diagnoses or Management Options     Amount and/or Complexity of Data Reviewed  Clinical lab tests: ordered and reviewed (Troponin is <0.010)  Tests in the radiology section of CPT®: reviewed and ordered (CXR shows NAD)  Tests in the medicine section of CPT®: ordered and reviewed (See EKG procedure note. )  Decide to obtain previous medical records or to obtain history from someone other than the patient: yes  Review and summarize past medical records: yes (Pt was seen in the ED on 9/4/18 s/t SOA. Workup at that time was unremarkable. )  Discuss the patient with other providers: yes (Dr Fung, cardiology)    Patient Progress  Patient progress: stable         DIAGNOSIS  Final diagnoses:   Chest pain, unspecified type       DISPOSITION  ADMISSION    Discussed treatment plan and reason for admission with pt/family and admitting physician.  Pt/family voiced understanding of the plan for admission for further testing/treatment as needed.     Latest Documented Vital Signs:  As of 11:11 PM  BP- 168/93 HR- 88 Temp- 97.9 °F (36.6 °C) (Oral) O2 sat- 97%    --  Documentation assistance provided by roro Baltazar for Dr Moore.  Information  recorded by the scribe was done at my direction and has been verified and validated by me.     Eugenia Baltazar  01/05/19 1370       Ankit Moore MD  01/05/19 5928

## 2019-01-06 LAB
INR PPP: 1.58 (ref 0.9–1.1)
PROTHROMBIN TIME: 18.6 SECONDS (ref 11.7–14.2)
TROPONIN T SERPL-MCNC: <0.01 NG/ML (ref 0–0.03)

## 2019-01-06 PROCEDURE — 84484 ASSAY OF TROPONIN QUANT: CPT | Performed by: INTERNAL MEDICINE

## 2019-01-06 PROCEDURE — 85610 PROTHROMBIN TIME: CPT | Performed by: INTERNAL MEDICINE

## 2019-01-06 PROCEDURE — 93010 ELECTROCARDIOGRAM REPORT: CPT | Performed by: INTERNAL MEDICINE

## 2019-01-06 PROCEDURE — 93005 ELECTROCARDIOGRAM TRACING: CPT | Performed by: INTERNAL MEDICINE

## 2019-01-06 PROCEDURE — G0378 HOSPITAL OBSERVATION PER HR: HCPCS

## 2019-01-06 PROCEDURE — 99220 PR INITIAL OBSERVATION CARE/DAY 70 MINUTES: CPT | Performed by: INTERNAL MEDICINE

## 2019-01-06 RX ADMIN — Medication 1000 MCG: at 08:10

## 2019-01-06 RX ADMIN — ANASTROZOLE 1 MG: 1 TABLET, COATED ORAL at 08:10

## 2019-01-06 RX ADMIN — FUROSEMIDE 20 MG: 20 TABLET ORAL at 08:10

## 2019-01-06 RX ADMIN — LEVOTHYROXINE SODIUM 88 MCG: 88 TABLET ORAL at 06:27

## 2019-01-06 RX ADMIN — CARVEDILOL 6.25 MG: 6.25 TABLET, FILM COATED ORAL at 08:10

## 2019-01-06 RX ADMIN — WARFARIN SODIUM 2.5 MG: 2.5 TABLET ORAL at 18:01

## 2019-01-06 RX ADMIN — CARVEDILOL 6.25 MG: 6.25 TABLET, FILM COATED ORAL at 19:29

## 2019-01-06 NOTE — NURSING NOTE
Per family and patient, while coming back from the bath room she started to feel very tired and faint.  Family assisted pt back to be and she is now resting.  Notified cardiology of this event.  Still waiting for echo.  Will continue to monitor.  MAHNAZ Rodriguez RN

## 2019-01-06 NOTE — PROGRESS NOTES
ADDENDUM:    She had an episode of significant weakness while getting up to bathroom. Now back in bed and feeling well.  HR and BP back to normal.      Otf Saucedo MD

## 2019-01-06 NOTE — H&P
Patient Name: Diana Avalos  :1932  86 y.o.    Date of Admission: 2019  Date of Consultation:  19  Encounter Provider: Otf Saucedo MD  Place of Service: [facility] CARDIOLOGY  Referring Provider: No ref. provider found  Patient Care Team:  Javi Brand MD as PCP - General (Family Medicine)  Ye, Sherman LONG II, MD as Consulting Physician (Hematology and Oncology)  Sidney Castellanos MD as Referring Physician (General Surgery)  Tomeka Day [facility] as Pharmacist (Pharmacy)  Eve Bauer [facility] as Pharmacist      Chief complaint: chest pain    History of Present Illness:  Ms. Avalos is an 81 year old woman who follows with Dr. Leija and has history of Afib s/p AVN ablation and Medtronic pacemaker. She was admitted in 2011 with AFib. VICK showed thrombus of left atrial appendage. She had a normal nuclear stress test. She was started on Pradaxa, digoxin, and amiodarone but then these were stopped due to severe nausea. She was rehospitalized in 2012 for rapid Afib and underwent AVN ablation with single chamber Medtronic pacemaker implant. She was placed on warfarin. Echocardiogram in 2012 showed EF 35-40%, severe LA enlargement, severe dilation of RA, moderate mitral insufficiency, mild tricuspid insufficiency, and PAP 58mmHg. She was started on lasix at that time. At office follow-up in 2015, she was noted to be in persistent Afib; pacemaker should V-pacing at 97%. Echocardiogram in 2015 showed EF 38%, moderate bi-atrial enlargement, and moderate to severe tricuspid regurgitation with RVSP 58mmhg. She was seen in [facility] ED in May 2018 with dyspnea. She had a follow-up echocardiogram which showed EF 36%. Global LV strain -13%, mildly dilated LV, dyskinetic apex, moderate to severe mitral and tricuspid regurgitation, and RVSP 46mmHg. She then underwent upgrade to Bi-V CRT Medtronic device at that time. She did experience  pneumothorax requiring chest tube which did resolved by follow-up in July 2018. She was seen in ED in September 2018 for dyspnea. Her lasix was increased without improvement. She then had myocardial perfusion stress test which showed EF 54% and no evidence of ischemia. An Echocardiogram showed EF 40%, bi-atrial dilation, severe tricuspid regurgitation, and RVSP 49mmhg. She was last seen in office on 10/11/18 and no changes were made at that time.     She presented to ED on 1/5/19 with left side chest pain with radiation to right arm, dyspnea, and fatigue. Upon arrival, /77 with HR 79; ekg showed Afib with v-pacing. Labs: BUN 16, Crea 0.96, INR 1.75, proBNP 1326, troponin <0.010. CXR showed no active disease. Repeat troponin is <0.010    Cardiac Testing:  Myocardial Perfusion Stress Test 10/4/18  Interpretation Summary     · Left ventricular ejection fraction is normal (Calculated EF = 54%).  · Myocardial perfusion imaging indicates a normal myocardial perfusion study with no evidence of ischemia.  · Impressions are consistent with a low risk study.  · LVEF with stress is 60%.     Echocardiogram 10/4/18  Interpretation Summary     · Estimated EF = 40%. There is hypokinesis of the mid and basal lateral walls.  · Right ventricular cavity is mild-to-moderately dilated.  · Left atrial cavity size is moderately dilated.  · Right atrial cavity size is severely dilated.  · Mild mitral valve regurgitation is present  · Severe tricuspid valve regurgitation is present.  · Calculated right ventricular systolic pressure from tricuspid regurgitation is 49 mmHg       Echocardiogram 6/8/18  Interpretation Summary     · Left ventricular systolic function is moderately decreased. Calculated EF = 36%. Estimated EF was in agreement with the calculated EF. Global Longitudinal LV strain = -13%. Strain data was reviewed and speckle tracking was considered accurate. The left ventricular cavity is mildly dilated. Left ventricular wall  thickness is consistent with moderate concentric hypertrophy. Left ventricular diastolic function was indeterminate. Elevated left atrial pressure.  · There is marked dyssynchrony of the septum/apex/anterior wall, presumably due to RV apical pacing. However, the apex is essentially dyskinetic.  · Moderate-to-severe mitral valve regurgitation is present.  · Moderate to severe tricuspid valve regurgitation is present. Estimated right ventricular systolic pressure from tricuspid regurgitation is moderately elevated (45-55 mmHg). Calculated right ventricular systolic pressure from tricuspid regurgitation is 46 mmHg.     Echocardiogram 11/4/15      Past Medical History:   Diagnosis Date   • Acute on chronic systolic congestive heart failure (CMS/HCC)    • Anemia    • Arthritis    • Asthma    • Atrial fibrillation (CMS/HCC)     With a history of an atrial clot   • Breast cancer (CMS/HCC)     Left, stage IIB   • Disease of thyroid gland     Hypothyroidism   • Heart failure (CMS/HCC)    • Irritable bowel    • Mild tricuspid regurgitation    • Moderate mitral insufficiency    • NICM (nonischemic cardiomyopathy) (CMS/HCC)    • On warfarin therapy    • Pulmonary hypertension (CMS/HCC)    • Shortness of breath        Past Surgical History:   Procedure Laterality Date   • CARDIAC ELECTROPHYSIOLOGY PROCEDURE N/A 6/12/2018    Procedure: Device Upgrade-Upgrade to CRT-P-BIV Pacamaker Medtronic Has existing single chamber Medtronic pacemaker;  Surgeon: Leonardo David MD;  Location: [facility] INVASIVE LOCATION;  Service: Cardiovascular   • CHOLECYSTECTOMY  2000   • EYE SURGERY  2005    cataracts, Dr. Miramontes   • MASTECTOMY Left 10/2014   • PACEMAKER IMPLANTATION  2012    Dr. Leija         Prior to Admission medications    Medication Sig Start Date End Date Taking? Authorizing Provider   anastrozole (ARIMIDEX) 1 MG tablet Take 1 mg by mouth Daily.   Yes Provider, MD Cortez   carvedilol (COREG) 6.25 MG tablet Take 6.25 mg by  mouth 2 (Two) Times a Day With Meals.   Yes Cortez Lui MD   furosemide (LASIX) 20 MG tablet Take 40 mg by mouth Daily.   Yes Cortez Lui MD   glucosamine-chondroitin 500-400 MG capsule capsule Take 1 capsule by mouth Daily.   Yes Cortez Lui MD   acetaminophen (TYLENOL) 325 MG tablet Take 2 tablets by mouth Every 6 (Six) Hours As Needed for Mild Pain . 6/13/18   Klever Melgar MD   levothyroxine (SYNTHROID, LEVOTHROID) 88 MCG tablet Take 88 mcg by mouth Daily. 7/30/12   Cortez Lui MD   vitamin B-12 (CYANOCOBALAMIN) 1000 MCG tablet Take 1,000 mcg by mouth Daily.    Cortez Lui MD   warfarin (COUMADIN) 2.5 MG tablet TAKE ONE TABLET BY MOUTH DAILY  Patient taking differently: Nightly, Whole Tablet, 2.5 mg, Tues, Thurs, Sat, Sun 6/7/18   Rafaela Leija MD   warfarin (COUMADIN) 2.5 MG tablet Take 1.25 mg by mouth Every Night. Monday, Wednesday, Friday    Cortez Lui MD       Allergies   Allergen Reactions   • Iodinated Diagnostic Agents Hives   • Amiodarone Nausea Only   • Codeine Nausea Only   • Dabigatran Etexilate Mesylate Nausea Only   • Digoxin Nausea Only       Social History     Socioeconomic History   • Marital status:      Spouse name: Rick   • Number of children: 8   • Years of education: Not on file   • Highest education level: Not on file   Occupational History     Employer: RETIRED   Tobacco Use   • Smoking status: Never Smoker   • Smokeless tobacco: Never Used   • Tobacco comment: caffeine use-tea   Substance and Sexual Activity   • Alcohol use: Yes     Alcohol/week: 0.6 oz     Types: 1 Glasses of wine per week     Comment: social   • Drug use: No   • Sexual activity: Defer       Family History   Problem Relation Age of Onset   • Colon cancer Mother 75   • Colon cancer Sister 79   • Hypertension Sister    • Cholelithiasis Sister         2 sisters       REVIEW OF SYSTEMS:   All systems reviewed.  Pertinent positives identified in  "HPI.  All other systems are negative.      Objective:     Vitals:    01/05/19 2117 01/05/19 2150 01/05/19 2354 01/06/19 0810   BP: 161/93 168/93 144/70 135/80   BP Location:  Right arm Right arm Right arm   Patient Position:  Sitting Lying Lying   Pulse: 75 88 69 84   Resp:  16 16 16   Temp:  97.9 °F (36.6 °C) 98 °F (36.7 °C) 97.5 °F (36.4 °C)   TempSrc:  Oral Oral Oral   SpO2: 95% 97% 97% 97%   Weight:       Height:  167.6 cm (66\")       Body mass index is 24.77 kg/m².    General Appearance:    Alert, cooperative, in no acute distress   Head:    Normocephalic, without obvious abnormality, atraumatic   Eyes:            Lids and lashes normal, conjunctivae and sclerae normal, no   icterus, no pallor, corneas clear, PERRLA   Ears:    Ears appear intact with no abnormalities noted   Throat:   No oral lesions, no thrush, oral mucosa moist   Neck:   No adenopathy, supple, trachea midline, no thyromegaly, no   carotid bruit, no JVD   Back:     No kyphosis present, no scoliosis present, no skin lesions, erythema or scars, no tenderness to percussion or palpation, range of motion normal   Lungs:     Clear to auscultation,respirations regular, even and unlabored    Heart:    Regular rhythm and normal rate, normal S1 and S2, no murmur, no gallop, no rub, no click   Chest Wall:    No abnormalities observed   Abdomen:     Normal bowel sounds, no masses, no organomegaly, soft        non-tender, non-distended, no guarding, no rebound  tenderness   Extremities:   Moves all extremities well, no edema, no cyanosis, no redness   Pulses:   Pulses palpable and equal bilaterally. Normal radial, carotid, femoral, dorsalis pedis and posterior tibial pulses bilaterally. Normal abdominal aorta   Skin:  Psychiatric:   No bleeding, bruising or rash    Alert and oriented x 3, normal mood and affect   Lab Review:     Results from last 7 days   Lab Units  01/05/19   1836   SODIUM mmol/L  143   POTASSIUM mmol/L  3.6   CHLORIDE mmol/L  103   CO2 " mmol/L  27.5   BUN mg/dL  16   CREATININE mg/dL  0.96   CALCIUM mg/dL  9.0   BILIRUBIN mg/dL  0.6   ALK PHOS U/L  84   ALT (SGPT) U/L  10   AST (SGOT) U/L  22   GLUCOSE mg/dL  83     Results from last 7 days   Lab Units  01/06/19   0702  01/05/19   1836   TROPONIN T ng/mL  <0.010  <0.010     Results from last 7 days   Lab Units  01/05/19   1836   WBC 10*3/mm3  6.46   HEMOGLOBIN g/dL  13.5   HEMATOCRIT %  39.6   PLATELETS 10*3/mm3  202     Results from last 7 days   Lab Units  01/06/19   0702  01/05/19   1836   INR   1.58*  1.75*   APTT seconds   --   26.4                    Telemetry:        EKG:            I personally viewed and interpreted the patient's EKG/Telemetry data.        Assessment and Plan:       AF, cardiomyopathy.  S/p AVN ablation with implantation of biventricular pacer.  On anticoagulation.    Now with acute chest pain.  She had a negative stress test 10/18.  Her echocardiogram from that time showed EF 40%, severe TR.    She has a repeat echocardiogram ordered.  If this does not show any significant change, I would discharge her, as her symptoms have resolved.    Otf Saucedo MD  01/06/19  10:12 AM

## 2019-01-06 NOTE — NURSING NOTE
Spoke with Cardiology nurse.  She is aware of how pt is feeling now.  No new orders at this time.  Will continue to monitor.  MAHNAZ Rodriguez RN

## 2019-01-06 NOTE — PROGRESS NOTES
Discharge Planning Assessment  [facility]     Patient Name: Diana Avalos  MRN: [id number]  Today's Date: 1/6/2019    Admit Date: 1/5/2019    Discharge Needs Assessment     Row Name 01/06/19 1800       Living Environment    Lives With  child(mat), adult    Name(s) of Who Lives With Patient  Dajuan Avalos    Current Living Arrangements  home/apartment/condo    Primary Care Provided by  self;child(mat)    Provides Primary Care For  no one    Family Caregiver if Needed  child(mat), adult    Family Caregiver Names  daughters Kalli and Magui and her son Dajuan    Quality of Family Relationships  helpful;supportive    Able to Return to Prior Arrangements  yes       Resource/Environmental Concerns    Resource/Environmental Concerns  none    Transportation Concerns  car, none       Transition Planning    Patient/Family Anticipates Transition to  home with family    Patient/Family Anticipated Services at Transition  none    Transportation Anticipated  family or friend will provide       Discharge Needs Assessment    Readmission Within the Last 30 Days  no previous admission in last 30 days    Concerns to be Addressed  discharge planning    Equipment Currently Used at Home  none    Anticipated Changes Related to Illness  none    Equipment Needed After Discharge  none    Outpatient/Agency/Support Group Needs  homecare agency    Discharge Facility/Level of Care Needs  home with home health    Offered/Gave Vendor List  yes    Patient's Choice of Community Agency(s)  family would like to consider HH at [place]. Left HH list at bedside for family to review    Current Discharge Risk  dependent with mobility/activities of daily living        Discharge Plan     Row Name 01/06/19 1802       Plan    Plan  Home via private auto. Family considering HH at [facility]    Patient/Family in Agreement with Plan  yes spoke with pt and her dtrs Kalli and Magui at bedside    Plan Comments  Introduced self/explained role of CCP. Face sheet data verified. CMS  MCRAE letter discussed and signed by dtr/PATTI [place]. Pt lives at home with her son Dajuan. Dajuan lives in an apartment in the basement. He works but is able to assist with shopping and picking up meds. Pt is otherwise IADLs. Denies use of DME at home. No Hx of HH or SNF use. Dtr Magui is interested in HH list for possible needs at [place]. Left HH list at bedside for family and pt to review. CCP to follow..................JW        Destination      No service coordination in this encounter.      Durable Medical Equipment      No service coordination in this encounter.      Dialysis/Infusion      No service coordination in this encounter.      Home Medical Care      No service coordination in this encounter.      Community Resources      No service coordination in this encounter.          Demographic Summary     Row Name 01/06/19 2445       General Information    Admission Type  observation    Arrived From  home    Required Notices Provided  Observation Status Notice    Referral Source  admission list    Reason for Consult  discharge planning    Preferred Language  English     Used During This Interaction  no       Contact Information    Permission Granted to Share Info With  ;family/designee        Functional Status     Row Name 01/06/19 1754       Functional Status    Usual Activity Tolerance  good    Current Activity Tolerance  poor       Functional Status, IADL    Medications  assistive person    Meal Preparation  independent    Housekeeping  independent    Laundry  independent    Shopping  assistive person    IADL Comments  states she is IADLs but does not drive       Mental Status    General Appearance WDL  WDL        Psychosocial    No documentation.       Abuse/Neglect    No documentation.       Legal    No documentation.       Substance Abuse    No documentation.       Patient Forms     Row Name 01/06/19 180       Patient Forms    Provider Choice List  Delivered HH list    Delivered to  Support  person    Method of delivery  In person    Row Name 01/06/19 1756       Patient Forms    Patient Observation Letter  Delivered    Delivered to  Support person    Method of delivery  In person            Vicky Lee RN

## 2019-01-06 NOTE — PLAN OF CARE
Problem: Fall Risk (Adult)  Goal: Identify Related Risk Factors and Signs and Symptoms  Outcome: Ongoing (interventions implemented as appropriate)    Goal: Absence of Fall  Outcome: Ongoing (interventions implemented as appropriate)      Problem: Patient Care Overview  Goal: Plan of Care Review  Outcome: Ongoing (interventions implemented as appropriate)   01/06/19 1502   OTHER   Outcome Summary Pt alert and oriented. No c/o chest pain this shift. Family at bedside. Awaiting echo. Family updated. Pt stated she did not feel well while up to the bathroom this afternoon. Cardiology aware and no new orders received at this time. VSS. Will continue to monitor.     Goal: Individualization and Mutuality  Outcome: Ongoing (interventions implemented as appropriate)    Goal: Discharge Needs Assessment  Outcome: Ongoing (interventions implemented as appropriate)    Goal: Interprofessional Rounds/Family Conf  Outcome: Ongoing (interventions implemented as appropriate)      Problem: Cardiac: ACS (Acute Coronary Syndrome) (Adult)  Goal: Signs and Symptoms of Listed Potential Problems Will be Absent, Minimized or Managed (Cardiac: ACS)  Outcome: Ongoing (interventions implemented as appropriate)      Problem: Pain, Acute (Adult)  Goal: Identify Related Risk Factors and Signs and Symptoms  Outcome: Ongoing (interventions implemented as appropriate)    Goal: Acceptable Pain Control/Comfort Level  Outcome: Ongoing (interventions implemented as appropriate)

## 2019-01-06 NOTE — PROGRESS NOTES
Clinical Pharmacy Services: Medication History    Diana Avalos is a 86 y.o. female presenting to [facility] for   Chief Complaint   Patient presents with   • Chest Pain     left side - not breathing well - per daughter pt appeared [place], was disoriented pta.  now resolved       She  has a past medical history of Acute on chronic systolic congestive heart failure (CMS/HCC), Anemia, Arthritis, Asthma, Atrial fibrillation (CMS/HCC), Breast cancer (CMS/HCC), Disease of thyroid gland, Heart failure (CMS/HCC), Irritable bowel, Mild tricuspid regurgitation, Moderate mitral insufficiency, NICM (nonischemic cardiomyopathy) (CMS/HCC), On warfarin therapy, Pulmonary hypertension (CMS/HCC), and Shortness of breath.    Allergies as of 01/05/2019 - Reviewed 01/05/2019   Allergen Reaction Noted   • Iodinated diagnostic agents  03/21/2016   • Amiodarone Nausea Only 03/21/2016   • Codeine Nausea Only 03/21/2016   • Dabigatran etexilate mesylate Nausea Only 03/21/2016   • Digoxin Nausea Only 03/21/2016       Medication information was obtained from: Patient's family  [phone]    Prior to Admission Medications     Prescriptions Last Dose Informant Patient Reported? Taking?    anastrozole (ARIMIDEX) 1 MG tablet  Family Member Yes Yes    Take 1 mg by mouth Daily.    carvedilol (COREG) 6.25 MG tablet  Family Member Yes Yes    Take 6.25 mg by mouth 2 (Two) Times a Day With Meals.    furosemide (LASIX) 20 MG tablet  Family Member Yes Yes    Take 40 mg by mouth Daily.    acetaminophen (TYLENOL) 325 MG tablet  Family Member No No    Take 2 tablets by mouth Every 6 (Six) Hours As Needed for Mild Pain .    levothyroxine (SYNTHROID, LEVOTHROID) 88 MCG tablet  Family Member Yes No    Take 88 mcg by mouth Daily.    vitamin B-12 (CYANOCOBALAMIN) 1000 MCG tablet  Family Member Yes No    Take 1,000 mcg by mouth Daily.    warfarin (COUMADIN) 2.5 MG tablet  Family Member No No    TAKE ONE  TABLET BY MOUTH DAILY    Patient taking differently:  Nightly, Whole Tablet, 2.5 mg, Tues, Thurs, Sat, Sun    warfarin (COUMADIN) 2.5 MG tablet  Family Member Yes No    Take 1.25 mg by mouth Every Night. Monday, Wednesday, Friday            Medication notes:   Removed per family: Carvedilol 3.125 mg (dose change).  Added per family: Glucosamine    This medication list is complete to the best of my knowledge as of 1/5/2019    Please call if questions.    Pablo Horne  1/5/2019 9:32 PM

## 2019-01-06 NOTE — ED NOTES
Nursing report ED to floor  Diana Avalos  86 y.o.  female    HPI (triage note):   Chief Complaint   Patient presents with   • Chest Pain     left side - not breathing well - per daughter pt appeared [place], was disoriented pta.  now resolved       Admitting doctor:   Samantha Fung MD    Admitting diagnosis:   The encounter diagnosis was Chest pain, unspecified type.    Code status:   Current Code Status     Date Active Code Status Order ID Comments User Context       Prior          Allergies:   Iodinated diagnostic agents; Amiodarone; Codeine; Dabigatran etexilate mesylate; and Digoxin    Weight:       01/05/19 1818   Weight: 69.6 kg (153 lb 7 oz)       Most recent vitals:   Vitals:    01/05/19 1917 01/05/19 1948 01/05/19 2019 01/05/19 2047   BP: 153/81 159/65 160/90 154/83   BP Location:  Right arm Right arm Right arm   Patient Position:  Sitting Sitting Sitting   Pulse: 71 69 74 71   Resp:       Temp:       TempSrc:       SpO2: 96% 96% 98% 97%   Weight:       Height:           Active LDAs/IV Access:   Lines, Drains & Airways    Active LDAs     Name:   Placement date:   Placement time:   Site:   Days:    Peripheral IV 01/05/19 1834 Right Antecubital   01/05/19 1834    Antecubital   less than 1                Labs (abnormal labs have a star):   Labs Reviewed   COMPREHENSIVE METABOLIC PANEL - Abnormal; Notable for the following components:       Result Value    eGFR Non  Amer 55 (*)     All other components within normal limits    Narrative:     The MDRD GFR formula is only valid for adults with stable renal function between ages 18 and 70.   PROTIME-INR - Abnormal; Notable for the following components:    Protime 20.1 (*)     INR 1.75 (*)     All other components within normal limits   CBC WITH AUTO DIFFERENTIAL - Abnormal; Notable for the following components:    RDW 13.1 (*)     All other components within normal limits   APTT - Normal   BNP (IN-HOUSE) - Normal    Narrative:     Among patients  with dyspnea, NT-proBNP is highly sensitive for the detection of acute congestive heart failure. In addition NT-proBNP of <300 pg/ml effectively rules out acute congestive heart failure with 99% negative predictive value.   TROPONIN (IN-HOUSE) - Normal    Narrative:     Troponin T Reference Ranges:  Less than 0.03 ng/mL:    Negative for AMI  0.03 to 0.09 ng/mL:      Indeterminant for AMI  Greater than 0.09 ng/mL: Positive for AMI   CBC AND DIFFERENTIAL    Narrative:     The following orders were created for panel order CBC & Differential.  Procedure                               Abnormality         Status                     ---------                               -----------         ------                     CBC Auto Differential[[id number]]        Abnormal            Final result                 Please view results for these tests on the individual orders.       EKG:   ECG 12 Lead             Meds given in ED:   Medications   sodium chloride 0.9 % flush 10 mL (not administered)   nitroglycerin (NITROSTAT) SL tablet 0.4 mg (0.4 mg Sublingual Given 1/5/19 2015)       Imaging results:  Xr Chest 2 View    Result Date: 1/5/2019  COPD, no active disease  This report was finalized on 1/5/2019 8:24 PM by Joao Meade M.D.        Ambulatory status:   - up with assist    Social issues:   Social History     Socioeconomic History   • Marital status:      Spouse name: Rick   • Number of children: 8   • Years of education: Not on file   • Highest education level: Not on file   Social Needs   • Financial resource strain: Not on file   • Food insecurity - worry: Not on file   • Food insecurity - inability: Not on file   • Transportation needs - medical: Not on file   • Transportation needs - non-medical: Not on file   Occupational History     Employer: RETIRED   Tobacco Use   • Smoking status: Never Smoker   • Smokeless tobacco: Never Used   • Tobacco comment: caffeine use-tea   Substance and Sexual Activity   •  Alcohol use: Yes     Alcohol/week: 0.6 oz     Types: 1 Glasses of wine per week     Comment: social   • Drug use: No   • Sexual activity: Defer   Other Topics Concern   • Not on file   Social History Narrative   • Not on file        Anna Sena RN  01/05/19 5622

## 2019-01-07 ENCOUNTER — APPOINTMENT (OUTPATIENT)
Dept: PHARMACY | Facility: HOSPITAL | Age: 84
End: 2019-01-07

## 2019-01-07 ENCOUNTER — TELEPHONE (OUTPATIENT)
Dept: CARDIOLOGY | Facility: CLINIC | Age: 84
End: 2019-01-07

## 2019-01-07 ENCOUNTER — APPOINTMENT (OUTPATIENT)
Dept: CARDIOLOGY | Facility: HOSPITAL | Age: 84
End: 2019-01-07
Attending: INTERNAL MEDICINE

## 2019-01-07 VITALS
DIASTOLIC BLOOD PRESSURE: 71 MMHG | OXYGEN SATURATION: 99 % | HEIGHT: 66 IN | RESPIRATION RATE: 18 BRPM | BODY MASS INDEX: 24.59 KG/M2 | WEIGHT: 153 LBS | HEART RATE: 85 BPM | SYSTOLIC BLOOD PRESSURE: 118 MMHG | TEMPERATURE: 98.6 F

## 2019-01-07 LAB
AORTIC DIMENSIONLESS INDEX: 0.6 (DI)
BH CV ECHO MEAS - ACS: 1.7 CM
BH CV ECHO MEAS - AO MAX PG: 3 MMHG
BH CV ECHO MEAS - AO MEAN PG (FULL): 2 MMHG
BH CV ECHO MEAS - AO MEAN PG: 2 MMHG
BH CV ECHO MEAS - AO ROOT AREA (BSA CORRECTED): 1.6
BH CV ECHO MEAS - AO ROOT AREA: 6.6 CM^2
BH CV ECHO MEAS - AO ROOT DIAM: 2.9 CM
BH CV ECHO MEAS - AO V2 MAX: 82.8 CM/SEC
BH CV ECHO MEAS - AO V2 MEAN: 61 CM/SEC
BH CV ECHO MEAS - AO V2 VTI: 15.4 CM
BH CV ECHO MEAS - AVA(I,A): 1.8 CM^2
BH CV ECHO MEAS - AVA(I,D): 1.8 CM^2
BH CV ECHO MEAS - BSA(HAYCOCK): 1.8 M^2
BH CV ECHO MEAS - BSA: 1.8 M^2
BH CV ECHO MEAS - BZI_BMI: 24.7 KILOGRAMS/M^2
BH CV ECHO MEAS - BZI_METRIC_HEIGHT: 167.6 CM
BH CV ECHO MEAS - BZI_METRIC_WEIGHT: 69.4 KG
BH CV ECHO MEAS - EDV(CUBED): 35.9 ML
BH CV ECHO MEAS - EDV(MOD-SP2): 59 ML
BH CV ECHO MEAS - EDV(MOD-SP4): 61 ML
BH CV ECHO MEAS - EDV(TEICH): 44.1 ML
BH CV ECHO MEAS - EF(CUBED): 70.4 %
BH CV ECHO MEAS - EF(MOD-BP): 63 %
BH CV ECHO MEAS - EF(MOD-SP2): 61 %
BH CV ECHO MEAS - EF(MOD-SP4): 65.6 %
BH CV ECHO MEAS - EF(TEICH): 63.3 %
BH CV ECHO MEAS - ESV(CUBED): 10.6 ML
BH CV ECHO MEAS - ESV(MOD-SP2): 23 ML
BH CV ECHO MEAS - ESV(MOD-SP4): 21 ML
BH CV ECHO MEAS - ESV(TEICH): 16.2 ML
BH CV ECHO MEAS - FS: 33.3 %
BH CV ECHO MEAS - IVS/LVPW: 1
BH CV ECHO MEAS - IVSD: 1.4 CM
BH CV ECHO MEAS - LAT PEAK E' VEL: 8 CM/SEC
BH CV ECHO MEAS - LV DIASTOLIC VOL/BSA (35-75): 34.2 ML/M^2
BH CV ECHO MEAS - LV MASS(C)D: 159.5 GRAMS
BH CV ECHO MEAS - LV MASS(C)DI: 89.4 GRAMS/M^2
BH CV ECHO MEAS - LV SYSTOLIC VOL/BSA (12-30): 11.8 ML/M^2
BH CV ECHO MEAS - LV V1 MAX: 48.9 CM/SEC
BH CV ECHO MEAS - LV V1 MEAN: 32.3 CM/SEC
BH CV ECHO MEAS - LV V1 VTI: 8.9 CM
BH CV ECHO MEAS - LVIDD: 3.3 CM
BH CV ECHO MEAS - LVIDS: 2.2 CM
BH CV ECHO MEAS - LVLD AP2: 6.6 CM
BH CV ECHO MEAS - LVLD AP4: 5.8 CM
BH CV ECHO MEAS - LVLS AP2: 6 CM
BH CV ECHO MEAS - LVLS AP4: 5 CM
BH CV ECHO MEAS - LVOT AREA (M): 3.1 CM^2
BH CV ECHO MEAS - LVOT AREA: 3.1 CM^2
BH CV ECHO MEAS - LVOT DIAM: 2 CM
BH CV ECHO MEAS - LVPWD: 1.4 CM
BH CV ECHO MEAS - MED PEAK E' VEL: 8 CM/SEC
BH CV ECHO MEAS - MR MAX PG: 102.4 MMHG
BH CV ECHO MEAS - MR MAX VEL: 506 CM/SEC
BH CV ECHO MEAS - MV A DUR: 0.08 SEC
BH CV ECHO MEAS - MV A MAX VEL: 21.3 CM/SEC
BH CV ECHO MEAS - MV DEC SLOPE: 354 CM/SEC^2
BH CV ECHO MEAS - MV DEC TIME: 0.19 SEC
BH CV ECHO MEAS - MV E MAX VEL: 61.4 CM/SEC
BH CV ECHO MEAS - MV E/A: 2.9
BH CV ECHO MEAS - MV MEAN PG: 1 MMHG
BH CV ECHO MEAS - MV P1/2T MAX VEL: 73 CM/SEC
BH CV ECHO MEAS - MV P1/2T: 60.4 MSEC
BH CV ECHO MEAS - MV V2 MEAN: 41.5 CM/SEC
BH CV ECHO MEAS - MV V2 VTI: 15.7 CM
BH CV ECHO MEAS - MVA P1/2T LCG: 3 CM^2
BH CV ECHO MEAS - MVA(P1/2T): 3.6 CM^2
BH CV ECHO MEAS - MVA(VTI): 1.8 CM^2
BH CV ECHO MEAS - PA ACC SLOPE: 710 CM/SEC^2
BH CV ECHO MEAS - PA ACC TIME: 0.07 SEC
BH CV ECHO MEAS - PA MAX PG (FULL): 0.72 MMHG
BH CV ECHO MEAS - PA MAX PG: 1.2 MMHG
BH CV ECHO MEAS - PA PR(ACCEL): 45.7 MMHG
BH CV ECHO MEAS - PA V2 MAX: 53.7 CM/SEC
BH CV ECHO MEAS - PULM A REVS DUR: 0.07 SEC
BH CV ECHO MEAS - PULM A REVS VEL: 19.9 CM/SEC
BH CV ECHO MEAS - PULM DIAS VEL: 57.6 CM/SEC
BH CV ECHO MEAS - PULM S/D: 0.56
BH CV ECHO MEAS - PULM SYS VEL: 32.5 CM/SEC
BH CV ECHO MEAS - PVA(V,A): 1.9 CM^2
BH CV ECHO MEAS - PVA(V,D): 1.9 CM^2
BH CV ECHO MEAS - QP/QS: 0.86
BH CV ECHO MEAS - RAP SYSTOLE: 3 MMHG
BH CV ECHO MEAS - RV MAX PG: 0.43 MMHG
BH CV ECHO MEAS - RV MEAN PG: 0 MMHG
BH CV ECHO MEAS - RV V1 MAX: 32.9 CM/SEC
BH CV ECHO MEAS - RV V1 MEAN: 26.1 CM/SEC
BH CV ECHO MEAS - RV V1 VTI: 7.7 CM
BH CV ECHO MEAS - RVOT AREA: 3.1 CM^2
BH CV ECHO MEAS - RVOT DIAM: 2 CM
BH CV ECHO MEAS - RVSP: 28 MMHG
BH CV ECHO MEAS - SI(AO): 57 ML/M^2
BH CV ECHO MEAS - SI(CUBED): 14.2 ML/M^2
BH CV ECHO MEAS - SI(LVOT): 15.7 ML/M^2
BH CV ECHO MEAS - SI(MOD-SP2): 20.2 ML/M^2
BH CV ECHO MEAS - SI(MOD-SP4): 22.4 ML/M^2
BH CV ECHO MEAS - SI(TEICH): 15.6 ML/M^2
BH CV ECHO MEAS - SV(AO): 101.7 ML
BH CV ECHO MEAS - SV(CUBED): 25.3 ML
BH CV ECHO MEAS - SV(LVOT): 28.1 ML
BH CV ECHO MEAS - SV(MOD-SP2): 36 ML
BH CV ECHO MEAS - SV(MOD-SP4): 40 ML
BH CV ECHO MEAS - SV(RVOT): 24.2 ML
BH CV ECHO MEAS - SV(TEICH): 27.9 ML
BH CV ECHO MEAS - TAPSE (>1.6): 1.1 CM2
BH CV ECHO MEAS - TR MAX VEL: 251 CM/SEC
BH CV ECHO MEASUREMENTS AVERAGE E/E' RATIO: 7.68
BH CV VAS BP LEFT ARM: NORMAL MMHG
BH CV XLRA - RV BASE: 4 CM
BH CV XLRA - TDI S': 8.8 CM/SEC
INR PPP: 1.48 (ref 0.9–1.1)
LEFT ATRIUM VOLUME INDEX: 42.1 ML/M2
LV EF 2D ECHO EST: 63 %
MAXIMAL PREDICTED HEART RATE: 134 BPM
PROTHROMBIN TIME: 17.7 SECONDS (ref 11.7–14.2)
STRESS TARGET HR: 114 BPM

## 2019-01-07 PROCEDURE — 93306 TTE W/DOPPLER COMPLETE: CPT

## 2019-01-07 PROCEDURE — 99217 PR OBSERVATION CARE DISCHARGE MANAGEMENT: CPT | Performed by: NURSE PRACTITIONER

## 2019-01-07 PROCEDURE — 93306 TTE W/DOPPLER COMPLETE: CPT | Performed by: INTERNAL MEDICINE

## 2019-01-07 PROCEDURE — 85610 PROTHROMBIN TIME: CPT | Performed by: INTERNAL MEDICINE

## 2019-01-07 PROCEDURE — G0378 HOSPITAL OBSERVATION PER HR: HCPCS

## 2019-01-07 RX ORDER — NITROGLYCERIN 0.4 MG/1
0.4 TABLET SUBLINGUAL
Qty: 5 TABLET | Refills: 0 | Status: SHIPPED | OUTPATIENT
Start: 2019-01-07 | End: 2021-08-04 | Stop reason: SDUPTHER

## 2019-01-07 RX ADMIN — FUROSEMIDE 20 MG: 20 TABLET ORAL at 10:24

## 2019-01-07 RX ADMIN — ANASTROZOLE 1 MG: 1 TABLET, COATED ORAL at 12:20

## 2019-01-07 RX ADMIN — CARVEDILOL 6.25 MG: 6.25 TABLET, FILM COATED ORAL at 10:23

## 2019-01-07 RX ADMIN — Medication 1000 MCG: at 10:23

## 2019-01-07 RX ADMIN — LEVOTHYROXINE SODIUM 88 MCG: 88 TABLET ORAL at 07:17

## 2019-01-07 NOTE — DISCHARGE SUMMARY
Hospital Discharge    Patient Name: Diana Avalos  Age/Sex: 86 y.o. female  : 1932  MRN: [id number]    Encounter Provider: KIKA Urbano  Referring Provider: No ref. provider found  Place of Service: [facility] CARDIOLOGY  Patient Care Team:  Javi Brand MD as PCP - General (Family Medicine)  Ye, Sherman LONG II, MD as Consulting Physician (Hematology and Oncology)  Sidney Castellanos MD as Referring Physician (General Surgery)  Eve B[facility] as Pharmacist         Date of Discharge:  2019   Date of Admit: 2019    Discharge Condition: Stable  Discharge Diagnosis:    Chest pain      Hospital Course:   Diana Avalos is a 86 y.o. female followed by Dr. 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 She is on warfarin. She also has mild to moderate MR which is medically managed. She had a stress in 10/2018 that showed no ischemia. Echo at that time showed mildly reduced LV systolic function. Patient presented to the emergency room on  with left sided chest pain and right arm pain. She felt short of breath. She had been active at the grocery store earlier that day and did not have any symptoms. The symptoms occurred at rest. She was seen in the ER at which time symptoms had almost resolved and have not recurred. She ruled out for myocardial infarction. Repeat echo shower normal LV systolic function and otherwise no significant change from last echo. She feels well this morning and is stable for discharge. She was supposed to see KIKA Castelan in office tomorrow with device check. This will be rescheduled for next week. Her INR is subtherapeutic. She has been instructed to increase dose today and tomorrow and will recheck on Wednesday. INR clinic has been notified as well.     Objective:  Temp:  [97.6 °F (36.4 °C)-98.6 °F (37 °C)] 98.6 °F (37 °C)  Heart Rate:  [70-85] 85  Resp:  [18] 18  BP: (114-126)/(54-71)  118/71    Intake/Output Summary (Last 24 hours) at 1/7/2019 1353  Last data filed at 1/6/2019 2300  Gross per 24 hour   Intake 360 ml   Output --   Net 360 ml     Body mass index is 24.69 kg/m².      01/05/19  1818 01/07/19  0928   Weight: 69.6 kg (153 lb 7 oz) 69.4 kg (153 lb)     Weight change:     Physical Exam:  Constitutional: She is oriented to person, place, and time. She appears well-developed. She does not appear ill.   Neck: No JVD present. Carotid bruit is not present.   Cardiovascular: Normal rate, regular rhythm and normal heart sounds.    Pulses:       Posterior tibial pulses are 2+ on the right side, and 2+ on the left side.   Pulmonary/Chest: Effort normal and breath sounds normal.   Abdominal: Soft. Normal appearance and bowel sounds are normal. There is no tenderness.   Musculoskeletal: Normal range of motion.        Right shoulder: She exhibits no deformity.        Left shoulder: She exhibits no deformity.   Neurological: She is alert and oriented to person, place, and time. She has normal strength.   Skin: Skin is warm, dry and intact. No rash noted.   Psychiatric: She has a normal mood and affect. Her behavior is normal. Thought content normal.   Vitals reviewed      Procedures Performed  Echo 1/7/19  · Left ventricular systolic function is normal. Calculated EF = 63.0%. Estimated EF was in agreement with the calculated EF. Estimated EF = 63%. Normal left ventricular cavity size noted. All left ventricular wall segments contract normally. Left ventricular wall thickness is consistent with mild concentric hypertrophy. Left ventricular diastolic dysfunction is noted (grade II w/high LAP) consistent with pseudonormalization.  · Electronic lead present in the ventricle.  · Left atrial volume is severely increased. Right atrial cavity size is moderate-to-severely dilated.  · Mild MAC is present. Mild-to-moderate mitral valve regurgitation is present.  · Tricuspid Valve: The tricuspid valve is normal.  Mild tricuspid valve regurgitation is present. Estimated right ventricular systolic pressure from tricuspid regurgitation is normal (<35 mmHg). Calculated right ventricular systolic pressure from tricuspid regurgitation is 28 mmHg.       Consults:  Consults     Date and Time Order Name Status Description    1/5/2019 2033 DARLIN (on-call MD unless specified) Completed           Pertinent Test Results:  Results from last 7 days   Lab Units  01/05/19   1836   SODIUM mmol/L  143   POTASSIUM mmol/L  3.6   CHLORIDE mmol/L  103   CO2 mmol/L  27.5   BUN mg/dL  16   CREATININE mg/dL  0.96   GLUCOSE mg/dL  83   CALCIUM mg/dL  9.0   AST (SGOT) U/L  22   ALT (SGPT) U/L  10     Results from last 7 days   Lab Units  01/06/19   0702  01/05/19   1836   TROPONIN T ng/mL  <0.010  <0.010     Results from last 7 days   Lab Units  01/05/19   1836   WBC 10*3/mm3  6.46   HEMOGLOBIN g/dL  13.5   HEMATOCRIT %  39.6   PLATELETS 10*3/mm3  202     Results from last 7 days   Lab Units  01/07/19   1051  01/06/19   0702  01/05/19   1836   INR   1.48*  1.58*  1.75*   APTT seconds   --    --   26.4               Invalid input(s): LDLCALC  Results from last 7 days   Lab Units  01/05/19   1836   PROBNP pg/mL  1,326.0           Discharge Medications     Discharge Medications      New Medications      Instructions Start Date   nitroglycerin 0.4 MG SL tablet  Commonly known as:  NITROSTAT   0.4 mg, Sublingual, Every 5 Minutes PRN, Take no more than 3 doses in 15 minutes.         Changes to Medications      Instructions Start Date   warfarin 2.5 MG tablet  Commonly known as:  COUMADIN  What changed:  Another medication with the same name was changed. Make sure you understand how and when to take each.   1.25 mg, Oral, Nightly, Monday, Wednesday, Friday      warfarin 2.5 MG tablet  Commonly known as:  COUMADIN  What changed:    · how much to take  · how to take this  · when to take this   TAKE ONE TABLET BY MOUTH DAILY         Continue These Medications       Instructions Start Date   acetaminophen 325 MG tablet  Commonly known as:  TYLENOL   650 mg, Oral, Every 6 Hours PRN      anastrozole 1 MG tablet  Commonly known as:  ARIMIDEX   1 mg, Oral, Daily      carvedilol 6.25 MG tablet  Commonly known as:  COREG   6.25 mg, Oral, 2 Times Daily With Meals      furosemide 20 MG tablet  Commonly known as:  LASIX   40 mg, Oral, Daily      glucosamine-chondroitin 500-400 MG capsule capsule   1 capsule, Oral, Daily      levothyroxine 88 MCG tablet  Commonly known as:  SYNTHROID, LEVOTHROID   88 mcg, Oral, Daily      vitamin B-12 1000 MCG tablet  Commonly known as:  CYANOCOBALAMIN   1,000 mcg, Oral, Daily             Discharge Diet:    Dietary Orders (From admission, onward)    Start     Ordered    01/06/19 1108  Diet Regular; Thin; Cardiac  Diet Effective Now     Question Answer Comment   Diet Texture / Consistency Regular    Fluid Consistency Thin    Common Modifiers Cardiac        01/06/19 1109          Activity at Discharge:   as tolerated    Discharge disposition: home     Discharge Instructions and Follow ups:  Future Appointments   Date Time Provider Department Center   1/9/2019  2:15 PM ANTI COAG CLINIC [facility] None   1/11/2019  2:30 PM Mercedes Phipps APRN [facility] None   4/8/2019 12:00 AM MICAELA REMOTE, DARLIN [facility] None   7/31/2019 11:00 AM MICAELA IN OFFICE, [facility] None   7/31/2019 11:40 AM Leonardo David MD [facility] None     Follow-up Information     Javi Brand MD .    Specialty:  Family Medicine  Contact information:  [facility] [facility] [address] [phone]                   Test Results Pending at Discharge: INR Wednesday     KIKA Urbano  01/07/19  1:53 PM

## 2019-01-07 NOTE — PLAN OF CARE
Problem: Fall Risk (Adult)  Goal: Identify Related Risk Factors and Signs and Symptoms  Outcome: Ongoing (interventions implemented as appropriate)    Goal: Absence of Fall  Outcome: Ongoing (interventions implemented as appropriate)      Problem: Patient Care Overview  Goal: Plan of Care Review  Outcome: Ongoing (interventions implemented as appropriate)   01/07/19 0518   Coping/Psychosocial   Plan of Care Reviewed With patient   Plan of Care Review   Progress no change   OTHER   Outcome Summary Pt A&O. Some STML per pt family. No c/o pain or distress. No chest pain. Awaiting echo. No stress test to be done- pt just got one done not long ago. Pt got up around 0400 to move over to chair- states she uncomfortable in bed. Has not been callling out for help. VSS, will continue to monitor.     Goal: Individualization and Mutuality  Outcome: Ongoing (interventions implemented as appropriate)    Goal: Discharge Needs Assessment  Outcome: Ongoing (interventions implemented as appropriate)    Goal: Interprofessional Rounds/Family Conf  Outcome: Ongoing (interventions implemented as appropriate)      Problem: Cardiac: ACS (Acute Coronary Syndrome) (Adult)  Goal: Signs and Symptoms of Listed Potential Problems Will be Absent, Minimized or Managed (Cardiac: ACS)  Outcome: Ongoing (interventions implemented as appropriate)      Problem: Pain, Acute (Adult)  Goal: Identify Related Risk Factors and Signs and Symptoms  Outcome: Ongoing (interventions implemented as appropriate)    Goal: Acceptable Pain Control/Comfort Level  Outcome: Ongoing (interventions implemented as appropriate)

## 2019-01-07 NOTE — PROGRESS NOTES
Case Management Discharge Note    Final Note: Pt discharged home.  CANDIE Carrera    Destination      No service has been selected for the patient.      Durable Medical Equipment      No service has been selected for the patient.      Dialysis/Infusion      No service has been selected for the patient.      Home Medical Care      No service has been selected for the patient.      Community Resources      No service has been selected for the patient.        Other: Other(Private Auto)    Final Discharge Disposition Code: 01 - home or self-care

## 2019-01-08 ENCOUNTER — READMISSION MANAGEMENT (OUTPATIENT)
Dept: CALL CENTER | Facility: HOSPITAL | Age: 84
End: 2019-01-08

## 2019-01-08 NOTE — OUTREACH NOTE
Prep Survey      Responses   Facility patient discharged from?  [facility]   Is patient eligible?  Yes   Discharge diagnosis   Chest pain   Does the patient have one of the following disease processes/diagnoses(primary or secondary)?  Other   Does the patient have Home health ordered?  No   Is there a DME ordered?  No   Prep survey completed?  Yes          Vicky Boateng RN

## 2019-01-09 ENCOUNTER — ANTICOAGULATION VISIT (OUTPATIENT)
Dept: PHARMACY | Facility: HOSPITAL | Age: 84
End: 2019-01-09

## 2019-01-09 ENCOUNTER — READMISSION MANAGEMENT (OUTPATIENT)
Dept: CALL CENTER | Facility: HOSPITAL | Age: 84
End: 2019-01-09

## 2019-01-09 DIAGNOSIS — Z79.01 LONG TERM CURRENT USE OF ANTICOAGULANT THERAPY: ICD-10-CM

## 2019-01-09 LAB
INR PPP: 2.3 (ref 0.91–1.09)
PROTHROMBIN TIME: 27.4 SECONDS (ref 10–13.8)

## 2019-01-09 PROCEDURE — 85610 PROTHROMBIN TIME: CPT

## 2019-01-09 PROCEDURE — 36416 COLLJ CAPILLARY BLOOD SPEC: CPT

## 2019-01-09 NOTE — OUTREACH NOTE
Medical Week 1 Survey      Responses   Facility patient discharged from?  [place]   Does the patient have one of the following disease processes/diagnoses(primary or secondary)?  Other   Is there a successful TCM telephone encounter documented?  No   Week 1 attempt successful?  Yes   Call start time  1205   Call end time  1217   Discharge diagnosis   Chest pain   Is patient permission given to speak with other caregiver?  Yes   Person spoke with today (if not patient) and relationship  Magui   Agueda reviewed with patient/caregiver?  Yes   Is the patient having any side effects they believe may be caused by any medication additions or changes?  No   Does the patient have all medications ordered at discharge?  Yes   Is the patient taking all medications as directed (includes completed medication regime)?  Yes   Does the patient have a primary care provider?   Yes   Does the patient have an appointment with their PCP within 7 days of discharge?  Yes   Has the patient kept scheduled appointments due by today?  N/A   Has home health visited the patient within 72 hours of discharge?  N/A   Psychosocial issues?  No   Did the patient receive a copy of their discharge instructions?  Yes   Nursing interventions  Reviewed instructions with patient   What is the patient's perception of their health status since discharge?  Improving   Is the patient/caregiver able to teach back signs and symptoms related to disease process for when to call PCP?  Yes   Is the patient/caregiver able to teach back signs and symptoms related to disease process for when to call 911?  Yes   Is the patient/caregiver able to teach back the hierarchy of who to call/visit for symptoms/problems? PCP, Specialist, Home health nurse, Urgent Care, ED, 911  Yes   Additional teach back comments  Her daughter says she is very independent.  There are 8 children and they want to help her. Long discussion on EMS vs driving.   Week 1 call completed?  Yes           Pretty Lim, RN

## 2019-01-09 NOTE — PROGRESS NOTES
Anticoagulation Clinic Progress Note    Anticoagulation Summary  As of 2019    INR goal:   2.0-3.0   TTR:   89.8 % (3.1 mo)   INR used for dosin.3 (2019)   Warfarin maintenance plan:   1.25 mg on Mon, Wed, Fri; 2.5 mg all other days   Weekly warfarin total:   13.75 mg   No change documented:   Debi Tyler   Plan last modified:   Nikole Tiwari, [facility] (2018)   Next INR check:   2019   Priority:   High   Target end date:   Indefinite    Indications    Long-term (current) use of anticoagulants [Z79.01]             Anticoagulation Episode Summary     INR check location:       Preferred lab:       Send INR reminders to:    [facility]    Comments:         Anticoagulation Care Providers     Provider Role Specialty Phone number    Rafaela Leija MD Referring Cardiology [phone]          Clinic Interview:      INR History:  Anticoagulation Monitoring 10/25/2018 12/3/2018 2019   INR 2.2 3.0 2.3   INR Date 10/25/2018 12/3/2018 2019   INR Goal 2.0-3.0 2.0-3.0 2.0-3.0   Trend Same Same Same   Last Week Total 13.75 mg 13.75 mg 20 mg   Next Week Total 13.75 mg 13.75 mg 13.75 mg   Sun 2.5 mg 2.5 mg 2.5 mg   Mon 1.25 mg 5 mg (); Otherwise 1.25 mg 1.25 mg   Tue 2.5 mg 5 mg (); Otherwise 2.5 mg 2.5 mg   Wed 1.25 mg 1.25 mg 1.25 mg   Thu 2.5 mg 2.5 mg 2.5 mg   Fri 1.25 mg 1.25 mg 1.25 mg   Sat 2.5 mg 2.5 mg 2.5 mg   Visit Report - - -   Some recent data might be hidden       Plan:  1. INR is therapeutic today- see above in Anticoagulation Summary.   Will instruct Diana Avalos to continue their warfarin regimen- see above in Anticoagulation Summary.  2. Follow up in 1 week .  3. Patient declines warfarin refills.  4. Verbal and written information provided. Patient expresses understanding and has no further questions at this time.    Debi Tyler

## 2019-01-14 ENCOUNTER — TELEPHONE (OUTPATIENT)
Dept: GENERAL RADIOLOGY | Facility: HOSPITAL | Age: 84
End: 2019-01-14

## 2019-01-14 RX ORDER — ANASTROZOLE 1 MG/1
TABLET ORAL
Qty: 30 TABLET | Refills: 1 | Status: SHIPPED | OUTPATIENT
Start: 2019-01-14 | End: 2019-03-24 | Stop reason: SDUPTHER

## 2019-01-15 ENCOUNTER — TELEPHONE (OUTPATIENT)
Dept: CARDIOLOGY | Facility: CLINIC | Age: 84
End: 2019-01-15

## 2019-01-15 NOTE — TELEPHONE ENCOUNTER
Pt's daughter called and wants to know I the correct dose of coreg. Per last OV 10/11/18 see yan in 3 months.  Decrease coreg to 3.125mg BID. Pt was hospitalized 1/5/19 upon the dc pt coreg was increased to 6.25 mg BID.      Does pt need to stay on coreg 6.25 mg or 3.125 mg BID.......please advise      Thanks  Candice KENNEDY

## 2019-01-16 ENCOUNTER — READMISSION MANAGEMENT (OUTPATIENT)
Dept: CALL CENTER | Facility: HOSPITAL | Age: 84
End: 2019-01-16

## 2019-01-16 NOTE — OUTREACH NOTE
Medical Week 2 Survey      Responses   Facility patient discharged from?  [place]   Does the patient have one of the following disease processes/diagnoses(primary or secondary)?  Other   Week 2 attempt successful?  No   Unsuccessful attempts  Attempt 1          Johanny Cosby RN

## 2019-01-17 ENCOUNTER — ANTICOAGULATION VISIT (OUTPATIENT)
Dept: PHARMACY | Facility: HOSPITAL | Age: 84
End: 2019-01-17

## 2019-01-17 ENCOUNTER — OFFICE VISIT (OUTPATIENT)
Dept: CARDIOLOGY | Facility: CLINIC | Age: 84
End: 2019-01-17

## 2019-01-17 VITALS
WEIGHT: 154.6 LBS | SYSTOLIC BLOOD PRESSURE: 122 MMHG | HEIGHT: 66 IN | DIASTOLIC BLOOD PRESSURE: 66 MMHG | BODY MASS INDEX: 24.85 KG/M2 | HEART RATE: 88 BPM

## 2019-01-17 DIAGNOSIS — I36.1 NON-RHEUMATIC TRICUSPID VALVE INSUFFICIENCY: ICD-10-CM

## 2019-01-17 DIAGNOSIS — I10 ESSENTIAL HYPERTENSION: ICD-10-CM

## 2019-01-17 DIAGNOSIS — M79.601 RIGHT ARM PAIN: ICD-10-CM

## 2019-01-17 DIAGNOSIS — I50.22 CHRONIC SYSTOLIC CONGESTIVE HEART FAILURE (HCC): Primary | ICD-10-CM

## 2019-01-17 DIAGNOSIS — I27.20 PULMONARY HYPERTENSION (HCC): ICD-10-CM

## 2019-01-17 DIAGNOSIS — Z95.0 STATUS POST BIVENTRICULAR PACEMAKER: ICD-10-CM

## 2019-01-17 DIAGNOSIS — I48.21 PERMANENT ATRIAL FIBRILLATION (HCC): ICD-10-CM

## 2019-01-17 DIAGNOSIS — I47.20 VENTRICULAR TACHYCARDIA (HCC): ICD-10-CM

## 2019-01-17 DIAGNOSIS — I34.0 NON-RHEUMATIC MITRAL REGURGITATION: ICD-10-CM

## 2019-01-17 PROBLEM — Z79.01 LONG TERM CURRENT USE OF ANTICOAGULANT THERAPY: Status: RESOLVED | Noted: 2018-09-27 | Resolved: 2019-01-17

## 2019-01-17 PROBLEM — R07.9 CHEST PAIN: Status: RESOLVED | Noted: 2019-01-05 | Resolved: 2019-01-17

## 2019-01-17 PROBLEM — J93.9 PNEUMOTHORAX: Status: RESOLVED | Noted: 2018-06-14 | Resolved: 2019-01-17

## 2019-01-17 LAB
INR PPP: 3.2 (ref 0.91–1.09)
PROTHROMBIN TIME: 38.3 SECONDS (ref 10–13.8)

## 2019-01-17 PROCEDURE — 93000 ELECTROCARDIOGRAM COMPLETE: CPT | Performed by: NURSE PRACTITIONER

## 2019-01-17 PROCEDURE — 36416 COLLJ CAPILLARY BLOOD SPEC: CPT

## 2019-01-17 PROCEDURE — 85610 PROTHROMBIN TIME: CPT

## 2019-01-17 PROCEDURE — 99214 OFFICE O/P EST MOD 30 MIN: CPT | Performed by: NURSE PRACTITIONER

## 2019-01-17 PROCEDURE — G0463 HOSPITAL OUTPT CLINIC VISIT: HCPCS

## 2019-01-17 RX ORDER — FUROSEMIDE 40 MG/1
40 TABLET ORAL DAILY
Qty: 90 TABLET | Refills: 2 | Status: SHIPPED | OUTPATIENT
Start: 2019-01-17 | End: 2019-10-22 | Stop reason: SDUPTHER

## 2019-01-17 RX ORDER — CARVEDILOL 6.25 MG/1
6.25 TABLET ORAL 2 TIMES DAILY WITH MEALS
Qty: 180 TABLET | Refills: 2 | Status: SHIPPED | OUTPATIENT
Start: 2019-01-17 | End: 2019-12-23

## 2019-01-17 NOTE — PROGRESS NOTES
Date of Office Visit: 2019  Encounter Provider: KIKA Vidal  Place of Service: [facility] CARDIOLOGY  Patient Name: Diana Avalos  :1932    Chief Complaint   Patient presents with   • Follow-up     Hospital    :     HPI: Diana Avalos is a 86 y.o. female who presents today for hospital follow up. She has a history of anemia, asthma, breast cancer, thyroid disease, and irritable bowel syndrome.    She was diagnosed with atrial fibrillation in 2011 and a rate control strategy and anticoagulation were elected.  She was placed on amiodarone, digoxin, and dabigatran.  She then underwent AV node ablation and single-chamber Medtronic pacemaker in 2012.  In 2012, she was hospitalized and noted to have a reduced ejection fraction of 35-40%, mitral insufficiency, and pulmonary artery hypertension.      In 2018, she was admitted with chronic systolic heart failure. Echocardiogram in 2018 revealed an EF of 36%, moderate LVH, marked dysynchrony of the septum and chronic apex to the RV pacing, moderate to severe mitral insufficiency, moderate to severe tricuspid insufficiency, and moderate pulmonary hypertension.  She underwent upgrade to CRT-D and developed a pneumothorax postoperatively requiring a chest tube placement.  Her pneumothorax resolved and she followed up with KIKA Marcial and was doing well    In 2018, she presented to the emergency department with shortness of breath.  There were no abnormalities on her blood work except for pro BNP elevated at  and chest x-ray was normal.  She followed up with me and continued to experience dyspnea upon exertion/talking, PND, and orthopnea. I increased her furosemide to 40 mg a day.     In 2018 she had an echocardiogram which revealed an EF of 40%, right ventricle mild to moderately dilated, left atrium moderately dilated, right atrium severely dilated, mild  mitral valve regurgitation, severe tricuspid valve regurgitation, and RVSP 49 mmHg.  She had a cardiac PET scan which showed an EF of 54% and no evidence of ischemia. She followed up with Dr. Leija in October and she decreased her carvedilol to 3.125 mg twice daily because of fatigue.    She presented to [facility] on 1/5/19 with left-sided chest pain, right arm pain, and shortness of breath occurring at rest.  She ruled out for myocardial infarction.  Repeat echocardiogram showed improvement of her ejection fraction to 63%, mild concentric hypertrophy, grade 2 diastolic dysfunction, left atrium volume severely increased, right atrium moderately to severely dilated, mild to moderate mitral valve regurgitation, mild tricuspid valve regurgitation, and RVSP normal.  I reviewed her blood work from the hospitalization.  She was discharged on nitroglycerin sublingual, warfarin dosage was changed, and she was recommended to increase her carvedilol to 6.25 mg twice a day.    There was an alert on January 7, 2018 per ICD that she had 3 nonsustained runs of nonsustained ventricular tachycardia.  Her daughter then called 1/15 asking for clarification on carvedilol and she was recommended to take carvedilol 6.25 mg twice a day.    She presents today for follow-up.  Overall she is feeling better and denies any further chest pain.  She continues to experience her right arm ache that she rates an 8 out of 10 constantly over the past 2 weeks.  The pain does not worsen with exertion and occurs at rest.  She denies any injury to her arm, neck, or back.  She has chronic shortness of breath and feels that this is improved.  She denies PND, orthopnea, edema, palpitations, dizziness, or syncope.  Her weight at home have been at her baseline which is 147 pounds.  Her blood pressure and heart rate are normal.  She remains on warfarin, denies bleeding, and INRs are followed at her PCP office.    Past Medical History:   Diagnosis  Date   • Acute on chronic systolic congestive heart failure (CMS/HCC)    • Anemia    • Arthritis    • Asthma    • Atrial fibrillation (CMS/HCC)     With a history of an atrial clot   • Breast cancer (CMS/HCC)     Left, stage IIB   • Disease of thyroid gland     Hypothyroidism   • Irritable bowel    • Mitral regurgitation    • NICM (nonischemic cardiomyopathy) (CMS/HCC)    • On warfarin therapy    • Pneumothorax 6/14/2018   • Pulmonary hypertension (CMS/HCC)     Resolved per echocardiogram 1/2019   • Tricuspid regurgitation    • Ventricular tachycardia (CMS/HCC) 01/17/2019    Nonsustained       Past Surgical History:   Procedure Laterality Date   • CARDIAC ELECTROPHYSIOLOGY PROCEDURE N/A 6/12/2018    Procedure: Device Upgrade-Upgrade to CRT-P-BIV Pacamaker Medtronic Has existing single chamber Medtronic pacemaker;  Surgeon: Leonardo David MD;  Location: [facility] INVASIVE LOCATION;  Service: Cardiovascular   • CHOLECYSTECTOMY  2000   • EYE SURGERY  2005    cataracts, Dr. Miramontes   • MASTECTOMY Left 10/2014   • PACEMAKER IMPLANTATION  2012    Dr. Leija       Social History     Social History   • Marital status:      Spouse name: Rick   • Number of children: 8   • Years of education: N/A     Occupational History   •  Retired     Social History Main Topics   • Smoking status: Never Smoker   • Smokeless tobacco: Never Used      Comment: caffeine use-tea   • Alcohol use 0.6 oz/week     1 Glasses of wine per week      Comment: social   • Drug use: No   • Sexual activity: Not on file       Family History   Problem Relation Age of Onset   • Colon cancer Mother 75   • Colon cancer Sister 79   • Hypertension Sister    • Cholelithiasis Sister         2 sisters       Review of Systems   Constitution: Positive for malaise/fatigue. Negative for chills, diaphoresis, fever, night sweats, weight gain and weight loss.   HENT: Negative for hearing loss, nosebleeds, sore throat and tinnitus.    Eyes: Negative for blurred  vision, double vision, pain and visual disturbance.   Cardiovascular: Positive for dyspnea on exertion. Negative for chest pain, claudication, cyanosis, irregular heartbeat, leg swelling, near-syncope, orthopnea, palpitations, paroxysmal nocturnal dyspnea and syncope.   Respiratory: Positive for shortness of breath. Negative for cough, hemoptysis, snoring and wheezing.    Endocrine: Negative for cold intolerance, heat intolerance and polyuria.   Hematologic/Lymphatic: Negative for bleeding problem. Does not bruise/bleed easily.   Skin: Negative for color change, dry skin, flushing and itching.   Musculoskeletal: Positive for joint pain. Negative for falls, joint swelling, muscle cramps, muscle weakness and myalgias.   Gastrointestinal: Negative for abdominal pain, constipation, heartburn, melena, nausea and vomiting.   Genitourinary: Negative for dysuria and hematuria.   Neurological: Negative for dizziness, headaches, light-headedness, loss of balance, numbness, paresthesias, seizures and vertigo.   Psychiatric/Behavioral: Negative for altered mental status, depression, memory loss and substance abuse. The patient does not have insomnia and is not nervous/anxious.    Allergic/Immunologic: Negative for environmental allergies.       Allergies   Allergen Reactions   • Iodinated Diagnostic Agents Hives   • Amiodarone Nausea Only   • Codeine Nausea Only   • Dabigatran Etexilate Mesylate Nausea Only   • Digoxin Nausea Only         Current Outpatient Medications:   •  acetaminophen (TYLENOL) 325 MG tablet, Take 2 tablets by mouth Every 6 (Six) Hours As Needed for Mild Pain ., Disp: , Rfl:   •  anastrozole (ARIMIDEX) 1 MG tablet, Take 1 mg by mouth Daily., Disp: , Rfl:   •  anastrozole (ARIMIDEX) 1 MG tablet, TAKE ONE TABLET BY MOUTH DAILY, Disp: 30 tablet, Rfl: 1  •  carvedilol (COREG) 6.25 MG tablet, Take 1 tablet by mouth 2 (Two) Times a Day With Meals., Disp: 180 tablet, Rfl: 2  •  furosemide (LASIX) 40 MG tablet,  "Take 1 tablet by mouth Daily., Disp: 90 tablet, Rfl: 2  •  glucosamine-chondroitin 500-400 MG capsule capsule, Take 1 capsule by mouth Daily., Disp: , Rfl:   •  levothyroxine (SYNTHROID, LEVOTHROID) 88 MCG tablet, Take 88 mcg by mouth Daily., Disp: , Rfl:   •  nitroglycerin (NITROSTAT) 0.4 MG SL tablet, Place 1 tablet under the tongue Every 5 (Five) Minutes As Needed for Chest Pain for up to 2 doses. Take no more than 3 doses in 15 minutes., Disp: 5 tablet, Rfl: 0  •  vitamin B-12 (CYANOCOBALAMIN) 1000 MCG tablet, Take 1,000 mcg by mouth Daily., Disp: , Rfl:   •  warfarin (COUMADIN) 2.5 MG tablet, TAKE ONE TABLET BY MOUTH DAILY (Patient taking differently: Nightly, Whole Tablet, 2.5 mg, Tues, Thurs, Sat, Sun), Disp: 90 tablet, Rfl: 0  •  warfarin (COUMADIN) 2.5 MG tablet, Take 1.25 mg by mouth Every Night. Monday, Wednesday, Friday, Disp: , Rfl:       Objective:     Vitals:    01/17/19 1304   BP: 122/66   BP Location: Left arm   Pulse: 88   Weight: 70.1 kg (154 lb 9.6 oz)   Height: 167.6 cm (66\")     Body mass index is 24.95 kg/m².    PHYSICAL EXAM:    Vitals Reviewed.   General Appearance: No acute distress, well developed and well nourished.   Eyes: Conjunctiva and lids: No erythema, swelling, or discharge. Sclera non-icteric.  Glasses.  HENT: Atraumatic, normocephalic. External eyes, ears, and nose normal. No hearing loss noted. Mucous membranes normal. Lips not cyanotic. Neck supple with no tenderness.  Respiratory: No signs of respiratory distress. Respiration rhythm and depth normal.   Clear to auscultation. No rales, crackles, rhonchi, or wheezing auscultated.   Cardiovascular:  Jugular Venous Pressure: Normal  Heart Rate and Rhythm: Irregular, paced rhythm.  Heart Sounds: Normal S1 and S2. No S3 or S4 noted.  Murmurs: No murmurs noted. No rubs, thrills, or gallops.   Arterial Pulses: Carotid pulses normal. No carotid bruit noted. Posterior tibialis and dorsalis pedis pulses normal.   Lower Extremities: No " edema noted.  Gastrointestinal:  Abdomen soft, non-distended, non-tender. Normal bowel sounds. No hepatomegaly.   Musculoskeletal: Normal movement of extremities  Skin and Nails: General appearance normal. No pallor, cyanosis, diaphoresis. Skin temperature normal. No clubbing of fingernails.   Psychiatric: Patient alert and oriented to person, place, and time. Speech and behavior appropriate. Normal mood and affect.       ECG 12 Lead  Date/Time: 2019 1:08 PM  Performed by: Mercedes Phipps APRN  Authorized by: Mercedes Phipps APRN   Comparison: compared with previous ECG from 2019  Rhythm: atrial fibrillation and paced  Rate: normal  BPM: 88  Conduction: conduction normal  ST Segments: ST segments normal  T Waves: T waves normal  Pacin% capture  Clinical impression: abnormal ECG              Assessment:       Diagnosis Plan   1. Chronic systolic congestive heart failure (CMS/HCC)     2. Status post biventricular pacemaker     3. Permanent atrial fibrillation (CMS/HCC)     4. Essential hypertension     5. Pulmonary hypertension (CMS/HCC)     6. Non-rheumatic mitral regurgitation     7. Non-rheumatic tricuspid valve insufficiency     8. Ventricular tachycardia (CMS/HCC)     9. Right arm pain            Plan:       1.  Acute on Chronic Systolic Heart Failure: Ejection fraction noted to be 36% and her recent echocardiogram showed improvement with an ejection fraction of 63%.  She is well compensated today and we'll continue with furosemide.  I have increased her carvedilol to 6.25 mg twice a day and she'll call with any concerns with the increased dosage.    Heart Failure  Assessment  • NYHA class II - There is slight limitation of physical activity. The patient is comfortable at rest, but physical activity results in fatigue, palpitations or shortness of breath.  • Beta blocker prescribed  • Diuretics prescribed  • Left ventricular function is normal by qualitative assessment    Plan  • The patient  has received heart failure education on the following topics: dietary sodium restriction, medication instructions, symptom management, physical activity and weight monitoring    Subjective/Objective  • The patient reports dyspnea        2.  Permanent Atrial Fibrillation: Status post AV node ablation with Medtronic single chamber pacemaker January 2012 and recent upgrade to CRT device in June 2018.  She remains on chronic warfarin therapy with INRs checked here at [facility].    Atrial Fibrillation and Atrial Flutter  Assessment  • The patient has permanent atrial fibrillation  • The patient's CHADS2-VASc score is 5  • A [id number] score of 2 or more is considered a high risk for a thromboembolic event  • Warfarin prescribed    Plan  • Continue in atrial fibrillation with rate control  • Continue warfarin for antithrombotic therapy, bleeding issues discussed  • Continue beta blocker for rate control    Subjective - Objective  • The patient had atrial fibrillation ablation         3.  Hypertension: Blood pressure well-controlled today.    4.  Pulmonary Hypertension: Moderate per echocardiogram in the past and was recently noted to be normal per echocardiogram.    5.  Valvular Disease: Regular function has improved per echocardiogram with the following results: Mild to moderate mitral valve regurgitation and mild tricuspid valve regurgitation.    6.  Nonsustained Ventricular Tachycardia: She was noted recently per ICD check to have 3 brief runs of nonsustained ventricular tachycardia and she is asymptomatic.  I've recommended increasing her carvedilol to 6.25 mg twice daily and she'll call with any concerns.    7.  Right Arm Pain: I do not feel this is cardiac related and I've recommended follow-up with her PCP.    8.  I discussed her recommended to follow-up with Dr. Leija in 6 months.  She has an appointment scheduled in 6 months with Dr. Leonardo David's I think that we need to extend this out.  However,  after further discussion she prefers to see Dr. Leija in May 2019.    As always, it has been a pleasure to participate in your patient's care.      Sincerely,         KIKA Castelan

## 2019-01-25 ENCOUNTER — ANTICOAGULATION VISIT (OUTPATIENT)
Dept: PHARMACY | Facility: HOSPITAL | Age: 84
End: 2019-01-25

## 2019-01-25 LAB
INR PPP: 2.2 (ref 0.91–1.09)
PROTHROMBIN TIME: 26.5 SECONDS (ref 10–13.8)

## 2019-01-25 PROCEDURE — 36416 COLLJ CAPILLARY BLOOD SPEC: CPT

## 2019-01-25 PROCEDURE — 85610 PROTHROMBIN TIME: CPT

## 2019-01-25 RX ORDER — WARFARIN SODIUM 2.5 MG/1
TABLET ORAL
Qty: 90 TABLET | Refills: 0 | Status: SHIPPED | OUTPATIENT
Start: 2019-01-25 | End: 2019-03-06

## 2019-01-25 NOTE — PROGRESS NOTES
Anticoagulation Clinic Progress Note    Anticoagulation Summary  As of 2019    INR goal:   2.0-3.0   TTR:   88.2 % (3.7 mo)   INR used for dosin.2 (2019)   Warfarin maintenance plan:   1.25 mg on Mon, Wed, Fri; 2.5 mg all other days   Weekly warfarin total:   13.75 mg   No change documented:   Ginny Mota   Plan last modified:   Nikole Tiwari [facility] (2018)   Next INR check:   2019   Priority:   Maintenance   Target end date:   Indefinite    Indications    Long-term (current) use of anticoagulants (Resolved) [Z79.01]             Anticoagulation Episode Summary     INR check location:       Preferred lab:       Send INR reminders to:    [facility]    Comments:         Anticoagulation Care Providers     Provider Role Specialty Phone number    Rafaela Leija MD Referring Cardiology [phone]          Clinic Interview:  Patient Findings     Negatives:   Signs/symptoms of thrombosis, Signs/symptoms of bleeding,   Laboratory test error suspected, Change in health, Change in alcohol use,   Change in activity, Upcoming invasive procedure, Emergency department   visit, Upcoming dental procedure, Missed doses, Extra doses, Change in   medications, Change in diet/appetite, Hospital admission, Bruising, Other   complaints      Clinical Outcomes     Negatives:   Major bleeding event, Thromboembolic event,   Anticoagulation-related hospital admission, Anticoagulation-related ED   visit, Anticoagulation-related fatality        INR History:  Anticoagulation Monitoring 2019   INR 2.3 3.2 2.2   INR Date 2019   INR Goal 2.0-3.0 2.0-3.0 2.0-3.0   Trend Same Same Same   Last Week Total 20 mg 13.75 mg 13.75 mg   Next Week Total 13.75 mg 13.75 mg 13.75 mg   Sun 2.5 mg 2.5 mg 2.5 mg   Mon 1.25 mg 1.25 mg 1.25 mg   Tue 2.5 mg 2.5 mg 2.5 mg   Wed 1.25 mg 1.25 mg 1.25 mg   Thu 2.5 mg 2.5 mg 2.5 mg   Fri 1.25 mg 1.25 mg 1.25 mg    Sat 2.5 mg 2.5 mg 2.5 mg   Visit Report - - -   Some recent data might be hidden       Plan:  1. INR is therapeutic today- see above in Anticoagulation Summary.   Will instruct Diana Avalos to continue their warfarin regimen- see above in Anticoagulation Summary.  2. Follow up in 4 weeks.  3. Patient declines warfarin refills.  4. Verbal and written information provided. Patient expresses understanding and has no further questions at this time.    Ginny Mota

## 2019-01-28 ENCOUNTER — TELEPHONE (OUTPATIENT)
Dept: ONCOLOGY | Facility: HOSPITAL | Age: 84
End: 2019-01-28

## 2019-01-28 ENCOUNTER — DOCUMENTATION (OUTPATIENT)
Dept: ONCOLOGY | Facility: CLINIC | Age: 84
End: 2019-01-28

## 2019-01-28 NOTE — PROGRESS NOTES
Since she has had breast cancer, even at her advanced age I think she should have the mammogram.  However, if she very much wants to wait to discuss this with me at the office visit, that is fine.  ===View-only below this line===    ----- Message -----  From: Rhonda Torrez RN  Sent: 1/28/2019  12:22 PM  To: Sherman Belcher II, MD    Pt is due to see you 2-18.  She would be due for a yearly mammo in Jan but is electing not to have it done until she sees you.  She does not think it is necessary.  Thanks.

## 2019-01-28 NOTE — TELEPHONE ENCOUNTER
Pt does not think it is necessary to have a mammo done.  Will see Dr. Belcher on Feb 18th. Message sent to Dr. Belcher with info.     ----- Message from Mavis Betancur sent at 1/28/2019 11:56 AM EST -----  [phone] daughter, Magui  Does she really need to have a mammogram.

## 2019-02-05 ENCOUNTER — TELEPHONE (OUTPATIENT)
Dept: CARDIOLOGY | Facility: CLINIC | Age: 84
End: 2019-02-05

## 2019-02-05 NOTE — TELEPHONE ENCOUNTER
Pt's daughter Magui called and states that pt is having a teeth extraction on 2/8/19 with Dr Meza. If its ok for pt to hold he warfarin 3 days prior to her procedure.....Please advise      Thanks  Candice KENNEDY

## 2019-02-18 ENCOUNTER — APPOINTMENT (OUTPATIENT)
Dept: LAB | Facility: HOSPITAL | Age: 84
End: 2019-02-18

## 2019-02-18 ENCOUNTER — APPOINTMENT (OUTPATIENT)
Dept: ONCOLOGY | Facility: CLINIC | Age: 84
End: 2019-02-18

## 2019-02-25 ENCOUNTER — ANTICOAGULATION VISIT (OUTPATIENT)
Dept: PHARMACY | Facility: HOSPITAL | Age: 84
End: 2019-02-25

## 2019-02-25 LAB
INR PPP: 1.7 (ref 0.91–1.09)
PROTHROMBIN TIME: 20.8 SECONDS (ref 10–13.8)

## 2019-02-25 PROCEDURE — 85610 PROTHROMBIN TIME: CPT

## 2019-02-25 PROCEDURE — 36416 COLLJ CAPILLARY BLOOD SPEC: CPT

## 2019-02-26 NOTE — PROGRESS NOTES
Anticoagulation Clinic Progress Note    Anticoagulation Summary  As of 2019    INR goal:   2.0-3.0   TTR:   77.7 % (4.7 mo)   INR used for dosin.7! (2019)   Warfarin maintenance plan:   1.25 mg on Mon, Wed, Fri; 2.5 mg all other days   Weekly warfarin total:   13.75 mg   Plan last modified:   Nikole Tiwari, [facility] (2018)   Next INR check:   3/13/2019   Priority:   Maintenance   Target end date:   Indefinite    Indications    Long-term (current) use of anticoagulants (Resolved) [Z79.01]             Anticoagulation Episode Summary     INR check location:       Preferred lab:       [facility]    Comments:         Anticoagulation Care Providers     Provider Role Specialty Phone number    Rafaela Leija MD Referring Cardiology [phone]          Clinic Interview:      INR History:  Anticoagulation Monitoring 2019   INR 3.2 2.2 1.7   INR Date 2019   INR Goal 2.0-3.0 2.0-3.0 2.0-3.0   Trend Same Same Same   Last Week Total 13.75 mg 13.75 mg 13.75 mg   Next Week Total 13.75 mg 13.75 mg 17.5 mg   Sun 2.5 mg 2.5 mg 2.5 mg   Mon 1.25 mg 1.25 mg 5 mg (); Otherwise 1.25 mg   Tue 2.5 mg 2.5 mg 2.5 mg   Wed 1.25 mg 1.25 mg 1.25 mg   Thu 2.5 mg 2.5 mg 2.5 mg   Fri 1.25 mg 1.25 mg 1.25 mg   Sat 2.5 mg 2.5 mg 2.5 mg   Visit Report - - -   Some recent data might be hidden       Plan:  1. INR is out of range today- see above in Anticoagulation Summary.   Will instruct Diana Avalos to increase their warfarin regimen- see above in Anticoagulation Summary.  2. Follow up in 2 weeks.  3. Patient declines warfarin refills.  4. Verbal and written information provided. Patient expresses understanding and has no further questions at this time.    Debi Tyler

## 2019-03-05 NOTE — PROGRESS NOTES
Subjective .     REASONS FOR FOLLOWUP:  Breast cancer    HISTORY OF PRESENT ILLNESS:  The patient is a 86 y.o. year old female  who is here for follow-up with the above-mentioned history.    Denies hot flashes, nausea, pain, weight loss.  Past Medical History:   Diagnosis Date   • Acute on chronic systolic congestive heart failure (CMS/HCC)    • Anemia    • Arthritis    • Asthma    • Atrial fibrillation (CMS/HCC)     With a history of an atrial clot   • Breast cancer (CMS/HCC)     Left, stage IIB   • Disease of thyroid gland     Hypothyroidism   • Irritable bowel    • Mitral regurgitation    • NICM (nonischemic cardiomyopathy) (CMS/HCC)    • On warfarin therapy    • Pneumothorax 6/14/2018   • Pulmonary hypertension (CMS/HCC)     Resolved per echocardiogram 1/2019   • Tricuspid regurgitation    • Ventricular tachycardia (CMS/HCC) 01/17/2019    Nonsustained       HEMATOLOGIC/ONCOLOGIC HISTORY:  (History from previous dates can be found in the separate document.)    MEDICATIONS    Current Outpatient Medications:   •  acetaminophen (TYLENOL) 325 MG tablet, Take 2 tablets by mouth Every 6 (Six) Hours As Needed for Mild Pain ., Disp: , Rfl:   •  anastrozole (ARIMIDEX) 1 MG tablet, TAKE ONE TABLET BY MOUTH DAILY, Disp: 30 tablet, Rfl: 1  •  carvedilol (COREG) 6.25 MG tablet, Take 1 tablet by mouth 2 (Two) Times a Day With Meals., Disp: 180 tablet, Rfl: 2  •  furosemide (LASIX) 40 MG tablet, Take 1 tablet by mouth Daily., Disp: 90 tablet, Rfl: 2  •  glucosamine-chondroitin 500-400 MG capsule capsule, Take 1 capsule by mouth Daily., Disp: , Rfl:   •  levothyroxine (SYNTHROID, LEVOTHROID) 88 MCG tablet, Take 88 mcg by mouth Daily., Disp: , Rfl:   •  nitroglycerin (NITROSTAT) 0.4 MG SL tablet, Place 1 tablet under the tongue Every 5 (Five) Minutes As Needed for Chest Pain for up to 2 doses. Take no more than 3 doses in 15 minutes. (Patient taking differently: Place 0.4 mg under the tongue As Needed for Chest Pain. Take no  more than 3 doses in 15 minutes.), Disp: 5 tablet, Rfl: 0  •  vitamin B-12 (CYANOCOBALAMIN) 1000 MCG tablet, Take 1,000 mcg by mouth Daily., Disp: , Rfl:   •  warfarin (COUMADIN) 2.5 MG tablet, TAKE ONE TABLET BY MOUTH DAILY (Patient taking differently: Nightly, Whole Tablet, 2.5 mg, Tues, Thurs, Sat, Sun), Disp: 90 tablet, Rfl: 0  •  warfarin (COUMADIN) 2.5 MG tablet, Take 1.25 mg by mouth Every Night. Monday, Wednesday, Friday, Disp: , Rfl:     ALLERGIES:     Allergies   Allergen Reactions   • Iodinated Diagnostic Agents Hives   • Amiodarone Nausea Only   • Codeine Nausea Only   • Dabigatran Etexilate Mesylate Nausea Only   • Digoxin Nausea Only       SOCIAL HISTORY:       Social History     Socioeconomic History   • Marital status:      Spouse name: Rick   • Number of children: 8   • Years of education: Not on file   • Highest education level: Not on file   Social Needs   • Financial resource strain: Not on file   • Food insecurity - worry: Not on file   • Food insecurity - inability: Not on file   • Transportation needs - medical: Not on file   • Transportation needs - non-medical: Not on file   Occupational History     Employer: RETIRED   Tobacco Use   • Smoking status: Never Smoker   • Smokeless tobacco: Never Used   • Tobacco comment: caffeine use-tea   Substance and Sexual Activity   • Alcohol use: Yes     Alcohol/week: 0.6 oz     Types: 1 Glasses of wine per week     Comment: social   • Drug use: No   • Sexual activity: Defer   Other Topics Concern   • Not on file   Social History Narrative   • Not on file         FAMILY HISTORY:  Family History   Problem Relation Age of Onset   • Colon cancer Mother 75   • Colon cancer Sister 79   • Hypertension Sister    • Cholelithiasis Sister         2 sisters       REVIEW OF SYSTEMS:  Review of Systems   Constitutional: Negative for activity change.   HENT: Negative for nosebleeds and trouble swallowing.    Respiratory: Negative for shortness of breath and  "wheezing.    Cardiovascular: Negative for chest pain and palpitations.   Gastrointestinal: Negative for constipation, diarrhea and nausea.   Genitourinary: Negative for dysuria and hematuria.   Musculoskeletal: Negative for arthralgias and myalgias.   Skin: Negative for rash and wound.   Neurological: Negative for seizures and syncope.   Hematological: Negative for adenopathy. Does not bruise/bleed easily.   Psychiatric/Behavioral: Negative for confusion.         Objective    Vitals:    03/06/19 1546   BP: 133/75   Pulse: 86   Resp: 12   Temp: 97.6 °F (36.4 °C)   TempSrc: Oral   SpO2: 96%   Weight: 70.3 kg (155 lb)   Height: 167 cm (65.75\")  Comment: new ht   PainSc: 0-No pain     Current Status 3/6/2019   ECOG score 0      PHYSICAL EXAM:    CONSTITUTIONAL:  Vital signs reviewed.  No distress, looks comfortable.  EYES:  Conjunctiva and lids unremarkable.  PERRLA  EARS,NOSE,MOUTH,THROAT:  Ears and nose appear unremarkable.  Lips, teeth, gums appear unremarkable.  RESPIRATORY:  Normal respiratory effort.  Lungs clear to auscultation bilaterally.  BREAST: no masses by palpation or inspection   CARDIOVASCULAR:  Normal S1, S2.  No murmurs rubs or gallops.  No significant lower extremity edema.  GASTROINTESTINAL: Abdomen appears unremarkable.  Nontender.  No hepatomegaly.  No splenomegaly.  LYMPHATIC:  No cervical, supraclavicular, axillary lymphadenopathy.  SKIN:  Warm.  No rashes.  PSYCHIATRIC:  Normal judgment and insight.  Normal mood and affect.    RECENT LABS:        WBC   Date/Time Value Ref Range Status   03/06/2019 03:30 PM 5.43 3.40 - 10.80 10*3/mm3 Final     Hemoglobin   Date/Time Value Ref Range Status   03/06/2019 03:30 PM 13.2 12.0 - 15.9 g/dL Final     Platelets   Date/Time Value Ref Range Status   03/06/2019 03:30  140 - 450 10*3/mm3 Final       Assessment/Plan     ASSESSMENT:  Problem List Items Addressed This Visit        Unprioritized    Malignant neoplasm of central portion of left breast in " female, estrogen receptor positive (CMS/HCC) - Primary    Relevant Orders    CBC & Differential    Comprehensive Metabolic Panel         * [id number] (stage IIB) left breast cancer, 5.1 cm, grade 2.  Left mastectomy by Dr. Clifton.  Tamoxifen 10/31/14-11/3/15.  Stopped due to altered taste with no desire to eat.  Arimidex started 1/1/16.    Arimidex through a planned 10/31/19 (did not tolerate tamoxifen due to altered taste with lack of desire to eat.  Has significant osteoporosis)   Remission continues.    *Atrial fibrillation with a history of an atrial clot for which she is on Coumadin through other MDs    *Osteoporosis. Dr. Tinsley, her primary care physician, recommended Reclast annually 5 mg IV.  We administer.  She is on calcium and vitamin D.  Last DEXA 11/19/18: Osteopenia.  Worst T score -2.3, lumbar spine.  Overall, not much change from 11/9/16.    *Last visit with me was 5/22/18.  She was supposed to return in 6 months but did not.  Last Reclast was 5/22/18.    *Macrocytosis.  Mild.  Monitor.  Hb normal.    PLAN:   · MD CBC CMP around 10/31/19 (at which time we will stop hormonal therapy as she will have completed 5 years)  · Annual Reclast sometime after 5/22/19.  This will be the last planned re-class through our office as she will stop her Arimidex around 10/31/19.  No MD needed on day of Reclast.  · Last mammogram, 1/4/18, BI-RADS 1.  She is overdue for mammogram.  We have ordered another mammogram today.  · Last DEXA, 11/19/18     Daughter assisted with history.

## 2019-03-06 ENCOUNTER — OFFICE VISIT (OUTPATIENT)
Dept: ONCOLOGY | Facility: CLINIC | Age: 84
End: 2019-03-06

## 2019-03-06 ENCOUNTER — LAB (OUTPATIENT)
Dept: LAB | Facility: HOSPITAL | Age: 84
End: 2019-03-06

## 2019-03-06 VITALS
HEIGHT: 66 IN | TEMPERATURE: 97.6 F | WEIGHT: 155 LBS | OXYGEN SATURATION: 96 % | BODY MASS INDEX: 24.91 KG/M2 | SYSTOLIC BLOOD PRESSURE: 133 MMHG | DIASTOLIC BLOOD PRESSURE: 75 MMHG | RESPIRATION RATE: 12 BRPM | HEART RATE: 86 BPM

## 2019-03-06 DIAGNOSIS — C50.112 MALIGNANT NEOPLASM OF CENTRAL PORTION OF LEFT BREAST IN FEMALE, ESTROGEN RECEPTOR POSITIVE (HCC): ICD-10-CM

## 2019-03-06 DIAGNOSIS — Z17.0 MALIGNANT NEOPLASM OF CENTRAL PORTION OF LEFT BREAST IN FEMALE, ESTROGEN RECEPTOR POSITIVE (HCC): ICD-10-CM

## 2019-03-06 DIAGNOSIS — M81.0 OSTEOPOROSIS, UNSPECIFIED OSTEOPOROSIS TYPE, UNSPECIFIED PATHOLOGICAL FRACTURE PRESENCE: ICD-10-CM

## 2019-03-06 DIAGNOSIS — C50.112 MALIGNANT NEOPLASM OF CENTRAL PORTION OF LEFT BREAST IN FEMALE, ESTROGEN RECEPTOR POSITIVE (HCC): Primary | ICD-10-CM

## 2019-03-06 DIAGNOSIS — Z17.0 MALIGNANT NEOPLASM OF CENTRAL PORTION OF LEFT BREAST IN FEMALE, ESTROGEN RECEPTOR POSITIVE (HCC): Primary | ICD-10-CM

## 2019-03-06 DIAGNOSIS — Z12.31 SCREENING MAMMOGRAM, ENCOUNTER FOR: Primary | ICD-10-CM

## 2019-03-06 LAB
ALBUMIN SERPL-MCNC: 3.6 G/DL (ref 3.5–5.2)
ALBUMIN/GLOB SERPL: 1.1 G/DL (ref 1.1–2.4)
ALP SERPL-CCNC: 84 U/L (ref 38–116)
ALT SERPL W P-5'-P-CCNC: 8 U/L (ref 0–33)
ANION GAP SERPL CALCULATED.3IONS-SCNC: 8.7 MMOL/L
AST SERPL-CCNC: 21 U/L (ref 0–32)
BASOPHILS # BLD AUTO: 0.05 10*3/MM3 (ref 0–0.2)
BASOPHILS NFR BLD AUTO: 0.9 % (ref 0–1.5)
BILIRUB SERPL-MCNC: 0.4 MG/DL (ref 0.1–1.2)
BUN BLD-MCNC: 14 MG/DL (ref 6–20)
BUN/CREAT SERPL: 14.6 (ref 7.3–30)
CALCIUM SPEC-SCNC: 9.2 MG/DL (ref 8.5–10.2)
CHLORIDE SERPL-SCNC: 104 MMOL/L (ref 98–107)
CO2 SERPL-SCNC: 31.3 MMOL/L (ref 22–29)
CREAT BLD-MCNC: 0.96 MG/DL (ref 0.6–1.1)
DEPRECATED RDW RBC AUTO: 46.3 FL (ref 37–54)
EOSINOPHIL # BLD AUTO: 0.15 10*3/MM3 (ref 0–0.4)
EOSINOPHIL NFR BLD AUTO: 2.8 % (ref 0.3–6.2)
ERYTHROCYTE [DISTWIDTH] IN BLOOD BY AUTOMATED COUNT: 13 % (ref 12.3–15.4)
GFR SERPL CREATININE-BSD FRML MDRD: 55 ML/MIN/1.73
GLOBULIN UR ELPH-MCNC: 3.2 GM/DL (ref 1.8–3.5)
GLUCOSE BLD-MCNC: 112 MG/DL (ref 74–124)
HCT VFR BLD AUTO: 40.5 % (ref 34–46.6)
HGB BLD-MCNC: 13.2 G/DL (ref 12–15.9)
IMM GRANULOCYTES # BLD AUTO: 0.03 10*3/MM3 (ref 0–0.05)
IMM GRANULOCYTES NFR BLD AUTO: 0.6 % (ref 0–0.5)
LYMPHOCYTES # BLD AUTO: 1.62 10*3/MM3 (ref 0.7–3.1)
LYMPHOCYTES NFR BLD AUTO: 29.8 % (ref 19.6–45.3)
MCH RBC QN AUTO: 32 PG (ref 26.6–33)
MCHC RBC AUTO-ENTMCNC: 32.6 G/DL (ref 31.5–35.7)
MCV RBC AUTO: 98.3 FL (ref 79–97)
MONOCYTES # BLD AUTO: 0.73 10*3/MM3 (ref 0.1–0.9)
MONOCYTES NFR BLD AUTO: 13.4 % (ref 5–12)
NEUTROPHILS # BLD AUTO: 2.85 10*3/MM3 (ref 1.4–7)
NEUTROPHILS NFR BLD AUTO: 52.5 % (ref 42.7–76)
NRBC BLD AUTO-RTO: 0 /100 WBC (ref 0–0)
PLATELET # BLD AUTO: 249 10*3/MM3 (ref 140–450)
PMV BLD AUTO: 9.5 FL (ref 6–12)
POTASSIUM BLD-SCNC: 4.5 MMOL/L (ref 3.5–4.7)
PROT SERPL-MCNC: 6.8 G/DL (ref 6.3–8)
RBC # BLD AUTO: 4.12 10*6/MM3 (ref 3.77–5.28)
SODIUM BLD-SCNC: 144 MMOL/L (ref 134–145)
WBC NRBC COR # BLD: 5.43 10*3/MM3 (ref 3.4–10.8)

## 2019-03-06 PROCEDURE — 80053 COMPREHEN METABOLIC PANEL: CPT | Performed by: INTERNAL MEDICINE

## 2019-03-06 PROCEDURE — 99214 OFFICE O/P EST MOD 30 MIN: CPT | Performed by: INTERNAL MEDICINE

## 2019-03-06 PROCEDURE — 36415 COLL VENOUS BLD VENIPUNCTURE: CPT | Performed by: INTERNAL MEDICINE

## 2019-03-06 PROCEDURE — 85025 COMPLETE CBC W/AUTO DIFF WBC: CPT | Performed by: INTERNAL MEDICINE

## 2019-03-13 ENCOUNTER — ANTICOAGULATION VISIT (OUTPATIENT)
Dept: PHARMACY | Facility: HOSPITAL | Age: 84
End: 2019-03-13

## 2019-03-13 LAB
INR PPP: 2.2 (ref 0.91–1.09)
PROTHROMBIN TIME: 25.9 SECONDS (ref 10–13.8)

## 2019-03-13 PROCEDURE — 36416 COLLJ CAPILLARY BLOOD SPEC: CPT

## 2019-03-13 PROCEDURE — 85610 PROTHROMBIN TIME: CPT

## 2019-03-13 NOTE — PROGRESS NOTES
Anticoagulation Clinic Progress Note    Anticoagulation Summary  As of 3/13/2019    INR goal:   2.0-3.0   TTR:   73.8 % (5.2 mo)   INR used for dosin.2 (3/13/2019)   Warfarin maintenance plan:   1.25 mg on Mon, Wed, Fri; 2.5 mg all other days   Weekly warfarin total:   13.75 mg   No change documented:   Tonya Sagastume   Plan last modified:   Nikole Tiwari [facility] (2018)   Next INR check:   4/10/2019   Priority:   Maintenance   Target end date:   Indefinite    Indications    Long-term (current) use of anticoagulants (Resolved) [Z79.01]             Anticoagulation Episode Summary     INR check location:       Preferred lab:       Send INR reminders to:    [facility]    Comments:         Anticoagulation Care Providers     Provider Role Specialty Phone number    Rafaela Leija MD Referring Cardiology [phone]          Clinic Interview:  Patient Findings     Negatives:   Signs/symptoms of thrombosis, Signs/symptoms of bleeding,   Laboratory test error suspected, Change in health, Change in alcohol use,   Change in activity, Upcoming invasive procedure, Emergency department   visit, Upcoming dental procedure, Missed doses, Extra doses, Change in   medications, Change in diet/appetite, Hospital admission, Bruising, Other   complaints      Clinical Outcomes     Negatives:   Major bleeding event, Thromboembolic event,   Anticoagulation-related hospital admission, Anticoagulation-related ED   visit, Anticoagulation-related fatality        INR History:  Anticoagulation Monitoring 2019 2019 3/13/2019   INR 2.2 1.7 2.2   INR Date 2019 2019 3/13/2019   INR Goal 2.0-3.0 2.0-3.0 2.0-3.0   Trend Same Same Same   Last Week Total 13.75 mg 13.75 mg 13.75 mg   Next Week Total 13.75 mg 17.5 mg 13.75 mg   Sun 2.5 mg 2.5 mg 2.5 mg   Mon 1.25 mg 5 mg (); Otherwise 1.25 mg 1.25 mg   Tue 2.5 mg 2.5 mg 2.5 mg   Wed 1.25 mg 1.25 mg 1.25 mg   Thu 2.5 mg 2.5 mg 2.5 mg   Fri 1.25 mg  1.25 mg 1.25 mg   Sat 2.5 mg 2.5 mg 2.5 mg   Visit Report - - -   Some recent data might be hidden       Plan:  1. INR is therapeutic today- see above in Anticoagulation Summary.   Will instruct Diana Avalos to continue their warfarin regimen- see above in Anticoagulation Summary.  2. Follow up in 3 weeks.  3. Patient declines warfarin refills.  4. Verbal and written information provided. Patient expresses understanding and has no further questions at this time.    Tonya Sagastume

## 2019-03-25 RX ORDER — ANASTROZOLE 1 MG/1
TABLET ORAL
Qty: 30 TABLET | Refills: 7 | Status: SHIPPED | OUTPATIENT
Start: 2019-03-25 | End: 2021-08-04 | Stop reason: ALTCHOICE

## 2019-04-08 ENCOUNTER — CLINICAL SUPPORT NO REQUIREMENTS (OUTPATIENT)
Dept: CARDIOLOGY | Facility: CLINIC | Age: 84
End: 2019-04-08

## 2019-04-08 ENCOUNTER — TELEPHONE (OUTPATIENT)
Dept: CARDIOLOGY | Facility: CLINIC | Age: 84
End: 2019-04-08

## 2019-04-08 DIAGNOSIS — I48.20 CHRONIC ATRIAL FIBRILLATION (HCC): Primary | ICD-10-CM

## 2019-04-08 PROCEDURE — 93294 REM INTERROG EVL PM/LDLS PM: CPT | Performed by: INTERNAL MEDICINE

## 2019-04-08 PROCEDURE — 93296 REM INTERROG EVL PM/IDS: CPT | Performed by: INTERNAL MEDICINE

## 2019-04-09 ENCOUNTER — HOSPITAL ENCOUNTER (OUTPATIENT)
Dept: MAMMOGRAPHY | Facility: HOSPITAL | Age: 84
Discharge: HOME | End: 2019-04-09
Admitting: INTERNAL MEDICINE

## 2019-04-09 DIAGNOSIS — Z12.31 SCREENING MAMMOGRAM, ENCOUNTER FOR: ICD-10-CM

## 2019-04-09 PROCEDURE — 77067 SCR MAMMO BI INCL CAD: CPT

## 2019-04-10 ENCOUNTER — ANTICOAGULATION VISIT (OUTPATIENT)
Dept: PHARMACY | Facility: HOSPITAL | Age: 84
End: 2019-04-10

## 2019-04-10 LAB
INR PPP: 2.5 (ref 0.91–1.09)
PROTHROMBIN TIME: 30.2 SECONDS (ref 10–13.8)

## 2019-04-10 PROCEDURE — 36416 COLLJ CAPILLARY BLOOD SPEC: CPT

## 2019-04-10 PROCEDURE — 85610 PROTHROMBIN TIME: CPT

## 2019-04-10 NOTE — PROGRESS NOTES
Anticoagulation Clinic Progress Note    Anticoagulation Summary  As of 4/10/2019    INR goal:   2.0-3.0   TTR:   77.8 % (6.2 mo)   INR used for dosin.5 (4/10/2019)   Warfarin maintenance plan:   1.25 mg every Mon, Wed, Fri; 2.5 mg all other days   Weekly warfarin total:   13.75 mg   No change documented:   Ginny Mota   Plan last modified:   Nikole Tiwari [facility] (2018)   Next INR check:   2019   Priority:   Maintenance   Target end date:   Indefinite    Indications    Long-term (current) use of anticoagulants (Resolved) [Z79.01]             Anticoagulation Episode Summary     INR check location:       Preferred lab:       Send INR reminders to:    [facility]    Comments:         Anticoagulation Care Providers     Provider Role Specialty Phone number    Rafaela Leija MD Referring Cardiology [phone]          Clinic Interview:  Patient Findings     Negatives:   Signs/symptoms of thrombosis, Signs/symptoms of bleeding,   Laboratory test error suspected, Change in health, Change in alcohol use,   Change in activity, Upcoming invasive procedure, Emergency department   visit, Upcoming dental procedure, Missed doses, Extra doses, Change in   medications, Change in diet/appetite, Hospital admission, Bruising, Other   complaints      Clinical Outcomes     Negatives:   Major bleeding event, Thromboembolic event,   Anticoagulation-related hospital admission, Anticoagulation-related ED   visit, Anticoagulation-related fatality        INR History:  Anticoagulation Monitoring 2019 3/13/2019 4/10/2019   INR 1.7 2.2 2.5   INR Date 2019 3/13/2019 4/10/2019   INR Goal 2.0-3.0 2.0-3.0 2.0-3.0   Trend Same Same Same   Last Week Total 13.75 mg 13.75 mg 13.75 mg   Next Week Total 17.5 mg 13.75 mg 13.75 mg   Sun 2.5 mg 2.5 mg 2.5 mg   Mon 5 mg (); Otherwise 1.25 mg 1.25 mg 1.25 mg   Tue 2.5 mg 2.5 mg 2.5 mg   Wed 1.25 mg 1.25 mg 1.25 mg   Thu 2.5 mg 2.5 mg 2.5 mg   Fri  1.25 mg 1.25 mg 1.25 mg   Sat 2.5 mg 2.5 mg 2.5 mg   Visit Report - - -   Some recent data might be hidden       Plan:  1. INR is therapeutic today- see above in Anticoagulation Summary.   Will instruct Diana Avalos to continue their warfarin regimen- see above in Anticoagulation Summary.  2. Follow up in 4 weeks.  3. Patient declines warfarin refills.  4. Verbal and written information provided. Patient expresses understanding and has no further questions at this time.    Ginny Mota

## 2019-04-11 ENCOUNTER — APPOINTMENT (OUTPATIENT)
Dept: CT IMAGING | Facility: HOSPITAL | Age: 84
End: 2019-04-11

## 2019-04-11 ENCOUNTER — HOSPITAL ENCOUNTER (EMERGENCY)
Facility: HOSPITAL | Age: 84
Discharge: HOME | End: 2019-04-11
Attending: EMERGENCY MEDICINE | Admitting: EMERGENCY MEDICINE

## 2019-04-11 ENCOUNTER — APPOINTMENT (OUTPATIENT)
Dept: GENERAL RADIOLOGY | Facility: HOSPITAL | Age: 84
End: 2019-04-11

## 2019-04-11 VITALS
HEIGHT: 65 IN | TEMPERATURE: 97.7 F | OXYGEN SATURATION: 97 % | HEART RATE: 70 BPM | SYSTOLIC BLOOD PRESSURE: 166 MMHG | BODY MASS INDEX: 25.79 KG/M2 | DIASTOLIC BLOOD PRESSURE: 82 MMHG | WEIGHT: 154.8 LBS | RESPIRATION RATE: 18 BRPM

## 2019-04-11 DIAGNOSIS — S22.41XA CLOSED FRACTURE OF MULTIPLE RIBS OF RIGHT SIDE, INITIAL ENCOUNTER: Primary | ICD-10-CM

## 2019-04-11 PROCEDURE — 93010 ELECTROCARDIOGRAM REPORT: CPT | Performed by: INTERNAL MEDICINE

## 2019-04-11 PROCEDURE — 93005 ELECTROCARDIOGRAM TRACING: CPT

## 2019-04-11 PROCEDURE — 73630 X-RAY EXAM OF FOOT: CPT

## 2019-04-11 PROCEDURE — 93005 ELECTROCARDIOGRAM TRACING: CPT | Performed by: EMERGENCY MEDICINE

## 2019-04-11 PROCEDURE — 71250 CT THORAX DX C-: CPT

## 2019-04-11 PROCEDURE — 99283 EMERGENCY DEPT VISIT LOW MDM: CPT

## 2019-04-11 RX ORDER — HYDROCODONE BITARTRATE AND ACETAMINOPHEN 5; 325 MG/1; MG/1
.5-1 TABLET ORAL EVERY 6 HOURS PRN
Qty: 8 TABLET | Refills: 0 | Status: SHIPPED | OUTPATIENT
Start: 2019-04-11 | End: 2019-11-22

## 2019-04-11 RX ORDER — ACETAMINOPHEN 500 MG
1000 TABLET ORAL ONCE
Status: COMPLETED | OUTPATIENT
Start: 2019-04-11 | End: 2019-04-11

## 2019-04-11 RX ADMIN — ACETAMINOPHEN 1000 MG: 500 TABLET, FILM COATED ORAL at 20:21

## 2019-04-11 NOTE — ED TRIAGE NOTES
Patient was on the porch planting flowers, fell, c/o right rib pain, patient also c/o indigestion and SOA since fall. Denies head injury

## 2019-04-11 NOTE — ED PROVIDER NOTES
EMERGENCY DEPARTMENT ENCOUNTER    Room Number:  10/10  Date seen:  4/11/2019  Time seen: 7:54 PM  PCP: Javi Brand MD  Historian: patient and family      HPI:  Chief Complaint: rib pain  Context: Diana Avalos is a 86 y.o. female who presents to the ED c/o right sided rib pain that began after a fall earlier today. Family states pt fell around 1300 while planting flowers on her porch. Pt also complains of right foot pain and chest wall tenderness after the fall. Pt states she was unable to up after the wall on her own. Family states they helped pt up and that she was able to ambulate. Pt denies head injury, hip pain, HA, N/V, and neck pain. Pt is on Warfarin. Pt has not had medication for pain. Pt has hx of breast CA. [place] [facility] [place] to [place].     Pain Location: right ribs  Radiation: none  Quality: pain  Intensity/Severity: moderate  Duration: 7 hours  Onset quality: gradual  Timing: constant  Progression: unchand  Previous Episodes: none  Treatment before arrival: [place] [facility] [place] to [place].  Associated Symptoms: right foot pain, chest wall tenderness          PAST MEDICAL HISTORY  Active Ambulatory Problems     Diagnosis Date Noted   • Malignant neoplasm of central portion of left female breast (CMS/HCC) 04/25/2016   • Osteoporosis 04/27/2016   • Permanent atrial fibrillation (CMS/HCC) 10/02/2013   • Status post biventricular pacemaker 10/02/2013   • Mitral valve insufficiency 11/15/2016   • Pulmonary hypertension (CMS/HCC) 11/15/2016   • Osteopenia of multiple sites 04/30/2018   • Malignant neoplasm of central portion of left breast in female, estrogen receptor positive (CMS/HCC) 04/30/2018   • Hypothyroidism 10/02/2013   • Pulmonary nodule 04/03/2017   • Tricuspid regurgitation 06/08/2018   • Chronic systolic congestive heart failure (CMS/HCC) 06/13/2018   • Ventricular tachycardia (CMS/HCC) 01/17/2019     Resolved Ambulatory Problems     Diagnosis Date Noted   •  Cardiomyopathy (CMS/HCC) 11/15/2016   • Pulmonary embolism (CMS/HCC) 04/11/2017   • Pneumothorax 06/14/2018   • S/P AV daniela ablation 07/31/2018   • Long-term (current) use of anticoagulants 09/27/2018   • Chest pain 01/05/2019     Past Medical History:   Diagnosis Date   • Acute on chronic systolic congestive heart failure (CMS/HCC)    • Anemia    • Arthritis    • Asthma    • Atrial fibrillation (CMS/HCC)    • Breast cancer (CMS/HCC)    • Disease of thyroid gland    • Irritable bowel    • Mitral regurgitation    • NICM (nonischemic cardiomyopathy) (CMS/HCC)    • On warfarin therapy    • Pneumothorax 6/14/2018   • Pulmonary hypertension (CMS/HCC)    • Tricuspid regurgitation    • Ventricular tachycardia (CMS/HCC) 01/17/2019         PAST SURGICAL HISTORY  Past Surgical History:   Procedure Laterality Date   • CARDIAC ELECTROPHYSIOLOGY PROCEDURE N/A 6/12/2018    Procedure: Device Upgrade-Upgrade to CRT-P-BIV Pacamaker Medtronic Has existing single chamber Medtronic pacemaker;  Surgeon: Leonardo David MD;  Location: [facility] INVASIVE LOCATION;  Service: Cardiovascular   • CHOLECYSTECTOMY  2000   • EYE SURGERY  2005    cataracts, Dr. Miramontes   • MASTECTOMY Left 10/2014   • PACEMAKER IMPLANTATION  2012    Dr. Leija         FAMILY HISTORY  Family History   Problem Relation Age of Onset   • Colon cancer Mother 75   • Colon cancer Sister 79   • Hypertension Sister    • Cholelithiasis Sister         2 sisters         SOCIAL HISTORY  Social History     Socioeconomic History   • Marital status:      Spouse name: Rick   • Number of children: 8   • Years of education: Not on file   • Highest education level: Not on file   Occupational History     Employer: RETIRED   Tobacco Use   • Smoking status: Never Smoker   • Smokeless tobacco: Never Used   • Tobacco comment: caffeine use-tea   Substance and Sexual Activity   • Alcohol use: Yes     Alcohol/week: 0.6 oz     Types: 1 Glasses of wine per week     Comment: social    • Drug use: No   • Sexual activity: Defer         ALLERGIES  Iodinated diagnostic agents; Amiodarone; Codeine; Dabigatran etexilate mesylate; and Digoxin        REVIEW OF SYSTEMS  Review of Systems   Constitutional: Negative for diaphoresis and fever.   HENT: Negative for congestion.    Eyes: Negative for visual disturbance.   Respiratory: Negative for shortness of breath.    Cardiovascular: Negative for palpitations.   Gastrointestinal: Negative for blood in stool and vomiting.   Endocrine: Negative for polyuria.   Genitourinary: Negative for flank pain.   Musculoskeletal: Positive for arthralgias (right ribs) and myalgias (right foot). Negative for joint swelling.        (+) chest wall tenderness   Skin: Negative for wound.   Neurological: Negative for seizures.   Hematological: Negative for adenopathy.   Psychiatric/Behavioral: Negative for sleep disturbance.            PHYSICAL EXAM  ED Triage Vitals   Temp Heart Rate Resp BP SpO2   04/11/19 1908 04/11/19 1908 04/11/19 1908 04/11/19 1942 04/11/19 1908   97.7 °F (36.5 °C) 90 22 173/82 97 %      Temp src Heart Rate Source Patient Position BP Location FiO2 (%)   04/11/19 1908 04/11/19 1908 -- -- --   Tympanic Monitor            GENERAL: no acute distress  HENT: nares patent, no scalp hematoma   NECK: no cervical spine tenderness  EYES: no scleral icterus, YING  CV: regular rhythm, normal rate  RESPIRATORY: normal effort  ABDOMEN: soft and nontender  MUSCULOSKELETAL: no long bone deformity, no midline tenderness, right anterior and lateral chest wall tenderness without soft tissue crepitus, point tenderness over the right foot without significant soft tissue swelling  NEURO: alert, moves all extremities, follows commands  SKIN: warm, dry    Vital signs and nursing notes reviewed.          RADIOLOGY  Xr Foot 3+ View Right    Result Date: 4/11/2019  RIGHT FOOT X-RAYS  CLINICAL HISTORY: Right foot pain. Patient fell.  3 views of the right foot demonstrate no evidence  of acute fracture or subluxation. There is mild arthritic change, primarily in the interphalangeal joints. A tiny plantar calcaneal heel spur and a large dorsal calcaneal heel spur present.  This report was finalized on 4/11/2019 8:55 PM by Dr. Elmer Mckeon M.D.      Ct Chest Without Contrast    Addendum Date: 4/11/2019    Addendum: Additional nondisplaced fractures are noted involving the 10th rib laterally as well as the 11th and 12th ribs posteriorly.  This report was finalized on 4/11/2019 9:38 PM by Joao Meade M.D.      Result Date: 4/11/2019  THORACIC CT SCAN WITHOUT CONTRAST  HISTORY: fall, right anterior and lateral chest wall tenderness  COMPARISON: None.  TECHNIQUE:  Radiation dose reduction techniques were utilized, including automated exposure control and exposure modulation based on body size. Axial images of the thorax obtained without IV contrast, per request.  FINDINGS: There is some mild motion artifact. The lungs are hyperexpanded suggesting COPD. There is extensive bronchiectasis in the right lower lobe, superior segment in particular. Minimal linear scarring and/or atelectasis inferiorly in the right lower lobe. There is no convincing evidence of active air space disease process otherwise. No edema or pleural fluid. Heart is enlarged. Aorta moderately calcified but nonaneurysmal. Bone window images demonstrate a minimally displaced right sixth rib fracture laterally well seen on image 39. No other definite acute osseous abnormalities identified, considering some motion artifact.. Prior cholecystectomy  Unenhanced images of the included upper abdomen show a couple of small low-density renal lesions bilaterally, likely incidental cysts. They're too small for detailed assessment, particularly without contrast. Suggest follow-up.        1. COPD with chronic-appearing changes in the right lower lobe. 2. Minimally displaced right sixth rib fracture. 3. Renal lesions as discussed.       .  This  report was finalized on 4/11/2019 9:26 PM by Joao Meade M.D.        Ordered the above noted radiological studies. Reviewed by me in PACS.  Spoke with Dr. Meade (radiologist) regarding CT chest scan results.          PROCEDURES  Procedures        EKG:           EKG time: 1916  Rhythm/Rate: ventricular paced 74 BPM  P waves and VA: N/A  QRS, axis: IVCD with LAD  ST and T waves: T wave inversions V1-V4     Interpreted Contemporaneously by me, independently viewed  Unchanged compared to prior 1/6/19              MEDICATIONS GIVEN IN ER  Medications   acetaminophen (TYLENOL) tablet 1,000 mg (1,000 mg Oral Given 4/11/19 2021)                   PROGRESS AND CONSULTS     2012-Ordered CT chest and XR right foot for further evaluation. Ordered Tylenol for pain.     2139-Rechecked pt. Pt is resting comfortably. Notified pt and family of XR right foot-negative and CT chest which shows 4 rib fractures. Informed pt that with rib fractures it will hurt to breath and that increases the risk for pneumonia. Pt denies pain with inspiration currently. Discussed with pt that pain control and conscious deep breathing is key for healing of rib fracutres. Informed pt that she will likely be more sore tomorrow. Discussed the plan to discharge pt with prescriptions for Norco and incentive spirometer Informed pt that she will need to f/u with her PCP for further care. Gave pt RTER including fever, cp, SOA, r worsening pain. Pt understands and agrees with the plan, all questions answered.    2144-Ordered Aryan and incentive spirometer.     MEDICAL DECISION MAKING    86-year-old female presents with complaint of right chest wall pain after a fall from standing.  She denies head trauma or loss of consciousness.  She denies headache.  She has had no vomiting.  CT chest reveals for minimally displaced right rib fractures with no pneumothorax or hemothorax.  Her pain is very well controlled with only Tylenol here.  I explained the importance  of aggressive pain control with rib fractures and also incentive spirometry.  The patient feels comfortable going home.  She was given Norco as needed for pain.  Strict return precautions were discussed with both patient and her family.    MDM  Number of Diagnoses or Management Options     Amount and/or Complexity of Data Reviewed  Tests in the radiology section of CPT®: reviewed and ordered (CT chest-4 right rib fractures)  Tests in the medicine section of CPT®: reviewed and ordered (See EKG procedure note)  Discussion of test results with the performing providers: yes ()  Obtain history from someone other than the patient: yes (family)  Independent visualization of images, tracings, or specimens: yes               DIAGNOSIS  Final diagnoses:   Closed fracture of multiple ribs of right side, initial encounter         DISPOSITION  DISCHARGE    Patient discharged in stable condition.    Reviewed implications of results, diagnosis, meds, responsibility to follow up, warning signs and symptoms of possible worsening, potential complications and reasons to return to ER.    Patient/Family voiced understanding of above instructions.    Discussed plan for discharge, as there is no emergent indication for admission. Patient referred to primary care provider for BP management due to today's BP. Pt/family is agreeable and understands need for follow up and repeat testing.  Pt is aware that discharge does not mean that nothing is wrong but it indicates no emergency is present that requires admission and they must continue care with follow-up as given below or physician of their choice.     FOLLOW-UP  Javi Brand MD  [address] [place] [address] [id number] [phone]    Schedule an appointment as soon as possible for a visit            Medication List      New Prescriptions    HYDROcodone-acetaminophen 5-325 MG per tablet  Commonly known as:  NORCO  Take 0.5-1 tablets by mouth Every 6 (Six) Hours As  Needed for Moderate   Pain .        Changed    nitroglycerin 0.4 MG SL tablet  Commonly known as:  NITROSTAT  Place 1 tablet under the tongue Every 5 (Five) Minutes As Needed for Chest   Pain for up to 2 doses. Take no more than 3 doses in 15 minutes.  What changed:    when to take this  additional instructions     * warfarin 2.5 MG tablet  Commonly known as:  COUMADIN  What changed:  Another medication with the same name was changed. Make   sure you understand how and when to take each.     * warfarin 2.5 MG tablet  Commonly known as:  COUMADIN  TAKE ONE TABLET BY MOUTH DAILY  What changed:    how much to take  how to take this  when to take this         * This list has 2 medication(s) that are the same as other medications   prescribed for you. Read the directions carefully, and ask your doctor or   other care provider to review them with you.                          Latest Documented Vital Signs:  As of 9:50 PM  BP- 173/82 HR- 76 Temp- 97.7 °F (36.5 °C) (Tympanic) O2 sat- 97%        --  Documentation assistance provided by roro Ramírez for Dr. EVI Medina MD.  Information recorded by the roro was done at my direction and has been verified and validated by me.                    Debi Ramírez  04/11/19 [id number]       Gorge Medina MD  04/11/19 [id number]

## 2019-04-12 NOTE — DISCHARGE INSTRUCTIONS
You have fractures of your right 6, 10, 11, and 12 ribs.    Take the pain medication as needed.    Take frequent deep breaths.   Return to the ER for shortness of breath, fever, or other concerns.

## 2019-05-08 ENCOUNTER — ANTICOAGULATION VISIT (OUTPATIENT)
Dept: PHARMACY | Facility: HOSPITAL | Age: 84
End: 2019-05-08

## 2019-05-08 LAB
INR PPP: 3.2 (ref 0.91–1.09)
PROTHROMBIN TIME: 38.8 SECONDS (ref 10–13.8)

## 2019-05-08 PROCEDURE — 85610 PROTHROMBIN TIME: CPT

## 2019-05-08 PROCEDURE — G0463 HOSPITAL OUTPT CLINIC VISIT: HCPCS

## 2019-05-08 PROCEDURE — 36416 COLLJ CAPILLARY BLOOD SPEC: CPT

## 2019-05-08 NOTE — PROGRESS NOTES
Anticoagulation Clinic Progress Note    Anticoagulation Summary  As of 5/8/2019    INR goal:   2.0-3.0   TTR:   76.9 % (7.1 mo)   INR used for dosing:   3.2! (5/8/2019)   Warfarin maintenance plan:   1.25 mg every Mon, Wed, Fri; 2.5 mg all other days   Weekly warfarin total:   13.75 mg   No change documented:   Jayant White RP   Plan last modified:   Nikole Tiwari [facility] (9/27/2018)   Next INR check:   5/22/2019   Priority:   Maintenance   Target end date:   Indefinite    Indications    Long-term (current) use of anticoagulants (Resolved) [Z79.01]             Anticoagulation Episode Summary     INR check location:       Preferred lab:       Send INR reminders to:    [facility]    Comments:         Anticoagulation Care Providers     Provider Role Specialty Phone number    Rafaela Leija MD Referring Cardiology [phone]          Clinic Interview:  Patient Findings     Negatives:   Signs/symptoms of thrombosis, Signs/symptoms of bleeding,   Laboratory test error suspected, Change in health, Change in alcohol use,   Change in activity, Upcoming invasive procedure, Emergency department   visit, Upcoming dental procedure, Missed doses, Extra doses, Change in   medications, Change in diet/appetite, Hospital admission, Bruising, Other   complaints      Clinical Outcomes     Negatives:   Major bleeding event, Thromboembolic event,   Anticoagulation-related hospital admission, Anticoagulation-related ED   visit, Anticoagulation-related fatality        INR History:  Anticoagulation Monitoring 3/13/2019 4/10/2019 5/8/2019   INR 2.2 2.5 3.2   INR Date 3/13/2019 4/10/2019 5/8/2019   INR Goal 2.0-3.0 2.0-3.0 2.0-3.0   Trend Same Same Same   Last Week Total 13.75 mg 13.75 mg 13.75 mg   Next Week Total 13.75 mg 13.75 mg 13.75 mg   Sun 2.5 mg 2.5 mg 2.5 mg   Mon 1.25 mg 1.25 mg 1.25 mg   Tue 2.5 mg 2.5 mg 2.5 mg   Wed 1.25 mg 1.25 mg 1.25 mg   Thu 2.5 mg 2.5 mg 2.5 mg   Fri 1.25 mg 1.25 mg 1.25 mg   Sat  2.5 mg 2.5 mg 2.5 mg   Visit Report - - -   Some recent data might be hidden       Plan:  1. INR is Supratherapeutic today- see above in Anticoagulation Summary.  Will instruct Diana Avalos to Continue their warfarin regimen (due for lower 1.25 mg dose today)- see above in Anticoagulation Summary.  2. Follow up in 2 weeks  3. Patient declines warfarin refills.  4. Verbal and written information provided. Patient expresses understanding and has no further questions at this time.    Jayant White [facility]

## 2019-05-13 RX ORDER — WARFARIN SODIUM 2.5 MG/1
TABLET ORAL
Qty: 80 TABLET | Refills: 0 | Status: SHIPPED | OUTPATIENT
Start: 2019-05-13 | End: 2019-05-16 | Stop reason: SDUPTHER

## 2019-05-16 RX ORDER — WARFARIN SODIUM 2.5 MG/1
TABLET ORAL
Qty: 75 TABLET | Refills: 0 | Status: SHIPPED | OUTPATIENT
Start: 2019-05-16 | End: 2019-09-05 | Stop reason: SDUPTHER

## 2019-05-22 ENCOUNTER — ANTICOAGULATION VISIT (OUTPATIENT)
Dept: PHARMACY | Facility: HOSPITAL | Age: 84
End: 2019-05-22

## 2019-05-22 ENCOUNTER — OFFICE VISIT (OUTPATIENT)
Dept: CARDIOLOGY | Facility: CLINIC | Age: 84
End: 2019-05-22

## 2019-05-22 VITALS
BODY MASS INDEX: 24.68 KG/M2 | WEIGHT: 153.6 LBS | SYSTOLIC BLOOD PRESSURE: 108 MMHG | HEIGHT: 66 IN | HEART RATE: 70 BPM | DIASTOLIC BLOOD PRESSURE: 58 MMHG

## 2019-05-22 DIAGNOSIS — I36.1 NON-RHEUMATIC TRICUSPID VALVE INSUFFICIENCY: ICD-10-CM

## 2019-05-22 DIAGNOSIS — I50.22 CHRONIC SYSTOLIC CONGESTIVE HEART FAILURE (HCC): ICD-10-CM

## 2019-05-22 DIAGNOSIS — I27.20 PULMONARY HYPERTENSION (HCC): ICD-10-CM

## 2019-05-22 DIAGNOSIS — I48.21 PERMANENT ATRIAL FIBRILLATION (HCC): Primary | ICD-10-CM

## 2019-05-22 DIAGNOSIS — Z17.0 MALIGNANT NEOPLASM OF CENTRAL PORTION OF LEFT BREAST IN FEMALE, ESTROGEN RECEPTOR POSITIVE (HCC): Primary | ICD-10-CM

## 2019-05-22 DIAGNOSIS — I47.20 VENTRICULAR TACHYCARDIA (HCC): ICD-10-CM

## 2019-05-22 DIAGNOSIS — C50.112 MALIGNANT NEOPLASM OF CENTRAL PORTION OF LEFT BREAST IN FEMALE, ESTROGEN RECEPTOR POSITIVE (HCC): Primary | ICD-10-CM

## 2019-05-22 DIAGNOSIS — I34.0 NON-RHEUMATIC MITRAL REGURGITATION: ICD-10-CM

## 2019-05-22 LAB
INR PPP: 4 (ref 0.91–1.09)
PROTHROMBIN TIME: 48.1 SECONDS (ref 10–13.8)

## 2019-05-22 PROCEDURE — 36416 COLLJ CAPILLARY BLOOD SPEC: CPT

## 2019-05-22 PROCEDURE — 99214 OFFICE O/P EST MOD 30 MIN: CPT | Performed by: INTERNAL MEDICINE

## 2019-05-22 PROCEDURE — 85610 PROTHROMBIN TIME: CPT

## 2019-05-22 PROCEDURE — G0463 HOSPITAL OUTPT CLINIC VISIT: HCPCS

## 2019-05-22 RX ORDER — SODIUM CHLORIDE 9 MG/ML
250 INJECTION, SOLUTION INTRAVENOUS ONCE
Status: CANCELLED | OUTPATIENT
Start: 2019-05-23

## 2019-05-22 RX ORDER — ZOLEDRONIC ACID 5 MG/100ML
5 INJECTION, SOLUTION INTRAVENOUS ONCE
Status: CANCELLED | OUTPATIENT
Start: 2019-05-23

## 2019-05-22 NOTE — PROGRESS NOTES
Date of Office Visit: 2019  Encounter Provider: Rafaela Leija MD  Place of Service: [facility]  Patient Name: Diana Avalos  :1932      Patient ID:  Diana Avalos is a 86 y.o. female is here for  followup for atrial fibrillation and CHF.        History of Present Illness    She was seen at [facility] on 2011 for atrial  fibrillation and was placed on IV Cardizem. She was reluctant to take  Coumadin but was willing to take Pradaxa. During her hospitalization her  ejection fraction was 45 to 50% with no significant valvular heart disease.  She had a transesophageal echocardiogram done on September 15, 2011 which  showed marked left atrial enlargement, and there was slow velocity through  the left atrial appendage and the left atrial appendage itself had a  thrombus. The left ventricle showed a moderate reduction in function with a  markedly dilated left atrium. At that time we decided to go ahead and  control her atrial fibrillation with rate and anticoagulation. However, we  had difficulty controlling her rate so she was put on amiodarone just for  rate control. She underwent a stress nuclear perfusion study on 2011 which was normal. On  she called back in because she  was having severe nausea. She was on Pradaxa, digoxin, and amiodarone at  that time. She went off of all those medications and felt better but on  2012 she started having atrial fibrillation with rapid  ventricular response, and was readmitted to the hospital.  She had a single chamber Medtronic generator placed with an AV node ablation, 2012.  She was in the hospital 2012 with acute bronchitis. She had an echocardiogram done  2012 showing an ejection fraction of 35-40%, severe left atrial volume enlargement,  severe dilation of the right atrium, moderate mitral insufficiency, mild tricuspid  insufficiency,  pulmonary artery pressure of 58 mmHg. She was started on Lasix 20 mg  daily, and this really does seem to help.         She was admitted June 2018 with acute on chronic systolic heart failure.  She was found to have profound septal dyssynchrony on echo.  We felt that upgrading her pacing device to CRT would be helpful.  she had a prior single lead pacer with AV node ablation for permanent atrial fibrillation which is not well rate controlled.  Her echocardiogram which was done 6/8/18 showed ejection fraction 36% with moderate concentric LVH, marked synchrony of the septum and chronic apex to the RV pacing, moderate to severe mitral insufficiency, moderate to severe tricuspid insufficiency and RVSP of 46 mmHg.  After the upgrade to CRT, she did develop a pneumothorax.  She was hospitalized for a left iatrogenic pneumothorax requiring a chest tube placement.  This did resolve.     She had a nonischemic stress PET done 10/4/18.  She had an echo done 10/4/18 showing LVEF 40%, hypokinesis of the mid and basal lateral walls, mild to moderate right ventricular dilation, moderate left atrial dilation, severe right atrial dilation, mild mitral insufficiency, severe tricuspid insufficiency and RVSP of 49 mmHg.     She presented to [facility] on 1/5/19 with left-sided chest pain, right arm pain, and shortness of breath occurring at rest.  She ruled out for myocardial infarction.  Repeat echocardiogram showed improvement of her ejection fraction to 63%, mild concentric hypertrophy, grade 2 diastolic dysfunction, left atrium volume severely increased, right atrium moderately to severely dilated, mild to moderate mitral valve regurgitation, mild tricuspid valve regurgitation, and RVSP normal.      There was an alert on January 7, 2018 on pacer that she had 3 nonsustained runs of nonsustained ventricular tachycardia.  Her daughter then called 1/15 asking for clarification on carvedilol and she was recommended to take  carvedilol 6.25 mg twice a day.    She still has intermittent ventricular tachycardia noted on her pacer interrogations.  Last one April 2019 showed this.  She does not feel this.  She is had no dizziness, syncope or falls.  She did have rib fractures about a month ago which may coincide to the nsVT that we saw on pacer interrogation.  She had severe pain for about 2 weeks.  She is better now.  She has no chest pain or pressure.  She has no orthopnea or PND.  Her energy level is good.  She has no difficulty breathing with activity.  She has no lower extremity edema.      Past Medical History:   Diagnosis Date   • Acute on chronic systolic congestive heart failure (CMS/HCC)    • Anemia    • Arthritis    • Asthma    • Atrial fibrillation (CMS/HCC)     With a history of an atrial clot   • Breast cancer (CMS/HCC)     Left, stage IIB   • Disease of thyroid gland     Hypothyroidism   • Essential hypertension    • Hypothyroidism    • Irritable bowel    • Mitral regurgitation    • NICM (nonischemic cardiomyopathy) (CMS/HCC)    • On warfarin therapy    • Osteoporosis    • Pneumothorax 6/14/2018   • Pulmonary hypertension (CMS/HCC)     Resolved per echocardiogram 1/2019   • Tricuspid regurgitation    • Ventricular tachycardia (CMS/HCC) 01/17/2019    Nonsustained         Past Surgical History:   Procedure Laterality Date   • CARDIAC ELECTROPHYSIOLOGY PROCEDURE N/A 6/12/2018    Procedure: Device Upgrade-Upgrade to CRT-P-BIV Pacamaker Medtronic Has existing single chamber Medtronic pacemaker;  Surgeon: Leonardo David MD;  Location: [facility] INVASIVE LOCATION;  Service: Cardiovascular   • CHOLECYSTECTOMY  2000   • EYE SURGERY  2005    cataracts, Dr. Miramontes   • MASTECTOMY Left 10/2014   • PACEMAKER IMPLANTATION  2012    Dr. Leija       Current Outpatient Medications on File Prior to Visit   Medication Sig Dispense Refill   • acetaminophen (TYLENOL) 325 MG tablet Take 2 tablets by mouth Every 6 (Six) Hours As Needed for Mild  Pain .     • anastrozole (ARIMIDEX) 1 MG tablet TAKE ONE TABLET BY MOUTH DAILY 30 tablet 7   • carvedilol (COREG) 6.25 MG tablet Take 1 tablet by mouth 2 (Two) Times a Day With Meals. 180 tablet 2   • furosemide (LASIX) 40 MG tablet Take 1 tablet by mouth Daily. 90 tablet 2   • glucosamine-chondroitin 500-400 MG capsule capsule Take 1 capsule by mouth Daily.     • levothyroxine (SYNTHROID, LEVOTHROID) 88 MCG tablet Take 88 mcg by mouth Daily.     • nitroglycerin (NITROSTAT) 0.4 MG SL tablet Place 1 tablet under the tongue Every 5 (Five) Minutes As Needed for Chest Pain for up to 2 doses. Take no more than 3 doses in 15 minutes. (Patient taking differently: Place 0.4 mg under the tongue As Needed for Chest Pain. Take no more than 3 doses in 15 minutes.) 5 tablet 0   • vitamin B-12 (CYANOCOBALAMIN) 1000 MCG tablet Take 1,000 mcg by mouth Daily.     • warfarin (COUMADIN) 2.5 MG tablet TAKE ONE TABLET (2.5 MG) BY MOUTH DAILY EXCEPT TAKE ONE-HALF TABLET (1.25 MG) DAILY ON MONDAY, WEDNESDAY, AND Friday. OR AS DIRECTED. 75 tablet 0   • HYDROcodone-acetaminophen (NORCO) 5-325 MG per tablet Take 0.5-1 tablets by mouth Every 6 (Six) Hours As Needed for Moderate Pain . 8 tablet 0     No current facility-administered medications on file prior to visit.        Social History     Socioeconomic History   • Marital status:      Spouse name: Rick   • Number of children: 8   • Years of education: Not on file   • Highest education level: Not on file   Occupational History     Employer: RETIRED   Tobacco Use   • Smoking status: Never Smoker   • Smokeless tobacco: Never Used   • Tobacco comment: caffeine use-tea   Substance and Sexual Activity   • Alcohol use: Yes     Alcohol/week: 0.6 oz     Types: 1 Glasses of wine per week     Comment: social   • Drug use: No   • Sexual activity: Defer           Review of Systems   Constitution: Negative.   HENT: Negative for congestion.    Eyes: Negative for vision loss in left eye and  "vision loss in right eye.   Respiratory: Negative.  Negative for cough, hemoptysis, shortness of breath, sleep disturbances due to breathing, snoring, sputum production and wheezing.    Endocrine: Negative.    Hematologic/Lymphatic: Negative.    Skin: Negative for poor wound healing and rash.   Musculoskeletal: Negative for falls, gout, muscle cramps and myalgias.   Gastrointestinal: Negative for abdominal pain, diarrhea, dysphagia, hematemesis, melena, nausea and vomiting.   Neurological: Negative for excessive daytime sleepiness, dizziness, headaches, light-headedness, loss of balance, seizures and vertigo.   Psychiatric/Behavioral: Negative for depression and substance abuse. The patient is not nervous/anxious.        Procedures  Procedures        Objective:      Vitals:    05/22/19 1417   BP: 108/58   BP Location: Right arm   Patient Position: Sitting   Pulse: 70   Weight: 69.7 kg (153 lb 9.6 oz)   Height: 167.6 cm (66\")     Body mass index is 24.79 kg/m².    Physical Exam   Constitutional: She is oriented to person, place, and time. She appears well-developed and well-nourished. No distress.   HENT:   Head: Normocephalic and atraumatic.   Eyes: Conjunctivae are normal. No scleral icterus.   Neck: Neck supple. No JVD present. Carotid bruit is not present. No thyromegaly present.   Cardiovascular: Normal rate, regular rhythm, S1 normal, S2 normal, normal heart sounds and intact distal pulses.  No extrasystoles are present. PMI is not displaced. Exam reveals no gallop.   No murmur heard.  Pulses:       Carotid pulses are 2+ on the right side, and 2+ on the left side.       Radial pulses are 2+ on the right side, and 2+ on the left side.        Dorsalis pedis pulses are 2+ on the right side, and 2+ on the left side.        Posterior tibial pulses are 2+ on the right side, and 2+ on the left side.   Pulmonary/Chest: Effort normal and breath sounds normal. No respiratory distress. She has no wheezes. She has no " rhonchi. She has no rales. She exhibits no tenderness.   Abdominal: Soft. Bowel sounds are normal. She exhibits no distension, no abdominal bruit and no mass. There is no tenderness.   Musculoskeletal: She exhibits no edema or deformity.   Lymphadenopathy:     She has no cervical adenopathy.   Neurological: She is alert and oriented to person, place, and time. No cranial nerve deficit.   Skin: Skin is warm and dry. No rash noted. She is not diaphoretic. No cyanosis. No pallor. Nails show no clubbing.   Psychiatric: She has a normal mood and affect. Judgment normal.   Vitals reviewed.      Lab Review:       Assessment:      Diagnosis Plan   1. Permanent atrial fibrillation (CMS/[facility])     2. Non-rheumatic mitral regurgitation     3. Pulmonary hypertension (CMS/HCC)     4. Non-rheumatic tricuspid valve insufficiency     5. Chronic systolic congestive heart failure (CMS/HCC)     6. Ventricular tachycardia (CMS/[facility])       1. Chronic permanent atrial fibrillation; status post AV node ablation with  Medtronic single chamber pacemaker on January 9, 2012 with upgrade to CRT 6/2018 . She is on chronic  Coumadin therapy only.   2. Hypertension, under good control. She is really on no medications except  Lasix.   3. Mild to moderate cardiomyopathy, nonischemic, LVEF 40%.  has septal dyssynchrony.   4. Moderate mitral insufficiency and moderate tricuspid insufficiency,  stable.   5. Hypothyroidism, stable.   6. Pulmonary hypertension, moderate.   7. Moderate pulmonary hypertension. This is source of dyspnea.   8. NsVT, continue coreg.  Noted intermittently pacer interrogation.      Plan:       F/u with yan in 6 months.  No med changes.     Atrial Fibrillation and Atrial Flutter  Assessment  • The patient has permanent atrial fibrillation  • This is non-valvular in etiology  • The patient's CHADS2-VASc score is 4  • A [id number] score of 2 or more is considered a high risk for a thromboembolic event  • Warfarin  prescribed    Plan  • Continue in atrial fibrillation with rate control  • Continue warfarin for antithrombotic therapy, bleeding issues discussed  • Continue beta blocker for rate control

## 2019-05-22 NOTE — PROGRESS NOTES
Anticoagulation Clinic Progress Note    Anticoagulation Summary  As of 2019    INR goal:   2.0-3.0   TTR:   72.2 % (7.6 mo)   INR used for dosin.0! (2019)   Warfarin maintenance plan:   2.5 mg every Sun, Tue, Thu; 1.25 mg all other days   Weekly warfarin total:   12.5 mg   Plan last modified:   Eve Bauer RPH (2019)   Next INR check:   2019   Priority:   Maintenance   Target end date:   Indefinite    Indications    Long-term (current) use of anticoagulants (Resolved) [Z79.01]             Anticoagulation Episode Summary     INR check location:       Preferred lab:       Send INR reminders to:    [facility]    Comments:         Anticoagulation Care Providers     Provider Role Specialty Phone number    Rafaela Leija MD Referring Cardiology [phone]          Clinic Interview:      INR History:  Anticoagulation Monitoring 4/10/2019 2019 2019   INR 2.5 3.2 4.0   INR Date 4/10/2019 2019 2019   INR Goal 2.0-3.0 2.0-3.0 2.0-3.0   Trend Same Same Down   Last Week Total 13.75 mg 13.75 mg 13.75 mg   Next Week Total 13.75 mg 13.75 mg 11.25 mg   Sun 2.5 mg 2.5 mg 2.5 mg   Mon 1.25 mg 1.25 mg 1.25 mg   Tue 2.5 mg 2.5 mg 2.5 mg   Wed 1.25 mg 1.25 mg Hold (); Otherwise 1.25 mg   Thu 2.5 mg 2.5 mg 2.5 mg   Fri 1.25 mg 1.25 mg 1.25 mg   Sat 2.5 mg 2.5 mg 1.25 mg   Visit Report - - -   Some recent data might be hidden       Plan:  1. INR is Supratherapeutic today- see above in Anticoagulation Summary.  Will instruct Diana Avalos to Change their warfarin regimen- see above in Anticoagulation Summary.  2. Follow up in 2 weeks  3. Patient declines warfarin refills.  4. Verbal and written information provided. Patient expresses understanding and has no further questions at this time.    Eve Bauer RPH

## 2019-05-23 ENCOUNTER — INFUSION (OUTPATIENT)
Dept: ONCOLOGY | Facility: HOSPITAL | Age: 84
End: 2019-05-23

## 2019-05-23 VITALS
SYSTOLIC BLOOD PRESSURE: 136 MMHG | BODY MASS INDEX: 24.46 KG/M2 | HEIGHT: 66 IN | OXYGEN SATURATION: 97 % | RESPIRATION RATE: 16 BRPM | DIASTOLIC BLOOD PRESSURE: 81 MMHG | TEMPERATURE: 98 F | HEART RATE: 77 BPM | WEIGHT: 152.2 LBS

## 2019-05-23 DIAGNOSIS — Z17.0 MALIGNANT NEOPLASM OF CENTRAL PORTION OF LEFT BREAST IN FEMALE, ESTROGEN RECEPTOR POSITIVE (HCC): Primary | ICD-10-CM

## 2019-05-23 DIAGNOSIS — C50.112 MALIGNANT NEOPLASM OF CENTRAL PORTION OF LEFT BREAST IN FEMALE, ESTROGEN RECEPTOR POSITIVE (HCC): Primary | ICD-10-CM

## 2019-05-23 DIAGNOSIS — M85.89 OSTEOPENIA OF MULTIPLE SITES: ICD-10-CM

## 2019-05-23 LAB
ALBUMIN SERPL-MCNC: 3.9 G/DL (ref 3.5–5.2)
ALBUMIN/GLOB SERPL: 1.2 G/DL (ref 1.1–2.4)
ALP SERPL-CCNC: 88 U/L (ref 38–116)
ALT SERPL W P-5'-P-CCNC: 16 U/L (ref 0–33)
ANION GAP SERPL CALCULATED.3IONS-SCNC: 10.7 MMOL/L
AST SERPL-CCNC: 24 U/L (ref 0–32)
BASOPHILS # BLD AUTO: 0.04 10*3/MM3 (ref 0–0.2)
BASOPHILS NFR BLD AUTO: 0.9 % (ref 0–1.5)
BILIRUB SERPL-MCNC: 0.6 MG/DL (ref 0.2–1.2)
BUN BLD-MCNC: 16 MG/DL (ref 6–20)
BUN/CREAT SERPL: 18.2 (ref 7.3–30)
CALCIUM SPEC-SCNC: 9.1 MG/DL (ref 8.5–10.2)
CHLORIDE SERPL-SCNC: 104 MMOL/L (ref 98–107)
CO2 SERPL-SCNC: 27.3 MMOL/L (ref 22–29)
CREAT BLD-MCNC: 0.88 MG/DL (ref 0.6–1.1)
DEPRECATED RDW RBC AUTO: 44.3 FL (ref 37–54)
EOSINOPHIL # BLD AUTO: 0.09 10*3/MM3 (ref 0–0.4)
EOSINOPHIL NFR BLD AUTO: 2 % (ref 0.3–6.2)
ERYTHROCYTE [DISTWIDTH] IN BLOOD BY AUTOMATED COUNT: 12.4 % (ref 12.3–15.4)
GFR SERPL CREATININE-BSD FRML MDRD: 61 ML/MIN/1.73
GLOBULIN UR ELPH-MCNC: 3.2 GM/DL (ref 1.8–3.5)
GLUCOSE BLD-MCNC: 84 MG/DL (ref 74–124)
HCT VFR BLD AUTO: 42.4 % (ref 34–46.6)
HGB BLD-MCNC: 13.8 G/DL (ref 12–15.9)
IMM GRANULOCYTES # BLD AUTO: 0.01 10*3/MM3 (ref 0–0.05)
IMM GRANULOCYTES NFR BLD AUTO: 0.2 % (ref 0–0.5)
LYMPHOCYTES # BLD AUTO: 1.38 10*3/MM3 (ref 0.7–3.1)
LYMPHOCYTES NFR BLD AUTO: 30.1 % (ref 19.6–45.3)
MAGNESIUM SERPL-MCNC: 2 MG/DL (ref 1.8–2.5)
MCH RBC QN AUTO: 31.4 PG (ref 26.6–33)
MCHC RBC AUTO-ENTMCNC: 32.5 G/DL (ref 31.5–35.7)
MCV RBC AUTO: 96.6 FL (ref 79–97)
MONOCYTES # BLD AUTO: 0.73 10*3/MM3 (ref 0.1–0.9)
MONOCYTES NFR BLD AUTO: 15.9 % (ref 5–12)
NEUTROPHILS # BLD AUTO: 2.34 10*3/MM3 (ref 1.7–7)
NEUTROPHILS NFR BLD AUTO: 50.9 % (ref 42.7–76)
NRBC BLD AUTO-RTO: 0 /100 WBC (ref 0–0.2)
PHOSPHATE SERPL-MCNC: 4 MG/DL (ref 2.5–4.5)
PLATELET # BLD AUTO: 207 10*3/MM3 (ref 140–450)
PMV BLD AUTO: 10.1 FL (ref 6–12)
POTASSIUM BLD-SCNC: 4.8 MMOL/L (ref 3.5–4.7)
PROT SERPL-MCNC: 7.1 G/DL (ref 6.3–8)
RBC # BLD AUTO: 4.39 10*6/MM3 (ref 3.77–5.28)
SODIUM BLD-SCNC: 142 MMOL/L (ref 134–145)
WBC NRBC COR # BLD: 4.59 10*3/MM3 (ref 3.4–10.8)

## 2019-05-23 PROCEDURE — 83735 ASSAY OF MAGNESIUM: CPT | Performed by: INTERNAL MEDICINE

## 2019-05-23 PROCEDURE — 96365 THER/PROPH/DIAG IV INF INIT: CPT | Performed by: INTERNAL MEDICINE

## 2019-05-23 PROCEDURE — 85025 COMPLETE CBC W/AUTO DIFF WBC: CPT | Performed by: INTERNAL MEDICINE

## 2019-05-23 PROCEDURE — 84100 ASSAY OF PHOSPHORUS: CPT | Performed by: INTERNAL MEDICINE

## 2019-05-23 PROCEDURE — 25010000002 ZOLEDRONIC ACID 5 MG/100ML SOLUTION: Performed by: INTERNAL MEDICINE

## 2019-05-23 PROCEDURE — 80053 COMPREHEN METABOLIC PANEL: CPT | Performed by: INTERNAL MEDICINE

## 2019-05-23 RX ORDER — SODIUM CHLORIDE 9 MG/ML
250 INJECTION, SOLUTION INTRAVENOUS ONCE
OUTPATIENT
Start: 2019-05-23

## 2019-05-23 RX ORDER — SODIUM CHLORIDE 9 MG/ML
250 INJECTION, SOLUTION INTRAVENOUS ONCE
Status: COMPLETED | OUTPATIENT
Start: 2019-05-23 | End: 2019-05-23

## 2019-05-23 RX ORDER — ZOLEDRONIC ACID 5 MG/100ML
5 INJECTION, SOLUTION INTRAVENOUS ONCE
OUTPATIENT
Start: 2019-05-23

## 2019-05-23 RX ORDER — ZOLEDRONIC ACID 5 MG/100ML
5 INJECTION, SOLUTION INTRAVENOUS ONCE
Status: COMPLETED | OUTPATIENT
Start: 2019-05-23 | End: 2019-05-23

## 2019-05-23 RX ADMIN — ZOLEDRONIC ACID 5 MG: 0.05 INJECTION, SOLUTION INTRAVENOUS at 15:10

## 2019-05-23 RX ADMIN — SODIUM CHLORIDE 250 ML: 9 INJECTION, SOLUTION INTRAVENOUS at 14:50

## 2019-06-05 ENCOUNTER — ANTICOAGULATION VISIT (OUTPATIENT)
Dept: PHARMACY | Facility: HOSPITAL | Age: 84
End: 2019-06-05

## 2019-06-05 LAB
INR PPP: 2.1 (ref 0.91–1.09)
PROTHROMBIN TIME: 24.6 SECONDS (ref 10–13.8)

## 2019-06-05 PROCEDURE — 85610 PROTHROMBIN TIME: CPT

## 2019-06-05 PROCEDURE — 36416 COLLJ CAPILLARY BLOOD SPEC: CPT

## 2019-06-05 NOTE — PROGRESS NOTES
Anticoagulation Clinic Progress Note    Anticoagulation Summary  As of 2019    INR goal:   2.0-3.0   TTR:   70.8 % (8 mo)   INR used for dosin.1 (2019)   Warfarin maintenance plan:   2.5 mg every Sun, Tue, Thu; 1.25 mg all other days   Weekly warfarin total:   12.5 mg   No change documented:   Ginny Mota   Plan last modified:   Eve Bauer RPH (2019)   Next INR check:   2019   Priority:   High   Target end date:   Indefinite    Indications    Long-term (current) use of anticoagulants (Resolved) [Z79.01]             Anticoagulation Episode Summary     INR check location:       Preferred lab:       Send INR reminders to:    [facility]    Comments:         Anticoagulation Care Providers     Provider Role Specialty Phone number    Rafaela Leija MD Referring Cardiology [phone]          Clinic Interview:  Patient Findings     Negatives:   Signs/symptoms of thrombosis, Signs/symptoms of bleeding,   Laboratory test error suspected, Change in health, Change in alcohol use,   Change in activity, Upcoming invasive procedure, Emergency department   visit, Upcoming dental procedure, Missed doses, Extra doses, Change in   medications, Change in diet/appetite, Hospital admission, Bruising, Other   complaints      Clinical Outcomes     Negatives:   Major bleeding event, Thromboembolic event,   Anticoagulation-related hospital admission, Anticoagulation-related ED   visit, Anticoagulation-related fatality        INR History:  Anticoagulation Monitoring 2019   INR 3.2 4.0 2.1   INR Date 2019   INR Goal 2.0-3.0 2.0-3.0 2.0-3.0   Trend Same Down Same   Last Week Total 13.75 mg 13.75 mg 12.5 mg   Next Week Total 13.75 mg 11.25 mg 12.5 mg   Sun 2.5 mg 2.5 mg 2.5 mg   Mon 1.25 mg 1.25 mg 1.25 mg   Tue 2.5 mg 2.5 mg 2.5 mg   Wed 1.25 mg Hold (); Otherwise 1.25 mg 1.25 mg   Thu 2.5 mg 2.5 mg 2.5 mg   Fri 1.25 mg 1.25 mg  1.25 mg   Sat 2.5 mg 1.25 mg 1.25 mg   Visit Report - - -   Some recent data might be hidden       Plan:  1. INR is therapeutic today- see above in Anticoagulation Summary.   Will instruct iDana Avalos to continue their warfarin regimen- see above in Anticoagulation Summary.  2. Follow up in 2 weeks.  3. Patient declines warfarin refills.  4. Verbal and written information provided. Patient expresses understanding and has no further questions at this time.    Ginny Mota

## 2019-06-19 ENCOUNTER — ANTICOAGULATION VISIT (OUTPATIENT)
Dept: PHARMACY | Facility: HOSPITAL | Age: 84
End: 2019-06-19

## 2019-06-19 LAB
INR PPP: 1.7 (ref 0.91–1.09)
PROTHROMBIN TIME: 20.8 SECONDS (ref 10–13.8)

## 2019-06-19 PROCEDURE — G0463 HOSPITAL OUTPT CLINIC VISIT: HCPCS

## 2019-06-19 PROCEDURE — 36416 COLLJ CAPILLARY BLOOD SPEC: CPT

## 2019-06-19 PROCEDURE — 85610 PROTHROMBIN TIME: CPT

## 2019-06-19 NOTE — PROGRESS NOTES
Anticoagulation Clinic Progress Note    Anticoagulation Summary  As of 2019    INR goal:   2.0-3.0   TTR:   68.3 % (8.5 mo)   INR used for dosin.7! (2019)   Warfarin maintenance plan:   1.25 mg every Mon, Wed, Fri; 2.5 mg all other days   Weekly warfarin total:   13.75 mg   Plan last modified:   Jayant White RP (2019)   Next INR check:   7/3/2019   Priority:   High   Target end date:   Indefinite    Indications    Long-term (current) use of anticoagulants (Resolved) [Z79.01]             Anticoagulation Episode Summary     INR check location:       Preferred lab:       Send INR reminders to:   [facility]    Comments:         Anticoagulation Care Providers     Provider Role Specialty Phone number    Rafaela Leija MD Referring Cardiology [phone]          Clinic Interview:  Patient Findings     Negatives:   Signs/symptoms of thrombosis, Signs/symptoms of bleeding,   Laboratory test error suspected, Change in health, Change in alcohol use,   Change in activity, Upcoming invasive procedure, Emergency department   visit, Upcoming dental procedure, Missed doses, Extra doses, Change in   medications, Change in diet/appetite, Hospital admission, Bruising, Other   complaints      Clinical Outcomes     Negatives:   Major bleeding event, Thromboembolic event,   Anticoagulation-related hospital admission, Anticoagulation-related ED   visit, Anticoagulation-related fatality        INR History:  Anticoagulation Monitoring 2019   INR 4.0 2.1 1.7   INR Date 2019   INR Goal 2.0-3.0 2.0-3.0 2.0-3.0   Trend Down Same Up   Last Week Total 13.75 mg 12.5 mg 12.5 mg   Next Week Total 11.25 mg 12.5 mg 15 mg   Sun 2.5 mg 2.5 mg 2.5 mg   Mon 1.25 mg 1.25 mg 1.25 mg   Tue 2.5 mg 2.5 mg 2.5 mg   Wed Hold (); Otherwise 1.25 mg 1.25 mg 2.5 mg (); Otherwise 1.25 mg   Thu 2.5 mg 2.5 mg 2.5 mg   Fri 1.25 mg 1.25 mg 1.25 mg   Sat 1.25 mg 1.25  mg 2.5 mg   Visit Report - - -   Some recent data might be hidden       Plan:  1. INR is Subtherapeutic today- see above in Anticoagulation Summary.  Will instruct Diana Avalos to Increase their warfarin regimen- see above in Anticoagulation Summary.  2. Follow up in 2 weeks  3. Patient declines warfarin refills.  4. Verbal and written information provided. Patient expresses understanding and has no further questions at this time.    Jayant White RP

## 2019-07-03 ENCOUNTER — ANTICOAGULATION VISIT (OUTPATIENT)
Dept: PHARMACY | Facility: HOSPITAL | Age: 84
End: 2019-07-03

## 2019-07-03 LAB
INR PPP: 2.1 (ref 0.91–1.09)
PROTHROMBIN TIME: 25.4 SECONDS (ref 10–13.8)

## 2019-07-03 PROCEDURE — 85610 PROTHROMBIN TIME: CPT

## 2019-07-03 PROCEDURE — 36416 COLLJ CAPILLARY BLOOD SPEC: CPT

## 2019-07-03 NOTE — PROGRESS NOTES
Anticoagulation Clinic Progress Note    Anticoagulation Summary  As of 7/3/2019    INR goal:   2.0-3.0   TTR:   66.0 % (9 mo)   INR used for dosin.1 (7/3/2019)   Warfarin maintenance plan:   1.25 mg every Mon, Wed, Fri; 2.5 mg all other days   Weekly warfarin total:   13.75 mg   No change documented:   Ginny Mota   Plan last modified:   Jayant White RPH (2019)   Next INR check:   2019   Priority:   High   Target end date:   Indefinite    Indications    Long-term (current) use of anticoagulants (Resolved) [Z79.01]             Anticoagulation Episode Summary     INR check location:       Preferred lab:       Send INR reminders to:   [facility]    Comments:         Anticoagulation Care Providers     Provider Role Specialty Phone number    Rafaela Leija MD Referring Cardiology [phone]          Clinic Interview:  Patient Findings     Negatives:   Signs/symptoms of thrombosis, Signs/symptoms of bleeding,   Laboratory test error suspected, Change in health, Change in alcohol use,   Change in activity, Upcoming invasive procedure, Emergency department   visit, Upcoming dental procedure, Missed doses, Extra doses, Change in   medications, Change in diet/appetite, Hospital admission, Bruising, Other   complaints      Clinical Outcomes     Negatives:   Major bleeding event, Thromboembolic event,   Anticoagulation-related hospital admission, Anticoagulation-related ED   visit, Anticoagulation-related fatality        INR History:  Anticoagulation Monitoring 2019 2019 7/3/2019   INR 2.1 1.7 2.1   INR Date 2019 2019 7/3/2019   INR Goal 2.0-3.0 2.0-3.0 2.0-3.0   Trend Same Up Same   Last Week Total 12.5 mg 12.5 mg 13.75 mg   Next Week Total 12.5 mg 15 mg 13.75 mg   Sun 2.5 mg 2.5 mg 2.5 mg   Mon 1.25 mg 1.25 mg 1.25 mg   Tue 2.5 mg 2.5 mg 2.5 mg   Wed 1.25 mg 2.5 mg (); Otherwise 1.25 mg 1.25 mg   Thu 2.5 mg 2.5 mg 2.5 mg   Fri 1.25 mg 1.25 mg 1.25  mg   Sat 1.25 mg 2.5 mg 2.5 mg   Visit Report - - -   Some recent data might be hidden       Plan:  1. INR is therapeutic today- see above in Anticoagulation Summary.   Will instruct Diana Avalos to continue their warfarin regimen- see above in Anticoagulation Summary.  2. Follow up in 2 weeks.  3. Patient declines warfarin refills.  4. Verbal and written information provided. Patient expresses understanding and has no further questions at this time.    Ginny Mota

## 2019-07-17 ENCOUNTER — ANTICOAGULATION VISIT (OUTPATIENT)
Dept: PHARMACY | Facility: HOSPITAL | Age: 84
End: 2019-07-17

## 2019-07-17 LAB
INR PPP: 1.7 (ref 0.91–1.09)
PROTHROMBIN TIME: 20.9 SECONDS (ref 10–13.8)

## 2019-07-17 PROCEDURE — 36416 COLLJ CAPILLARY BLOOD SPEC: CPT

## 2019-07-17 PROCEDURE — 85610 PROTHROMBIN TIME: CPT

## 2019-07-17 NOTE — PROGRESS NOTES
Anticoagulation Clinic Progress Note    Anticoagulation Summary  As of 2019    INR goal:   2.0-3.0   TTR:   64.0 % (9.4 mo)   INR used for dosin.7! (2019)   Warfarin maintenance plan:   1.25 mg every Mon, Wed, Fri; 2.5 mg all other days   Weekly warfarin total:   13.75 mg   Plan last modified:   Jayant White RPH (2019)   Next INR check:   2019   Priority:   High   Target end date:   Indefinite    Indications    Long-term (current) use of anticoagulants (Resolved) [Z79.01]             Anticoagulation Episode Summary     INR check location:       Preferred lab:       Send INR reminders to:   [facility]    Comments:         Anticoagulation Care Providers     Provider Role Specialty Phone number    Rafaela Leija MD Referring Cardiology [phone]          Clinic Interview:  Patient Findings     Negatives:   Signs/symptoms of thrombosis, Signs/symptoms of bleeding,   Laboratory test error suspected, Change in health, Change in alcohol use,   Change in activity, Upcoming invasive procedure, Emergency department   visit, Upcoming dental procedure, Missed doses, Extra doses, Change in   medications, Change in diet/appetite, Hospital admission, Bruising, Other   complaints      Clinical Outcomes     Negatives:   Major bleeding event, Thromboembolic event,   Anticoagulation-related hospital admission, Anticoagulation-related ED   visit, Anticoagulation-related fatality        INR History:  Anticoagulation Monitoring 2019 7/3/2019 2019   INR 1.7 2.1 1.7   INR Date 2019 7/3/2019 2019   INR Goal 2.0-3.0 2.0-3.0 2.0-3.0   Trend Up Same Same   Last Week Total 12.5 mg 13.75 mg 13.75 mg   Next Week Total 15 mg 13.75 mg 15 mg   Sun 2.5 mg 2.5 mg 2.5 mg   Mon 1.25 mg 1.25 mg 1.25 mg   Tue 2.5 mg 2.5 mg 2.5 mg   Wed 2.5 mg (); Otherwise 1.25 mg 1.25 mg 2.5 mg (); Otherwise 1.25 mg   Thu 2.5 mg 2.5 mg 2.5 mg   Fri 1.25 mg 1.25 mg 1.25 mg   Sat 2.5 mg 2.5  mg 2.5 mg   Visit Report - - -   Some recent data might be hidden       Plan:  1. INR is out of range- see above in Anticoagulation Summary.   Will instruct Dinaa Avalos to Change  their warfarin regimen- see above in Anticoagulation Summary.  2. Follow up in 2 weeks.   3. Patient declines warfarin refills.  4. Verbal and written information provided. Patient expresses understanding and has no further questions at this time.    Tonya Sagastume

## 2019-07-31 ENCOUNTER — OFFICE VISIT (OUTPATIENT)
Dept: CARDIOLOGY | Facility: CLINIC | Age: 84
End: 2019-07-31

## 2019-07-31 ENCOUNTER — ANTICOAGULATION VISIT (OUTPATIENT)
Dept: PHARMACY | Facility: HOSPITAL | Age: 84
End: 2019-07-31

## 2019-07-31 ENCOUNTER — CLINICAL SUPPORT NO REQUIREMENTS (OUTPATIENT)
Dept: CARDIOLOGY | Facility: CLINIC | Age: 84
End: 2019-07-31

## 2019-07-31 VITALS
HEIGHT: 66 IN | BODY MASS INDEX: 24.43 KG/M2 | WEIGHT: 152 LBS | DIASTOLIC BLOOD PRESSURE: 76 MMHG | HEART RATE: 72 BPM | SYSTOLIC BLOOD PRESSURE: 132 MMHG

## 2019-07-31 DIAGNOSIS — Z95.0 CARDIAC RESYNCHRONIZATION THERAPY PACEMAKER (CRT-P) IN PLACE: ICD-10-CM

## 2019-07-31 DIAGNOSIS — Z95.0 CARDIAC PACEMAKER IN SITU: ICD-10-CM

## 2019-07-31 DIAGNOSIS — I47.20 VENTRICULAR TACHYCARDIA (HCC): ICD-10-CM

## 2019-07-31 DIAGNOSIS — I48.21 PERMANENT ATRIAL FIBRILLATION (HCC): Primary | ICD-10-CM

## 2019-07-31 DIAGNOSIS — I44.2 THIRD DEGREE AV BLOCK (HCC): ICD-10-CM

## 2019-07-31 DIAGNOSIS — I50.22 CHRONIC SYSTOLIC CONGESTIVE HEART FAILURE (HCC): Primary | ICD-10-CM

## 2019-07-31 LAB
INR PPP: 2.7 (ref 0.91–1.09)
PROTHROMBIN TIME: 32.8 SECONDS (ref 10–13.8)

## 2019-07-31 PROCEDURE — 85610 PROTHROMBIN TIME: CPT

## 2019-07-31 PROCEDURE — 36416 COLLJ CAPILLARY BLOOD SPEC: CPT

## 2019-07-31 PROCEDURE — 93000 ELECTROCARDIOGRAM COMPLETE: CPT | Performed by: INTERNAL MEDICINE

## 2019-07-31 PROCEDURE — 93280 PM DEVICE PROGR EVAL DUAL: CPT | Performed by: INTERNAL MEDICINE

## 2019-07-31 PROCEDURE — 99214 OFFICE O/P EST MOD 30 MIN: CPT | Performed by: INTERNAL MEDICINE

## 2019-07-31 NOTE — PROGRESS NOTES
Date of Office Visit: 2019  Encounter Provider: Leonardo David MD  Place of Service: [facility] CARDIOLOGY  Patient Name: Diana Avalos  : 1932    Subjective:     Encounter Date:2019      Patient ID: Diana Avalos is a 87 y.o. female who has a cc of  NICM and third degree av block and pacing induced CM and I did a crt -p in 2018 and before that her EF was 36%. This year her EF is now 63%    She is much improved clinically.   No anginal chest pain,   No sig rockwell,   No soa,   No fainting,  No orthostasis.   No edema.   Exercise tolerance: much improved.     There have been no hospital admission since the last visit.     There have been no bleeding events.       Past Medical History:   Diagnosis Date   • Acute on chronic systolic congestive heart failure (CMS/HCC)    • Anemia    • Arthritis    • Asthma    • Atrial fibrillation (CMS/HCC)     With a history of an atrial clot   • Breast cancer (CMS/HCC)     Left, stage IIB   • Disease of thyroid gland     Hypothyroidism   • Essential hypertension    • Hypothyroidism    • Irritable bowel    • Mitral regurgitation    • NICM (nonischemic cardiomyopathy) (CMS/HCC)    • On warfarin therapy    • Osteoporosis    • Pneumothorax 2018   • Pulmonary hypertension (CMS/HCC)     Resolved per echocardiogram 2019   • Tricuspid regurgitation    • Ventricular tachycardia (CMS/HCC) 2019    Nonsustained       Social History     Socioeconomic History   • Marital status:      Spouse name: Rick   • Number of children: 8   • Years of education: Not on file   • Highest education level: Not on file   Occupational History     Employer: RETIRED   Tobacco Use   • Smoking status: Never Smoker   • Smokeless tobacco: Never Used   • Tobacco comment: caffeine use-tea   Substance and Sexual Activity   • Alcohol use: Yes     Alcohol/week: 0.6 oz     Types: 1 Glasses of wine per week     Comment: social   • Drug use: No   • Sexual  "activity: Defer       Review of Systems   Constitution: Negative for fever and night sweats.   HENT: Negative for ear pain and stridor.    Eyes: Negative for discharge and visual halos.   Cardiovascular: Negative for cyanosis.   Respiratory: Negative for hemoptysis and sputum production.    Hematologic/Lymphatic: Negative for adenopathy.   Skin: Negative for nail changes and unusual hair distribution.   Musculoskeletal: Negative for gout and joint swelling.   Gastrointestinal: Negative for bowel incontinence and flatus.   Genitourinary: Negative for dysuria and flank pain.   Neurological: Negative for seizures and tremors.   Psychiatric/Behavioral: Negative for altered mental status. The patient is not nervous/anxious.             Objective:     Vitals:    07/31/19 1144   BP: 132/76   BP Location: Right arm   Patient Position: Sitting   Cuff Size: Adult   Pulse: 72   Weight: 68.9 kg (152 lb)   Height: 167.6 cm (66\")         Physical Exam   Constitutional: She is oriented to person, place, and time.   HENT:   Head: Normocephalic and atraumatic.   Eyes: Right eye exhibits no discharge. Left eye exhibits no discharge.   Neck: No JVD present. No thyromegaly present.   Cardiovascular: Normal rate and regular rhythm. Exam reveals no gallop and no friction rub.   No murmur heard.  Pulmonary/Chest: Effort normal and breath sounds normal. She has no rales.   Abdominal: Soft. Bowel sounds are normal. There is no tenderness.   Musculoskeletal: Normal range of motion. She exhibits no edema or deformity.   Neurological: She is alert and oriented to person, place, and time. She exhibits normal muscle tone.   Skin: Skin is warm and dry. No erythema.   Psychiatric: She has a normal mood and affect. Her behavior is normal. Thought content normal.         ECG 12 Lead  Date/Time: 7/31/2019 12:47 PM  Performed by: Leonardo David MD  Authorized by: Leonardo David MD   Comparison: compared with previous ECG   Similar to previous " ECG  Rhythm: paced    Clinical impression: abnormal EKG  Comments: Good crt             Lab Review:       Assessment:          Diagnosis Plan   1. Permanent atrial fibrillation (CMS/HCC)     2. Ventricular tachycardia (CMS/HCC)     3. Cardiac pacemaker in situ     4. Third degree AV block (CMS/HCC)     5. Cardiac resynchronization therapy pacemaker (CRT-P) in place            Plan:       Atrial Fibrillation and Atrial Flutter  Assessment  • The patient has permanent atrial fibrillation  • This is non-valvular in etiology  • The patient's CHADS2-VASc score is 4  • A [id number] score of 2 or more is considered a high risk for a thromboembolic event    Plan  • Continue in atrial fibrillation with rate control  • Continue warfarin for antithrombotic therapy, bleeding issues discussed    I reviewed the pacemaker/ICD tracings and the pacing and sensing parameters are normal.  AF burden is 100% --- the pacing vector is LV4 to 3 which is best by CXR. Great resynch on ECG too.     VT -- she's ahd some NSVT but is not symptomatic and she is on carvedilol.    I'd do the Thurman heart failure but she NO LONGER HAS HF since she is a super reponder to crt     EF now 63%!!!!!!!!!!!!!!!!!!

## 2019-07-31 NOTE — PROGRESS NOTES
I have supervised and reviewed the notes, assessments, and/or procedures performed by Asya Colby, PharmD Candidate. The documented assessment and plan were developed cooperatively. I concur with her documentation of this patient.

## 2019-07-31 NOTE — PROGRESS NOTES
Anticoagulation Clinic Progress Note    Anticoagulation Summary  As of 2019    INR goal:   2.0-3.0   TTR:   64.3 % (9.9 mo)   INR used for dosin.7 (2019)   Warfarin maintenance plan:   1.25 mg every Mon, Wed, Fri; 2.5 mg all other days   Weekly warfarin total:   13.75 mg   No change documented:   Asya Colby, Pharmacy Intern   Plan last modified:   Jayant White [facility] (2019)   Next INR check:   2019   Priority:   High   Target end date:   Indefinite    Indications    Long-term (current) use of anticoagulants (Resolved) [Z79.01]             Anticoagulation Episode Summary     INR check location:       Preferred lab:       Send INR reminders to:   [facility]    Comments:         Anticoagulation Care Providers     Provider Role Specialty Phone number    Rafaela Leija MD Referring Cardiology [phone]          Clinic Interview:  Patient Findings     Negatives:   Signs/symptoms of thrombosis, Signs/symptoms of bleeding,   Laboratory test error suspected, Change in health, Change in alcohol use,   Change in activity, Upcoming invasive procedure, Emergency department   visit, Upcoming dental procedure, Missed doses, Extra doses, Change in   medications, Change in diet/appetite, Hospital admission, Bruising, Other   complaints      Clinical Outcomes     Negatives:   Major bleeding event, Thromboembolic event,   Anticoagulation-related hospital admission, Anticoagulation-related ED   visit, Anticoagulation-related fatality        INR History:  Anticoagulation Monitoring 7/3/2019 2019 2019   INR 2.1 1.7 2.7   INR Date 7/3/2019 2019 2019   INR Goal 2.0-3.0 2.0-3.0 2.0-3.0   Trend Same Same Same   Last Week Total 13.75 mg 13.75 mg 13.75 mg   Next Week Total 13.75 mg 15 mg 13.75 mg   Sun 2.5 mg 2.5 mg 2.5 mg   Mon 1.25 mg 1.25 mg 1.25 mg   Tue 2.5 mg 2.5 mg 2.5 mg   Wed 1.25 mg 2.5 mg (); Otherwise 1.25 mg 1.25 mg   Thu 2.5 mg 2.5 mg 2.5 mg   Fri  1.25 mg 1.25 mg 1.25 mg   Sat 2.5 mg 2.5 mg 2.5 mg   Visit Report - - -   Some recent data might be hidden       Plan:  1. INR is therapeutic today- see above in Anticoagulation Summary.   Will instruct Diana Avalos to continue their warfarin regimen- see above in Anticoagulation Summary.  2. Follow up in 2 weeks.  3. Patient declines warfarin refills.  4. Verbal and written information provided. Patient expresses understanding and has no further questions at this time.    Asya Colby, Pharmacy Intern

## 2019-08-07 ENCOUNTER — CLINICAL SUPPORT NO REQUIREMENTS (OUTPATIENT)
Dept: CARDIOLOGY | Facility: CLINIC | Age: 84
End: 2019-08-07

## 2019-08-07 DIAGNOSIS — I48.20 CHRONIC ATRIAL FIBRILLATION (HCC): Primary | ICD-10-CM

## 2019-08-14 ENCOUNTER — ANTICOAGULATION VISIT (OUTPATIENT)
Dept: PHARMACY | Facility: HOSPITAL | Age: 84
End: 2019-08-14

## 2019-08-14 LAB
INR PPP: 2.6 (ref 0.91–1.09)
PROTHROMBIN TIME: 31.6 SECONDS (ref 10–13.8)

## 2019-08-14 PROCEDURE — 85610 PROTHROMBIN TIME: CPT

## 2019-08-14 PROCEDURE — 36416 COLLJ CAPILLARY BLOOD SPEC: CPT

## 2019-08-14 NOTE — PROGRESS NOTES
Anticoagulation Clinic Progress Note    Anticoagulation Summary  As of 2019    INR goal:   2.0-3.0   TTR:   65.9 % (10.4 mo)   INR used for dosin.6 (2019)   Warfarin maintenance plan:   1.25 mg every Mon, Wed, Fri; 2.5 mg all other days   Weekly warfarin total:   13.75 mg   No change documented:   Ginny Mota   Plan last modified:   Jayant White [facility] (2019)   Next INR check:   2019   Priority:   High   Target end date:   Indefinite    Indications    Long-term (current) use of anticoagulants (Resolved) [Z79.01]             Anticoagulation Episode Summary     INR check location:       Preferred lab:       Send INR reminders to:   [facility]    Comments:         Anticoagulation Care Providers     Provider Role Specialty Phone number    Rafaela Leija MD Referring Cardiology [phone]          Clinic Interview:  Patient Findings     Negatives:   Signs/symptoms of thrombosis, Signs/symptoms of bleeding,   Laboratory test error suspected, Change in health, Change in alcohol use,   Change in activity, Upcoming invasive procedure, Emergency department   visit, Upcoming dental procedure, Missed doses, Extra doses, Change in   medications, Change in diet/appetite, Hospital admission, Bruising, Other   complaints      Clinical Outcomes     Negatives:   Major bleeding event, Thromboembolic event,   Anticoagulation-related hospital admission, Anticoagulation-related ED   visit, Anticoagulation-related fatality        INR History:  Anticoagulation Monitoring 2019   INR 1.7 2.7 2.6   INR Date 2019   INR Goal 2.0-3.0 2.0-3.0 2.0-3.0   Trend Same Same Same   Last Week Total 13.75 mg 13.75 mg 13.75 mg   Next Week Total 15 mg 13.75 mg 13.75 mg   Sun 2.5 mg 2.5 mg 2.5 mg   Mon 1.25 mg 1.25 mg 1.25 mg   Tue 2.5 mg 2.5 mg 2.5 mg   Wed 2.5 mg (); Otherwise 1.25 mg 1.25 mg 1.25 mg   Thu 2.5 mg 2.5 mg 2.5 mg   Fri 1.25  mg 1.25 mg 1.25 mg   Sat 2.5 mg 2.5 mg 2.5 mg   Visit Report - - -   Some recent data might be hidden       Plan:  1. INR is therapeutic today- see above in Anticoagulation Summary.   Will instruct Diana Avalos to continue their warfarin regimen- see above in Anticoagulation Summary.  2. Follow up in 4 weeks.  3. Patient declines warfarin refills.  4. Verbal and written information provided. Patient expresses understanding and has no further questions at this time.    Ginny Mota

## 2019-09-05 RX ORDER — WARFARIN SODIUM 2.5 MG/1
TABLET ORAL
Qty: 80 TABLET | Refills: 0 | Status: SHIPPED | OUTPATIENT
Start: 2019-09-05 | End: 2020-01-03

## 2019-09-11 ENCOUNTER — ANTICOAGULATION VISIT (OUTPATIENT)
Dept: PHARMACY | Facility: HOSPITAL | Age: 84
End: 2019-09-11

## 2019-09-11 LAB
INR PPP: 2.8 (ref 0.91–1.09)
PROTHROMBIN TIME: 33.4 SECONDS (ref 10–13.8)

## 2019-09-11 PROCEDURE — 36416 COLLJ CAPILLARY BLOOD SPEC: CPT

## 2019-09-11 PROCEDURE — 85610 PROTHROMBIN TIME: CPT

## 2019-09-11 NOTE — PROGRESS NOTES
Anticoagulation Clinic Progress Note    Anticoagulation Summary  As of 2019    INR goal:   2.0-3.0   TTR:   68.7 % (11.3 mo)   INR used for dosin.8 (2019)   Warfarin maintenance plan:   1.25 mg every Mon, Wed, Fri; 2.5 mg all other days   Weekly warfarin total:   13.75 mg   No change documented:   Ginny Mota   Plan last modified:   Jayant White [facility] (2019)   Next INR check:   10/9/2019   Priority:   High   Target end date:   Indefinite    Indications    Long-term (current) use of anticoagulants (Resolved) [Z79.01]             Anticoagulation Episode Summary     INR check location:       Preferred lab:       Send INR reminders to:   [facility]    Comments:         Anticoagulation Care Providers     Provider Role Specialty Phone number    Rafaela Leija MD Referring Cardiology [phone]          Clinic Interview:  Patient Findings     Negatives:   Signs/symptoms of thrombosis, Signs/symptoms of bleeding,   Laboratory test error suspected, Change in health, Change in alcohol use,   Change in activity, Upcoming invasive procedure, Emergency department   visit, Upcoming dental procedure, Missed doses, Extra doses, Change in   medications, Change in diet/appetite, Hospital admission, Bruising, Other   complaints      Clinical Outcomes     Negatives:   Major bleeding event, Thromboembolic event,   Anticoagulation-related hospital admission, Anticoagulation-related ED   visit, Anticoagulation-related fatality        INR History:  Anticoagulation Monitoring 2019   INR 2.7 2.6 2.8   INR Date 2019   INR Goal 2.0-3.0 2.0-3.0 2.0-3.0   Trend Same Same Same   Last Week Total 13.75 mg 13.75 mg 13.75 mg   Next Week Total 13.75 mg 13.75 mg 13.75 mg   Sun 2.5 mg 2.5 mg 2.5 mg   Mon 1.25 mg 1.25 mg 1.25 mg   Tue 2.5 mg 2.5 mg 2.5 mg   Wed 1.25 mg 1.25 mg 1.25 mg   Thu 2.5 mg 2.5 mg 2.5 mg   Fri 1.25 mg 1.25 mg 1.25 mg    Sat 2.5 mg 2.5 mg 2.5 mg   Visit Report - - -   Some recent data might be hidden       Plan:  1. INR is therapeutic today- see above in Anticoagulation Summary.   Will instruct Diana Avalos to continue their warfarin regimen- see above in Anticoagulation Summary.  2. Follow up in 4 weeks.  3. Patient declines warfarin refills.  4. Verbal and written information provided. Patient expresses understanding and has no further questions at this time.    Ginny Mota

## 2019-09-16 NOTE — TELEPHONE ENCOUNTER
----- Message from Esme Leos sent at 1/14/2019 11:57 AM EST -----  Please call for an appt with Dr Belcher. Will give 2 months supply since she isn't really overdue. She was due last month for a 6 month MD and labs. Thanks Esme   Patient calling as well for refill request

## 2019-10-08 ENCOUNTER — ANTICOAGULATION VISIT (OUTPATIENT)
Dept: PHARMACY | Facility: HOSPITAL | Age: 84
End: 2019-10-08

## 2019-10-08 LAB
INR PPP: 3.4 (ref 0.91–1.09)
PROTHROMBIN TIME: 40.7 SECONDS (ref 10–13.8)

## 2019-10-08 PROCEDURE — 85610 PROTHROMBIN TIME: CPT

## 2019-10-08 PROCEDURE — G0463 HOSPITAL OUTPT CLINIC VISIT: HCPCS

## 2019-10-08 PROCEDURE — 36416 COLLJ CAPILLARY BLOOD SPEC: CPT

## 2019-10-08 NOTE — PROGRESS NOTES
Anticoagulation Clinic Progress Note    Anticoagulation Summary  As of 10/8/2019    INR goal:   2.0-3.0   TTR:   66.1 % (1 y)   INR used for dosing:   3.4! (10/8/2019)   Warfarin maintenance plan:   2.5 mg every Sun, Thu, Sat; 1.25 mg all other days   Weekly warfarin total:   12.5 mg   Plan last modified:   Eve Bauer RPH (10/8/2019)   Next INR check:   10/22/2019   Priority:   High   Target end date:   Indefinite    Indications    Long-term (current) use of anticoagulants (Resolved) [Z79.01]             Anticoagulation Episode Summary     INR check location:       Preferred lab:       Send INR reminders to:    [facility]    Comments:         Anticoagulation Care Providers     Provider Role Specialty Phone number    Rafaela Leija MD Referring Cardiology [phone]          Clinic Interview:  Patient Findings     Negatives:   Signs/symptoms of thrombosis, Signs/symptoms of bleeding,   Laboratory test error suspected, Change in health, Change in alcohol use,   Change in activity, Upcoming invasive procedure, Emergency department   visit, Upcoming dental procedure, Missed doses, Extra doses, Change in   medications, Change in diet/appetite, Hospital admission, Bruising, Other   complaints      Clinical Outcomes     Negatives:   Major bleeding event, Thromboembolic event,   Anticoagulation-related hospital admission, Anticoagulation-related ED   visit, Anticoagulation-related fatality        INR History:  Anticoagulation Monitoring 8/14/2019 9/11/2019 10/8/2019   INR 2.6 2.8 3.4   INR Date 8/14/2019 9/11/2019 10/8/2019   INR Goal 2.0-3.0 2.0-3.0 2.0-3.0   Trend Same Same Down   Last Week Total 13.75 mg 13.75 mg 13.75 mg   Next Week Total 13.75 mg 13.75 mg 12.5 mg   Sun 2.5 mg 2.5 mg 2.5 mg   Mon 1.25 mg 1.25 mg 1.25 mg   Tue 2.5 mg 2.5 mg 1.25 mg   Wed 1.25 mg 1.25 mg 1.25 mg   Thu 2.5 mg 2.5 mg 2.5 mg   Fri 1.25 mg 1.25 mg 1.25 mg   Sat 2.5 mg 2.5 mg 2.5 mg   Visit Report - - -   Some  recent data might be hidden       Plan:  1. INR is Supratherapeutic today- see above in Anticoagulation Summary.  Will instruct Diana Avalos to Change their warfarin regimen- see above in Anticoagulation Summary.  2. Follow up in 2 weeks  3. Patient declines warfarin refills.  4. Verbal and written information provided. Patient expresses understanding and has no further questions at this time.    Steffanie Yo, Pharmacy Intern

## 2019-10-08 NOTE — PROGRESS NOTES
I have supervised and reviewed the notes, assessments, and/or procedures performed by Steffanie Yo, PharmD Candidate. The documented assessment and plan were developed cooperatively, and the plan was implemented in my presence. I concur with her documentation of this patient.    Eve Bauer [facility]

## 2019-10-22 ENCOUNTER — APPOINTMENT (OUTPATIENT)
Dept: LAB | Facility: HOSPITAL | Age: 84
End: 2019-10-22

## 2019-10-22 ENCOUNTER — ANTICOAGULATION VISIT (OUTPATIENT)
Dept: PHARMACY | Facility: HOSPITAL | Age: 84
End: 2019-10-22

## 2019-10-22 DIAGNOSIS — I48.21 PERMANENT ATRIAL FIBRILLATION (HCC): Primary | ICD-10-CM

## 2019-10-22 LAB
INR PPP: 4.76 (ref 0.9–1.1)
INR PPP: 5.6 (ref 0.91–1.09)
PROTHROMBIN TIME: 44.5 SECONDS (ref 11.7–14.2)
PROTHROMBIN TIME: 66.8 SECONDS (ref 10–13.8)

## 2019-10-22 PROCEDURE — 36416 COLLJ CAPILLARY BLOOD SPEC: CPT

## 2019-10-22 PROCEDURE — 36415 COLL VENOUS BLD VENIPUNCTURE: CPT

## 2019-10-22 PROCEDURE — 85610 PROTHROMBIN TIME: CPT

## 2019-10-22 PROCEDURE — G0463 HOSPITAL OUTPT CLINIC VISIT: HCPCS

## 2019-10-22 RX ORDER — FUROSEMIDE 40 MG/1
TABLET ORAL
Qty: 90 TABLET | Refills: 1 | Status: SHIPPED | OUTPATIENT
Start: 2019-10-22 | End: 2020-04-27

## 2019-10-22 NOTE — PROGRESS NOTES
Anticoagulation Clinic Progress Note    Anticoagulation Summary  As of 10/22/2019    INR goal:   2.0-3.0   TTR:   63.7 % (1 y)   INR used for dosin.76! (10/22/2019)   Warfarin maintenance plan:   2.5 mg every Sun, Thu, Sat; 1.25 mg all other days   Weekly warfarin total:   12.5 mg   Plan last modified:   Eve Bauer RPH (10/8/2019)   Next INR check:   10/25/2019   Priority:   High   Target end date:   Indefinite    Indications    Long-term (current) use of anticoagulants (Resolved) [Z79.01]             Anticoagulation Episode Summary     INR check location:       Preferred lab:       Send INR reminders to:    [facility]    Comments:         Anticoagulation Care Providers     Provider Role Specialty Phone number    Rafaela Leija MD Referring Cardiology [phone]          Clinic Interview:      INR History:  Anticoagulation Monitoring 2019 10/8/2019 10/22/2019   INR 2.8 3.4 4.76   INR Date 2019 10/8/2019 10/22/2019   INR Goal 2.0-3.0 2.0-3.0 2.0-3.0   Trend Same Down Same   Last Week Total 13.75 mg 13.75 mg 12.5 mg   Next Week Total 13.75 mg 12.5 mg 10 mg   Sun 2.5 mg 2.5 mg -   Mon 1.25 mg 1.25 mg -   Tue 2.5 mg 1.25 mg Hold (10/22)   Wed 1.25 mg 1.25 mg Hold (10/23)   Thu 2.5 mg 2.5 mg 2.5 mg   Fri 1.25 mg 1.25 mg -   Sat 2.5 mg 2.5 mg -   Visit Report - - -   Some recent data might be hidden       Plan:  1. INR is Supratherapeutic today and was double checked with venous lab draw- see above in Anticoagulation Summary. Possibly addition of scheduled acetaminophen 1000mg twice daily might have contributed to elevated INR.  Usually acetaminophen ok at 2g/day or less but may be issue in this 88yo patient.  Will instruct Diana Avalos to HOLD warfarin x 2 days, then 2.5mg x 1d-- see above in Anticoagulation Summary.  2. Follow up in 4 days  3. Patient declines warfarin refills.  4. Verbal and written information provided. Patient expresses understanding and has no further  questions at this time.    Eve Bauer RPH

## 2019-10-25 ENCOUNTER — ANTICOAGULATION VISIT (OUTPATIENT)
Dept: PHARMACY | Facility: HOSPITAL | Age: 84
End: 2019-10-25

## 2019-10-25 LAB
INR PPP: 2.3 (ref 0.91–1.09)
PROTHROMBIN TIME: 27.4 SECONDS (ref 10–13.8)

## 2019-10-25 PROCEDURE — 85610 PROTHROMBIN TIME: CPT

## 2019-10-25 PROCEDURE — 36416 COLLJ CAPILLARY BLOOD SPEC: CPT

## 2019-10-25 NOTE — PROGRESS NOTES
Anticoagulation Clinic Progress Note    Anticoagulation Summary  As of 10/25/2019    INR goal:   2.0-3.0   TTR:   63.3 % (1 y)   INR used for dosin.3 (10/25/2019)   Warfarin maintenance plan:   2.5 mg every Sun, Thu, Sat; 1.25 mg all other days   Weekly warfarin total:   12.5 mg   No change documented:   Jayant White RPH   Plan last modified:   Eve Bauer RPH (10/8/2019)   Next INR check:   2019   Priority:   High   Target end date:   Indefinite    Indications    Long-term (current) use of anticoagulants (Resolved) [Z79.01]             Anticoagulation Episode Summary     INR check location:       Preferred lab:       Send INR reminders to:    [facility]    Comments:         Anticoagulation Care Providers     Provider Role Specialty Phone number    Rafaela Leija MD Referring Cardiology [phone]          Clinic Interview:  Patient Findings     Positives:   Change in medications    Negatives:   Signs/symptoms of thrombosis, Signs/symptoms of bleeding,   Laboratory test error suspected, Change in health, Change in alcohol use,   Change in activity, Upcoming invasive procedure, Emergency department   visit, Upcoming dental procedure, Missed doses, Extra doses, Change in   diet/appetite, Hospital admission, Bruising, Other complaints    Comments:   Decreased APAP from 1000 mg BID to 500 mg BID.      Clinical Outcomes     Negatives:   Major bleeding event, Thromboembolic event,   Anticoagulation-related hospital admission, Anticoagulation-related ED   visit, Anticoagulation-related fatality    Comments:   Decreased APAP from 1000 mg BID to 500 mg BID.        INR History:  Anticoagulation Monitoring 10/8/2019 10/22/2019 10/25/2019   INR 3.4 4.76 2.3   INR Date 10/8/2019 10/22/2019 10/25/2019   INR Goal 2.0-3.0 2.0-3.0 2.0-3.0   Trend Down Same Same   Last Week Total 13.75 mg 12.5 mg 10 mg   Next Week Total 12.5 mg 10 mg 12.5 mg   Sun 2.5 mg - 2.5 mg   Mon 1.25 mg - 1.25 mg   Tue  1.25 mg Hold (10/22) 1.25 mg   Wed 1.25 mg Hold (10/23) 1.25 mg   Thu 2.5 mg 2.5 mg 2.5 mg   Fri 1.25 mg - 1.25 mg   Sat 2.5 mg - 2.5 mg   Visit Report - - -   Some recent data might be hidden       Plan:  1. INR is Therapeutic today- see above in Anticoagulation Summary.  Will instruct Diana Avalos to Continue their warfarin regimen- see above in Anticoagulation Summary.  2. Follow up in 1 week  3. Patient declines warfarin refills.  4. Verbal and written information provided. Patient expresses understanding and has no further questions at this time.    Jayant White [facility]

## 2019-11-01 ENCOUNTER — ANTICOAGULATION VISIT (OUTPATIENT)
Dept: PHARMACY | Facility: HOSPITAL | Age: 84
End: 2019-11-01

## 2019-11-01 ENCOUNTER — CLINICAL SUPPORT NO REQUIREMENTS (OUTPATIENT)
Dept: CARDIOLOGY | Facility: CLINIC | Age: 84
End: 2019-11-01

## 2019-11-01 DIAGNOSIS — I50.9 CONGESTIVE HEART FAILURE, UNSPECIFIED HF CHRONICITY, UNSPECIFIED HEART FAILURE TYPE (HCC): Primary | ICD-10-CM

## 2019-11-01 LAB
INR PPP: 3.5 (ref 0.91–1.09)
PROTHROMBIN TIME: 42.3 SECONDS (ref 10–13.8)

## 2019-11-01 PROCEDURE — 93296 REM INTERROG EVL PM/IDS: CPT | Performed by: INTERNAL MEDICINE

## 2019-11-01 PROCEDURE — 93297 REM INTERROG DEV EVAL ICPMS: CPT | Performed by: INTERNAL MEDICINE

## 2019-11-01 PROCEDURE — 85610 PROTHROMBIN TIME: CPT

## 2019-11-01 PROCEDURE — 93294 REM INTERROG EVL PM/LDLS PM: CPT | Performed by: INTERNAL MEDICINE

## 2019-11-01 PROCEDURE — 36416 COLLJ CAPILLARY BLOOD SPEC: CPT

## 2019-11-01 PROCEDURE — G0463 HOSPITAL OUTPT CLINIC VISIT: HCPCS

## 2019-11-01 NOTE — PROGRESS NOTES
Anticoagulation Clinic Progress Note    Anticoagulation Summary  As of 11/1/2019    INR goal:   2.0-3.0   TTR:   63.3 % (1.1 y)   INR used for dosing:   3.5! (11/1/2019)   Warfarin maintenance plan:   2.5 mg every Sun, Thu; 1.25 mg all other days   Weekly warfarin total:   11.25 mg   Plan last modified:   Eve Bauer RPH (11/1/2019)   Next INR check:   11/8/2019   Priority:   High   Target end date:   Indefinite    Indications    Long-term (current) use of anticoagulants (Resolved) [Z79.01]             Anticoagulation Episode Summary     INR check location:       Preferred lab:       Send INR reminders to:    [facility]    Comments:         Anticoagulation Care Providers     Provider Role Specialty Phone number    Rafaela Leija MD Referring Cardiology [phone]          Clinic Interview:  Patient Findings     Negatives:   Signs/symptoms of thrombosis, Signs/symptoms of bleeding,   Laboratory test error suspected, Change in health, Change in alcohol use,   Change in activity, Upcoming invasive procedure, Emergency department   visit, Upcoming dental procedure, Missed doses, Extra doses, Change in   medications, Change in diet/appetite, Hospital admission, Bruising, Other   complaints      Clinical Outcomes     Negatives:   Major bleeding event, Thromboembolic event,   Anticoagulation-related hospital admission, Anticoagulation-related ED   visit, Anticoagulation-related fatality        INR History:  Anticoagulation Monitoring 10/22/2019 10/25/2019 11/1/2019   INR 4.76 2.3 3.5   INR Date 10/22/2019 10/25/2019 11/1/2019   INR Goal 2.0-3.0 2.0-3.0 2.0-3.0   Trend Same Same Down   Last Week Total 12.5 mg 10 mg 12.5 mg   Next Week Total 10 mg 12.5 mg 10 mg   Sun - 2.5 mg 2.5 mg   Mon - 1.25 mg 1.25 mg   Tue Hold (10/22) 1.25 mg 1.25 mg   Wed Hold (10/23) 1.25 mg 1.25 mg   Thu 2.5 mg 2.5 mg 2.5 mg   Fri - 1.25 mg Hold (11/1)   Sat - 2.5 mg 1.25 mg   Visit Report - - -   Some recent data might  be hidden       Plan:  1. INR is Supratherapeutic today- see above in Anticoagulation Summary.  Will instruct Diana Avalos to HOLD warfarin today, then decrease their warfarin regimen- see above in Anticoagulation Summary.  2. Follow up in 1 week  3. Patient declines warfarin refills.  4. Verbal and written information provided. Patient expresses understanding and has no further questions at this time.    Eve Bauer [facility]

## 2019-11-04 ENCOUNTER — LAB (OUTPATIENT)
Dept: LAB | Facility: HOSPITAL | Age: 84
End: 2019-11-04

## 2019-11-04 ENCOUNTER — OFFICE VISIT (OUTPATIENT)
Dept: ONCOLOGY | Facility: CLINIC | Age: 84
End: 2019-11-04

## 2019-11-04 VITALS
HEART RATE: 85 BPM | SYSTOLIC BLOOD PRESSURE: 145 MMHG | OXYGEN SATURATION: 97 % | HEIGHT: 66 IN | WEIGHT: 152.5 LBS | BODY MASS INDEX: 24.51 KG/M2 | RESPIRATION RATE: 16 BRPM | DIASTOLIC BLOOD PRESSURE: 85 MMHG | TEMPERATURE: 97.8 F

## 2019-11-04 DIAGNOSIS — Z17.0 MALIGNANT NEOPLASM OF CENTRAL PORTION OF LEFT BREAST IN FEMALE, ESTROGEN RECEPTOR POSITIVE (HCC): Primary | ICD-10-CM

## 2019-11-04 DIAGNOSIS — C50.112 MALIGNANT NEOPLASM OF CENTRAL PORTION OF LEFT BREAST IN FEMALE, ESTROGEN RECEPTOR POSITIVE (HCC): Primary | ICD-10-CM

## 2019-11-04 LAB
ALBUMIN SERPL-MCNC: 3.9 G/DL (ref 3.5–5.2)
ALBUMIN/GLOB SERPL: 1.3 G/DL (ref 1.1–2.4)
ALP SERPL-CCNC: 79 U/L (ref 38–116)
ALT SERPL W P-5'-P-CCNC: 16 U/L (ref 0–33)
ANION GAP SERPL CALCULATED.3IONS-SCNC: 12.2 MMOL/L (ref 5–15)
AST SERPL-CCNC: 23 U/L (ref 0–32)
BASOPHILS # BLD AUTO: 0.05 10*3/MM3 (ref 0–0.2)
BASOPHILS NFR BLD AUTO: 0.9 % (ref 0–1.5)
BILIRUB SERPL-MCNC: 0.8 MG/DL (ref 0.2–1.2)
BUN BLD-MCNC: 15 MG/DL (ref 6–20)
BUN/CREAT SERPL: 16.9 (ref 7.3–30)
CALCIUM SPEC-SCNC: 9.5 MG/DL (ref 8.5–10.2)
CHLORIDE SERPL-SCNC: 102 MMOL/L (ref 98–107)
CO2 SERPL-SCNC: 29.8 MMOL/L (ref 22–29)
CREAT BLD-MCNC: 0.89 MG/DL (ref 0.6–1.1)
DEPRECATED RDW RBC AUTO: 50.4 FL (ref 37–54)
EOSINOPHIL # BLD AUTO: 0.08 10*3/MM3 (ref 0–0.4)
EOSINOPHIL NFR BLD AUTO: 1.5 % (ref 0.3–6.2)
ERYTHROCYTE [DISTWIDTH] IN BLOOD BY AUTOMATED COUNT: 13.9 % (ref 12.3–15.4)
GFR SERPL CREATININE-BSD FRML MDRD: 60 ML/MIN/1.73
GLOBULIN UR ELPH-MCNC: 3.1 GM/DL (ref 1.8–3.5)
GLUCOSE BLD-MCNC: 118 MG/DL (ref 74–124)
HCT VFR BLD AUTO: 41.8 % (ref 34–46.6)
HGB BLD-MCNC: 13.6 G/DL (ref 12–15.9)
IMM GRANULOCYTES # BLD AUTO: 0.02 10*3/MM3 (ref 0–0.05)
IMM GRANULOCYTES NFR BLD AUTO: 0.4 % (ref 0–0.5)
LYMPHOCYTES # BLD AUTO: 1.37 10*3/MM3 (ref 0.7–3.1)
LYMPHOCYTES NFR BLD AUTO: 25.2 % (ref 19.6–45.3)
MCH RBC QN AUTO: 31.9 PG (ref 26.6–33)
MCHC RBC AUTO-ENTMCNC: 32.5 G/DL (ref 31.5–35.7)
MCV RBC AUTO: 97.9 FL (ref 79–97)
MONOCYTES # BLD AUTO: 0.88 10*3/MM3 (ref 0.1–0.9)
MONOCYTES NFR BLD AUTO: 16.2 % (ref 5–12)
NEUTROPHILS # BLD AUTO: 3.03 10*3/MM3 (ref 1.7–7)
NEUTROPHILS NFR BLD AUTO: 55.8 % (ref 42.7–76)
NRBC BLD AUTO-RTO: 0 /100 WBC (ref 0–0.2)
PLATELET # BLD AUTO: 196 10*3/MM3 (ref 140–450)
PMV BLD AUTO: 9.5 FL (ref 6–12)
POTASSIUM BLD-SCNC: 4.6 MMOL/L (ref 3.5–4.7)
PROT SERPL-MCNC: 7 G/DL (ref 6.3–8)
RBC # BLD AUTO: 4.27 10*6/MM3 (ref 3.77–5.28)
SODIUM BLD-SCNC: 144 MMOL/L (ref 134–145)
WBC NRBC COR # BLD: 5.43 10*3/MM3 (ref 3.4–10.8)

## 2019-11-04 PROCEDURE — 80053 COMPREHEN METABOLIC PANEL: CPT

## 2019-11-04 PROCEDURE — G0463 HOSPITAL OUTPT CLINIC VISIT: HCPCS | Performed by: INTERNAL MEDICINE

## 2019-11-04 PROCEDURE — 99215 OFFICE O/P EST HI 40 MIN: CPT | Performed by: INTERNAL MEDICINE

## 2019-11-04 PROCEDURE — 36415 COLL VENOUS BLD VENIPUNCTURE: CPT

## 2019-11-04 PROCEDURE — 85025 COMPLETE CBC W/AUTO DIFF WBC: CPT

## 2019-11-04 NOTE — PROGRESS NOTES
Subjective .     REASONS FOR FOLLOWUP:  Breast cancer    HISTORY OF PRESENT ILLNESS:  The patient is a 87 y.o. year old female  who is here for follow-up with the above-mentioned history.    Denies hot flashes.  No change in chronic dyspnea on exertion which is mild.  Denies nausea.  Eating well.  Denies pain.    Past Medical History:   Diagnosis Date   • Acute on chronic systolic congestive heart failure (CMS/HCC)    • Anemia    • Arthritis    • Asthma    • Atrial fibrillation (CMS/HCC)     With a history of an atrial clot   • Breast cancer (CMS/HCC)     Left, stage IIB   • Disease of thyroid gland     Hypothyroidism   • Essential hypertension    • Hypothyroidism    • Irritable bowel    • Mitral regurgitation    • NICM (nonischemic cardiomyopathy) (CMS/HCC)    • On warfarin therapy    • Osteoporosis    • Pneumothorax 6/14/2018   • Pulmonary hypertension (CMS/HCC)     Resolved per echocardiogram 1/2019   • Tricuspid regurgitation    • Ventricular tachycardia (CMS/HCC) 01/17/2019    Nonsustained       HEMATOLOGIC/ONCOLOGIC HISTORY:  (History from previous dates can be found in the separate document.)    MEDICATIONS    Current Outpatient Medications:   •  acetaminophen (TYLENOL) 325 MG tablet, Take 2 tablets by mouth Every 6 (Six) Hours As Needed for Mild Pain ., Disp: , Rfl:   •  anastrozole (ARIMIDEX) 1 MG tablet, TAKE ONE TABLET BY MOUTH DAILY, Disp: 30 tablet, Rfl: 7  •  carvedilol (COREG) 6.25 MG tablet, Take 1 tablet by mouth 2 (Two) Times a Day With Meals., Disp: 180 tablet, Rfl: 2  •  furosemide (LASIX) 40 MG tablet, TAKE ONE TABLET BY MOUTH DAILY, Disp: 90 tablet, Rfl: 1  •  levothyroxine (SYNTHROID, LEVOTHROID) 88 MCG tablet, Take 88 mcg by mouth Daily., Disp: , Rfl:   •  nitroglycerin (NITROSTAT) 0.4 MG SL tablet, Place 1 tablet under the tongue Every 5 (Five) Minutes As Needed for Chest Pain for up to 2 doses. Take no more than 3 doses in 15 minutes. (Patient taking differently: Place 0.4 mg under the  tongue As Needed for Chest Pain. Take no more than 3 doses in 15 minutes.), Disp: 5 tablet, Rfl: 0  •  vitamin B-12 (CYANOCOBALAMIN) 1000 MCG tablet, Take 1,000 mcg by mouth Daily., Disp: , Rfl:   •  warfarin (COUMADIN) 2.5 MG tablet, TAKE ONE-HALF TABLET BY MOUTH EVERY MONDAY, WEDNESDAY,  AND FRIDAY TAKE ONE TABLET ALL OTHER DAYS OR AS DIRECTED, Disp: 80 tablet, Rfl: 0  •  HYDROcodone-acetaminophen (NORCO) 5-325 MG per tablet, Take 0.5-1 tablets by mouth Every 6 (Six) Hours As Needed for Moderate Pain ., Disp: 8 tablet, Rfl: 0    ALLERGIES:     Allergies   Allergen Reactions   • Iodinated Diagnostic Agents Hives   • Amiodarone Nausea Only   • Codeine Nausea Only   • Dabigatran Etexilate Mesylate Nausea Only   • Digoxin Nausea Only       SOCIAL HISTORY:       Social History     Socioeconomic History   • Marital status:      Spouse name: Rick   • Number of children: 8   • Years of education: Not on file   • Highest education level: Not on file   Occupational History     Employer: RETIRED   Tobacco Use   • Smoking status: Never Smoker   • Smokeless tobacco: Never Used   • Tobacco comment: caffeine use-tea   Substance and Sexual Activity   • Alcohol use: Yes     Alcohol/week: 0.6 oz     Types: 1 Glasses of wine per week     Comment: social   • Drug use: No   • Sexual activity: Defer         FAMILY HISTORY:  Family History   Problem Relation Age of Onset   • Colon cancer Mother 75   • Colon cancer Sister 79   • Hypertension Sister    • Cholelithiasis Sister         2 sisters       REVIEW OF SYSTEMS:  Review of Systems   Constitutional: Negative for activity change.   HENT: Negative for nosebleeds and trouble swallowing.    Respiratory: Negative for shortness of breath and wheezing.    Cardiovascular: Negative for chest pain and palpitations.   Gastrointestinal: Negative for constipation, diarrhea and nausea.   Genitourinary: Negative for dysuria and hematuria.   Musculoskeletal: Negative for arthralgias and  "myalgias.   Skin: Negative for rash and wound.   Neurological: Negative for seizures and syncope.   Hematological: Negative for adenopathy. Does not bruise/bleed easily.   Psychiatric/Behavioral: Negative for confusion.         Objective    Vitals:    11/04/19 1435   BP: 145/85   Pulse: 85   Resp: 16   Temp: 97.8 °F (36.6 °C)   SpO2: 97%   Weight: 69.2 kg (152 lb 8 oz)   Height: 167.6 cm (65.98\")   PainSc: 0-No pain     Current Status 11/4/2019   ECOG score 0      PHYSICAL EXAM:    CONSTITUTIONAL:  Vital signs reviewed.  No distress, looks comfortable.  EYES:  Conjunctiva and lids unremarkable.  PERRLA  EARS,NOSE,MOUTH,THROAT:  Ears and nose appear unremarkable.  Lips, teeth, gums appear unremarkable.  RESPIRATORY:  Normal respiratory effort.  Lungs clear to auscultation bilaterally.  CARDIOVASCULAR:  Normal S1, S2.  No murmurs rubs or gallops.  No significant lower extremity edema.  BREAST: no masses by palpation or inspection   GASTROINTESTINAL: Abdomen appears unremarkable.  Nontender.  No hepatomegaly.  No splenomegaly.  LYMPHATIC:  No cervical, supraclavicular, axillary lymphadenopathy.  SKIN:  Warm.  No rashes.  PSYCHIATRIC:  Normal judgment and insight.  Normal mood and affect.    RECENT LABS:        WBC   Date/Time Value Ref Range Status   11/04/2019 02:20 PM 5.43 3.40 - 10.80 10*3/mm3 Final     Hemoglobin   Date/Time Value Ref Range Status   11/04/2019 02:20 PM 13.6 12.0 - 15.9 g/dL Final     Platelets   Date/Time Value Ref Range Status   11/04/2019 02:20  140 - 450 10*3/mm3 Final       Assessment/Plan     ASSESSMENT:  Problem List Items Addressed This Visit        Unprioritized    Malignant neoplasm of central portion of left breast in female, estrogen receptor positive (CMS/HCC) - Primary    Relevant Orders    CBC & Differential         * [id number] (stage IIB) left breast cancer, 5.1 cm, grade 2.  Left mastectomy by Dr. Clifton.  · Tamoxifen 10/31/14-11/3/15.  Stopped due to altered taste with no desire " to eat.  Arimidex started 1/1/16.  ·   Arimidex through a planned 10/31/19 (did not tolerate tamoxifen due to altered taste with lack of desire to eat.  Has significant osteoporosis)   · I discussed with patient and daughter we could continue her Arimidex beyond 5 years as this is becoming a more accepted thing to do for larger tumors or node positive tumors.  Her tumor was 5.1 cm.  However, this may contribute to osteoporosis.  After much discussion, patient absolutely wants to stop Arimidex which I think is reasonable.  Case also discussed with Dr. Arellano of our practice.  He agrees with stopping Arimidex as well.  I agree with this also.  Remission continues.    *Atrial fibrillation with a history of an atrial clot for which she is on Coumadin through other MDs    *Osteopenia. Dr. Tinsley, her primary care physician, recommended Reclast annually 5 mg IV.  We administered while on Arimidex.  She is on calcium and vitamin D.  Last DEXA 11/19/18: Osteopenia.  Worst T score -2.3, lumbar spine.  Overall, not much change from 11/9/16.    *Last visit with me was 5/22/18.  She was supposed to return in 6 months but did not.  Last Reclast was 5/22/18.    *Macrocytosis.  Mild.  Monitor.  Hb normal.  MCV 97.9    PLAN:   · Stop Arimidex (she completed 5 years of hormonal therapy)  · MD CBC 1 year  · We will not order any more DEXA scans since she is stopping her Arimidex.  · We will not order any more Reclast since she is stopping her Arimidex.  · Defer further monitoring and treatment of osteopenia to PCP, now that she is off her Arimidex.  · Last mammogram, 4/9/2019, BI-RADS 1      Daughter assisted with history.  For this visit I re-reviewed her last DEXA scan.    Send this note to Dr. Brand, pcp

## 2019-11-08 ENCOUNTER — ANTICOAGULATION VISIT (OUTPATIENT)
Dept: PHARMACY | Facility: HOSPITAL | Age: 84
End: 2019-11-08

## 2019-11-08 LAB
INR PPP: 1.9 (ref 0.91–1.09)
PROTHROMBIN TIME: 22.8 SECONDS (ref 10–13.8)

## 2019-11-08 PROCEDURE — 85610 PROTHROMBIN TIME: CPT

## 2019-11-08 PROCEDURE — 36416 COLLJ CAPILLARY BLOOD SPEC: CPT

## 2019-11-08 NOTE — PROGRESS NOTES
Anticoagulation Clinic Progress Note    Anticoagulation Summary  As of 2019    INR goal:   2.0-3.0   TTR:   63.2 % (1.1 y)   INR used for dosin.9! (2019)   Warfarin maintenance plan:   2.5 mg every Sun, Thu; 1.25 mg all other days   Weekly warfarin total:   11.25 mg   No change documented:   Tonya Sagastume   Plan last modified:   Eve Bauer RPH (2019)   Next INR check:   2019   Priority:   High   Target end date:   Indefinite    Indications    Long-term (current) use of anticoagulants (Resolved) [Z79.01]             Anticoagulation Episode Summary     INR check location:       Preferred lab:       Send INR reminders to:   [facility]    Comments:         Anticoagulation Care Providers     Provider Role Specialty Phone number    Rafaela Leija MD Referring Cardiology [phone]          Clinic Interview:  Patient Findings     Negatives:   Signs/symptoms of thrombosis, Signs/symptoms of bleeding,   Laboratory test error suspected, Change in health, Change in alcohol use,   Change in activity, Upcoming invasive procedure, Emergency department   visit, Upcoming dental procedure, Missed doses, Extra doses, Change in   medications, Change in diet/appetite, Hospital admission, Bruising, Other   complaints      Clinical Outcomes     Negatives:   Major bleeding event, Thromboembolic event,   Anticoagulation-related hospital admission, Anticoagulation-related ED   visit, Anticoagulation-related fatality        INR History:  Anticoagulation Monitoring 10/25/2019 2019 2019   INR 2.3 3.5 1.9   INR Date 10/25/2019 2019 2019   INR Goal 2.0-3.0 2.0-3.0 2.0-3.0   Trend Same Down Same   Last Week Total 10 mg 12.5 mg 10 mg   Next Week Total 12.5 mg 10 mg 11.25 mg   Sun 2.5 mg 2.5 mg 2.5 mg   Mon 1.25 mg 1.25 mg 1.25 mg   Tue 1.25 mg 1.25 mg 1.25 mg   Wed 1.25 mg 1.25 mg 1.25 mg   Thu 2.5 mg 2.5 mg 2.5 mg   Fri 1.25 mg Hold () 1.25 mg   Sat 2.5 mg 1.25  mg 1.25 mg   Visit Report - - -   Some recent data might be hidden       Plan:  1. INR is out of range- see above in Anticoagulation Summary.   Will instruct Diana Avalos to Continue  their warfarin regimen- see above in Anticoagulation Summary.  2. Follow up in 2 weeks.   3. Patient declines warfarin refills.  4. Verbal and written information provided. Patient expresses understanding and has no further questions at this time.    Tonya Sagastume

## 2019-11-22 ENCOUNTER — OFFICE VISIT (OUTPATIENT)
Dept: CARDIOLOGY | Facility: CLINIC | Age: 84
End: 2019-11-22

## 2019-11-22 ENCOUNTER — ANTICOAGULATION VISIT (OUTPATIENT)
Dept: PHARMACY | Facility: HOSPITAL | Age: 84
End: 2019-11-22

## 2019-11-22 VITALS
SYSTOLIC BLOOD PRESSURE: 130 MMHG | DIASTOLIC BLOOD PRESSURE: 70 MMHG | WEIGHT: 150.4 LBS | BODY MASS INDEX: 24.17 KG/M2 | HEIGHT: 66 IN | HEART RATE: 78 BPM

## 2019-11-22 DIAGNOSIS — I48.21 PERMANENT ATRIAL FIBRILLATION (HCC): Primary | ICD-10-CM

## 2019-11-22 DIAGNOSIS — I47.29 NSVT (NONSUSTAINED VENTRICULAR TACHYCARDIA) (HCC): ICD-10-CM

## 2019-11-22 DIAGNOSIS — I10 ESSENTIAL HYPERTENSION: ICD-10-CM

## 2019-11-22 DIAGNOSIS — I27.20 PULMONARY HYPERTENSION (HCC): ICD-10-CM

## 2019-11-22 DIAGNOSIS — I38 VALVULAR HEART DISEASE: ICD-10-CM

## 2019-11-22 LAB
INR PPP: 3.1 (ref 0.91–1.09)
PROTHROMBIN TIME: 37.5 SECONDS (ref 10–13.8)

## 2019-11-22 PROCEDURE — 99214 OFFICE O/P EST MOD 30 MIN: CPT | Performed by: NURSE PRACTITIONER

## 2019-11-22 PROCEDURE — 36416 COLLJ CAPILLARY BLOOD SPEC: CPT

## 2019-11-22 PROCEDURE — 85610 PROTHROMBIN TIME: CPT

## 2019-11-22 PROCEDURE — 93000 ELECTROCARDIOGRAM COMPLETE: CPT | Performed by: NURSE PRACTITIONER

## 2019-11-22 PROCEDURE — G0463 HOSPITAL OUTPT CLINIC VISIT: HCPCS

## 2019-11-22 NOTE — PROGRESS NOTES
Date of Office Visit: 2019  Encounter Provider: KIKA Vidal  Place of Service: [facility] CARDIOLOGY  Patient Name: Diana Avalos  :1932    Chief Complaint   Patient presents with   • Congestive Heart Failure   • Atrial Fibrillation   • Follow-up   :     HPI: Diana Avalos is a 88 y.o. female who presents today for six month cardiac follow up.     In 2011, she was diagnosed with atrial fibrillation and a rate control strategy and anticoagulation were elected.  She was placed on amiodarone, digoxin, and dabigatran. In 2012, she underwent AV node ablation and single-chamber Medtronic pacemaker. In 2012, she was hospitalized and noted to have a reduced ejection fraction of 35-40%, mitral insufficiency, and pulmonary artery hypertension.      In 2018, she was admitted with chronic systolic heart failure. Echocardiogram at that time revealed an EF of 36%, moderate LVH, marked dysynchrony of the septum and chronic apex to the RV pacing, moderate to severe mitral insufficiency, moderate to severe tricuspid insufficiency, and moderate pulmonary hypertension.  She underwent upgrade to CRT-D and developed a pneumothorax postoperatively requiring a chest tube placement.  Her pneumothorax resolved.     In 2018, she presented to the emergency department with shortness of breath.  There were no abnormalities on her blood work except for pro BNP elevated at  and chest x-ray was normal.  She followed up with me and continued to experience dyspnea upon exertion/talking, PND, and orthopnea. I increased her furosemide to 40 mg a day.     In 2018 she had an echocardiogram which revealed an EF of 40%, right ventricle mild to moderately dilated, left atrium moderately dilated, right atrium severely dilated, mild mitral valve regurgitation, severe tricuspid valve regurgitation, and RVSP 49 mmHg.  She had a cardiac PET scan which  "showed an EF of 54% and no evidence of ischemia. She followed up with Dr. Leija in October and she decreased her carvedilol to 3.125 mg twice daily because of fatigue.    In January 2019, she presented to [facility] with chest pain and dyspnea. She ruled out for myocardial infarction.  Repeat echocardiogram showed improvement of her ejection fraction to 63%, mild concentric hypertrophy, grade 2 diastolic dysfunction, left atrium volume severely increased, right atrium moderately to severely dilated, mild to moderate mitral valve regurgitation, mild tricuspid valve regurgitation, and RVSP normal. She was recommended to increase her carvedilol to 6.25 mg twice a day.    In May 2019, she followed up with Dr. Leija and no changes were made.  In July 2019, she followed up by Dr. Leonardo David and was doing well at that time.    She presents today for his 6-month follow-up visit with her daughter accompanying her.  She has experienced dyspnea and feels that it may have worsened \"a little\".  She denies chest pain, PND, orthopnea, cough, edema, palpitations, dizziness, syncope, or bleeding.  Her blood pressure and heart rate are both normal today.  Her INRs are followed at the [facility]. I reviewed her recent blood work from 11/4/2019 which included a CBC which showed a normal hemoglobin and hematocrit.  CMP normal except for CO2 mildly elevated at 29.8.  Kidney function normal.    Past Medical History:   Diagnosis Date   • Acute on chronic systolic congestive heart failure (CMS/HCC)    • Anemia    • Arthritis    • Asthma    • Atrial fibrillation (CMS/HCC)     With a history of an atrial clot   • Breast cancer (CMS/HCC)     Left, stage IIB   • Disease of thyroid gland     Hypothyroidism   • Essential hypertension    • Hypothyroidism    • Irritable bowel    • Mitral regurgitation    • NICM (nonischemic cardiomyopathy) (CMS/HCC)    • On warfarin therapy    • Osteoporosis    • Pneumothorax " 6/14/2018   • Pulmonary hypertension (CMS/HCC)     Resolved per echocardiogram 1/2019   • Third degree AV block (CMS/HCC)    • Tricuspid regurgitation    • Ventricular tachycardia (CMS/HCC) 01/17/2019    Nonsustained       Past Surgical History:   Procedure Laterality Date   • CARDIAC ELECTROPHYSIOLOGY PROCEDURE N/A 6/12/2018    Procedure: Device Upgrade-Upgrade to CRT-P-BIV Pacamaker Medtronic Has existing single chamber Medtronic pacemaker;  Surgeon: Leonardo David MD;  Location: [facility] INVASIVE LOCATION;  Service: Cardiovascular   • CHOLECYSTECTOMY  2000   • EYE SURGERY  2005    cataracts, Dr. Miramontes   • MASTECTOMY Left 10/2014   • PACEMAKER IMPLANTATION  2012    Dr. Leija       Social History     Social History   • Marital status:      Spouse name: Rick   • Number of children: 8   • Years of education: N/A     Occupational History   •  Retired     Social History Main Topics   • Smoking status: Never Smoker   • Smokeless tobacco: Never Used      Comment: caffeine use-tea   • Alcohol use 0.6 oz/week     1 Glasses of wine per week      Comment: social   • Drug use: No   • Sexual activity: Not on file       Family History   Problem Relation Age of Onset   • Colon cancer Mother 75   • Colon cancer Sister 79   • Hypertension Sister    • Cholelithiasis Sister         2 sisters       Review of Systems   Constitution: Negative for chills, diaphoresis, fever, malaise/fatigue, night sweats, weight gain and weight loss.   HENT: Negative for hearing loss, nosebleeds, sore throat and tinnitus.    Eyes: Negative for blurred vision, double vision, pain and visual disturbance.   Cardiovascular: Positive for dyspnea on exertion. Negative for chest pain, claudication, cyanosis, irregular heartbeat, leg swelling, near-syncope, orthopnea, palpitations, paroxysmal nocturnal dyspnea and syncope.   Respiratory: Positive for shortness of breath. Negative for cough, hemoptysis, snoring and wheezing.    Endocrine:  Negative for cold intolerance, heat intolerance and polyuria.   Hematologic/Lymphatic: Negative for bleeding problem. Does not bruise/bleed easily.   Skin: Negative for color change, dry skin, flushing and itching.   Musculoskeletal: Positive for joint pain. Negative for falls, joint swelling, muscle cramps, muscle weakness and myalgias.   Gastrointestinal: Negative for abdominal pain, constipation, heartburn, melena, nausea and vomiting.   Genitourinary: Negative for dysuria and hematuria.   Neurological: Negative for dizziness, headaches, light-headedness, loss of balance, numbness, paresthesias, seizures and vertigo.   Psychiatric/Behavioral: Negative for altered mental status, depression, memory loss and substance abuse. The patient does not have insomnia and is not nervous/anxious.    Allergic/Immunologic: Negative for environmental allergies.       Allergies   Allergen Reactions   • Iodinated Diagnostic Agents Hives   • Amiodarone Nausea Only   • Codeine Nausea Only   • Dabigatran Etexilate Mesylate Nausea Only   • Digoxin Nausea Only         Current Outpatient Medications:   •  acetaminophen (TYLENOL) 325 MG tablet, Take 2 tablets by mouth Every 6 (Six) Hours As Needed for Mild Pain ., Disp: , Rfl:   •  anastrozole (ARIMIDEX) 1 MG tablet, TAKE ONE TABLET BY MOUTH DAILY, Disp: 30 tablet, Rfl: 7  •  levothyroxine (SYNTHROID, LEVOTHROID) 88 MCG tablet, Take 88 mcg by mouth Daily., Disp: , Rfl:   •  nitroglycerin (NITROSTAT) 0.4 MG SL tablet, Place 1 tablet under the tongue Every 5 (Five) Minutes As Needed for Chest Pain for up to 2 doses. Take no more than 3 doses in 15 minutes. (Patient taking differently: Place 0.4 mg under the tongue As Needed for Chest Pain. Take no more than 3 doses in 15 minutes.), Disp: 5 tablet, Rfl: 0  •  carvedilol (COREG) 6.25 MG tablet, Take 1 tablet by mouth 2 (Two) Times a Day With Meals. Patient must contact the office for an appointment prior to further refills. 9/29/20, Disp:  "180 tablet, Rfl: 0  •  furosemide (LASIX) 40 MG tablet, TAKE ONE TABLET BY MOUTH DAILY, Disp: 90 tablet, Rfl: 0  •  warfarin (COUMADIN) 2.5 MG tablet, Take one tablet (2.5 mg) by mouth Mon, Wed, and Fri, and take one-half tablet (1.25 mg) by mouth all other days or as directed., Disp: 65 tablet, Rfl: 1      Objective:     Vitals:    11/22/19 1457   BP: 130/70   BP Location: Right arm   Pulse: 78   Weight: 68.2 kg (150 lb 6.4 oz)   Height: 167.6 cm (66\")     Body mass index is 24.28 kg/m².    PHYSICAL EXAM:    Vitals Reviewed.   General Appearance: No acute distress, well developed and well nourished.   Eyes: Conjunctiva and lids: No erythema, swelling, or discharge. Sclera non-icteric.  Glasses.  HENT: Atraumatic, normocephalic. External eyes, ears, and nose normal. No hearing loss noted. Mucous membranes normal. Lips not cyanotic. Neck supple with no tenderness.  Respiratory: No signs of respiratory distress. Respiration rhythm and depth normal.   Clear to auscultation. No rales, crackles, rhonchi, or wheezing auscultated.   Cardiovascular:  Jugular Venous Pressure: Normal  Heart Rate and Rhythm: Irregular, paced rhythm.  Heart Sounds: Normal S1 and S2. No S3 or S4 noted.  Murmurs: No murmurs noted. No rubs, thrills, or gallops.   Lower Extremities: No edema noted.  Gastrointestinal:  Abdomen soft, non-distended, non-tender. Normal bowel sounds. No hepatomegaly.   Musculoskeletal: Normal movement of extremities  Skin and Nails: General appearance normal. No pallor, cyanosis, diaphoresis. Skin temperature normal. No clubbing of fingernails.   Psychiatric: Patient alert and oriented to person, place, and time. Speech and behavior appropriate. Normal mood and affect.       ECG 12 Lead  Date/Time: 11/22/2019 3:02 PM  Performed by: Mercedes Phipps APRN  Authorized by: Mercedes Phipps APRN   Comparison: compared with previous ECG from 7/31/2019  Similar to previous ECG  Rhythm: atrial fibrillation  Rate: normal  BPM: " 78  Conduction: conduction normal  ST Segments: ST segments normal  T Waves: T waves normal  QRS axis: normal  Other: no other findings  Pacing: ventricular pacing  Clinical impression: abnormal EKG              Assessment:       Diagnosis Plan   1. Permanent atrial fibrillation  ECG 12 Lead   2. Essential hypertension     3. Pulmonary hypertension (CMS/[facility])     4. Valvular heart disease     5. NSVT (nonsustained ventricular tachycardia) (CMS/[facility])            Plan:       1.  Acute on Chronic Systolic Heart Failure: Ejection fraction noted to be 36% and her recent echocardiogram showed improvement with an ejection fraction of 63%.  She is well compensated today and we'll continue with furosemide.  Continue carvedilol to 6.25 mg twice a day.  She reports some mild dyspnea and will just continue to monitor.  If she notices any worsening dyspnea, I have asked her to call the office. I/A cannot find documentation why she is not ACE/ARB therapy in past.     Heart Failure  Assessment  • NYHA class II - There is slight limitation of physical activity. The patient is comfortable at rest, but physical activity results in fatigue, palpitations or shortness of breath.  • Beta blocker prescribed  • Diuretics prescribed  • Left ventricular function is normal by qualitative assessment    Plan  • The patient has received heart failure education on the following topics: dietary sodium restriction, medication instructions, symptom management, physical activity and weight monitoring    Subjective/Objective  • The patient reports dyspnea        2.  Permanent Atrial Fibrillation: Status post AV node ablation with Medtronic single chamber pacemaker in January 2012 and upgrade to CRT device in June 2018.  She remains on chronic warfarin therapy with INRs checked here at [facility].    Atrial Fibrillation and Atrial Flutter  Assessment  • The patient has permanent atrial fibrillation  • The patient's CHADS2-VASc score is  5  • A [id number] score of 2 or more is considered a high risk for a thromboembolic event  • Warfarin prescribed    Plan  • Continue in atrial fibrillation with rate control  • Continue warfarin for antithrombotic therapy, bleeding issues discussed  • Continue beta blocker for rate control    Subjective - Objective  • The patient had atrial fibrillation ablation         3.  Hypertension: Blood pressure well-controlled today.    4.  Pulmonary Hypertension: Moderate per echocardiogram in the past and was recently noted to be normal per echocardiogram.    5.  Valvular Disease: Regular function has improved per echocardiogram with the following results: Mild to moderate mitral valve regurgitation and mild tricuspid valve regurgitation.    6.  Nonsustained Ventricular Tachycardia: Continue beta blocker.     7.  I recommend follow-up with Dr. Leija in 6 months, unless otherwise needed sooner.    As always, it has been a pleasure to participate in your patient's care.      Sincerely,         KIKA Castelan

## 2019-11-22 NOTE — PROGRESS NOTES
Anticoagulation Clinic Progress Note    Anticoagulation Summary  As of 11/22/2019    INR goal:   2.0-3.0   TTR:   63.9 % (1.1 y)   INR used for dosing:   3.1! (11/22/2019)   Warfarin maintenance plan:   2.5 mg every Thu; 1.25 mg all other days   Weekly warfarin total:   10 mg   Plan last modified:   Colleen Henao RPH (11/22/2019)   Next INR check:   12/9/2019   Priority:   High   Target end date:   Indefinite    Indications    Long-term (current) use of anticoagulants (Resolved) [Z79.01]             Anticoagulation Episode Summary     INR check location:       Preferred lab:       Send INR reminders to:    [facility]    Comments:         Anticoagulation Care Providers     Provider Role Specialty Phone number    Rafaela Leija MD Referring Cardiology [phone]          Clinic Interview:  Patient Findings     Negatives:   Signs/symptoms of thrombosis, Signs/symptoms of bleeding,   Laboratory test error suspected, Change in health, Change in alcohol use,   Change in activity, Upcoming invasive procedure, Emergency department   visit, Upcoming dental procedure, Missed doses, Extra doses, Change in   medications, Change in diet/appetite, Hospital admission, Bruising, Other   complaints      Clinical Outcomes     Negatives:   Major bleeding event, Thromboembolic event,   Anticoagulation-related hospital admission, Anticoagulation-related ED   visit, Anticoagulation-related fatality        INR History:  Anticoagulation Monitoring 11/1/2019 11/8/2019 11/22/2019   INR 3.5 1.9 3.1   INR Date 11/1/2019 11/8/2019 11/22/2019   INR Goal 2.0-3.0 2.0-3.0 2.0-3.0   Trend Down Same Down   Last Week Total 12.5 mg 10 mg 11.25 mg   Next Week Total 10 mg 11.25 mg 10 mg   Sun 2.5 mg 2.5 mg 1.25 mg   Mon 1.25 mg 1.25 mg 1.25 mg   Tue 1.25 mg 1.25 mg 1.25 mg   Wed 1.25 mg 1.25 mg 1.25 mg   Thu 2.5 mg 2.5 mg 2.5 mg   Fri Hold (11/1) 1.25 mg 1.25 mg   Sat 1.25 mg 1.25 mg 1.25 mg   Visit Report - - -   Some recent  data might be hidden       Plan:  1. INR is Supratherapeutic today- see above in Anticoagulation Summary.  Will instruct Diana Avalos to Change their warfarin regimen- see above in Anticoagulation Summary. 2.5 mg on Thurs; 1.25 mg all other days  2. Follow up in 2 weeks  3. Patient declines warfarin refills.  4. Verbal and written information provided. Patient expresses understanding and has no further questions at this time.    Colleen Henao [facility]

## 2019-12-09 ENCOUNTER — ANTICOAGULATION VISIT (OUTPATIENT)
Dept: PHARMACY | Facility: HOSPITAL | Age: 84
End: 2019-12-09

## 2019-12-09 LAB
INR PPP: 1.8 (ref 0.91–1.09)
PROTHROMBIN TIME: 21.9 SECONDS (ref 10–13.8)

## 2019-12-09 PROCEDURE — 36416 COLLJ CAPILLARY BLOOD SPEC: CPT

## 2019-12-09 PROCEDURE — G0463 HOSPITAL OUTPT CLINIC VISIT: HCPCS

## 2019-12-09 PROCEDURE — 85610 PROTHROMBIN TIME: CPT

## 2019-12-09 NOTE — PROGRESS NOTES
Anticoagulation Clinic Progress Note    Anticoagulation Summary  As of 2019    INR goal:   2.0-3.0   TTR:   64.4 % (1.2 y)   INR used for dosin.8! (2019)   Warfarin maintenance plan:   2.5 mg every Thu; 1.25 mg all other days   Weekly warfarin total:   10 mg   Plan last modified:   Colleen Henao RPH (2019)   Next INR check:   2019   Priority:   High   Target end date:   Indefinite    Indications    Long-term (current) use of anticoagulants (Resolved) [Z79.01]             Anticoagulation Episode Summary     INR check location:       Preferred lab:       Send INR reminders to:    [facility]    Comments:         Anticoagulation Care Providers     Provider Role Specialty Phone number    Rafaela Leija MD Referring Cardiology [phone]          Clinic Interview:  Patient Findings     Negatives:   Signs/symptoms of thrombosis, Signs/symptoms of bleeding,   Laboratory test error suspected, Change in health, Change in alcohol use,   Change in activity, Upcoming invasive procedure, Emergency department   visit, Upcoming dental procedure, Missed doses, Extra doses, Change in   medications, Change in diet/appetite, Hospital admission, Bruising, Other   complaints      Clinical Outcomes     Negatives:   Major bleeding event, Thromboembolic event,   Anticoagulation-related hospital admission, Anticoagulation-related ED   visit, Anticoagulation-related fatality        INR History:  Anticoagulation Monitoring 2019   INR 1.9 3.1 1.8   INR Date 2019   INR Goal 2.0-3.0 2.0-3.0 2.0-3.0   Trend Same Down Same   Last Week Total 10 mg 11.25 mg 10 mg   Next Week Total 11.25 mg 10 mg 11.25 mg   Sun 2.5 mg 1.25 mg 1.25 mg   Mon 1.25 mg 1.25 mg 2.5 mg ()   Tue 1.25 mg 1.25 mg 1.25 mg   Wed 1.25 mg 1.25 mg 1.25 mg   Thu 2.5 mg 2.5 mg 2.5 mg   Fri 1.25 mg 1.25 mg 1.25 mg   Sat 1.25 mg 1.25 mg 1.25 mg   Visit Report - - -   Some  recent data might be hidden       Plan:  1. INR is Subtherapeutic today- see above in Anticoagulation Summary.  Will instruct Diana Avalos to Change their warfarin regimen and give 2.5 mg today instead of 1.25 mg; I recommend considering switching the patient to 1.5 mg daily with 1 mg tablets should the patient's INR drastically increase. Using the 2.5 mg tablets may not allow for a small enough titration- see above in Anticoagulation Summary.  2. Follow up in 1 week  3. Patient declines warfarin refills.  4. Verbal and written information provided. Patient expresses understanding and has no further questions at this time.    Jarrett Loomis [facility]   Statement Selected

## 2019-12-16 ENCOUNTER — ANTICOAGULATION VISIT (OUTPATIENT)
Dept: PHARMACY | Facility: HOSPITAL | Age: 84
End: 2019-12-16

## 2019-12-16 LAB
INR PPP: 2 (ref 0.91–1.09)
PROTHROMBIN TIME: 24 SECONDS (ref 10–13.8)

## 2019-12-16 PROCEDURE — 36416 COLLJ CAPILLARY BLOOD SPEC: CPT

## 2019-12-16 PROCEDURE — 85610 PROTHROMBIN TIME: CPT

## 2019-12-16 NOTE — PROGRESS NOTES
Anticoagulation Clinic Progress Note    Anticoagulation Summary  As of 2019    INR goal:   2.0-3.0   TTR:   63.4 % (1.2 y)   INR used for dosin.0 (2019)   Warfarin maintenance plan:   2.5 mg every Thu; 1.25 mg all other days   Weekly warfarin total:   10 mg   No change documented:   Ranjana Bear, Pharmacy Intern   Plan last modified:   Colleen Henao RPH (2019)   Next INR check:   2019   Priority:   High   Target end date:   Indefinite    Indications    Long-term (current) use of anticoagulants (Resolved) [Z79.01]             Anticoagulation Episode Summary     INR check location:       Preferred lab:       Send INR reminders to:    [facility]    Comments:         Anticoagulation Care Providers     Provider Role Specialty Phone number    Rafaela Leija MD Referring Cardiology [phone]          Clinic Interview:  Patient Findings     Negatives:   Signs/symptoms of thrombosis, Signs/symptoms of bleeding,   Laboratory test error suspected, Change in health, Change in alcohol use,   Change in activity, Upcoming invasive procedure, Emergency department   visit, Upcoming dental procedure, Missed doses, Extra doses, Change in   medications, Change in diet/appetite, Hospital admission, Bruising, Other   complaints      Clinical Outcomes     Negatives:   Major bleeding event, Thromboembolic event,   Anticoagulation-related hospital admission, Anticoagulation-related ED   visit, Anticoagulation-related fatality        INR History:  Anticoagulation Monitoring 2019   INR 3.1 1.8 2.0   INR Date 2019   INR Goal 2.0-3.0 2.0-3.0 2.0-3.0   Trend Down Same Same   Last Week Total 11.25 mg 10 mg 11.25 mg   Next Week Total 10 mg 11.25 mg 10 mg   Sun 1.25 mg 1.25 mg 1.25 mg   Mon 1.25 mg 2.5 mg () 1.25 mg   Tue 1.25 mg 1.25 mg 1.25 mg   Wed 1.25 mg 1.25 mg 1.25 mg   Thu 2.5 mg 2.5 mg 2.5 mg   Fri 1.25 mg 1.25 mg 1.25  mg   Sat 1.25 mg 1.25 mg 1.25 mg   Visit Report - - -   Some recent data might be hidden       Plan:  1. INR is Therapeutic today- see above in Anticoagulation Summary.  Will instruct Diana Avalos to Continue their warfarin regimen- see above in Anticoagulation Summary.  2. Follow up in 1 week  3. Patient declines warfarin refills.  4. Verbal and written information provided. Patient expresses understanding and has no further questions at this time.    Ranjana Bear, Pharmacy Intern

## 2019-12-16 NOTE — PROGRESS NOTES
I have supervised and reviewed the notes, assessments, and/or procedures performed by Santhosh Bear, PharmD Candidate. The documented assessment and plan were developed cooperatively. I concur with his documentation of this patient.    Jayant White [facility]

## 2019-12-23 ENCOUNTER — ANTICOAGULATION VISIT (OUTPATIENT)
Dept: PHARMACY | Facility: HOSPITAL | Age: 84
End: 2019-12-23

## 2019-12-23 LAB
INR PPP: 1.7 (ref 0.91–1.09)
PROTHROMBIN TIME: 21 SECONDS (ref 10–13.8)

## 2019-12-23 PROCEDURE — 85610 PROTHROMBIN TIME: CPT

## 2019-12-23 PROCEDURE — G0463 HOSPITAL OUTPT CLINIC VISIT: HCPCS

## 2019-12-23 PROCEDURE — 36416 COLLJ CAPILLARY BLOOD SPEC: CPT

## 2019-12-23 RX ORDER — CARVEDILOL 6.25 MG/1
TABLET ORAL
Qty: 180 TABLET | Refills: 0 | Status: SHIPPED | OUTPATIENT
Start: 2019-12-23 | End: 2020-01-02

## 2019-12-23 NOTE — PROGRESS NOTES
Anticoagulation Clinic Progress Note    Anticoagulation Summary  As of 2019    INR goal:   2.0-3.0   TTR:   62.4 % (1.2 y)   INR used for dosin.7! (2019)   Warfarin maintenance plan:   2.5 mg every Mon, Thu; 1.25 mg all other days   Weekly warfarin total:   11.25 mg   Plan last modified:   Lana Luciano, Pharmacy Intern (2019)   Next INR check:   2020   Priority:   High   Target end date:   Indefinite    Indications    Long-term (current) use of anticoagulants (Resolved) [Z79.01]             Anticoagulation Episode Summary     INR check location:       Preferred lab:       Send INR reminders to:    [facility]    Comments:         Anticoagulation Care Providers     Provider Role Specialty Phone number    Rafaela Leija MD Referring Cardiology [phone]          Clinic Interview:  Patient Findings     Negatives:   Signs/symptoms of thrombosis, Signs/symptoms of bleeding,   Laboratory test error suspected, Change in health, Change in alcohol use,   Change in activity, Upcoming invasive procedure, Emergency department   visit, Upcoming dental procedure, Missed doses, Extra doses, Change in   medications, Change in diet/appetite, Hospital admission, Bruising, Other   complaints      Clinical Outcomes     Negatives:   Major bleeding event, Thromboembolic event,   Anticoagulation-related hospital admission, Anticoagulation-related ED   visit, Anticoagulation-related fatality        INR History:  Anticoagulation Monitoring 2019   INR 1.8 2.0 1.7   INR Date 2019   INR Goal 2.0-3.0 2.0-3.0 2.0-3.0   Trend Same Same Up   Last Week Total 10 mg 11.25 mg 10 mg   Next Week Total 11.25 mg 10 mg 11.25 mg   Sun 1.25 mg 1.25 mg 1.25 mg   Mon 2.5 mg () 1.25 mg 2.5 mg   Tue 1.25 mg 1.25 mg 1.25 mg   Wed 1.25 mg 1.25 mg 1.25 mg   Thu 2.5 mg 2.5 mg 2.5 mg   Fri 1.25 mg 1.25 mg 1.25 mg   Sat 1.25 mg 1.25 mg 1.25 mg   Visit  Report - - -   Some recent data might be hidden       Plan:  1. INR is Subtherapeutic today- see above in Anticoagulation Summary.  Will instruct Diana Avalos to Change their warfarin regimen- see above in Anticoagulation Summary.  2. Follow up in 2 weeks  3. Patient declines warfarin refills.  4. Verbal and written information provided. Patient expresses understanding and has no further questions at this time.    Lana Luciano, Pharmacy Intern

## 2020-01-02 RX ORDER — CARVEDILOL 6.25 MG/1
TABLET ORAL
Qty: 180 TABLET | Refills: 1 | Status: SHIPPED | OUTPATIENT
Start: 2020-01-02 | End: 2020-09-29

## 2020-01-03 RX ORDER — WARFARIN SODIUM 2.5 MG/1
TABLET ORAL
Qty: 70 TABLET | Refills: 1 | Status: SHIPPED | OUTPATIENT
Start: 2020-01-03 | End: 2020-06-15

## 2020-01-06 ENCOUNTER — ANTICOAGULATION VISIT (OUTPATIENT)
Dept: PHARMACY | Facility: HOSPITAL | Age: 85
End: 2020-01-06

## 2020-01-06 LAB
INR PPP: 1.9 (ref 0.91–1.09)
PROTHROMBIN TIME: 22.5 SECONDS (ref 10–13.8)

## 2020-01-06 PROCEDURE — 36416 COLLJ CAPILLARY BLOOD SPEC: CPT

## 2020-01-06 PROCEDURE — 85610 PROTHROMBIN TIME: CPT

## 2020-01-06 NOTE — PROGRESS NOTES
Anticoagulation Clinic Progress Note    Anticoagulation Summary  As of 2020    INR goal:   2.0-3.0   TTR:   60.5 % (1.2 y)   INR used for dosin.9! (2020)   Warfarin maintenance plan:   2.5 mg every Mon, Thu; 1.25 mg all other days   Weekly warfarin total:   11.25 mg   No change documented:   Ginny Mota   Plan last modified:   Lana Luciano, Pharmacy Intern (2019)   Next INR check:   2020   Priority:   High   Target end date:   Indefinite    Indications    Long-term (current) use of anticoagulants (Resolved) [Z79.01]             Anticoagulation Episode Summary     INR check location:       Preferred lab:       Send INR reminders to:   [facility]    Comments:         Anticoagulation Care Providers     Provider Role Specialty Phone number    Rafaela Leija MD Referring Cardiology [phone]          Clinic Interview:  Patient Findings     Negatives:   Signs/symptoms of thrombosis, Signs/symptoms of bleeding,   Laboratory test error suspected, Change in health, Change in alcohol use,   Change in activity, Upcoming invasive procedure, Emergency department   visit, Upcoming dental procedure, Missed doses, Extra doses, Change in   medications, Change in diet/appetite, Hospital admission, Bruising, Other   complaints      Clinical Outcomes     Negatives:   Major bleeding event, Thromboembolic event,   Anticoagulation-related hospital admission, Anticoagulation-related ED   visit, Anticoagulation-related fatality        INR History:  Anticoagulation Monitoring 2019   INR 2.0 1.7 1.9   INR Date 2019   INR Goal 2.0-3.0 2.0-3.0 2.0-3.0   Trend Same Up Same   Last Week Total 11.25 mg 10 mg 11.25 mg   Next Week Total 10 mg 11.25 mg 11.25 mg   Sun 1.25 mg 1.25 mg 1.25 mg   Mon 1.25 mg 2.5 mg 2.5 mg   Tue 1.25 mg 1.25 mg 1.25 mg   Wed 1.25 mg 1.25 mg 1.25 mg   Thu 2.5 mg 2.5 mg 2.5 mg   Fri 1.25 mg 1.25 mg 1.25  mg   Sat 1.25 mg 1.25 mg 1.25 mg   Visit Report - - -   Some recent data might be hidden       Plan:  1. INR is out of range- see above in Anticoagulation Summary.   Will instruct Diana Avalos to Continue  their warfarin regimen- see above in Anticoagulation Summary.  2. Follow up in 2 weeks.   3. Patient declines warfarin refills.  4. Verbal and written information provided. Patient expresses understanding and has no further questions at this time.    Ginny Mota

## 2020-01-20 ENCOUNTER — ANTICOAGULATION VISIT (OUTPATIENT)
Dept: PHARMACY | Facility: HOSPITAL | Age: 85
End: 2020-01-20

## 2020-01-20 LAB
INR PPP: 2 (ref 0.91–1.09)
PROTHROMBIN TIME: 23.7 SECONDS (ref 10–13.8)

## 2020-01-20 PROCEDURE — 85610 PROTHROMBIN TIME: CPT

## 2020-01-20 PROCEDURE — 36416 COLLJ CAPILLARY BLOOD SPEC: CPT

## 2020-01-20 NOTE — PROGRESS NOTES
Anticoagulation Clinic Progress Note    Anticoagulation Summary  As of 2020    INR goal:   2.0-3.0   TTR:   58.7 % (1.3 y)   INR used for dosin.0 (2020)   Warfarin maintenance plan:   2.5 mg every Mon, Thu; 1.25 mg all other days   Weekly warfarin total:   11.25 mg   No change documented:   Debi Tyler   Plan last modified:   Lana Luciano, Pharmacy Intern (2019)   Next INR check:   2/3/2020   Priority:   High   Target end date:   Indefinite    Indications    Long-term (current) use of anticoagulants (Resolved) [Z79.01]             Anticoagulation Episode Summary     INR check location:       Preferred lab:       Send INR reminders to:    [facility]    Comments:         Anticoagulation Care Providers     Provider Role Specialty Phone number    Rafaela Leija MD Referring Cardiology [phone]          Clinic Interview:  Patient Findings     Negatives:   Signs/symptoms of thrombosis, Signs/symptoms of bleeding,   Laboratory test error suspected, Change in health, Change in alcohol use,   Change in activity, Upcoming invasive procedure, Emergency department   visit, Upcoming dental procedure, Missed doses, Extra doses, Change in   medications, Change in diet/appetite, Hospital admission, Bruising, Other   complaints      Clinical Outcomes     Negatives:   Major bleeding event, Thromboembolic event,   Anticoagulation-related hospital admission, Anticoagulation-related ED   visit, Anticoagulation-related fatality        INR History:  Anticoagulation Monitoring 2019   INR 1.7 1.9 2.0   INR Date 2019   INR Goal 2.0-3.0 2.0-3.0 2.0-3.0   Trend Up Same Same   Last Week Total 10 mg 11.25 mg 11.25 mg   Next Week Total 11.25 mg 11.25 mg 11.25 mg   Sun 1.25 mg 1.25 mg 1.25 mg   Mon 2.5 mg 2.5 mg 2.5 mg   Tue 1.25 mg 1.25 mg 1.25 mg   Wed 1.25 mg 1.25 mg 1.25 mg   Thu 2.5 mg 2.5 mg 2.5 mg   Fri 1.25 mg 1.25 mg 1.25 mg    Sat 1.25 mg 1.25 mg 1.25 mg   Visit Report - - -   Some recent data might be hidden       Plan:  1. INR is therapeutic today- see above in Anticoagulation Summary.   Will instruct Diana Avalos to continue their warfarin regimen- see above in Anticoagulation Summary.  2. Follow up in 2 weeks.  3. Patient declines warfarin refills.  4. Verbal and written information provided. Patient expresses understanding and has no further questions at this time.    Debi Tyler

## 2020-02-03 ENCOUNTER — CLINICAL SUPPORT NO REQUIREMENTS (OUTPATIENT)
Dept: CARDIOLOGY | Facility: CLINIC | Age: 85
End: 2020-02-03

## 2020-02-03 ENCOUNTER — ANTICOAGULATION VISIT (OUTPATIENT)
Dept: PHARMACY | Facility: HOSPITAL | Age: 85
End: 2020-02-03

## 2020-02-03 DIAGNOSIS — I44.2 COMPLETE HEART BLOCK (HCC): Primary | ICD-10-CM

## 2020-02-03 LAB
INR PPP: 1.6 (ref 0.91–1.09)
PROTHROMBIN TIME: 19 SECONDS (ref 10–13.8)

## 2020-02-03 PROCEDURE — 85610 PROTHROMBIN TIME: CPT

## 2020-02-03 PROCEDURE — 93280 PM DEVICE PROGR EVAL DUAL: CPT | Performed by: INTERNAL MEDICINE

## 2020-02-03 PROCEDURE — 36416 COLLJ CAPILLARY BLOOD SPEC: CPT

## 2020-02-03 PROCEDURE — G0463 HOSPITAL OUTPT CLINIC VISIT: HCPCS

## 2020-02-03 NOTE — PROGRESS NOTES
Anticoagulation Clinic Progress Note    Anticoagulation Summary  As of 2/3/2020    INR goal:   2.0-3.0   TTR:   57.0 % (1.3 y)   INR used for dosin.6! (2/3/2020)   Warfarin maintenance plan:   2.5 mg every Mon, Wed, Fri; 1.25 mg all other days   Weekly warfarin total:   12.5 mg   Plan last modified:   Eve Bauer RPH (2/3/2020)   Next INR check:   2020   Priority:   High   Target end date:   Indefinite    Indications    Long-term (current) use of anticoagulants (Resolved) [Z79.01]             Anticoagulation Episode Summary     INR check location:       Preferred lab:       Send INR reminders to:    [facility]    Comments:         Anticoagulation Care Providers     Provider Role Specialty Phone number    Rafaela Leija MD Referring Cardiology [phone]          Clinic Interview:      INR History:  Anticoagulation Monitoring 2020 2020 2/3/2020   INR 1.9 2.0 1.6   INR Date 2020 2020 2/3/2020   INR Goal 2.0-3.0 2.0-3.0 2.0-3.0   Trend Same Same Up   Last Week Total 11.25 mg 11.25 mg 11.25 mg   Next Week Total 11.25 mg 11.25 mg 12.5 mg   Sun 1.25 mg 1.25 mg 1.25 mg   Mon 2.5 mg 2.5 mg 2.5 mg   Tue 1.25 mg 1.25 mg 1.25 mg   Wed 1.25 mg 1.25 mg 2.5 mg   Thu 2.5 mg 2.5 mg 1.25 mg   Fri 1.25 mg 1.25 mg 2.5 mg   Sat 1.25 mg 1.25 mg 1.25 mg   Visit Report - - -   Some recent data might be hidden       Plan:  1. INR is Subtherapeutic today- see above in Anticoagulation Summary.  Will instruct Diana Avalos to Increase their warfarin regimen- see above in Anticoagulation Summary.  2. Follow up in 2 weeks  3. Patient declines warfarin refills.  4. Verbal and written information provided. Patient expresses understanding and has no further questions at this time.    Eve Bauer RPH

## 2020-02-18 ENCOUNTER — ANTICOAGULATION VISIT (OUTPATIENT)
Dept: PHARMACY | Facility: HOSPITAL | Age: 85
End: 2020-02-18

## 2020-02-18 LAB
INR PPP: 2 (ref 0.91–1.09)
PROTHROMBIN TIME: 23.5 SECONDS (ref 10–13.8)

## 2020-02-18 PROCEDURE — 36416 COLLJ CAPILLARY BLOOD SPEC: CPT

## 2020-02-18 PROCEDURE — 85610 PROTHROMBIN TIME: CPT

## 2020-02-18 NOTE — PROGRESS NOTES
Anticoagulation Clinic Progress Note    Anticoagulation Summary  As of 2020    INR goal:   2.0-3.0   TTR:   55.3 % (1.4 y)   INR used for dosin.0 (2020)   Warfarin maintenance plan:   2.5 mg every Mon, Wed, Fri; 1.25 mg all other days   Weekly warfarin total:   12.5 mg   No change documented:   Tonya Sagastume   Plan last modified:   Eve Bauer RPH (2/3/2020)   Next INR check:   3/9/2020   Priority:   High   Target end date:   Indefinite    Indications    Long-term (current) use of anticoagulants (Resolved) [Z79.01]             Anticoagulation Episode Summary     INR check location:       Preferred lab:       Send INR reminders to:   [facility]    Comments:         Anticoagulation Care Providers     Provider Role Specialty Phone number    Rafaela Leija MD Referring Cardiology [phone]          Clinic Interview:  Patient Findings     Negatives:   Signs/symptoms of thrombosis, Signs/symptoms of bleeding,   Laboratory test error suspected, Change in health, Change in alcohol use,   Change in activity, Upcoming invasive procedure, Emergency department   visit, Upcoming dental procedure, Missed doses, Extra doses, Change in   medications, Change in diet/appetite, Hospital admission, Bruising, Other   complaints      Clinical Outcomes     Negatives:   Major bleeding event, Thromboembolic event,   Anticoagulation-related hospital admission, Anticoagulation-related ED   visit, Anticoagulation-related fatality        INR History:  Anticoagulation Monitoring 2020 2/3/2020 2020   INR 2.0 1.6 2.0   INR Date 2020 2/3/2020 2020   INR Goal 2.0-3.0 2.0-3.0 2.0-3.0   Trend Same Up Same   Last Week Total 11.25 mg 11.25 mg 12.5 mg   Next Week Total 11.25 mg 12.5 mg 12.5 mg   Sun 1.25 mg 1.25 mg 1.25 mg   Mon 2.5 mg 2.5 mg 2.5 mg   Tue 1.25 mg 1.25 mg 1.25 mg   Wed 1.25 mg 2.5 mg 2.5 mg   Thu 2.5 mg 1.25 mg 1.25 mg   Fri 1.25 mg 2.5 mg 2.5 mg   Sat 1.25 mg 1.25 mg  1.25 mg   Visit Report - - -   Some recent data might be hidden       Plan:  1. INR is therapeutic today- see above in Anticoagulation Summary.   Will instruct Diana Avalos to continue their warfarin regimen- see above in Anticoagulation Summary.  2. Follow up in 4 weeks.  3. Patient declines warfarin refills.  4. Verbal and written information provided. Patient expresses understanding and has no further questions at this time.    Tonya Sagastume

## 2020-03-09 ENCOUNTER — ANTICOAGULATION VISIT (OUTPATIENT)
Dept: PHARMACY | Facility: HOSPITAL | Age: 85
End: 2020-03-09

## 2020-03-09 LAB
INR PPP: 2 (ref 0.91–1.09)
PROTHROMBIN TIME: 24.4 SECONDS (ref 10–13.8)

## 2020-03-09 PROCEDURE — 85610 PROTHROMBIN TIME: CPT

## 2020-03-09 PROCEDURE — 36416 COLLJ CAPILLARY BLOOD SPEC: CPT

## 2020-03-09 NOTE — PROGRESS NOTES
Anticoagulation Clinic Progress Note    Anticoagulation Summary  As of 3/9/2020    INR goal:   2.0-3.0   TTR:   57.0 % (1.4 y)   INR used for dosin.0 (3/9/2020)   Warfarin maintenance plan:   2.5 mg every Mon, Wed, Fri; 1.25 mg all other days   Weekly warfarin total:   12.5 mg   No change documented:   Tonya Sagastume   Plan last modified:   Eve Bauer RPH (2/3/2020)   Next INR check:   2020   Priority:   High   Target end date:   Indefinite    Indications    Long-term (current) use of anticoagulants (Resolved) [Z79.01]             Anticoagulation Episode Summary     INR check location:       Preferred lab:       Send INR reminders to:   [facility]    Comments:         Anticoagulation Care Providers     Provider Role Specialty Phone number    Rafaela Leija MD Referring Cardiology [phone]          Clinic Interview:  Patient Findings     Negatives:   Signs/symptoms of thrombosis, Signs/symptoms of bleeding,   Laboratory test error suspected, Change in health, Change in alcohol use,   Change in activity, Upcoming invasive procedure, Emergency department   visit, Upcoming dental procedure, Missed doses, Extra doses, Change in   medications, Change in diet/appetite, Hospital admission, Bruising, Other   complaints      Clinical Outcomes     Negatives:   Major bleeding event, Thromboembolic event,   Anticoagulation-related hospital admission, Anticoagulation-related ED   visit, Anticoagulation-related fatality        INR History:  Anticoagulation Monitoring 2/3/2020 2020 3/9/2020   INR 1.6 2.0 2.0   INR Date 2/3/2020 2020 3/9/2020   INR Goal 2.0-3.0 2.0-3.0 2.0-3.0   Trend Up Same Same   Last Week Total 11.25 mg 12.5 mg 12.5 mg   Next Week Total 12.5 mg 12.5 mg 12.5 mg   Sun 1.25 mg 1.25 mg 1.25 mg   Mon 2.5 mg 2.5 mg 2.5 mg   Tue 1.25 mg 1.25 mg 1.25 mg   Wed 2.5 mg 2.5 mg 2.5 mg   Thu 1.25 mg 1.25 mg 1.25 mg   Fri 2.5 mg 2.5 mg 2.5 mg   Sat 1.25 mg 1.25 mg 1.25 mg    Visit Report - - -   Some recent data might be hidden       Plan:  1. INR is therapeutic today- see above in Anticoagulation Summary.   Will instruct Diana Avalos to continue their warfarin regimen- see above in Anticoagulation Summary.  2. Follow up in 4 weeks.  3. Patient declines warfarin refills.  4. Verbal and written information provided. Patient expresses understanding and has no further questions at this time.    Tonya Sagastume

## 2020-04-06 ENCOUNTER — TELEPHONE (OUTPATIENT)
Dept: PHARMACY | Facility: HOSPITAL | Age: 85
End: 2020-04-06

## 2020-04-07 ENCOUNTER — APPOINTMENT (OUTPATIENT)
Dept: PHARMACY | Facility: HOSPITAL | Age: 85
End: 2020-04-07

## 2020-04-27 RX ORDER — FUROSEMIDE 40 MG/1
TABLET ORAL
Qty: 90 TABLET | Refills: 0 | Status: SHIPPED | OUTPATIENT
Start: 2020-04-27 | End: 2020-07-24

## 2020-05-05 ENCOUNTER — TELEPHONE (OUTPATIENT)
Dept: PHARMACY | Facility: HOSPITAL | Age: 85
End: 2020-05-05

## 2020-05-05 ENCOUNTER — APPOINTMENT (OUTPATIENT)
Dept: PHARMACY | Facility: HOSPITAL | Age: 85
End: 2020-05-05

## 2020-05-05 DIAGNOSIS — I48.21 PERMANENT ATRIAL FIBRILLATION (HCC): Primary | ICD-10-CM

## 2020-05-08 ENCOUNTER — ANTICOAGULATION VISIT (OUTPATIENT)
Dept: PHARMACY | Facility: HOSPITAL | Age: 85
End: 2020-05-08

## 2020-05-08 ENCOUNTER — TELEPHONE (OUTPATIENT)
Dept: PHARMACY | Facility: HOSPITAL | Age: 85
End: 2020-05-08

## 2020-05-08 LAB — INR PPP: 2.8

## 2020-05-08 NOTE — PROGRESS NOTES
Anticoagulation Clinic Progress Note    Anticoagulation Summary  As of 2020    INR goal:   2.0-3.0   TTR:   61.4 % (1.6 y)   INR used for dosin.80 (2020)   Warfarin maintenance plan:   2.5 mg every Mon, Wed, Fri; 1.25 mg all other days   Weekly warfarin total:   12.5 mg   Plan last modified:   Eve Bauer RPH (2/3/2020)   Next INR check:   2020   Priority:   High   Target end date:   Indefinite    Indications    Long-term (current) use of anticoagulants (Resolved) [Z79.01]             Anticoagulation Episode Summary     INR check location:       Preferred lab:       Send INR reminders to:    [facility]    Comments:         Anticoagulation Care Providers     Provider Role Specialty Phone number    Rafaela Leija MD Referring Cardiology [phone]          Clinic Interview:      INR History:  Anticoagulation Monitoring 2020 3/9/2020 2020   INR 2.0 2.0 2.80   INR Date 2020 3/9/2020 2020   INR Goal 2.0-3.0 2.0-3.0 2.0-3.0   Trend Same Same Same   Last Week Total 12.5 mg 12.5 mg 12.5 mg   Next Week Total 12.5 mg 12.5 mg 12.5 mg   Sun 1.25 mg 1.25 mg 1.25 mg   Mon 2.5 mg 2.5 mg 2.5 mg   Tue 1.25 mg 1.25 mg 1.25 mg   Wed 2.5 mg 2.5 mg 2.5 mg   Thu 1.25 mg 1.25 mg 1.25 mg   Fri 2.5 mg 2.5 mg 2.5 mg   Sat 1.25 mg 1.25 mg 1.25 mg   Visit Report - - -   Some recent data might be hidden       Plan:  1. INR is Therapeutic today- see above in Anticoagulation Summary.   Will instruct Diana Avalos to Continue their warfarin regimen- see above in Anticoagulation Summary.  2. Follow up in 4 weeks  3. Spoke with pt today. They have been instructed to call if any changes in medications, doses, concerns, etc. Patient expresses understanding and has no further questions at this time.    Eve Bauer RPH

## 2020-06-03 ENCOUNTER — TELEPHONE (OUTPATIENT)
Dept: PHARMACY | Facility: HOSPITAL | Age: 85
End: 2020-06-03

## 2020-06-03 NOTE — TELEPHONE ENCOUNTER
Pt's daughter, Magui, called concerned regarding about clinic visit on 6/5/20 in light of pandemic. She requested lab draw through [facility] on 6/8/20. Faxed order to [facility].

## 2020-06-09 LAB — INR PPP: 1.91

## 2020-06-10 ENCOUNTER — ANTICOAGULATION VISIT (OUTPATIENT)
Dept: PHARMACY | Facility: HOSPITAL | Age: 85
End: 2020-06-10

## 2020-06-10 NOTE — PROGRESS NOTES
Anticoagulation Clinic Progress Note    Anticoagulation Summary  As of 6/10/2020    INR goal:   2.0-3.0   TTR:   62.9 % (1.7 y)   INR used for dosin.91! (2020)   Warfarin maintenance plan:   2.5 mg every Mon, Wed, Fri; 1.25 mg all other days   Weekly warfarin total:   12.5 mg   Plan last modified:   Eve Bauer RPH (2/3/2020)   Next INR check:   2020   Priority:   High   Target end date:   Indefinite    Indications    Long-term (current) use of anticoagulants (Resolved) [Z79.01]             Anticoagulation Episode Summary     INR check location:       Preferred lab:       Send INR reminders to:   [facility]    Comments:         Anticoagulation Care Providers     Provider Role Specialty Phone number    Rafaela Leija MD Referring Cardiology [phone]          Clinic Interview:  Patient Findings     Positives:   Missed doses, Extra doses    Negatives:   Signs/symptoms of thrombosis, Signs/symptoms of bleeding,   Laboratory test error suspected, Change in health, Change in alcohol use,   Change in activity, Upcoming invasive procedure, Emergency department   visit, Upcoming dental procedure, Change in medications, Change in   diet/appetite, Hospital admission, Bruising, Other complaints      Clinical Outcomes     Negatives:   Major bleeding event, Thromboembolic event,   Anticoagulation-related hospital admission, Anticoagulation-related ED   visit, Anticoagulation-related fatality        INR History:  Anticoagulation Monitoring 3/9/2020 2020 6/10/2020   INR 2.0 2.80 1.91   INR Date 3/9/2020 2020 2020   INR Goal 2.0-3.0 2.0-3.0 2.0-3.0   Trend Same Same Same   Last Week Total 12.5 mg 12.5 mg 12.5 mg   Next Week Total 12.5 mg 12.5 mg 12.5 mg   Sun 1.25 mg 1.25 mg 1.25 mg   Mon 2.5 mg 2.5 mg 2.5 mg   Tue 1.25 mg 1.25 mg 1.25 mg   Wed 2.5 mg 2.5 mg 2.5 mg   Thu 1.25 mg 1.25 mg 1.25 mg   Fri 2.5 mg 2.5 mg 2.5 mg   Sat 1.25 mg 1.25 mg 1.25 mg   Visit Report - - -    Some recent data might be hidden       Plan:  1. INR was Subtherapeutic yesterday- see above in Anticoagulation Summary.   Will instruct Diana Avalos to Continue their warfarin regimen- see above in Anticoagulation Summary.  2. Follow up in 4 weeks  3. They have been instructed to call if any changes in medications, doses, concerns, etc. Patient expresses understanding and has no further questions at this time.    Jayant White [facility]

## 2020-06-15 RX ORDER — WARFARIN SODIUM 2.5 MG/1
TABLET ORAL
Qty: 65 TABLET | Refills: 1 | Status: SHIPPED | OUTPATIENT
Start: 2020-06-15 | End: 2020-06-23

## 2020-06-23 RX ORDER — WARFARIN SODIUM 2.5 MG/1
TABLET ORAL
Qty: 65 TABLET | Refills: 1 | Status: SHIPPED | OUTPATIENT
Start: 2020-06-23 | End: 2020-12-15

## 2020-07-07 LAB — INR PPP: 2.05

## 2020-07-08 ENCOUNTER — ANTICOAGULATION VISIT (OUTPATIENT)
Dept: PHARMACY | Facility: HOSPITAL | Age: 85
End: 2020-07-08

## 2020-07-08 NOTE — PROGRESS NOTES
Anticoagulation Clinic Progress Note    Anticoagulation Summary  As of 2020    INR goal:   2.0-3.0   TTR:   61.7 % (1.8 y)   INR used for dosin.05 (2020)   Warfarin maintenance plan:   2.5 mg every Mon, Wed, Fri; 1.25 mg all other days   Weekly warfarin total:   12.5 mg   Plan last modified:   Eve Bauer RPH (2/3/2020)   Next INR check:   2020   Priority:   High   Target end date:   Indefinite    Indications    Long-term (current) use of anticoagulants (Resolved) [Z79.01]             Anticoagulation Episode Summary     INR check location:       Preferred lab:       Send INR reminders to:    [facility]    Comments:         Anticoagulation Care Providers     Provider Role Specialty Phone number    Rafaela Leija MD Referring Cardiology [phone]          Clinic Interview:      INR History:  Anticoagulation Monitoring 2020 6/10/2020 2020   INR 2.80 1.91 2.05   INR Date 2020   INR Goal 2.0-3.0 2.0-3.0 2.0-3.0   Trend Same Same Same   Last Week Total 12.5 mg 12.5 mg 12.5 mg   Next Week Total 12.5 mg 12.5 mg 12.5 mg   Sun 1.25 mg 1.25 mg 1.25 mg   Mon 2.5 mg 2.5 mg 2.5 mg   Tue 1.25 mg 1.25 mg 1.25 mg   Wed 2.5 mg 2.5 mg 2.5 mg   Thu 1.25 mg 1.25 mg 1.25 mg   Fri 2.5 mg 2.5 mg 2.5 mg   Sat 1.25 mg 1.25 mg 1.25 mg   Visit Report - - -   Some recent data might be hidden       Plan:  1. INR is Therapeutic today- see above in Anticoagulation Summary.   Will instruct Diana Avalos to Continue their warfarin regimen- see above in Anticoagulation Summary.  2. Follow up in 4 weeks--[facility]  3. Left VM today for daughter Magui. They have been instructed to call if any changes in medications, doses, concerns, etc.     Eve Bauer RPH

## 2020-07-24 RX ORDER — FUROSEMIDE 40 MG/1
TABLET ORAL
Qty: 90 TABLET | Refills: 0 | Status: SHIPPED | OUTPATIENT
Start: 2020-07-24 | End: 2020-10-19

## 2020-08-04 LAB — INR PPP: 2.14

## 2020-08-05 ENCOUNTER — ANTICOAGULATION VISIT (OUTPATIENT)
Dept: PHARMACY | Facility: HOSPITAL | Age: 85
End: 2020-08-05

## 2020-08-05 NOTE — PROGRESS NOTES
Anticoagulation Clinic Progress Note    Anticoagulation Summary  As of 2020    INR goal:   2.0-3.0   TTR:   63.3 % (1.8 y)   INR used for dosin.14 (2020)   Warfarin maintenance plan:   2.5 mg every Mon, Wed, Fri; 1.25 mg all other days   Weekly warfarin total:   12.5 mg   No change documented:   Lashay Moss RPH   Plan last modified:   Eve Bauer RPH (2/3/2020)   Next INR check:   2020   Priority:   High   Target end date:   Indefinite    Indications    Long-term (current) use of anticoagulants (Resolved) [Z79.01]             Anticoagulation Episode Summary     INR check location:       Preferred lab:       Send INR reminders to:    [facility]    Comments:         Anticoagulation Care Providers     Provider Role Specialty Phone number    Rafaela Leija MD Referring Cardiology [phone]          Clinic Interview:      INR History:  Anticoagulation Monitoring 6/10/2020 2020 2020   INR 1.91 2.05 2.14   INR Date 2020   INR Goal 2.0-3.0 2.0-3.0 2.0-3.0   Trend Same Same Same   Last Week Total 12.5 mg 12.5 mg 12.5 mg   Next Week Total 12.5 mg 12.5 mg 12.5 mg   Sun 1.25 mg 1.25 mg 1.25 mg   Mon 2.5 mg 2.5 mg 2.5 mg   Tue 1.25 mg 1.25 mg 1.25 mg   Wed 2.5 mg 2.5 mg 2.5 mg   Thu 1.25 mg 1.25 mg 1.25 mg   Fri 2.5 mg 2.5 mg 2.5 mg   Sat 1.25 mg 1.25 mg 1.25 mg   Visit Report - - -   Some recent data might be hidden       Plan:  1. INR is Therapeutic today- see above in Anticoagulation Summary.   Will instruct Diana Avalos to Continue their warfarin regimen- see above in Anticoagulation Summary.  2. Follow up via [facility] in 4 weeks  3. They have been instructed to call if any changes in medications, doses, concerns, etc.  Lashay Moss RPH

## 2020-09-02 LAB — INR PPP: 1.71

## 2020-09-03 ENCOUNTER — ANTICOAGULATION VISIT (OUTPATIENT)
Dept: PHARMACY | Facility: HOSPITAL | Age: 85
End: 2020-09-03

## 2020-09-03 NOTE — PROGRESS NOTES
Anticoagulation Clinic Progress Note    Anticoagulation Summary  As of 9/3/2020    INR goal:   2.0-3.0   TTR:   62.0 % (1.9 y)   INR used for dosin.71! (2020)   Warfarin maintenance plan:   2.5 mg every Mon, Wed, Fri; 1.25 mg all other days   Weekly warfarin total:   12.5 mg   Plan last modified:   Eve Bauer RPH (2/3/2020)   Next INR check:   2020   Priority:   High   Target end date:   Indefinite    Indications    Long-term (current) use of anticoagulants (Resolved) [Z79.01]             Anticoagulation Episode Summary     INR check location:       Preferred lab:       Send INR reminders to:    [facility]    Comments:         Anticoagulation Care Providers     Provider Role Specialty Phone number    Rafaela Leija MD Referring Cardiology [phone]          Clinic Interview:  Patient Findings     Positives:   Change in medications    Negatives:   Signs/symptoms of thrombosis, Signs/symptoms of bleeding,   Laboratory test error suspected, Change in health, Change in alcohol use,   Change in activity, Upcoming invasive procedure, Emergency department   visit, Upcoming dental procedure, Missed doses, Extra doses, Change in   diet/appetite, Hospital admission, Bruising, Other complaints    Comments:   Pt was taking cephalexin but has been off for 7 - 10 days.       Clinical Outcomes     Negatives:   Major bleeding event, Thromboembolic event,   Anticoagulation-related hospital admission, Anticoagulation-related ED   visit, Anticoagulation-related fatality    Comments:   Pt was taking cephalexin but has been off for 7 - 10 days.         INR History:  Anticoagulation Monitoring 2020 2020 9/3/2020   INR 2.05 2.14 1.71   INR Date 2020   INR Goal 2.0-3.0 2.0-3.0 2.0-3.0   Trend Same Same Same   Last Week Total 12.5 mg 12.5 mg 12.5 mg   Next Week Total 12.5 mg 12.5 mg 13.75 mg   Sun 1.25 mg 1.25 mg 1.25 mg   Mon 2.5 mg 2.5 mg 2.5 mg   Tue 1.25 mg 1.25  mg 1.25 mg   Wed 2.5 mg 2.5 mg -   Thu 1.25 mg 1.25 mg 2.5 mg (9/3)   Fri 2.5 mg 2.5 mg 2.5 mg   Sat 1.25 mg 1.25 mg 1.25 mg   Visit Report - - -   Some recent data might be hidden       Plan:  1. INR is Subtherapeutic today- see above in Anticoagulation Summary.   Will instruct Diana Avalos to Change their warfarin regimen and take 2.5 mg today instead of 1.25 mg. The patient is to go to oral surgery today and the oral surgeon said it was ok for INR to be in range. I instructed Magui (pt's daughter) to go ahead and let the oral surgeon know about the INR results and plan to make sure the surgeon was fine with this- see above in Anticoagulation Summary.  2. Follow up in 1 week  3.  Pt has agreed to only be called if INR out of range. They have been instructed to call if any changes in medications, doses, concerns, etc. Patient expresses understanding and has no further questions at this time.    Jarrett Loomis [facility]

## 2020-09-09 LAB — INR PPP: 2

## 2020-09-10 ENCOUNTER — ANTICOAGULATION VISIT (OUTPATIENT)
Dept: PHARMACY | Facility: HOSPITAL | Age: 85
End: 2020-09-10

## 2020-09-10 NOTE — PROGRESS NOTES
Anticoagulation Clinic Progress Note    Anticoagulation Summary  As of 9/10/2020    INR goal:   2.0-3.0   TTR:   61.4 % (1.9 y)   INR used for dosin.00 (2020)   Warfarin maintenance plan:   2.5 mg every Mon, Wed, Fri; 1.25 mg all other days   Weekly warfarin total:   12.5 mg   Plan last modified:   Eve Bauer RPH (2/3/2020)   Next INR check:   2020   Priority:   High   Target end date:   Indefinite    Indications    Long-term (current) use of anticoagulants (Resolved) [Z79.01]             Anticoagulation Episode Summary     INR check location:       Preferred lab:       Send INR reminders to:    [facility]    Comments:         Anticoagulation Care Providers     Provider Role Specialty Phone number    Rafaela Leija MD Referring Cardiology [phone]          Clinic Interview:      INR History:  Anticoagulation Monitoring 2020 9/3/2020 9/10/2020   INR 2.14 1.71 2.00   INR Date 2020   INR Goal 2.0-3.0 2.0-3.0 2.0-3.0   Trend Same Same Same   Last Week Total 12.5 mg 12.5 mg 13.75 mg   Next Week Total 12.5 mg 13.75 mg 12.5 mg   Sun 1.25 mg 1.25 mg 1.25 mg   Mon 2.5 mg 2.5 mg 2.5 mg   Tue 1.25 mg 1.25 mg 1.25 mg   Wed 2.5 mg - -   Thu 1.25 mg 2.5 mg (9/3) 1.25 mg   Fri 2.5 mg 2.5 mg 2.5 mg   Sat 1.25 mg 1.25 mg 1.25 mg   Visit Report - - -   Some recent data might be hidden       Plan:  1. INR is Therapeutic today- see above in Anticoagulation Summary.   Will instruct Diana Avalos to Continue their warfarin regimen- see above in Anticoagulation Summary.  2. Follow up in 1 week  3.  They have been instructed to call if any changes in medications, doses, concerns, etc. Patient expresses understanding and has no further questions at this time.    Jarrett Loomis [facility]

## 2020-09-16 LAB — INR PPP: 2.02

## 2020-09-17 ENCOUNTER — ANTICOAGULATION VISIT (OUTPATIENT)
Dept: PHARMACY | Facility: HOSPITAL | Age: 85
End: 2020-09-17

## 2020-09-17 NOTE — PROGRESS NOTES
Anticoagulation Clinic Progress Note    Anticoagulation Summary  As of 2020    INR goal:  2.0-3.0   TTR:  61.8 % (1.9 y)   INR used for dosin.02 (2020)   Warfarin maintenance plan:  2.5 mg every Mon, Wed, Fri; 1.25 mg all other days   Weekly warfarin total:  12.5 mg   Plan last modified:  Eve Bauer [facility] (2/3/2020)   Next INR check:  2020   Priority:  High   Target end date:  Indefinite    Indications    Long-term (current) use of anticoagulants (Resolved) [Z79.01]             Anticoagulation Episode Summary     INR check location:      Preferred lab:      Send INR reminders to:   [facility]    Comments:        Anticoagulation Care Providers     Provider Role Specialty Phone number    Rafaela Leija MD Referring Cardiology [phone]          Clinic Interview:      INR History:  Anticoagulation Monitoring 9/3/2020 9/10/2020 2020   INR 1.71 2.00 2.02   INR Date 2020   INR Goal 2.0-3.0 2.0-3.0 2.0-3.0   Trend Same Same Same   Last Week Total 12.5 mg 13.75 mg 12.5 mg   Next Week Total 13.75 mg 12.5 mg 12.5 mg   Sun 1.25 mg 1.25 mg 1.25 mg   Mon 2.5 mg 2.5 mg 2.5 mg   Tue 1.25 mg 1.25 mg 1.25 mg   Wed - - 2.5 mg   Thu 2.5 mg (9/3) 1.25 mg 1.25 mg   Fri 2.5 mg 2.5 mg 2.5 mg   Sat 1.25 mg 1.25 mg 1.25 mg   Visit Report - - -   Some recent data might be hidden       Plan:  1. INR is Therapeutic today- see above in Anticoagulation Summary.   Spoke with  Diana Avalos's son Dajuan. Instructed to Continue their warfarin regimen- see above in Anticoagulation Summary.  2. Follow up in 2 weeks  3. They have been instructed to call if any changes in medications, doses, concerns, etc. Family expresses understanding and has no further questions at this time.    Lashay Moss [facility]

## 2020-09-29 RX ORDER — CARVEDILOL 6.25 MG/1
6.25 TABLET ORAL 2 TIMES DAILY WITH MEALS
Qty: 180 TABLET | Refills: 0 | Status: SHIPPED | OUTPATIENT
Start: 2020-09-29 | End: 2020-12-28

## 2020-10-02 LAB — INR PPP: 2.57

## 2020-10-05 ENCOUNTER — ANTICOAGULATION VISIT (OUTPATIENT)
Dept: PHARMACY | Facility: HOSPITAL | Age: 85
End: 2020-10-05

## 2020-10-05 NOTE — PROGRESS NOTES
Anticoagulation Clinic Progress Note    Anticoagulation Summary  As of 10/5/2020    INR goal:  2.0-3.0   TTR:  62.6 % (2 y)   INR used for dosin.57 (10/2/2020)   Warfarin maintenance plan:  2.5 mg every Mon, Wed, Fri; 1.25 mg all other days   Weekly warfarin total:  12.5 mg   Plan last modified:  Eve Bauer RPH (2/3/2020)   Next INR check:  10/28/2020   Priority:  High   Target end date:  Indefinite    Indications    Long-term (current) use of anticoagulants (Resolved) [Z79.01]             Anticoagulation Episode Summary     INR check location:      Preferred lab:      Send INR reminders to:   [facility]    Comments:        Anticoagulation Care Providers     Provider Role Specialty Phone number    Rafaela Leija MD Referring Cardiology [phone]          Clinic Interview:      INR History:  Anticoagulation Monitoring 9/10/2020 2020 10/5/2020   INR 2.00 2.02 2.57   INR Date 2020 2020 10/2/2020   INR Goal 2.0-3.0 2.0-3.0 2.0-3.0   Trend Same Same Same   Last Week Total 13.75 mg 12.5 mg 12.5 mg   Next Week Total 12.5 mg 12.5 mg 12.5 mg   Sun 1.25 mg 1.25 mg 1.25 mg   Mon 2.5 mg 2.5 mg 2.5 mg   Tue 1.25 mg 1.25 mg 1.25 mg   Wed - 2.5 mg 2.5 mg   Thu 1.25 mg 1.25 mg 1.25 mg   Fri 2.5 mg 2.5 mg 2.5 mg   Sat 1.25 mg 1.25 mg 1.25 mg   Visit Report - - -   Some recent data might be hidden       Plan:  1. INR is Therapeutic - see above in Anticoagulation Summary.   Will instruct Diana Avalos to Continue their warfarin regimen- see above in Anticoagulation Summary.  2. Follow up in 4 weeks  3. Spoke with daughter Magui today. They have been instructed to call if any changes in medications, doses, concerns, etc.   Eve Bauer RPH

## 2020-10-21 ENCOUNTER — TELEPHONE (OUTPATIENT)
Dept: CARDIOLOGY | Facility: CLINIC | Age: 85
End: 2020-10-21

## 2020-10-21 PROBLEM — I10 ESSENTIAL HYPERTENSION: Status: ACTIVE | Noted: 2020-10-21

## 2020-10-21 RX ORDER — FUROSEMIDE 40 MG/1
TABLET ORAL
Qty: 90 TABLET | Refills: 0 | Status: SHIPPED | OUTPATIENT
Start: 2020-10-21 | End: 2021-01-28

## 2020-10-21 NOTE — TELEPHONE ENCOUNTER
Please call patient.  I have given her a 90-day refill on her furosemide.  She is due for an appointment and blood work in our office.  Please arrange for her to be seen within the next 3 months by Dr. Leija.  Thank you

## 2020-10-28 LAB — INR PPP: 1.8

## 2020-10-29 ENCOUNTER — TELEPHONE (OUTPATIENT)
Dept: ONCOLOGY | Facility: CLINIC | Age: 85
End: 2020-10-29

## 2020-10-29 ENCOUNTER — ANTICOAGULATION VISIT (OUTPATIENT)
Dept: PHARMACY | Facility: HOSPITAL | Age: 85
End: 2020-10-29

## 2020-10-29 ENCOUNTER — DOCUMENTATION (OUTPATIENT)
Dept: ONCOLOGY | Facility: CLINIC | Age: 85
End: 2020-10-29

## 2020-10-29 NOTE — PROGRESS NOTES
Anticoagulation Clinic Progress Note    Anticoagulation Summary  As of 10/29/2020    INR goal:  2.0-3.0   TTR:  63.0 % (2.1 y)   INR used for dosin.80 (10/28/2020)   Warfarin maintenance plan:  2.5 mg every Mon, Wed, Fri; 1.25 mg all other days   Weekly warfarin total:  12.5 mg   Plan last modified:  Eve Bauer RPH (2/3/2020)   Next INR check:  2020   Priority:  High   Target end date:  Indefinite    Indications    Long-term (current) use of anticoagulants (Resolved) [Z79.01]             Anticoagulation Episode Summary     INR check location:      Preferred lab:      Send INR reminders to:   [facility]    Comments:        Anticoagulation Care Providers     Provider Role Specialty Phone number    Rafaela Leija MD Referring Cardiology [phone]          Clinic Interview:  Patient Findings     Negatives:  Signs/symptoms of thrombosis, Signs/symptoms of bleeding,   Laboratory test error suspected, Change in health, Change in alcohol use,   Change in activity, Upcoming invasive procedure, Emergency department   visit, Upcoming dental procedure, Missed doses, Extra doses, Change in   medications, Change in diet/appetite, Hospital admission, Bruising, Other   complaints      Clinical Outcomes     Negatives:  Major bleeding event, Thromboembolic event,   Anticoagulation-related hospital admission, Anticoagulation-related ED   visit, Anticoagulation-related fatality        INR History:  Anticoagulation Monitoring 2020 10/5/2020 10/29/2020   INR 2.02 2.57 1.80   INR Date 2020 10/2/2020 10/28/2020   INR Goal 2.0-3.0 2.0-3.0 2.0-3.0   Trend Same Same Same   Last Week Total 12.5 mg 12.5 mg 12.5 mg   Next Week Total 12.5 mg 12.5 mg 13.75 mg   Sun 1.25 mg 1.25 mg 1.25 mg   Mon 2.5 mg 2.5 mg 2.5 mg   Tue 1.25 mg 1.25 mg 1.25 mg   Wed 2.5 mg 2.5 mg 2.5 mg   Thu 1.25 mg 1.25 mg 2.5 mg (10/29); Otherwise 1.25 mg   Fri 2.5 mg 2.5 mg 2.5 mg   Sat 1.25 mg 1.25 mg 1.25 mg   Visit Report  - - -   Some recent data might be hidden       Plan:  1. INR was Subtherapeutic yseterday- see above in Anticoagulation Summary.   Will instruct Diana Avalos to Change their warfarin regimen- see above in Anticoagulation Summary.  2. Follow up in 2 weeks  3. They have been instructed to call if any changes in medications, doses, concerns, etc. Patient expresses understanding and has no further questions at this time.    Jayant White RP

## 2020-10-29 NOTE — TELEPHONE ENCOUNTER
PATIENT'S DAUGHTER LYNETTE (ON  [place]) CALLING, WANTS TO CANCEL THE PATIENT'S APPT FOR 11-2-20, SHE DOES NOT FEEL COMFORTABLE GETTING HER MOTHER OUT AT THIS TIME, PLEASE ADVISE?    PT CALL BACK # [phone]

## 2020-10-29 NOTE — PROGRESS NOTES
Because she is just on annual follow-up, no rush to get her back in.  I think it would be reasonable to follow-up in 3 months or so.  If she really wants to push it longer, such as 6 months that would be reasonable in the setting of the viral pandemic.  Please make sure she is up-to-date on her mammogram.  The last that I see was April 2019.  She needs to have this annually.  Therefore, I suspect she is about 6 months overdue.  I would rather her have her mammogram than to see me.    ===View-only below this line===  ----- Message -----  From: Frances Castillo  Sent: 10/29/2020  12:41 PM EDT  To: Sherman Belcher II, MD  Subject: how far out can I move                           Daughter wants to cancel next week, how far out can I move her?    [place]

## 2020-10-30 ENCOUNTER — DOCUMENTATION (OUTPATIENT)
Dept: ONCOLOGY | Facility: CLINIC | Age: 85
End: 2020-10-30

## 2020-10-30 DIAGNOSIS — Z17.0 MALIGNANT NEOPLASM OF CENTRAL PORTION OF LEFT BREAST IN FEMALE, ESTROGEN RECEPTOR POSITIVE (HCC): Primary | ICD-10-CM

## 2020-10-30 DIAGNOSIS — Z12.31 SCREENING MAMMOGRAM, ENCOUNTER FOR: ICD-10-CM

## 2020-10-30 DIAGNOSIS — C50.112 MALIGNANT NEOPLASM OF CENTRAL PORTION OF LEFT BREAST IN FEMALE, ESTROGEN RECEPTOR POSITIVE (HCC): Primary | ICD-10-CM

## 2020-10-30 NOTE — PROGRESS NOTES
Personally, I recommend continuing mammograms considering she had a breast cancer in the past.  That makes her at increased risk to get another breast cancer down the road.  I certainly respect patient and family decision if they choose to not have any further mammograms.  I realize she is advanced in age.  I favor continuing annual mammograms.  However, again I understand if they decide to stop annual mammograms.    ===View-only below this line===  ----- Message -----  From: Frances Castillo  Sent: 10/30/2020  10:13 AM EDT  To: Sherman Belcher II, MD  Subject: RE: how far out can I move                       Her daughter is stating she thought you said that she is 5 years out and she is 88 she will no longer need to get mammograms. She just wants to clarify that this still needs to be done annually. She is worried that she is again 88 and is it necessary to have this test now.   ----- Message -----  From: Sherman Belcher II, MD  Sent: 10/29/2020   1:04 PM EDT  To: Frances Castillo  Subject: RE: how far out can I move                       Because she is just on annual follow-up, no rush to get her back in.  I think it would be reasonable to follow-up in 3 months or so.  If she really wants to push it longer, such as 6 months that would be reasonable in the setting of the viral pandemic.  Please make sure she is up-to-date on her mammogram.  The last that I see was April 2019.  She needs to have this annually.  Therefore, I suspect she is about 6 months overdue.  I would rather her have her mammogram than to see me.  ----- Message -----  From: Frances Castillo  Sent: 10/29/2020  12:41 PM EDT  To: Sherman Belcher II, MD  Subject: how far out can I move                           Daughter wants to cancel next week, how far out can I move her?    [place]

## 2020-10-30 NOTE — TELEPHONE ENCOUNTER
I spoke with Dr Belcher and he wanted patient to have a mammogram now because she is late for her annual one and see him in 3 to 6 months from now. Patient chooses to wait to have her mammogram in 5.5 months and see Code in 6 months because of Covid and she is 89yo, No disire to have now because of the pandemic. Per patient daughter Magui.

## 2020-11-02 ENCOUNTER — APPOINTMENT (OUTPATIENT)
Dept: LAB | Facility: HOSPITAL | Age: 85
End: 2020-11-02

## 2020-11-11 LAB — INR PPP: 1.78

## 2020-11-12 ENCOUNTER — ANTICOAGULATION VISIT (OUTPATIENT)
Dept: PHARMACY | Facility: HOSPITAL | Age: 85
End: 2020-11-12

## 2020-11-12 NOTE — PROGRESS NOTES
Anticoagulation Clinic Progress Note    Anticoagulation Summary  As of 2020    INR goal:  2.0-3.0   TTR:  61.9 % (2.1 y)   INR used for dosin.78 (2020)   Warfarin maintenance plan:  1.25 mg every Sun, Tue, Thu; 2.5 mg all other days   Weekly warfarin total:  13.75 mg   Plan last modified:  Eve Bauer RPH (2020)   Next INR check:  2020   Priority:  High   Target end date:  Indefinite    Indications    Long-term (current) use of anticoagulants (Resolved) [Z79.01]             Anticoagulation Episode Summary     INR check location:      Preferred lab:      Send INR reminders to:   [facility] [place]    Comments:        Anticoagulation Care Providers     Provider Role Specialty Phone number    Rafaela Leija MD Referring Cardiology [phone]          Clinic Interview:      INR History:  Anticoagulation Monitoring 10/5/2020 10/29/2020 2020   INR 2.57 1.80 1.78   INR Date 10/2/2020 10/28/2020 2020   INR Goal 2.0-3.0 2.0-3.0 2.0-3.0   Trend Same Same Up   Last Week Total 12.5 mg 12.5 mg 12.5 mg   Next Week Total 12.5 mg 13.75 mg 13.75 mg   Sun 1.25 mg 1.25 mg 1.25 mg   Mon 2.5 mg 2.5 mg 2.5 mg   Tue 1.25 mg 1.25 mg 1.25 mg   Wed 2.5 mg 2.5 mg 2.5 mg   Thu 1.25 mg 2.5 mg (10/29); Otherwise 1.25 mg 1.25 mg   Fri 2.5 mg 2.5 mg 2.5 mg   Sat 1.25 mg 1.25 mg 2.5 mg   Visit Report - - -   Some recent data might be hidden       Plan:  1. INR is Subtherapeutic today- see above in Anticoagulation Summary.   Will instruct Diana Avalos to Increase their warfarin regimen- see above in Anticoagulation Summary.  2. Follow up in 2 weeks  3. Left VM today for Magui. They have been instructed to call if any changes in medications, doses, concerns, etc.   Eve Bauer RPH

## 2020-11-24 LAB — INR PPP: 1.89

## 2020-11-25 ENCOUNTER — ANTICOAGULATION VISIT (OUTPATIENT)
Dept: PHARMACY | Facility: HOSPITAL | Age: 85
End: 2020-11-25

## 2020-11-25 NOTE — PROGRESS NOTES
Anticoagulation Clinic Progress Note    Anticoagulation Summary  As of 2020    INR goal:  2.0-3.0   TTR:  60.9 % (2.1 y)   INR used for dosin.89 (2020)   Warfarin maintenance plan:  1.25 mg every Sun, Tue, Thu; 2.5 mg all other days   Weekly warfarin total:  13.75 mg   Plan last modified:  Eve Bauer RPH (2020)   Next INR check:  2020   Priority:  High   Target end date:  Indefinite    Indications    Long-term (current) use of anticoagulants (Resolved) [Z79.01]             Anticoagulation Episode Summary     INR check location:      Preferred lab:      Send INR reminders to:   [facility] [place]    Comments:        Anticoagulation Care Providers     Provider Role Specialty Phone number    Rafaela Leija MD Referring Cardiology [phone]          Clinic Interview:  Missed 1/2 tab just last weekend      INR History:  Anticoagulation Monitoring 10/29/2020 2020 2020   INR 1.80 1.78 1.89   INR Date 10/28/2020 2020 2020   INR Goal 2.0-3.0 2.0-3.0 2.0-3.0   Trend Same Up Same   Last Week Total 12.5 mg 12.5 mg 12.5 mg   Next Week Total 13.75 mg 13.75 mg 13.75 mg   Sun 1.25 mg 1.25 mg 1.25 mg   Mon 2.5 mg 2.5 mg 2.5 mg   Tue 1.25 mg 1.25 mg 1.25 mg   Wed 2.5 mg 2.5 mg 2.5 mg   Thu 2.5 mg (10/29); Otherwise 1.25 mg 1.25 mg 1.25 mg   Fri 2.5 mg 2.5 mg 2.5 mg   Sat 1.25 mg 2.5 mg 2.5 mg   Visit Report - - -   Some recent data might be hidden       Plan:  1. INR is Subtherapeutic today- see above in Anticoagulation Summary.   Will instruct Diana Avalos to continue their warfarin regimen- see above in Anticoagulation Summary.  2. Follow up in 2 weeks  3. Left VM for daughter Magui & she called back.   They have been instructed to call if any changes in medications, doses, concerns, etc.     Eve Bauer RPH

## 2020-12-09 LAB — INR PPP: 2.13

## 2020-12-10 ENCOUNTER — ANTICOAGULATION VISIT (OUTPATIENT)
Dept: PHARMACY | Facility: HOSPITAL | Age: 85
End: 2020-12-10

## 2020-12-10 NOTE — PROGRESS NOTES
Anticoagulation Clinic Progress Note    Anticoagulation Summary  As of 12/10/2020    INR goal:  2.0-3.0   TTR:  60.7 % (2.2 y)   INR used for dosin.13 (2020)   Warfarin maintenance plan:  1.25 mg every Sun, Tue, Thu; 2.5 mg all other days   Weekly warfarin total:  13.75 mg   No change documented:  Jayant White RPH   Plan last modified:  Eve Bauer RPH (2020)   Next INR check:  2020   Priority:  High   Target end date:  Indefinite    Indications    Long-term (current) use of anticoagulants (Resolved) [Z79.01]             Anticoagulation Episode Summary     INR check location:      Preferred lab:      Send INR reminders to:  [facility]    Comments:        Anticoagulation Care Providers     Provider Role Specialty Phone number    Rafaela Leija MD Referring Cardiology [phone]          Clinic Interview:  Patient Findings     Negatives:  Signs/symptoms of thrombosis, Signs/symptoms of bleeding,   Laboratory test error suspected, Change in health, Change in alcohol use,   Change in activity, Upcoming invasive procedure, Emergency department   visit, Upcoming dental procedure, Missed doses, Extra doses, Change in   medications, Change in diet/appetite, Hospital admission, Bruising, Other   complaints      Clinical Outcomes     Negatives:  Major bleeding event, Thromboembolic event,   Anticoagulation-related hospital admission, Anticoagulation-related ED   visit, Anticoagulation-related fatality        INR History:  Anticoagulation Monitoring 2020 2020 12/10/2020   INR 1.78 1.89 2.13   INR Date 2020   INR Goal 2.0-3.0 2.0-3.0 2.0-3.0   Trend Up Same Same   Last Week Total 12.5 mg 12.5 mg 13.75 mg   Next Week Total 13.75 mg 13.75 mg 13.75 mg   Sun 1.25 mg 1.25 mg 1.25 mg   Mon 2.5 mg 2.5 mg 2.5 mg   Tue 1.25 mg 1.25 mg 1.25 mg   Wed 2.5 mg 2.5 mg 2.5 mg   Thu 1.25 mg 1.25 mg 1.25 mg   Fri 2.5 mg 2.5 mg 2.5 mg   Sat 2.5 mg 2.5 mg  2.5 mg   Visit Report - - -   Some recent data might be hidden       Plan:  1. INR is Therapeutic today- see above in Anticoagulation Summary.   Will instruct Diana Avalos to Continue their warfarin regimen- see above in Anticoagulation Summary.  2. Follow up in 3 weeks  3. They have been instructed to call if any changes in medications, doses, concerns, etc. Patient expresses understanding and has no further questions at this time.    Jayant White RP

## 2020-12-15 RX ORDER — WARFARIN SODIUM 2.5 MG/1
TABLET ORAL
Qty: 75 TABLET | Refills: 1 | Status: SHIPPED | OUTPATIENT
Start: 2020-12-15 | End: 2020-12-21 | Stop reason: SDUPTHER

## 2020-12-21 RX ORDER — WARFARIN SODIUM 2.5 MG/1
TABLET ORAL
Qty: 75 TABLET | Refills: 1 | Status: SHIPPED | OUTPATIENT
Start: 2020-12-21 | End: 2021-05-26

## 2020-12-28 RX ORDER — CARVEDILOL 6.25 MG/1
6.25 TABLET ORAL 2 TIMES DAILY WITH MEALS
Qty: 180 TABLET | Refills: 0 | Status: SHIPPED | OUTPATIENT
Start: 2020-12-28 | End: 2021-03-02 | Stop reason: SDUPTHER

## 2020-12-29 LAB — INR PPP: 2.55

## 2020-12-30 ENCOUNTER — ANTICOAGULATION VISIT (OUTPATIENT)
Dept: PHARMACY | Facility: HOSPITAL | Age: 85
End: 2020-12-30

## 2020-12-30 NOTE — PROGRESS NOTES
Anticoagulation Clinic Progress Note    Anticoagulation Summary  As of 2020    INR goal:  2.0-3.0   TTR:  61.7 % (2.2 y)   INR used for dosin.55 (2020)   Warfarin maintenance plan:  1.25 mg every Sun, Tue, Thu; 2.5 mg all other days   Weekly warfarin total:  13.75 mg   Plan last modified:  Eve Buaer RPH (2020)   Next INR check:     Priority:  High   Target end date:  Indefinite    Indications    Long-term (current) use of anticoagulants (Resolved) [Z79.01]             Anticoagulation Episode Summary     INR check location:      Preferred lab:      Send INR reminders to:   [facility]    Comments:        Anticoagulation Care Providers     Provider Role Specialty Phone number    Rafaela Leija MD Referring Cardiology [phone]          Clinic Interview:  No changes per daughter      INR History:  Anticoagulation Monitoring 2020 12/10/2020 2020   INR 1.89 2.13 2.55   INR Date 2020   INR Goal 2.0-3.0 2.0-3.0 2.0-3.0   Trend Same Same Same   Last Week Total 12.5 mg 13.75 mg 13.75 mg   Next Week Total 13.75 mg 13.75 mg 13.75 mg   Sun 1.25 mg 1.25 mg 1.25 mg   Mon 2.5 mg 2.5 mg 2.5 mg   Tue 1.25 mg 1.25 mg 1.25 mg   Wed 2.5 mg 2.5 mg 2.5 mg   Thu 1.25 mg 1.25 mg 1.25 mg   Fri 2.5 mg 2.5 mg 2.5 mg   Sat 2.5 mg 2.5 mg 2.5 mg   Visit Report - - -   Some recent data might be hidden       Plan:  1. INR is Therapeutic today- see above in Anticoagulation Summary.   Will instruct Diana Avalos to Continue their warfarin regimen- see above in Anticoagulation Summary.  2. Follow up in 4 weeks  3. Spoke with daughter Magui today. They have been instructed to call if any changes in medications, doses, concerns, etc.   Eve Bauer RPH

## 2021-01-22 RX ORDER — FUROSEMIDE 40 MG/1
TABLET ORAL
Qty: 90 TABLET | Refills: 0 | OUTPATIENT
Start: 2021-01-22

## 2021-01-26 LAB — INR PPP: 2.21

## 2021-01-27 ENCOUNTER — ANTICOAGULATION VISIT (OUTPATIENT)
Dept: PHARMACY | Facility: HOSPITAL | Age: 86
End: 2021-01-27

## 2021-01-27 NOTE — PROGRESS NOTES
Anticoagulation Clinic Progress Note    Anticoagulation Summary  As of 2021    INR goal:  2.0-3.0   TTR:  63.0 % (2.3 y)   INR used for dosin.21 (2021)   Warfarin maintenance plan:  1.25 mg every Sun, Tue, Thu; 2.5 mg all other days   Weekly warfarin total:  13.75 mg   No change documented:  Jayant White RPH   Plan last modified:  Eve Bauer RPH (2020)   Next INR check:  2021   Priority:  High   Target end date:  Indefinite    Indications    Long-term (current) use of anticoagulants (Resolved) [Z79.01]             Anticoagulation Episode Summary     INR check location:      Preferred lab:      Send INR reminders to:  [facility]    Comments:        Anticoagulation Care Providers     Provider Role Specialty Phone number    Rafaela Leija MD Referring Cardiology [phone]          Clinic Interview:  Patient Findings     Negatives:  Signs/symptoms of thrombosis, Signs/symptoms of bleeding,   Laboratory test error suspected, Change in health, Change in alcohol use,   Change in activity, Upcoming invasive procedure, Emergency department   visit, Upcoming dental procedure, Missed doses, Extra doses, Change in   medications, Change in diet/appetite, Hospital admission, Bruising, Other   complaints      Clinical Outcomes     Negatives:  Major bleeding event, Thromboembolic event,   Anticoagulation-related hospital admission, Anticoagulation-related ED   visit, Anticoagulation-related fatality        INR History:  Anticoagulation Monitoring 12/10/2020 2020 2021   INR 2.13 2.55 2.21   INR Date 2020   INR Goal 2.0-3.0 2.0-3.0 2.0-3.0   Trend Same Same Same   Last Week Total 13.75 mg 13.75 mg 13.75 mg   Next Week Total 13.75 mg 13.75 mg 13.75 mg   Sun 1.25 mg 1.25 mg 1.25 mg   Mon 2.5 mg 2.5 mg 2.5 mg   Tue 1.25 mg 1.25 mg 1.25 mg   Wed 2.5 mg 2.5 mg 2.5 mg   Thu 1.25 mg 1.25 mg 1.25 mg   Fri 2.5 mg 2.5 mg 2.5 mg   Sat 2.5 mg 2.5 mg  2.5 mg   Visit Report - - -   Some recent data might be hidden       Plan:  1. INR is Therapeutic today- see above in Anticoagulation Summary.   Will instruct Diana Avalos to Continue their warfarin regimen- see above in Anticoagulation Summary.  2. Follow up in 4 weeks  3. They have been instructed to call if any changes in medications, doses, concerns, etc. Patient expresses understanding and has no further questions at this time.    Jayant White RP

## 2021-01-28 ENCOUNTER — TELEPHONE (OUTPATIENT)
Dept: CARDIOLOGY | Facility: CLINIC | Age: 86
End: 2021-01-28

## 2021-01-28 RX ORDER — FUROSEMIDE 40 MG/1
TABLET ORAL
Qty: 90 TABLET | Refills: 0 | Status: SHIPPED | OUTPATIENT
Start: 2021-01-28 | End: 2021-03-02 | Stop reason: SDUPTHER

## 2021-01-28 NOTE — TELEPHONE ENCOUNTER
Dr Brand's office states they have not seen the pt in quite some time and they do not have any labs on her.

## 2021-01-28 NOTE — TELEPHONE ENCOUNTER
See telephone correspondence.  I explained to the daughter why the prescription was denied.  I have sent in another refill.

## 2021-01-28 NOTE — TELEPHONE ENCOUNTER
I spoke with her daughter Magui Cassidy.  I explained that a message was left back in October 2020 stating that she needed an appointment and they canceled the appointment with Dr. Leija.  She is overdue for blood work.    Dulce-please call the PCP office and see if she has had a CBC and CMP/BMP completed in 2020.    FYI to myself.  If not, the daughter wants blood work to be completed when they come out to check her INRs.    We will arrange for her to have a telehealth visit in the near future.

## 2021-02-24 LAB — INR PPP: 2.11

## 2021-02-25 ENCOUNTER — ANTICOAGULATION VISIT (OUTPATIENT)
Dept: PHARMACY | Facility: HOSPITAL | Age: 86
End: 2021-02-25

## 2021-02-25 NOTE — PROGRESS NOTES
Anticoagulation Clinic Progress Note    Anticoagulation Summary  As of 2021    INR goal:  2.0-3.0   TTR:  64.2 % (2.4 y)   INR used for dosin.11 (2021)   Warfarin maintenance plan:  1.25 mg every Sun, Tue, Thu; 2.5 mg all other days   Weekly warfarin total:  13.75 mg   Plan last modified:  Eve Bauer RPH (2020)   Next INR check:  3/24/2021   Priority:  High   Target end date:  Indefinite    Indications    Long-term (current) use of anticoagulants (Resolved) [Z79.01]             Anticoagulation Episode Summary     INR check location:      Preferred lab:      [facility]    Comments:  [facility]      Anticoagulation Care Providers     Provider Role Specialty Phone number    Rafaela Leija MD Referring Cardiology [phone]          Clinic Interview:      INR History:  Anticoagulation Monitoring 2020   INR 2.55 2.21 2.11   INR Date 2020   INR Goal 2.0-3.0 2.0-3.0 2.0-3.0   Trend Same Same Same   Last Week Total 13.75 mg 13.75 mg 13.75 mg   Next Week Total 13.75 mg 13.75 mg 13.75 mg   Sun 1.25 mg 1.25 mg 1.25 mg   Mon 2.5 mg 2.5 mg 2.5 mg   Tue 1.25 mg 1.25 mg 1.25 mg   Wed 2.5 mg 2.5 mg 2.5 mg   Thu 1.25 mg 1.25 mg 1.25 mg   Fri 2.5 mg 2.5 mg 2.5 mg   Sat 2.5 mg 2.5 mg 2.5 mg   Visit Report - - -   Some recent data might be hidden       Plan:  1. INR is Therapeutic today- see above in Anticoagulation Summary.   Will instruct Diana Avalos to Continue their warfarin regimen- see above in Anticoagulation Summary.  2. Follow up in 4 weeks  3. Left VM for daughter Magui today. They have been instructed to call if any changes in medications, doses, concerns, etc.   Eve Bauer RPH

## 2021-03-02 ENCOUNTER — OFFICE VISIT (OUTPATIENT)
Dept: CARDIOLOGY | Facility: CLINIC | Age: 86
End: 2021-03-02

## 2021-03-02 VITALS
BODY MASS INDEX: 22.5 KG/M2 | SYSTOLIC BLOOD PRESSURE: 114 MMHG | TEMPERATURE: 96.5 F | WEIGHT: 139.4 LBS | HEART RATE: 72 BPM | DIASTOLIC BLOOD PRESSURE: 58 MMHG

## 2021-03-02 DIAGNOSIS — I34.0 NONRHEUMATIC MITRAL VALVE REGURGITATION: ICD-10-CM

## 2021-03-02 DIAGNOSIS — I36.1 NONRHEUMATIC TRICUSPID VALVE REGURGITATION: ICD-10-CM

## 2021-03-02 DIAGNOSIS — I47.20 VENTRICULAR TACHYCARDIA (HCC): ICD-10-CM

## 2021-03-02 DIAGNOSIS — I48.21 PERMANENT ATRIAL FIBRILLATION (HCC): Primary | ICD-10-CM

## 2021-03-02 DIAGNOSIS — I44.2 THIRD DEGREE AV BLOCK (HCC): ICD-10-CM

## 2021-03-02 DIAGNOSIS — I27.20 PULMONARY HYPERTENSION (HCC): ICD-10-CM

## 2021-03-02 DIAGNOSIS — Z95.0 CARDIAC RESYNCHRONIZATION THERAPY PACEMAKER (CRT-P) IN PLACE: ICD-10-CM

## 2021-03-02 DIAGNOSIS — I50.23 ACUTE ON CHRONIC SYSTOLIC HEART FAILURE (HCC): ICD-10-CM

## 2021-03-02 DIAGNOSIS — I10 ESSENTIAL HYPERTENSION: ICD-10-CM

## 2021-03-02 PROBLEM — R91.1 PULMONARY NODULE: Status: RESOLVED | Noted: 2017-04-03 | Resolved: 2021-03-02

## 2021-03-02 PROBLEM — R91.1 PULMONARY NODULE: Status: ACTIVE | Noted: 2017-04-03

## 2021-03-02 PROCEDURE — 99214 OFFICE O/P EST MOD 30 MIN: CPT | Performed by: NURSE PRACTITIONER

## 2021-03-02 RX ORDER — FUROSEMIDE 40 MG/1
40 TABLET ORAL DAILY
Qty: 90 TABLET | Refills: 3 | Status: SHIPPED | OUTPATIENT
Start: 2021-03-02 | End: 2021-04-28

## 2021-03-02 RX ORDER — CARVEDILOL 6.25 MG/1
6.25 TABLET ORAL 2 TIMES DAILY WITH MEALS
Qty: 180 TABLET | Refills: 3 | Status: SHIPPED | OUTPATIENT
Start: 2021-03-02 | End: 2022-04-04

## 2021-03-02 NOTE — PROGRESS NOTES
Telehealth Visit    Date of Visit: 2021  Encounter Provider: KIKA Vidal  Place of Service: [facility] CARDIOLOGY  Patient Name: Diana Avalos  :1932  Primary Cardiologist: Dr. Rafaela Leija     Chief Complaint   Patient presents with   • Atrial Fibrillation     Follow up   • Annual Exam       HPI: Diana Avalos is a pleasant 88 y.o. female who is an established patient of our practice. Due to COVID-19 virus, I am conducting a telehealth visit via video with patient and she has consented to this visit today. Her daughter is also on the call.     In 2011, she was diagnosed with atrial fibrillation.  In 2012, she underwent AV node ablation and single-chamber Medtronic pacemaker.  Her EF at that time was 35-40%.  Her ejection fraction has remained decreased.  In , she had an upgrade to a CRT-D device.    In 2019, she presented to [facility] with chest pain and dyspnea. She ruled out for myocardial infarction. Repeat echocardiogram showed improvement of her ejection fraction to 63%, mild concentric hypertrophy, grade 2 diastolic dysfunction, left atrium volume severely increased, right atrium moderately to severely dilated, mild to moderate mitral valve regurgitation, mild tricuspid valve regurgitation, and RVSP normal.     She said she has been doing very well at home.  She has not had the COVID-19 virus and is already received her first vaccine.  She remains on warfarin and INRs are being checked at her house and sent to the [facility].  She said she really has not been exerting herself.  She denies chest pain, shortness of air, palpitations, edema, dizziness, syncope, or bleeding.  Her blood pressure and heart rate are both normal today.  She is due for some repeat blood work.    Past Medical History:   Diagnosis Date   • Acute on chronic systolic congestive heart failure (CMS/HCC)    • Anemia    •  Arthritis    • Asthma    • Atrial fibrillation (CMS/HCC)     With a history of an atrial clot   • Breast cancer (CMS/HCC)     Left, stage IIB   • Disease of thyroid gland     Hypothyroidism   • Essential hypertension    • Hypothyroidism    • Irritable bowel    • Mitral regurgitation    • NICM (nonischemic cardiomyopathy) (CMS/HCC)    • On warfarin therapy    • Osteoporosis    • Pneumothorax 6/14/2018   • Pulmonary hypertension (CMS/HCC)     Resolved per echocardiogram 1/2019   • Third degree AV block (CMS/HCC)    • Tricuspid regurgitation    • Ventricular tachycardia (CMS/HCC) 01/17/2019    Nonsustained       Past Surgical History:   Procedure Laterality Date   • CARDIAC ELECTROPHYSIOLOGY PROCEDURE N/A 6/12/2018    Procedure: Device Upgrade-Upgrade to CRT-P-BIV Pacamaker Medtronic Has existing single chamber Medtronic pacemaker;  Surgeon: Leonardo David MD;  Location: [facility] INVASIVE LOCATION;  Service: Cardiovascular   • CHOLECYSTECTOMY  2000   • EYE SURGERY  2005    cataracts, Dr. Miramontes   • MASTECTOMY Left 10/2014   • PACEMAKER IMPLANTATION  2012    Dr. Leija       Social History     Socioeconomic History   • Marital status:      Spouse name: Rick   • Number of children: 8   • Years of education: Not on file   • Highest education level: Not on file   Occupational History     Employer: RETIRED   Tobacco Use   • Smoking status: Never Smoker   • Smokeless tobacco: Never Used   Substance and Sexual Activity   • Alcohol use: Yes     Alcohol/week: 1.0 standard drinks     Types: 1 Glasses of wine per week     Comment: caffeine use-tea   • Drug use: No   • Sexual activity: Defer       Family History   Problem Relation Age of Onset   • Colon cancer Mother 75   • Colon cancer Sister 79   • Hypertension Sister    • Cholelithiasis Sister         2 sisters       The following portion of the patient's history were reviewed and updated as appropriate: past medical history, past surgical history, past social  history, past family history, allergies, current medications, and problem list.    Review of Systems   Constitution: Negative.   Cardiovascular: Negative.    Respiratory: Negative.    Hematologic/Lymphatic: Negative.    Neurological: Negative.        Allergies   Allergen Reactions   • Iodinated Diagnostic Agents Hives   • Amiodarone Nausea Only   • Codeine Nausea Only   • Dabigatran Etexilate Mesylate Nausea Only   • Digoxin Nausea Only         Current Outpatient Medications:   •  anastrozole (ARIMIDEX) 1 MG tablet, TAKE ONE TABLET BY MOUTH DAILY, Disp: 30 tablet, Rfl: 7  •  carvedilol (COREG) 6.25 MG tablet, Take 1 tablet by mouth 2 (Two) Times a Day With Meals., Disp: 180 tablet, Rfl: 3  •  furosemide (LASIX) 40 MG tablet, Take 1 tablet by mouth Daily., Disp: 90 tablet, Rfl: 3  •  levothyroxine (SYNTHROID, LEVOTHROID) 88 MCG tablet, Take 88 mcg by mouth Daily., Disp: , Rfl:   •  nitroglycerin (NITROSTAT) 0.4 MG SL tablet, Place 1 tablet under the tongue Every 5 (Five) Minutes As Needed for Chest Pain for up to 2 doses. Take no more than 3 doses in 15 minutes. (Patient taking differently: Place 0.4 mg under the tongue As Needed for Chest Pain. Take no more than 3 doses in 15 minutes.), Disp: 5 tablet, Rfl: 0  •  warfarin (COUMADIN) 2.5 MG tablet, Take one-half tablet (1.25 mg) by mouth on Sun, Tues, and Thurs, and take one tablet (2.5 mg) by mouth all other days or as directed., Disp: 75 tablet, Rfl: 1        Objective:     Vitals:    03/02/21 1212   BP: 114/58   BP Location: Right arm   Pulse: 72   Temp: 96.5 °F (35.8 °C)   Weight: 63.2 kg (139 lb 6.4 oz)     Body mass index is 22.5 kg/m².    Due to telehealth visit, there was no EKG, vitals, or weight performed in our office.  Vitals/Weight were reported by the patient and conducted at home.     PHYSICAL EXAM:    Vitals Reviewed  General Appearance: No acute distress, well developed and well nourished.  Elderly.  Eyes: Conjunctivae and lids: No erythema,  swelling, or discharge. Sclerae anicteric.  Wears glasses.  HENT: Atraumatic, normocephalic. External eyes, ears, and nose normal. No hearing loss noted. Mucous membranes normal. Lips not cyanotic.   Neck: Symmetrical and no evidence of mass.  Respiratory: No signs of respiratory distress. Respiration rhythm and depth normal.   Musculoskeletal: Normal movement of extremities.  Skin: General appearance normal. No pallor, cyanosis, diaphoresis.   Psychiatric: Patient alert and oriented to person, place, and time. Speech and behavior appropriate. Normal mood and affect.        Assessment:       Diagnosis Plan   1. Permanent atrial fibrillation (CMS/HCC)  CBC (No Diff)    Comprehensive Metabolic Panel   2. Acute on chronic systolic heart failure (CMS/HCC)     3. Cardiac resynchronization therapy pacemaker (CRT-P) in place     4. Third degree AV block (CMS/HCC)     5. Nonrheumatic mitral valve regurgitation     6. Nonrheumatic tricuspid valve regurgitation     7. Pulmonary hypertension (CMS/HCC)     8. Essential hypertension     9. Ventricular tachycardia (CMS/HCC)            Plan:       1.  Permanent Atrial Fibrillation: In 2019, her last EKG showed atrial fibrillation.  Rate controlled on carvedilol and remains on warfarin with INRs followed at the [facility].    2.  Acute on Chronic Systolic/Diastolic Heart Failure: Per echocardiogram 2019-EF 63% and grade 2 diastolic dysfunction.  NYHA class II, (but she really does not exert herself).  She denies heart failure symptoms today.  Continue carvedilol and furosemide.  She is due for some repeat blood work including a CBC and BMP.  I will see if I can get this ordered through [facility] who draws her home INRs.    3.  CRT Device: Continue remote checks through our pacemaker clinic.  The last one in January 2021 was stable.    4.  Third-degree AV block: History of pacemaker placement and now upgrade to CRT device.    5.  Mitral Regurgitation: Mild  to moderate per echocardiogram in 2019.    6.  Tricuspid Regurgitation: Mild per echocardiogram 2019.    7.  Pulmonary Hypertension: RVSP normal in 2019.    8.  Nonsustained Ventricular Tachycardia: Continue with beta-blocker therapy.    9.  I recommended follow-up with Dr. Leija in 6 months.  I have refilled her carvedilol during this visit.    ADDENDUM:  · Order faxed to [facility] for CBC and CMP to be drawn at next home INR check. I have asked     This patient has consented to a telehealth visit via video. The visit was scheduled as a video visit to comply with patient safety concerns in accordance with CDC recommendations.  All vitals recorded within this visit are reported by the patient.  I spent 25 minutes in total including but not limited to the 11 minutes spent in direct conversation with this patient.      As always, it has been a pleasure to participate in your patient's care. Thank you.     Please call patient for 6-month follow-up visit with Dr. Leija.  Thank you follow-up with Dr. Leija in 6 months.  Thank you  Sincerely,         KIKA Castelan        Dictated utilizing Dragon dictation

## 2021-03-18 ENCOUNTER — TELEPHONE (OUTPATIENT)
Dept: CARDIOLOGY | Facility: CLINIC | Age: 86
End: 2021-03-18

## 2021-03-18 NOTE — TELEPHONE ENCOUNTER
On 3/2/2021, I faxed an order for a CBC and CMP to be drawn through [facility].  Please call them at [phone] to obtain those results.  Thank you

## 2021-03-23 LAB — INR PPP: 2.4

## 2021-03-24 ENCOUNTER — ANTICOAGULATION VISIT (OUTPATIENT)
Dept: PHARMACY | Facility: HOSPITAL | Age: 86
End: 2021-03-24

## 2021-03-24 ENCOUNTER — TELEPHONE (OUTPATIENT)
Dept: CARDIOLOGY | Facility: CLINIC | Age: 86
End: 2021-03-24

## 2021-03-24 NOTE — TELEPHONE ENCOUNTER
----- Message from KIKA Jon sent at 3/20/2021 12:18 AM EDT -----    Follow up on blood work to be order through [facility] on 3/23

## 2021-03-24 NOTE — PROGRESS NOTES
Anticoagulation Clinic Progress Note    Anticoagulation Summary  As of 3/24/2021    INR goal:  2.0-3.0   TTR:  65.3 % (2.5 y)   INR used for dosin.40 (3/23/2021)   Warfarin maintenance plan:  1.25 mg every Sun, Tue, Thu; 2.5 mg all other days   Weekly warfarin total:  13.75 mg   Plan last modified:  Eve Bauer RPH (2020)   Next INR check:  2021   Priority:  High   Target end date:  Indefinite    Indications    Long-term (current) use of anticoagulants (Resolved) [Z79.01]             Anticoagulation Episode Summary     INR check location:      Preferred lab:      [facility]    Comments:  [facility]      Anticoagulation Care Providers     Provider Role Specialty Phone number    Rafaela Leija MD Referring Cardiology [phone]          Clinic Interview:      INR History:  Anticoagulation Monitoring 2021 2021 3/24/2021   INR 2.21 2.11 2.40   INR Date 2021 2021 3/23/2021   INR Goal 2.0-3.0 2.0-3.0 2.0-3.0   Trend Same Same Same   Last Week Total 13.75 mg 13.75 mg 13.75 mg   Next Week Total 13.75 mg 13.75 mg 13.75 mg   Sun 1.25 mg 1.25 mg 1.25 mg   Mon 2.5 mg 2.5 mg 2.5 mg   Tue 1.25 mg 1.25 mg 1.25 mg   Wed 2.5 mg 2.5 mg 2.5 mg   Thu 1.25 mg 1.25 mg 1.25 mg   Fri 2.5 mg 2.5 mg 2.5 mg   Sat 2.5 mg 2.5 mg 2.5 mg   Visit Report - - -   Some recent data might be hidden       Plan:  1. INR is Therapeutic today- see above in Anticoagulation Summary.   Will instruct Diana Avalos to Continue their warfarin regimen- see above in Anticoagulation Summary.  2. Follow up in 4 weeks--[facility]  3. Left VM on daughter Magui's phone today. They have been instructed to call if any changes in medications, doses, concerns, etc.   Eve Bauer RPH

## 2021-03-25 NOTE — TELEPHONE ENCOUNTER
I reviewed the blood work for 3/23/2021.  CMP normal except for CO2 of 33 (high), albumin 3.0.  Kidney function and potassium normal.    Please call daughter and let her know that the blood work is stable.  Thank you

## 2021-04-07 ENCOUNTER — DOCUMENTATION (OUTPATIENT)
Dept: ONCOLOGY | Facility: CLINIC | Age: 86
End: 2021-04-07

## 2021-04-07 NOTE — PROGRESS NOTES
If they do not want to do the breast exams, I respect their wishes.  Especially, if they do not plan on doing anything about the results, then it does not make sense to continue to do this tests.    If they do not want to follow-up with me, I understand that as well.      ===View-only below this line===  ----- Message -----  From: Frances Castillo  Sent: 4/7/2021  11:17 AM EDT  To: Sherman Belcher II, MD, An Goodwin, RN  Subject: Does not want Mammo                              I just spoke with patient's daughter. The patient wants to cancel mammogram as she does not feel she needs it and if anything did show up on it she would not take any treatment etc.. It is difficult for her to get out and she does not feel it is necessary. Please discuss with Dr Belcher and call daughter, Magui Cassidy at [phone]. She is on the verbal release form so okay to call. If you decide with her to cancel, send appt desk message to cancel as I have left on for now.    Thanks!  Wilmer

## 2021-04-08 ENCOUNTER — TELEPHONE (OUTPATIENT)
Dept: ONCOLOGY | Facility: CLINIC | Age: 86
End: 2021-04-08

## 2021-04-08 NOTE — TELEPHONE ENCOUNTER
I spoke with this patient's daughter Magui Cassidy yesterday evening and her mother chooses to cancel her Mammogram as she feels it is unnecessary because if she has it and something is found she will not pursue any treatment. Message sent to Dr Belcher and he will abide by her wishes and had us cancel mammogram. Daughter agreed and Mammogram was cancelled. She will keep her yearly follow up.    Wilmer

## 2021-04-14 ENCOUNTER — APPOINTMENT (OUTPATIENT)
Dept: MAMMOGRAPHY | Facility: HOSPITAL | Age: 86
End: 2021-04-14

## 2021-04-19 ENCOUNTER — LAB (OUTPATIENT)
Dept: LAB | Facility: HOSPITAL | Age: 86
End: 2021-04-19

## 2021-04-19 ENCOUNTER — OFFICE VISIT (OUTPATIENT)
Dept: ONCOLOGY | Facility: CLINIC | Age: 86
End: 2021-04-19

## 2021-04-19 VITALS
HEART RATE: 76 BPM | RESPIRATION RATE: 17 BRPM | WEIGHT: 142.4 LBS | TEMPERATURE: 97.8 F | DIASTOLIC BLOOD PRESSURE: 81 MMHG | OXYGEN SATURATION: 96 % | SYSTOLIC BLOOD PRESSURE: 131 MMHG | HEIGHT: 67 IN | BODY MASS INDEX: 22.35 KG/M2

## 2021-04-19 DIAGNOSIS — Z17.0 MALIGNANT NEOPLASM OF CENTRAL PORTION OF LEFT BREAST IN FEMALE, ESTROGEN RECEPTOR POSITIVE (HCC): Primary | ICD-10-CM

## 2021-04-19 DIAGNOSIS — C50.112 MALIGNANT NEOPLASM OF CENTRAL PORTION OF LEFT BREAST IN FEMALE, ESTROGEN RECEPTOR POSITIVE (HCC): Primary | ICD-10-CM

## 2021-04-19 LAB
BASOPHILS # BLD AUTO: 0.04 10*3/MM3 (ref 0–0.2)
BASOPHILS NFR BLD AUTO: 0.7 % (ref 0–1.5)
DEPRECATED RDW RBC AUTO: 44.2 FL (ref 37–54)
EOSINOPHIL # BLD AUTO: 0.1 10*3/MM3 (ref 0–0.4)
EOSINOPHIL NFR BLD AUTO: 1.7 % (ref 0.3–6.2)
ERYTHROCYTE [DISTWIDTH] IN BLOOD BY AUTOMATED COUNT: 13 % (ref 12.3–15.4)
HCT VFR BLD AUTO: 43.5 % (ref 34–46.6)
HGB BLD-MCNC: 14.9 G/DL (ref 12–15.9)
IMM GRANULOCYTES # BLD AUTO: 0.04 10*3/MM3 (ref 0–0.05)
IMM GRANULOCYTES NFR BLD AUTO: 0.7 % (ref 0–0.5)
LYMPHOCYTES # BLD AUTO: 1.74 10*3/MM3 (ref 0.7–3.1)
LYMPHOCYTES NFR BLD AUTO: 29.2 % (ref 19.6–45.3)
MCH RBC QN AUTO: 31.9 PG (ref 26.6–33)
MCHC RBC AUTO-ENTMCNC: 34.3 G/DL (ref 31.5–35.7)
MCV RBC AUTO: 93.1 FL (ref 79–97)
MONOCYTES # BLD AUTO: 0.87 10*3/MM3 (ref 0.1–0.9)
MONOCYTES NFR BLD AUTO: 14.6 % (ref 5–12)
NEUTROPHILS NFR BLD AUTO: 3.17 10*3/MM3 (ref 1.7–7)
NEUTROPHILS NFR BLD AUTO: 53.1 % (ref 42.7–76)
NRBC BLD AUTO-RTO: 0 /100 WBC (ref 0–0.2)
PLATELET # BLD AUTO: 177 10*3/MM3 (ref 140–450)
PMV BLD AUTO: 10.3 FL (ref 6–12)
RBC # BLD AUTO: 4.67 10*6/MM3 (ref 3.77–5.28)
WBC # BLD AUTO: 5.96 10*3/MM3 (ref 3.4–10.8)

## 2021-04-19 PROCEDURE — 85025 COMPLETE CBC W/AUTO DIFF WBC: CPT

## 2021-04-19 PROCEDURE — 99203 OFFICE O/P NEW LOW 30 MIN: CPT | Performed by: INTERNAL MEDICINE

## 2021-04-19 PROCEDURE — 36415 COLL VENOUS BLD VENIPUNCTURE: CPT

## 2021-04-19 RX ORDER — MELOXICAM 15 MG/1
7.5 TABLET ORAL DAILY
COMMUNITY
End: 2021-08-04 | Stop reason: SDUPTHER

## 2021-04-19 NOTE — PROGRESS NOTES
Subjective .     REASONS FOR FOLLOWUP:  Breast cancer    HISTORY OF PRESENT ILLNESS:  The patient is a 88 y.o. year old female  who is here for follow-up with the above-mentioned history.    Feeling fine.  No SOA, pain, neurological symptoms, nausea.  She is decided not to have any more mammograms.        Past Medical History:   Diagnosis Date   • Acute on chronic systolic congestive heart failure (CMS/HCC)    • Anemia    • Arthritis    • Asthma    • Atrial fibrillation (CMS/HCC)     With a history of an atrial clot   • Breast cancer (CMS/HCC)     Left, stage IIB   • Disease of thyroid gland     Hypothyroidism   • Essential hypertension    • Hypothyroidism    • Irritable bowel    • Mitral regurgitation    • NICM (nonischemic cardiomyopathy) (CMS/HCC)    • On warfarin therapy    • Osteoporosis    • Pneumothorax 6/14/2018   • Pulmonary hypertension (CMS/HCC)     Resolved per echocardiogram 1/2019   • Third degree AV block (CMS/HCC)    • Tricuspid regurgitation    • Ventricular tachycardia (CMS/HCC) 01/17/2019    Nonsustained       HEMATOLOGIC/ONCOLOGIC HISTORY:  (History from previous dates can be found in the separate document.)    MEDICATIONS    Current Outpatient Medications:   •  anastrozole (ARIMIDEX) 1 MG tablet, TAKE ONE TABLET BY MOUTH DAILY, Disp: 30 tablet, Rfl: 7  •  carvedilol (COREG) 6.25 MG tablet, Take 1 tablet by mouth 2 (Two) Times a Day With Meals., Disp: 180 tablet, Rfl: 3  •  furosemide (LASIX) 40 MG tablet, Take 1 tablet by mouth Daily., Disp: 90 tablet, Rfl: 3  •  levothyroxine (SYNTHROID, LEVOTHROID) 88 MCG tablet, Take 88 mcg by mouth Daily., Disp: , Rfl:   •  meloxicam (MOBIC) 15 MG tablet, Take 7.5 mg by mouth Daily., Disp: , Rfl:   •  nitroglycerin (NITROSTAT) 0.4 MG SL tablet, Place 1 tablet under the tongue Every 5 (Five) Minutes As Needed for Chest Pain for up to 2 doses. Take no more than 3 doses in 15 minutes. (Patient taking differently: Place 0.4 mg under the tongue As Needed for  Chest Pain. Take no more than 3 doses in 15 minutes.), Disp: 5 tablet, Rfl: 0  •  warfarin (COUMADIN) 2.5 MG tablet, Take one-half tablet (1.25 mg) by mouth on Sun, Tues, and Thurs, and take one tablet (2.5 mg) by mouth all other days or as directed., Disp: 75 tablet, Rfl: 1    ALLERGIES:     Allergies   Allergen Reactions   • Iodinated Diagnostic Agents Hives   • Amiodarone Nausea Only   • Codeine Nausea Only   • Dabigatran Etexilate Mesylate Nausea Only   • Digoxin Nausea Only       SOCIAL HISTORY:       Social History     Socioeconomic History   • Marital status:      Spouse name: Rick   • Number of children: 8   • Years of education: Not on file   • Highest education level: Not on file   Tobacco Use   • Smoking status: Never Smoker   • Smokeless tobacco: Never Used   Substance and Sexual Activity   • Alcohol use: Yes     Alcohol/week: 1.0 standard drinks     Types: 1 Glasses of wine per week     Comment: caffeine use-tea   • Drug use: No   • Sexual activity: Defer         FAMILY HISTORY:  Family History   Problem Relation Age of Onset   • Colon cancer Mother 75   • Colon cancer Sister 79   • Hypertension Sister    • Cholelithiasis Sister         2 sisters       REVIEW OF SYSTEMS:  Review of Systems   Constitutional: Negative for activity change.   HENT: Negative for nosebleeds and trouble swallowing.    Respiratory: Negative for shortness of breath and wheezing.    Cardiovascular: Negative for chest pain and palpitations.   Gastrointestinal: Negative for constipation, diarrhea and nausea.   Genitourinary: Negative for dysuria and hematuria.   Musculoskeletal: Negative for arthralgias and myalgias.   Skin: Negative for rash and wound.   Neurological: Negative for seizures and syncope.   Hematological: Negative for adenopathy. Does not bruise/bleed easily.   Psychiatric/Behavioral: Negative for confusion.         Objective    Vitals:    04/19/21 1437   BP: 131/81   Pulse: 76   Resp: 17   Temp: 97.8 °F  "(36.6 °C)   TempSrc: Temporal   SpO2: 96%   Weight: 64.6 kg (142 lb 6.4 oz)   Height: 170.2 cm (67\")   PainSc: 0-No pain     Current Status 4/19/2021   ECOG score 1      PHYSICAL EXAM:        CONSTITUTIONAL:  Vital signs reviewed.  No distress, looks comfortable.  EYES:  Conjunctiva and lids unremarkable.  PERRLA  EARS,NOSE,MOUTH,THROAT:  Ears and nose appear unremarkable.  Lips, teeth, gums appear unremarkable.  RESPIRATORY:  Normal respiratory effort.  Lungs clear to auscultation bilaterally.  CARDIOVASCULAR:  Normal S1, S2.  No murmurs rubs or gallops.  No significant lower extremity edema.  BREAST: no masses by palpation or inspection   GASTROINTESTINAL: Abdomen appears unremarkable.  Nontender.  No hepatomegaly.  No splenomegaly.  LYMPHATIC:  No cervical, supraclavicular, axillary lymphadenopathy.  SKIN:  Warm.  No rashes.  PSYCHIATRIC:  Normal judgment and insight.  Normal mood and affect.          RECENT LABS:        WBC   Date/Time Value Ref Range Status   04/19/2021 02:14 PM 5.96 3.40 - 10.80 10*3/mm3 Final     Hemoglobin   Date/Time Value Ref Range Status   04/19/2021 02:14 PM 14.9 12.0 - 15.9 g/dL Final     Platelets   Date/Time Value Ref Range Status   04/19/2021 02:14  140 - 450 10*3/mm3 Final       Assessment/Plan     ASSESSMENT:  Problem List Items Addressed This Visit        Unprioritized    Malignant neoplasm of central portion of left breast in female, estrogen receptor positive (CMS/HCC) - Primary    Relevant Orders    CBC & Differential         * [id number] (stage IIB) left breast cancer, 5.1 cm, grade 2.  Left mastectomy by Dr. Clifton.  · Tamoxifen 10/31/14-11/3/15.  Stopped due to altered taste with no desire to eat.  Arimidex started 1/1/16.  · Completed 5 years adjuvant hormonal therapy with Arimidex until 10/31/19 (did not tolerate tamoxifen due to altered taste with lack of desire to eat.  Has significant osteoporosis)   · I discussed with patient and daughter we could continue her " Arimidex beyond 5 years as this is becoming a more accepted thing to do for larger tumors or node positive tumors.  Her tumor was 5.1 cm.  However, this may contribute to osteoporosis.  After much discussion, patient absolutely wants to stop Arimidex which I think is reasonable.  Case also discussed with Dr. Arellano of our practice.  He agrees with stopping Arimidex as well.  I agree with this also.  No signs of recurrence.  Remains in remission.    *Atrial fibrillation with a history of an atrial clot for which she is on Coumadin through other MDs  No bleeding.    *Osteopenia. Dr. Tinsley, her primary care physician, recommended Reclast annually 5 mg IV.  We administered while on Arimidex.  She is on calcium and vitamin D.  Last DEXA 11/19/18: Osteopenia.  Worst T score -2.3, lumbar spine.  Overall, not much change from 11/9/16.    *Last visit with me was 5/22/18.  She was supposed to return in 6 months but did not.  Last Reclast was 5/22/18.    *Macrocytosis.  Mild.  Monitor.  Hb normal.  MCV 93.1    PLAN:   · She declines any more mammograms (she does not want any treatment including hormonal therapy if another breast cancer is found).  · MD 1 year.    · Last mammogram, 4/9/2019, BI-RADS 1      Daughter assisted with history.    Send this note to Dr. Brand, pcp

## 2021-04-20 LAB — INR PPP: 2.59

## 2021-04-21 ENCOUNTER — ANTICOAGULATION VISIT (OUTPATIENT)
Dept: PHARMACY | Facility: HOSPITAL | Age: 86
End: 2021-04-21

## 2021-04-21 DIAGNOSIS — I48.21 PERMANENT ATRIAL FIBRILLATION (HCC): Primary | ICD-10-CM

## 2021-04-21 NOTE — PROGRESS NOTES
Anticoagulation Clinic Progress Note    Anticoagulation Summary  As of 2021    INR goal:  2.0-3.0   TTR:  66.3 % (2.5 y)   INR used for dosin.59 (2021)   Warfarin maintenance plan:  1.25 mg every Sun, Tue, Thu; 2.5 mg all other days   Weekly warfarin total:  13.75 mg   No change documented:  Sherman Smith, PharmD   Plan last modified:  Eve Bauer RPH (2020)   Next INR check:  2021   Priority:  High   Target end date:  Indefinite    Indications    Long-term (current) use of anticoagulants (Resolved) [Z79.01]             Anticoagulation Episode Summary     INR check location:      Preferred lab:      Send INR reminders to:   [facility]    Comments:  [facility]      Anticoagulation Care Providers     Provider Role Specialty Phone number    Rafaela Leija MD Referring Cardiology [phone]        Clinic Interview:  Patient Findings (per Daughter Magui Cassidy)   Negatives:  Signs/symptoms of thrombosis, Signs/symptoms of bleeding,   Laboratory test error suspected, Change in health, Change in alcohol use,   Change in activity, Upcoming invasive procedure, Emergency department   visit, Upcoming dental procedure, Missed doses, Extra doses, Change in   medications, Change in diet/appetite, Hospital admission, Bruising, Other   complaints      Clinical Outcomes     Negatives:  Major bleeding event, Thromboembolic event,   Anticoagulation-related hospital admission, Anticoagulation-related ED   visit, Anticoagulation-related fatality     INR History:  Anticoagulation Monitoring 2021 3/24/2021 2021   INR 2.11 2.40 2.59   INR Date 2021 3/23/2021 2021   INR Goal 2.0-3.0 2.0-3.0 2.0-3.0   Trend Same Same Same   Last Week Total 13.75 mg 13.75 mg 13.75 mg   Next Week Total 13.75 mg 13.75 mg 13.75 mg   Sun 1.25 mg 1.25 mg 1.25 mg   Mon 2.5 mg 2.5 mg 2.5 mg   Tue 1.25 mg 1.25 mg 1.25 mg   Wed 2.5 mg 2.5 mg 2.5 mg   Thu 1.25 mg 1.25 mg 1.25 mg   Fri 2.5  mg 2.5 mg 2.5 mg   Sat 2.5 mg 2.5 mg 2.5 mg   Visit Report - - -   Some recent data might be hidden     Plan:  1. INR is Therapeutic today- see above in Anticoagulation Summary.   Will instruct Diana Avalos to Continue their warfarin regimen- see above in Anticoagulation Summary.  2. Follow up in 4 weeks via [facility].    3.  They have been instructed to call if any changes in medications, doses, concerns, etc. Patient expresses understanding and has no further questions at this time.    Sherman Smith, PharmD

## 2021-04-28 RX ORDER — FUROSEMIDE 40 MG/1
TABLET ORAL
Qty: 90 TABLET | Refills: 3 | Status: SHIPPED | OUTPATIENT
Start: 2021-04-28 | End: 2022-07-20

## 2021-05-18 LAB — INR PPP: 2.27

## 2021-05-25 ENCOUNTER — ANTICOAGULATION VISIT (OUTPATIENT)
Dept: PHARMACY | Facility: HOSPITAL | Age: 86
End: 2021-05-25

## 2021-05-26 RX ORDER — WARFARIN SODIUM 2.5 MG/1
TABLET ORAL
Qty: 70 TABLET | Refills: 0 | Status: SHIPPED | OUTPATIENT
Start: 2021-05-26 | End: 2021-08-23

## 2021-06-03 RX ORDER — WARFARIN SODIUM 2.5 MG/1
TABLET ORAL
Qty: 70 TABLET | Refills: 0 | OUTPATIENT
Start: 2021-06-03

## 2021-06-03 NOTE — TELEPHONE ENCOUNTER
Refill request has been addressed on 5/26/21; new prescription was e-scribed with confirmed receipt by [facility] pharmacy.

## 2021-06-22 LAB — INR PPP: 2.46

## 2021-06-23 ENCOUNTER — ANTICOAGULATION VISIT (OUTPATIENT)
Dept: PHARMACY | Facility: HOSPITAL | Age: 86
End: 2021-06-23

## 2021-06-23 NOTE — PROGRESS NOTES
Anticoagulation Clinic Progress Note    Anticoagulation Summary  As of 2021    INR goal:  2.0-3.0   TTR:  68.5 % (2.7 y)   INR used for dosin.46 (2021)   Warfarin maintenance plan:  1.25 mg every Sun, Tue, Thu; 2.5 mg all other days   Weekly warfarin total:  13.75 mg   No change documented:  Colleen Henao, PharmD   Plan last modified:  Eve Bauer RPH (2020)   Next INR check:  2021   Priority:  Maintenance   Target end date:  Indefinite    Indications    Long-term (current) use of anticoagulants (Resolved) [Z79.01]             Anticoagulation Episode Summary     INR check location:      Preferred lab:      Send INR reminders to:   [facility]    Comments:  [facility]      Anticoagulation Care Providers     Provider Role Specialty Phone number    Rafaela Leija MD Referring Cardiology [phone]          Clinic Interview:  Patient Findings     Negatives:  Signs/symptoms of thrombosis, Signs/symptoms of bleeding,   Laboratory test error suspected, Change in health, Change in alcohol use,   Change in activity, Upcoming invasive procedure, Emergency department   visit, Upcoming dental procedure, Missed doses, Extra doses, Change in   medications, Change in diet/appetite, Hospital admission, Bruising, Other   complaints      Clinical Outcomes     Negatives:  Major bleeding event, Thromboembolic event,   Anticoagulation-related hospital admission, Anticoagulation-related ED   visit, Anticoagulation-related fatality        INR History:  Anticoagulation Monitoring 2021   INR 2.59 2.27 2.46   INR Date 2021   INR Goal 2.0-3.0 2.0-3.0 2.0-3.0   Trend Same Same Same   Last Week Total 13.75 mg 13.75 mg 13.75 mg   Next Week Total 13.75 mg 13.75 mg 13.75 mg   Sun 1.25 mg 1.25 mg 1.25 mg   Mon 2.5 mg 2.5 mg 2.5 mg   Tue 1.25 mg 1.25 mg 1.25 mg   Wed 2.5 mg 2.5 mg 2.5 mg   Thu 1.25 mg 1.25 mg 1.25 mg   Fri 2.5 mg 2.5 mg 2.5 mg    Sat 2.5 mg 2.5 mg 2.5 mg   Visit Report - - -   Some recent data might be hidden       Plan:  1. INR is Therapeutic today- see above in Anticoagulation Summary.   Will instruct Diana Avalos to Continue their warfarin regimen- see above in Anticoagulation Summary.  2. Follow up in 4 weeks  3. Pt has agreed to only be called if INR out of range. They have been instructed to call if any changes in medications, doses, concerns, etc. Patient expresses understanding and has no further questions at this time.    Colleen Henao, ErnestoD

## 2021-07-17 ENCOUNTER — APPOINTMENT (OUTPATIENT)
Dept: GENERAL RADIOLOGY | Facility: HOSPITAL | Age: 86
End: 2021-07-17

## 2021-07-17 ENCOUNTER — HOSPITAL ENCOUNTER (EMERGENCY)
Facility: HOSPITAL | Age: 86
Discharge: HOME | End: 2021-07-17
Attending: EMERGENCY MEDICINE | Admitting: EMERGENCY MEDICINE

## 2021-07-17 VITALS
RESPIRATION RATE: 16 BRPM | SYSTOLIC BLOOD PRESSURE: 167 MMHG | OXYGEN SATURATION: 97 % | HEART RATE: 86 BPM | DIASTOLIC BLOOD PRESSURE: 89 MMHG | TEMPERATURE: 97.5 F

## 2021-07-17 DIAGNOSIS — I48.21 PERMANENT ATRIAL FIBRILLATION (HCC): ICD-10-CM

## 2021-07-17 DIAGNOSIS — R07.9 CHEST PAIN, UNSPECIFIED TYPE: Primary | ICD-10-CM

## 2021-07-17 LAB
ALBUMIN SERPL-MCNC: 3.6 G/DL (ref 3.5–5.2)
ALBUMIN/GLOB SERPL: 1.2 G/DL
ALP SERPL-CCNC: 74 U/L (ref 39–117)
ALT SERPL W P-5'-P-CCNC: 11 U/L (ref 1–33)
ANION GAP SERPL CALCULATED.3IONS-SCNC: 9.8 MMOL/L (ref 5–15)
AST SERPL-CCNC: 17 U/L (ref 1–32)
BASOPHILS # BLD AUTO: 0.05 10*3/MM3 (ref 0–0.2)
BASOPHILS NFR BLD AUTO: 0.8 % (ref 0–1.5)
BILIRUB SERPL-MCNC: 0.8 MG/DL (ref 0–1.2)
BUN SERPL-MCNC: 16 MG/DL (ref 8–23)
BUN/CREAT SERPL: 18.4 (ref 7–25)
CALCIUM SPEC-SCNC: 8.8 MG/DL (ref 8.6–10.5)
CHLORIDE SERPL-SCNC: 102 MMOL/L (ref 98–107)
CO2 SERPL-SCNC: 29.2 MMOL/L (ref 22–29)
CREAT SERPL-MCNC: 0.87 MG/DL (ref 0.57–1)
DEPRECATED RDW RBC AUTO: 48.3 FL (ref 37–54)
EOSINOPHIL # BLD AUTO: 0.11 10*3/MM3 (ref 0–0.4)
EOSINOPHIL NFR BLD AUTO: 1.7 % (ref 0.3–6.2)
ERYTHROCYTE [DISTWIDTH] IN BLOOD BY AUTOMATED COUNT: 13.4 % (ref 12.3–15.4)
GFR SERPL CREATININE-BSD FRML MDRD: 61 ML/MIN/1.73
GLOBULIN UR ELPH-MCNC: 2.9 GM/DL
GLUCOSE SERPL-MCNC: 95 MG/DL (ref 65–99)
HCT VFR BLD AUTO: 45.1 % (ref 34–46.6)
HGB BLD-MCNC: 14.9 G/DL (ref 12–15.9)
HOLD SPECIMEN: NORMAL
HOLD SPECIMEN: NORMAL
IMM GRANULOCYTES # BLD AUTO: 0.03 10*3/MM3 (ref 0–0.05)
IMM GRANULOCYTES NFR BLD AUTO: 0.5 % (ref 0–0.5)
INR PPP: 2.07 (ref 0.9–1.1)
LYMPHOCYTES # BLD AUTO: 1.61 10*3/MM3 (ref 0.7–3.1)
LYMPHOCYTES NFR BLD AUTO: 24.7 % (ref 19.6–45.3)
MCH RBC QN AUTO: 31.6 PG (ref 26.6–33)
MCHC RBC AUTO-ENTMCNC: 33 G/DL (ref 31.5–35.7)
MCV RBC AUTO: 95.8 FL (ref 79–97)
MONOCYTES # BLD AUTO: 1.01 10*3/MM3 (ref 0.1–0.9)
MONOCYTES NFR BLD AUTO: 15.5 % (ref 5–12)
NEUTROPHILS NFR BLD AUTO: 3.7 10*3/MM3 (ref 1.7–7)
NEUTROPHILS NFR BLD AUTO: 56.8 % (ref 42.7–76)
NRBC BLD AUTO-RTO: 0 /100 WBC (ref 0–0.2)
PLATELET # BLD AUTO: 202 10*3/MM3 (ref 140–450)
PMV BLD AUTO: 10.4 FL (ref 6–12)
POTASSIUM SERPL-SCNC: 4.5 MMOL/L (ref 3.5–5.2)
PROT SERPL-MCNC: 6.5 G/DL (ref 6–8.5)
PROTHROMBIN TIME: 23 SECONDS (ref 11.7–14.2)
QT INTERVAL: 464 MS
RBC # BLD AUTO: 4.71 10*6/MM3 (ref 3.77–5.28)
SODIUM SERPL-SCNC: 141 MMOL/L (ref 136–145)
TROPONIN T SERPL-MCNC: <0.01 NG/ML (ref 0–0.03)
TROPONIN T SERPL-MCNC: <0.01 NG/ML (ref 0–0.03)
WBC # BLD AUTO: 6.51 10*3/MM3 (ref 3.4–10.8)
WHOLE BLOOD HOLD SPECIMEN: NORMAL

## 2021-07-17 PROCEDURE — 71045 X-RAY EXAM CHEST 1 VIEW: CPT

## 2021-07-17 PROCEDURE — 93010 ELECTROCARDIOGRAM REPORT: CPT | Performed by: INTERNAL MEDICINE

## 2021-07-17 PROCEDURE — 85025 COMPLETE CBC W/AUTO DIFF WBC: CPT | Performed by: EMERGENCY MEDICINE

## 2021-07-17 PROCEDURE — 85610 PROTHROMBIN TIME: CPT | Performed by: EMERGENCY MEDICINE

## 2021-07-17 PROCEDURE — 84484 ASSAY OF TROPONIN QUANT: CPT | Performed by: EMERGENCY MEDICINE

## 2021-07-17 PROCEDURE — 99284 EMERGENCY DEPT VISIT MOD MDM: CPT

## 2021-07-17 PROCEDURE — 93005 ELECTROCARDIOGRAM TRACING: CPT

## 2021-07-17 PROCEDURE — 80053 COMPREHEN METABOLIC PANEL: CPT | Performed by: EMERGENCY MEDICINE

## 2021-07-17 RX ORDER — SODIUM CHLORIDE 0.9 % (FLUSH) 0.9 %
10 SYRINGE (ML) INJECTION AS NEEDED
Status: DISCONTINUED | OUTPATIENT
Start: 2021-07-17 | End: 2021-07-17 | Stop reason: HOSPADM

## 2021-07-17 NOTE — ED PROVIDER NOTES
" EMERGENCY DEPARTMENT ENCOUNTER    Room Number:  38/38  Date of encounter:  7/17/2021  PCP: Javi Brand MD  Historian: Patient, daughters at bedside    I used full protective equipment while examining this patient.  This includes face mask, gloves and protective eyewear.  I washed my hands before entering the room and immediately upon leaving the room      HPI:  Chief Complaint: Chest pain  A complete HPI/ROS/PMH/PSH/SH/FH are unobtainable due to: None    Context: Diana Avalos is a 89 y.o. female who presents to the ED c/o chest pain.  Patient reports episode of chest pain that awoke her from sleeping around 7 AM.  Pain lasted around 30 minutes or less and is currently gone.  Patient is a poor historian and cannot really recall what the pain felt like or how long it lasted.  Daughter at bedside states she had a \"bad night last night\" and did not sleep well.  She woke up at 7 which is unusual for her.  She has been more fatigued than usual this morning.  Patient currently denies any chest pain or shortness of breath.      MEDICAL RECORD REVIEW  I have reviewed patient's prior medical records including cardiology office note from March of this year.  Patient has a history of atrial fibrillation and has had daniela ablation and pacemaker placement.  Patient does have ejection fraction of 60%.  She has not had history of coronary occlusive disease.  Patient is on Coreg for rate control and Coumadin for anticoagulation.    PAST MEDICAL HISTORY  Active Ambulatory Problems     Diagnosis Date Noted   • Malignant neoplasm of central portion of left female breast (CMS/[facility]) 04/25/2016   • Osteoporosis 04/27/2016   • Permanent atrial fibrillation (CMS/HCC) 10/02/2013   • Mitral valve insufficiency 11/15/2016   • Pulmonary hypertension (CMS/HCC) 11/15/2016   • Osteopenia of multiple sites 04/30/2018   • Malignant neoplasm of central portion of left breast in female, estrogen receptor positive (CMS/HCC) 04/30/2018 "   • Hypothyroidism 10/02/2013   • Pulmonary nodule 04/03/2017   • Tricuspid regurgitation 06/08/2018   • Ventricular tachycardia (CMS/HCC) 01/17/2019   • Third degree AV block (CMS/HCC) 07/31/2019   • Cardiac resynchronization therapy pacemaker (CRT-P) in place 07/31/2019   • Essential hypertension 10/21/2020     Resolved Ambulatory Problems     Diagnosis Date Noted   • Status post biventricular pacemaker 10/02/2013   • Cardiomyopathy (CMS/HCC) 11/15/2016   • Pulmonary embolism (CMS/HCC) 04/11/2017   • Chronic systolic congestive heart failure (CMS/HCC) 06/13/2018   • Pneumothorax 06/14/2018   • S/P AV daniela ablation 07/31/2018   • Long-term (current) use of anticoagulants 09/27/2018   • Chest pain 01/05/2019   • Cardiac pacemaker in situ 07/31/2019   • Pulmonary nodule 04/03/2017     Past Medical History:   Diagnosis Date   • Acute on chronic systolic congestive heart failure (CMS/HCC)    • Anemia    • Arthritis    • Asthma    • Atrial fibrillation (CMS/HCC)    • Breast cancer (CMS/HCC)    • Disease of thyroid gland    • Irritable bowel    • Mitral regurgitation    • NICM (nonischemic cardiomyopathy) (CMS/HCC)    • On warfarin therapy          PAST SURGICAL HISTORY  Past Surgical History:   Procedure Laterality Date   • CARDIAC ELECTROPHYSIOLOGY PROCEDURE N/A 6/12/2018    Procedure: Device Upgrade-Upgrade to CRT-P-BIV Pacamaker Medtronic Has existing single chamber Medtronic pacemaker;  Surgeon: Leonardo David MD;  Location: [facility] INVASIVE LOCATION;  Service: Cardiovascular   • CHOLECYSTECTOMY  2000   • EYE SURGERY  2005    cataracts, Dr. Miramontes   • MASTECTOMY Left 10/2014   • PACEMAKER IMPLANTATION  2012    Dr. Leija         FAMILY HISTORY  Family History   Problem Relation Age of Onset   • Colon cancer Mother 75   • Colon cancer Sister 79   • Hypertension Sister    • Cholelithiasis Sister         2 sisters         SOCIAL HISTORY  Social History     Socioeconomic History   • Marital status:       Spouse name: Rick   • Number of children: 8   • Years of education: Not on file   • Highest education level: Not on file   Tobacco Use   • Smoking status: Never Smoker   • Smokeless tobacco: Never Used   Substance and Sexual Activity   • Alcohol use: Yes     Alcohol/week: 1.0 standard drinks     Types: 1 Glasses of wine per week     Comment: caffeine use-tea   • Drug use: No   • Sexual activity: Defer         ALLERGIES  Iodinated diagnostic agents, Amiodarone, Codeine, Dabigatran etexilate mesylate, and Digoxin       REVIEW OF SYSTEMS  Review of Systems   Constitutional: Positive for fatigue. Negative for fever.   HENT: Negative.  Negative for sore throat.    Eyes: Negative.    Respiratory: Negative.  Negative for cough.    Cardiovascular: Positive for chest pain (As per HPI, gone currently).   Gastrointestinal: Negative.    Genitourinary: Negative.  Negative for dysuria.   Musculoskeletal: Negative.  Negative for back pain.   Skin: Negative.  Negative for rash.   Neurological: Negative.  Negative for headaches.   All other systems reviewed and are negative.          PHYSICAL EXAM    I have reviewed the triage vital signs and nursing notes.    ED Triage Vitals   Temp Heart Rate Resp BP SpO2   07/17/21 0854 07/17/21 0854 07/17/21 0855 07/17/21 0854 07/17/21 0854   97.5 °F (36.4 °C) 86 16 167/89 97 %      Temp src Heart Rate Source Patient Position BP Location FiO2 (%)   -- 07/17/21 0854 -- -- --    Monitor          Physical Exam  GENERAL: Alert and pleasant female in no obvious distress.  Triage vitals reviewed notable for blood pressure 167/89.  HENT: nares patent  EYES: no scleral icterus  CV: regular rhythm, regular rate-no murmur  RESPIRATORY: normal effort, clear to auscultation bilaterally-O2 saturation 99% on room air  ABDOMEN: soft nontender to palpation without masses  MUSCULOSKELETAL: no deformity-no segment swelling or tenderness to palpation  NEURO: Strength, sensation, and coordination are grossly  intact.  Speech and mentation are unremarkable  SKIN: warm, dry      LAB RESULTS  Recent Results (from the past 24 hour(s))   Green Top (Gel)    Collection Time: 07/17/21  9:10 AM   Result Value Ref Range    Extra Tube Hold for add-ons.    Lavender Top    Collection Time: 07/17/21  9:10 AM   Result Value Ref Range    Extra Tube hold for add-on    Gold Top - SST    Collection Time: 07/17/21  9:10 AM   Result Value Ref Range    Extra Tube Hold for add-ons.    Comprehensive Metabolic Panel    Collection Time: 07/17/21  9:10 AM    Specimen: Blood   Result Value Ref Range    Glucose 95 65 - 99 mg/dL    BUN 16 8 - 23 mg/dL    Creatinine 0.87 0.57 - 1.00 mg/dL    Sodium 141 136 - 145 mmol/L    Potassium 4.5 3.5 - 5.2 mmol/L    Chloride 102 98 - 107 mmol/L    CO2 29.2 (H) 22.0 - 29.0 mmol/L    Calcium 8.8 8.6 - 10.5 mg/dL    Total Protein 6.5 6.0 - 8.5 g/dL    Albumin 3.60 3.50 - 5.20 g/dL    ALT (SGPT) 11 1 - 33 U/L    AST (SGOT) 17 1 - 32 U/L    Alkaline Phosphatase 74 39 - 117 U/L    Total Bilirubin 0.8 0.0 - 1.2 mg/dL    eGFR Non African Amer 61 >60 mL/min/1.73    Globulin 2.9 gm/dL    A/G Ratio 1.2 g/dL    BUN/Creatinine Ratio 18.4 7.0 - 25.0    Anion Gap 9.8 5.0 - 15.0 mmol/L   Troponin    Collection Time: 07/17/21  9:10 AM    Specimen: Blood   Result Value Ref Range    Troponin T <0.010 0.000 - 0.030 ng/mL   CBC Auto Differential    Collection Time: 07/17/21  9:10 AM    Specimen: Blood   Result Value Ref Range    WBC 6.51 3.40 - 10.80 10*3/mm3    RBC 4.71 3.77 - 5.28 10*6/mm3    Hemoglobin 14.9 12.0 - 15.9 g/dL    Hematocrit 45.1 34.0 - 46.6 %    MCV 95.8 79.0 - 97.0 fL    MCH 31.6 26.6 - 33.0 pg    MCHC 33.0 31.5 - 35.7 g/dL    RDW 13.4 12.3 - 15.4 %    RDW-SD 48.3 37.0 - 54.0 fl    MPV 10.4 6.0 - 12.0 fL    Platelets 202 140 - 450 10*3/mm3    Neutrophil % 56.8 42.7 - 76.0 %    Lymphocyte % 24.7 19.6 - 45.3 %    Monocyte % 15.5 (H) 5.0 - 12.0 %    Eosinophil % 1.7 0.3 - 6.2 %    Basophil % 0.8 0.0 - 1.5 %     Immature Grans % 0.5 0.0 - 0.5 %    Neutrophils, Absolute 3.70 1.70 - 7.00 10*3/mm3    Lymphocytes, Absolute 1.61 0.70 - 3.10 10*3/mm3    Monocytes, Absolute 1.01 (H) 0.10 - 0.90 10*3/mm3    Eosinophils, Absolute 0.11 0.00 - 0.40 10*3/mm3    Basophils, Absolute 0.05 0.00 - 0.20 10*3/mm3    Immature Grans, Absolute 0.03 0.00 - 0.05 10*3/mm3    nRBC 0.0 0.0 - 0.2 /100 WBC   ECG 12 Lead    Collection Time: 07/17/21  9:21 AM   Result Value Ref Range    QT Interval 464 ms   Protime-INR    Collection Time: 07/17/21  9:56 AM    Specimen: Blood   Result Value Ref Range    Protime 23.0 (H) 11.7 - 14.2 Seconds    INR 2.07 (H) 0.90 - 1.10   Troponin    Collection Time: 07/17/21 11:08 AM    Specimen: Blood   Result Value Ref Range    Troponin T <0.010 0.000 - 0.030 ng/mL       Ordered the above labs and independently reviewed the results.      RADIOLOGY  XR Chest 1 View    Result Date: 7/17/2021  XR CHEST 1 VW-  HISTORY: Female who is 89 years-old,  chest pain  TECHNIQUE: Frontal view of the chest  COMPARISON: 1/5/2019  FINDINGS: The heart is enlarged. Aorta is tortuous, calcified. Left-sided pacemaker and cardiac leads are seen. Pulmonary vasculature is unremarkable. No focal pulmonary consolidation, pleural effusion, or pneumothorax. No acute osseous process.      No focal pulmonary consolidation. Cardiomegaly. Tortuous aorta. Follow-up as clinically indicated.  This report was finalized on 7/17/2021 10:01 AM by Dr. Jax Blanchard M.D.        I ordered the above noted radiological studies. Reviewed by me and discussed with radiologist.  See dictation for official radiology interpretation.      PROCEDURES  Procedures      MEDICATIONS GIVEN IN ER    Medications   sodium chloride 0.9 % flush 10 mL (has no administration in time range)         PROGRESS, DATA ANALYSIS, CONSULTS, AND MEDICAL DECISION MAKING    All labs have been independently reviewed by me.  All radiology studies have been reviewed by me and discussed with  radiologist dictating the report.   EKG's independently viewed and interpreted by me.  Discussion below represents my analysis of pertinent findings related to patient's condition, differential diagnosis, treatment plan and final disposition.      ED Course as of Jul 17 1155   Sat Jul 17, 2021   0930 [id number]-old female with history of A. fib who is anticoagulated on Coumadin presents with chest discomfort earlier this morning.  Patient is a fairly poor historian cannot remember the quality or duration of her pain.  She is pain-free currently.  Etiology is unclear but could include acute coronary syndrome, dysrhythmia or other noncardiac causes such as acid reflux or musculoskeletal pain.    [DB]   1001 EKG          EKG time: 0921  Rhythm/Rate: Underlying atrial fibrillation with ventricular paced rhythm  P waves and CT: Atrial fibrillation  QRS, axis: Left axis deviation, IVCD  ST and T waves: Unremarkable ST and T wave    Interpreted Contemporaneously by me, independently viewed  Not significantly changed compared to prior two thousand nineteen      [DB]   1027 Labs are reviewed and are fairly reassuring.  CBC is normal.  Chemistries are benign as well and initial troponin is normal.  INR is therapeutic at 2.1.  Patient remains chest pain-free here in the ED and will go ahead and get a second troponin as patient's pain began around 7 AM.  Will discuss results of testing with patient daughters at bedside.    [DB]   1040 I discussed results of initial testing with patient family at bedside.  Patient remains pain-free and feels just fine.  I explained that initial troponin was normal but given her episode of pain at 7 AM would like to do a repeat troponin.  Patient and family would rather go home if repeat troponin remains negative.    [DB]   1041 ED heart score is 4 with negative troponin.    [DB]   1148 Repeat troponin is back and is negative.  At this point patient family very comfortable going home with  negative ED work-up and do not want aggressive evaluation.    [DB]      ED Course User Index  [DB] Elmer Shell MD       AS OF 11:55 EDT VITALS:    BP - 167/89  HR - 86  TEMP - 97.5 °F (36.4 °C)  O2 SATS - 97%      DIAGNOSIS  Final diagnoses:   Chest pain, unspecified type   Permanent atrial fibrillation (CMS/HCC)         DISPOSITION  DISCHARGE    Patient discharged in stable condition.    Reviewed implications of results, diagnosis, meds, responsibility to follow up, warning signs and symptoms of possible worsening, potential complications and reasons to return to ER, including increased chest pain, shortness of breath or as needed.    Patient/Family voiced understanding of above instructions.    Discussed plan for discharge, as there is no emergent indication for admission. Patient referred to primary care provider for BP management due to today's BP. Pt/family is agreeable and understands need for follow up and repeat testing.  Pt is aware that discharge does not mean that nothing is wrong but it indicates no emergency is present that requires admission and they must continue care with follow-up as given below or physician of their choice.     FOLLOW-UP  Javi Brand MD  [address]  [place] [id number]  [phone]      As needed    Rafaela Leija MD  [address] [place] [id number]  [phone]    In 3 days  If Not Better         Medication List      Changed    nitroglycerin 0.4 MG SL tablet  Commonly known as: NITROSTAT  Place 1 tablet under the tongue Every 5 (Five) Minutes As Needed for Chest Pain for up to 2 doses. Take no more than 3 doses in 15 minutes.  What changed: when to take this                   Elmer Shell MD  07/17/21 [id number]

## 2021-07-17 NOTE — ED TRIAGE NOTES
Pt reports Chest pain that started around 0700 this morning. Pt was given 324ASA in route. Pt appears lethargic.      Pt was wearing a mask during assessment.  This RN wore appropriate PPE

## 2021-07-19 ENCOUNTER — TELEPHONE (OUTPATIENT)
Dept: CARDIOLOGY | Facility: CLINIC | Age: 86
End: 2021-07-19

## 2021-07-19 NOTE — TELEPHONE ENCOUNTER
Pts daughter called wanting to inform you that pt was in the ER on 7/17/2021    They were advised to call our office to see if she needed any follow up testing

## 2021-07-19 NOTE — TELEPHONE ENCOUNTER
She was in the emergency department recently for chest pain.  She will need to be seen in the office in the next week by me or Carmela.  Please arrange.

## 2021-07-19 NOTE — TELEPHONE ENCOUNTER
Left message for pt's daughter, Magui to follow up in our office next week with either you or Carmela.    Kallie- Can you please call pt and get her scheduled next week to see either Mercedes or Carmela?     Thank you.

## 2021-07-21 ENCOUNTER — ANTICOAGULATION VISIT (OUTPATIENT)
Dept: PHARMACY | Facility: HOSPITAL | Age: 86
End: 2021-07-21

## 2021-07-21 NOTE — PROGRESS NOTES
Anticoagulation Clinic Progress Note    Anticoagulation Summary  As of 2021    INR goal:  2.0-3.0   TTR:  69.3 % (2.8 y)   INR used for dosin.07 (2021)   Warfarin maintenance plan:  1.25 mg every Sun, Tue, Thu; 2.5 mg all other days   Weekly warfarin total:  13.75 mg   Plan last modified:  Eve Bauer RPH (2020)   Next INR check:  2021   Priority:  Maintenance   Target end date:  Indefinite    Indications    Long-term (current) use of anticoagulants (Resolved) [Z79.01]             Anticoagulation Episode Summary     INR check location:      Preferred lab:      Send INR reminders to:   [facility]    Comments:  [facility]      Anticoagulation Care Providers     Provider Role Specialty Phone number    Rafaela Leija MD Referring Cardiology [phone]          Clinic Interview:  Patient Findings     Negatives:  Signs/symptoms of thrombosis, Signs/symptoms of bleeding,   Laboratory test error suspected, Change in health, Change in alcohol use,   Change in activity, Upcoming invasive procedure, Emergency department   visit, Upcoming dental procedure, Missed doses, Extra doses, Change in   medications, Change in diet/appetite, Hospital admission, Bruising, Other   complaints      Clinical Outcomes     Negatives:  Major bleeding event, Thromboembolic event,   Anticoagulation-related hospital admission, Anticoagulation-related ED   visit, Anticoagulation-related fatality        INR History:  Anticoagulation Monitoring 2021   INR 2.27 2.46 2.07   INR Date 2021   INR Goal 2.0-3.0 2.0-3.0 2.0-3.0   Trend Same Same Same   Last Week Total 13.75 mg 13.75 mg 13.75 mg   Next Week Total 13.75 mg 13.75 mg 13.75 mg   Sun 1.25 mg 1.25 mg 1.25 mg   Mon 2.5 mg 2.5 mg 2.5 mg   Tue 1.25 mg 1.25 mg 1.25 mg   Wed 2.5 mg 2.5 mg 2.5 mg   Thu 1.25 mg 1.25 mg 1.25 mg   Fri 2.5 mg 2.5 mg 2.5 mg   Sat 2.5 mg 2.5 mg 2.5 mg   Visit Report - -  -   Some recent data might be hidden       Plan:  1. INR is Therapeutic today- see above in Anticoagulation Summary.   Will instruct Diana Avalos to Continue their warfarin regimen- see above in Anticoagulation Summary.  2. Follow up in 4 weeks  3.They have been instructed to call if any changes in medications, doses, concerns, etc. Patient expresses understanding and has no further questions at this time.    Elise Davis, [facility]

## 2021-08-04 ENCOUNTER — OFFICE VISIT (OUTPATIENT)
Dept: CARDIOLOGY | Facility: CLINIC | Age: 86
End: 2021-08-04

## 2021-08-04 ENCOUNTER — CLINICAL SUPPORT NO REQUIREMENTS (OUTPATIENT)
Dept: CARDIOLOGY | Facility: CLINIC | Age: 86
End: 2021-08-04

## 2021-08-04 VITALS
HEIGHT: 67 IN | SYSTOLIC BLOOD PRESSURE: 122 MMHG | DIASTOLIC BLOOD PRESSURE: 82 MMHG | WEIGHT: 146 LBS | BODY MASS INDEX: 22.91 KG/M2 | HEART RATE: 81 BPM

## 2021-08-04 DIAGNOSIS — I48.21 PERMANENT ATRIAL FIBRILLATION (HCC): ICD-10-CM

## 2021-08-04 DIAGNOSIS — I44.2 THIRD DEGREE AV BLOCK (HCC): Primary | ICD-10-CM

## 2021-08-04 DIAGNOSIS — I47.20 VENTRICULAR TACHYCARDIA (HCC): ICD-10-CM

## 2021-08-04 DIAGNOSIS — Z86.79 HISTORY OF HEART FAILURE: ICD-10-CM

## 2021-08-04 DIAGNOSIS — R07.2 PRECORDIAL PAIN: Primary | ICD-10-CM

## 2021-08-04 DIAGNOSIS — I45.10 RBBB (RIGHT BUNDLE BRANCH BLOCK): ICD-10-CM

## 2021-08-04 DIAGNOSIS — I38 VALVULAR HEART DISEASE: ICD-10-CM

## 2021-08-04 DIAGNOSIS — Z95.0 CARDIAC RESYNCHRONIZATION THERAPY PACEMAKER (CRT-P) IN PLACE: ICD-10-CM

## 2021-08-04 DIAGNOSIS — I44.2 THIRD DEGREE AV BLOCK (HCC): ICD-10-CM

## 2021-08-04 PROBLEM — Z86.711 HISTORY OF PULMONARY EMBOLUS (PE): Status: ACTIVE | Noted: 2017-04-11

## 2021-08-04 PROBLEM — Z86.711 HISTORY OF PULMONARY EMBOLUS (PE): Status: RESOLVED | Noted: 2017-04-11 | Resolved: 2021-08-04

## 2021-08-04 PROBLEM — I10 ESSENTIAL HYPERTENSION: Status: RESOLVED | Noted: 2020-10-21 | Resolved: 2021-08-04

## 2021-08-04 PROCEDURE — 99214 OFFICE O/P EST MOD 30 MIN: CPT | Performed by: NURSE PRACTITIONER

## 2021-08-04 PROCEDURE — 93000 ELECTROCARDIOGRAM COMPLETE: CPT | Performed by: NURSE PRACTITIONER

## 2021-08-04 PROCEDURE — 93290 INTERROG DEV EVAL ICPMS IP: CPT | Performed by: INTERNAL MEDICINE

## 2021-08-04 PROCEDURE — 93280 PM DEVICE PROGR EVAL DUAL: CPT | Performed by: INTERNAL MEDICINE

## 2021-08-04 RX ORDER — NITROGLYCERIN 0.4 MG/1
0.4 TABLET SUBLINGUAL AS NEEDED
Qty: 30 TABLET | Refills: 2 | Status: SHIPPED | OUTPATIENT
Start: 2021-08-04

## 2021-08-04 RX ORDER — LEVOTHYROXINE SODIUM 0.05 MG/1
50 TABLET ORAL DAILY
COMMUNITY
Start: 2021-06-24 | End: 2021-09-08 | Stop reason: ALTCHOICE

## 2021-08-04 NOTE — PROGRESS NOTES
"  Date of Office Visit: 2021  Encounter Provider: KIKA Vidal  Place of Service: [facility]  Patient Name: Diana Avalos  :1932  Primary Cardiologist: Dr. Rafaela Leija     Chief Complaint   Patient presents with   • Congestive Heart Failure   • Atrial Fibrillation   :     HPI: Diana Avalos is a pleasant 89 y.o. female who presents today for follow up on chest pain. I have reviewed her medical records.     In 2011, she was diagnosed with atrial fibrillation.  In 2012, she underwent AV node ablation and single-chamber Medtronic pacemaker.  Her EF at that time was 35-40%.  Her ejection fraction has remained decreased.  In , she had an upgrade to a CRT-D device.     In 2019, she presented to [facility] with chest pain and dyspnea. She ruled out for myocardial infarction. Repeat echocardiogram showed improvement of her ejection fraction to 63%, mild concentric hypertrophy, grade 2 diastolic dysfunction, left atrium volume severely increased, right atrium moderately to severely dilated, mild to moderate mitral valve regurgitation, mild tricuspid valve regurgitation, and RVSP normal.      In 2021, she presented to the [facility] ED with chest pain that awoke her from sleep.  The patient was a poor historian and just really could not give more description except that it lasted about 30 minutes or less.  The daughter was at the bedside and said she had \"a bad night last night\" and did not sleep well.  I reviewed the ED records.  Blood pressure elevated at 167/89.  EKG showed atrial fibrillation that was rate controlled.  Hemoglobin and hematocrit normal.  CMP normal.  She ruled out for acute coronary syndrome and was released to follow-up in our office.      Today is her follow-up visit with her daughter accompanying her.  She describes that chest pain as \"peculiar\".  She has had no further chest pain since the ED " visit.  She denies shortness of breath, palpitations, edema, dizziness, syncope, or bleeding.  She remains on warfarin with INRs followed at the [facility].  Her blood pressure and heart rate are normal today.    Past Medical History:   Diagnosis Date   • Acute on chronic systolic congestive heart failure (CMS/HCC)    • Anemia    • Arthritis    • Asthma    • Atrial fibrillation (CMS/HCC)     With a history of an atrial clot   • Breast cancer (CMS/HCC)     Left, stage IIB   • Disease of thyroid gland     Hypothyroidism   • Essential hypertension    • Hypothyroidism    • Irritable bowel    • Mitral regurgitation    • NICM (nonischemic cardiomyopathy) (CMS/HCC)    • On warfarin therapy    • Osteoporosis    • Pneumothorax 6/14/2018   • Pulmonary hypertension (CMS/HCC)     Resolved per echocardiogram 1/2019   • Third degree AV block (CMS/HCC)    • Tricuspid regurgitation    • Ventricular tachycardia (CMS/HCC) 01/17/2019    Nonsustained       Past Surgical History:   Procedure Laterality Date   • CARDIAC ELECTROPHYSIOLOGY PROCEDURE N/A 6/12/2018    Procedure: Device Upgrade-Upgrade to CRT-P-BIV Pacamaker Medtronic Has existing single chamber Medtronic pacemaker;  Surgeon: Leonardo David MD;  Location: [facility] INVASIVE LOCATION;  Service: Cardiovascular   • CHOLECYSTECTOMY  2000   • EYE SURGERY  2005    cataracts, Dr. Miramontes   • MASTECTOMY Left 10/2014   • PACEMAKER IMPLANTATION  2012    Dr. Leija       Social History     Socioeconomic History   • Marital status:      Spouse name: Rick   • Number of children: 8   • Years of education: Not on file   • Highest education level: Not on file   Tobacco Use   • Smoking status: Never Smoker   • Smokeless tobacco: Never Used   Substance and Sexual Activity   • Alcohol use: Yes     Alcohol/week: 1.0 standard drinks     Types: 1 Glasses of wine per week     Comment: caffeine use-tea   • Drug use: No   • Sexual activity: Defer       Family History  "  Problem Relation Age of Onset   • Colon cancer Mother 75   • Colon cancer Sister 79   • Hypertension Sister    • Cholelithiasis Sister         2 sisters       The following portion of the patient's history were reviewed and updated as appropriate: past medical history, past surgical history, past social history, past family history, allergies, current medications, and problem list.    Review of Systems   Constitutional: Negative.   Cardiovascular: Negative.    Respiratory: Negative.    Hematologic/Lymphatic: Negative.    Neurological: Negative.        Allergies   Allergen Reactions   • Iodinated Diagnostic Agents Hives   • Amiodarone Nausea Only   • Codeine Nausea Only   • Dabigatran Etexilate Mesylate Nausea Only   • Digoxin Nausea Only         Current Outpatient Medications:   •  carvedilol (COREG) 6.25 MG tablet, Take 1 tablet by mouth 2 (Two) Times a Day With Meals., Disp: 180 tablet, Rfl: 3  •  Cholecalciferol 50 MCG (2000 UT) capsule, Take  by mouth., Disp: , Rfl:   •  Diclofenac Sodium (VOLTAREN) 1 % gel gel, Apply 4 g topically to the appropriate area as directed., Disp: , Rfl:   •  diphenhydrAMINE HCl, Sleep, 25 MG capsule, Take  by mouth., Disp: , Rfl:   •  furosemide (LASIX) 40 MG tablet, TAKE ONE TABLET BY MOUTH DAILY, Disp: 90 tablet, Rfl: 3  •  levothyroxine (SYNTHROID, LEVOTHROID) 50 MCG tablet, Take 50 mcg by mouth Daily., Disp: , Rfl:   •  nitroglycerin (NITROSTAT) 0.4 MG SL tablet, Place 1 tablet under the tongue As Needed for Chest Pain. Take no more than 3 doses in 15 minutes., Disp: 30 tablet, Rfl: 2  •  warfarin (COUMADIN) 2.5 MG tablet, TAKE 1/2 TABLET BY MOUTH DAILY ON SUNDAY, TUESDAY AND THURSDAY AND TAKE ONE TABLET BY MOUTH DAILY ON ALL OTHER DAYS OR AS DIRECTED, Disp: 70 tablet, Rfl: 0        Objective:     Vitals:    08/04/21 1305 08/04/21 1307   BP: 128/82 122/82   BP Location: Right arm    Pulse: 81    Weight: 66.2 kg (146 lb)    Height: 170.2 cm (67\")      Body mass index is 22.87 " kg/m².    PHYSICAL EXAM:    Vitals Reviewed.   General Appearance: No acute distress, well developed and well nourished.  Thin.  Eyes: Conjunctiva and lids: No erythema, swelling, or discharge. Sclera non-icteric.  Wears glasses.  HENT: Atraumatic, normocephalic. External eyes, ears, and nose normal. No hearing loss noted. Mucous membranes normal. Lips not cyanotic. Neck supple with no tenderness.  Respiratory: No signs of respiratory distress. Respiration rhythm and depth normal.   Clear to auscultation. No rales, crackles, rhonchi, or wheezing auscultated.   Cardiovascular:  Jugular Venous Pressure: Normal  Heart Rate and Rhythm: Irregularly, irregular.  Paced rhythm.  Heart Sounds: Normal S1 and S2. No S3 or S4 noted.  Murmurs: No murmurs noted. No rubs, thrills, or gallops.   Lower Extremities: No edema noted.  Gastrointestinal:  Abdomen soft, non-distended, non-tender. Normal bowel sounds.    Musculoskeletal: Normal movement of extremities  Skin and Nails: General appearance normal. No pallor, cyanosis, diaphoresis. Skin temperature normal. No clubbing of fingernails.   Psychiatric: Patient alert and oriented to person, place, and time. Speech and behavior appropriate. Normal mood and affect.       ECG 12 Lead    Date/Time: 8/4/2021 1:08 PM  Performed by: Mercedes Phipps APRN  Authorized by: Mercedes Phipps APRN   Comparison: compared with previous ECG from 7/17/2021  Similar to previous ECG  Rhythm: atrial fibrillation and paced  Rate: normal  BPM: 81  Conduction: right bundle branch block  QRS axis: normal  Other findings: non-specific ST-T wave changes    Clinical impression: abnormal EKG              Assessment:       Diagnosis Plan   1. Precordial pain     2. Permanent atrial fibrillation (CMS/HCC)     3. Third degree AV block (CMS/HCC)     4. History of heart failure     5. Cardiac resynchronization therapy pacemaker (CRT-P) in place     6. RBBB (right bundle branch block)     7. Valvular heart  disease            Plan:       1.  Chest Pain: She had one episode of chest pain.  She sought medical attention at the [facility] ED and ruled out for acute coronary syndrome.  She denies any further chest pain.  I do not feel that we need to proceed with any further cardiac testing.    2.  Permanent Atrial Fibrillation: Rate controlled on carvedilol and remains on warfarin with INRs followed at the [facility].    3.  Third-degree A-V Block: Status post pacemaker.    4.  History of Heart Failure: Euvolemic today.      5.  CRT-P in place.  Continue with routine pacemaker checks.    6.  Right Bundle Branch Block: Chronic and unchanged.    7.  Valvular Heart Disease: Stable.    8.  I recommended a 6-month follow-up visit with Dr. Buddy Pratt.    As always, it has been a pleasure to participate in your patient's care. Thank you.       Sincerely,       KIKA Csatelan  [facility]      · COVID-19 Precautions - Patient was compliant in wearing a mask. When I saw the patient, I used appropriate personal protective equipment (PPE) including mask and eye shield (standard procedure).  Additionally, I used gown and gloves if indicated.  Hand hygiene was completed before and after seeing the patient.  · Dictated utilizing Dragon Dictation  · I spent 34 minutes reviewing her medical records/testing/previous office notes/labs, face-to-face interaction with patient, physical examination, formulating the plan of care, and discussion of plan of care with patient.

## 2021-08-06 NOTE — PROGRESS NOTES
Anticoagulation Clinic Progress Note    Anticoagulation Summary  As of 2021    INR goal:  2.0-3.0   TTR:  67.3 % (2.6 y)   INR used for dosin.27 (2021)   Warfarin maintenance plan:  1.25 mg every Sun, Tue, Thu; 2.5 mg all other days   Weekly warfarin total:  13.75 mg   No change documented:  Jayant White RPH   Plan last modified:  Eve Bauer RPH (2020)   Next INR check:  6/15/2021   Priority:  High   Target end date:  Indefinite    Indications    Long-term (current) use of anticoagulants (Resolved) [Z79.01]             Anticoagulation Episode Summary     INR check location:      Preferred lab:      Send INR reminders to:  [facility]    Comments:  [facility]      Anticoagulation Care Providers     Provider Role Specialty Phone number    Rafaela Leija MD Referring Cardiology [phone]          Clinic Interview:  Patient Findings     Negatives:  Signs/symptoms of thrombosis, Signs/symptoms of bleeding,   Laboratory test error suspected, Change in health, Change in alcohol use,   Change in activity, Upcoming invasive procedure, Emergency department   visit, Upcoming dental procedure, Missed doses, Extra doses, Change in   medications, Change in diet/appetite, Hospital admission, Bruising, Other   complaints      Clinical Outcomes     Negatives:  Major bleeding event, Thromboembolic event,   Anticoagulation-related hospital admission, Anticoagulation-related ED   visit, Anticoagulation-related fatality        INR History:  Anticoagulation Monitoring 3/24/2021 2021 2021   INR 2.40 2.59 2.27   INR Date 3/23/2021 2021 2021   INR Goal 2.0-3.0 2.0-3.0 2.0-3.0   Trend Same Same Same   Last Week Total 13.75 mg 13.75 mg 13.75 mg   Next Week Total 13.75 mg 13.75 mg 13.75 mg   Sun 1.25 mg 1.25 mg 1.25 mg   Mon 2.5 mg 2.5 mg 2.5 mg   Tue 1.25 mg 1.25 mg 1.25 mg   Wed 2.5 mg 2.5 mg 2.5 mg   Thu 1.25 mg 1.25 mg 1.25 mg   Fri 2.5 mg 2.5 mg 2.5 mg   Sat 2.5  mg 2.5 mg 2.5 mg   Visit Report - - -   Some recent data might be hidden       Plan:  1. INR was Therapeutic 5/18/21 - see above in Anticoagulation Summary.   Will instruct Diana Avalos to Continue their warfarin regimen- see above in Anticoagulation Summary.  2. Follow up in 4 weeks  3. They have been instructed to call if any changes in medications, doses, concerns, etc. Patient expresses understanding and has no further questions at this time.    Jayant White [facility]    used

## 2021-08-23 RX ORDER — WARFARIN SODIUM 2.5 MG/1
TABLET ORAL
Qty: 70 TABLET | Refills: 1 | Status: SHIPPED | OUTPATIENT
Start: 2021-08-23 | End: 2022-02-03

## 2021-08-24 LAB — INR PPP: 1.89

## 2021-08-25 ENCOUNTER — ANTICOAGULATION VISIT (OUTPATIENT)
Dept: PHARMACY | Facility: HOSPITAL | Age: 86
End: 2021-08-25

## 2021-08-25 NOTE — PROGRESS NOTES
Anticoagulation Clinic Progress Note    Anticoagulation Summary  As of 2021    INR goal:  2.0-3.0   TTR:  68.2 % (2.9 y)   INR used for dosin.89 (2021)   Warfarin maintenance plan:  1.25 mg every Sun, Tue, Thu; 2.5 mg all other days   Weekly warfarin total:  13.75 mg   No change documented:  Elise Davis RPH   Plan last modified:  Eve Bauer RPH (2020)   Next INR check:  2021   Priority:  Maintenance   Target end date:  Indefinite    Indications    Long-term (current) use of anticoagulants (Resolved) [Z79.01]             Anticoagulation Episode Summary     INR check location:      Preferred lab:      Send INR reminders to:  [facility]    Comments:  [facility]      Anticoagulation Care Providers     Provider Role Specialty Phone number    Rafaela Leija MD Referring Cardiology [phone]          Clinic Interview:  Patient Findings     Negatives:  Signs/symptoms of thrombosis, Signs/symptoms of bleeding,   Laboratory test error suspected, Change in health, Change in alcohol use,   Change in activity, Upcoming invasive procedure, Emergency department   visit, Upcoming dental procedure, Missed doses, Extra doses, Change in   medications, Change in diet/appetite, Hospital admission, Bruising, Other   complaints      Clinical Outcomes     Negatives:  Major bleeding event, Thromboembolic event,   Anticoagulation-related hospital admission, Anticoagulation-related ED   visit, Anticoagulation-related fatality        INR History:  Anticoagulation Monitoring 2021   INR 2.46 2.07 1.89   INR Date 2021   INR Goal 2.0-3.0 2.0-3.0 2.0-3.0   Trend Same Same Same   Last Week Total 13.75 mg 13.75 mg 13.75 mg   Next Week Total 13.75 mg 13.75 mg 13.75 mg   Sun 1.25 mg 1.25 mg 1.25 mg   Mon 2.5 mg 2.5 mg 2.5 mg   Tue 1.25 mg 1.25 mg 1.25 mg   Wed 2.5 mg 2.5 mg 2.5 mg   Thu 1.25 mg 1.25 mg 1.25 mg   Fri 2.5 mg 2.5 mg 2.5 mg    Sat 2.5 mg 2.5 mg 2.5 mg   Visit Report - - -   Some recent data might be hidden       Plan:  1. INR is Subtherapeutic today- see above in Anticoagulation Summary.   Will instruct Diana Avalos to Continue their warfarin regimen- see above in Anticoagulation Summary.  2. Follow up in 2 week  3. They have been instructed to call if any changes in medications, doses, concerns, etc. Patient expresses understanding and has no further questions at this time.    Elise Davis, [facility]

## 2021-09-07 LAB — INR PPP: 1.76

## 2021-09-08 ENCOUNTER — ANTICOAGULATION VISIT (OUTPATIENT)
Dept: PHARMACY | Facility: HOSPITAL | Age: 86
End: 2021-09-08

## 2021-09-08 RX ORDER — LEVOTHYROXINE SODIUM 0.07 MG/1
0.5 TABLET ORAL DAILY
COMMUNITY
Start: 2021-08-30 | End: 2022-08-30

## 2021-09-08 NOTE — PROGRESS NOTES
Anticoagulation Clinic Progress Note    Anticoagulation Summary  As of 2021    INR goal:  2.0-3.0   TTR:  67.3 % (2.9 y)   INR used for dosin.76 (2021)   Warfarin maintenance plan:  1.25 mg every Sun, Thu; 2.5 mg all other days   Weekly warfarin total:  15 mg   Plan last modified:  Jayant White, PharmD (2021)   Next INR check:  2021   Priority:  Maintenance   Target end date:  Indefinite    Indications    Long-term (current) use of anticoagulants (Resolved) [Z79.01]             Anticoagulation Episode Summary     INR check location:      Preferred lab:      Send INR reminders to:   [facility]    Comments:  [facility]      Anticoagulation Care Providers     Provider Role Specialty Phone number    Rafaela Leija MD Referring Cardiology [phone]          Clinic Interview:  Patient Findings     Positives:  Change in medications    Negatives:  Signs/symptoms of thrombosis, Signs/symptoms of bleeding,   Laboratory test error suspected, Change in health, Change in alcohol use,   Change in activity, Upcoming invasive procedure, Emergency department   visit, Upcoming dental procedure, Missed doses, Extra doses, Change in   diet/appetite, Hospital admission, Bruising, Other complaints    Comments:  Spoke w/ daughterMagui. Reports 2 months ago, her   levothyroxine dose was decreased from 88 mcg to 50 mcg, and last week it   was increased to 75 mcg.       Clinical Outcomes     Negatives:  Major bleeding event, Thromboembolic event,   Anticoagulation-related hospital admission, Anticoagulation-related ED   visit, Anticoagulation-related fatality    Comments:  Spoke w/ daughterMagui. Reports 2 months ago, her   levothyroxine dose was decreased from 88 mcg to 50 mcg, and last week it   was increased to 75 mcg.         INR History:  Anticoagulation Monitoring 2021   INR 2.07 1.89 1.76   INR Date 2021   INR Goal 2.0-3.0 2.0-3.0  2.0-3.0   Trend Same Same Up   Last Week Total 13.75 mg 13.75 mg 13.75 mg   Next Week Total 13.75 mg 13.75 mg 16.25 mg   Sun 1.25 mg 1.25 mg 1.25 mg   Mon 2.5 mg 2.5 mg 2.5 mg   Tue 1.25 mg 1.25 mg 2.5 mg   Wed 2.5 mg 2.5 mg 3.75 mg (9/8); Otherwise 2.5 mg   Thu 1.25 mg 1.25 mg 1.25 mg   Fri 2.5 mg 2.5 mg 2.5 mg   Sat 2.5 mg 2.5 mg 2.5 mg   Visit Report - - -   Some recent data might be hidden       Plan:  1. INR is Subtherapeutic today- see above in Anticoagulation Summary.   Will instruct Diana Avalos to Increase their warfarin regimen- see above in Anticoagulation Summary.  2. Follow up in 2 weeks  3. They have been instructed to call if any changes in medications, doses, concerns, etc. Patient expresses understanding and has no further questions at this time.    Jayant White, PharmD

## 2021-09-21 LAB — INR PPP: 2.17

## 2021-09-22 ENCOUNTER — ANTICOAGULATION VISIT (OUTPATIENT)
Dept: PHARMACY | Facility: HOSPITAL | Age: 86
End: 2021-09-22

## 2021-09-22 NOTE — PROGRESS NOTES
Anticoagulation Clinic Progress Note    Anticoagulation Summary  As of 2021    INR goal:  2.0-3.0   TTR:  66.9 % (3 y)   INR used for dosin.17 (2021)   Warfarin maintenance plan:  1.25 mg every Sun, Thu; 2.5 mg all other days   Weekly warfarin total:  15 mg   No change documented:  Lenin Cantrell III, PharmD   Plan last modified:  Jayant White, PharmD (2021)   Next INR check:  10/5/2021   Priority:  Maintenance   Target end date:  Indefinite    Indications    Long-term (current) use of anticoagulants (Resolved) [Z79.01]             Anticoagulation Episode Summary     INR check location:      Preferred lab:      Send INR reminders to:   [facility]    Comments:  [facility]      Anticoagulation Care Providers     Provider Role Specialty Phone number    Rafaela Leija MD Referring Cardiology [phone]          Clinic Interview:  Patient Findings     Negatives:  Signs/symptoms of thrombosis, Signs/symptoms of bleeding,   Laboratory test error suspected, Change in health, Change in alcohol use,   Change in activity, Upcoming invasive procedure, Emergency department   visit, Upcoming dental procedure, Missed doses, Extra doses, Change in   medications, Change in diet/appetite, Hospital admission, Bruising, Other   complaints      Clinical Outcomes     Negatives:  Major bleeding event, Thromboembolic event,   Anticoagulation-related hospital admission, Anticoagulation-related ED   visit, Anticoagulation-related fatality        INR History:  Anticoagulation Monitoring 2021   INR 1.89 1.76 2.17   INR Date 2021   INR Goal 2.0-3.0 2.0-3.0 2.0-3.0   Trend Same Up Same   Last Week Total 13.75 mg 13.75 mg 15 mg   Next Week Total 13.75 mg 16.25 mg 15 mg   Sun 1.25 mg 1.25 mg 1.25 mg   Mon 2.5 mg 2.5 mg 2.5 mg   Tue 1.25 mg 2.5 mg 2.5 mg   Wed 2.5 mg 3.75 mg (); Otherwise 2.5 mg 2.5 mg   Thu 1.25 mg 1.25 mg 1.25 mg   Fri 2.5 mg 2.5  mg 2.5 mg   Sat 2.5 mg 2.5 mg 2.5 mg   Visit Report - - -   Some recent data might be hidden       Plan:  1. INR is Therapeutic. Spoke with patient's daughter, Magui, and instructed to continue Diana Avalos's warfarin regimen - see above in Anticoagulation Summary.  2. Follow up in 2 weeks  3. They have been instructed to call if any changes in medications, doses, concerns, etc. They express understanding and have no further questions at this time.      Lenin Cantrell III, PharmD

## 2021-10-05 LAB — INR PPP: 2.27

## 2021-10-06 ENCOUNTER — ANTICOAGULATION VISIT (OUTPATIENT)
Dept: PHARMACY | Facility: HOSPITAL | Age: 86
End: 2021-10-06

## 2021-10-06 NOTE — PROGRESS NOTES
Anticoagulation Clinic Progress Note    Anticoagulation Summary  As of 10/6/2021    INR goal:  2.0-3.0   TTR:  67.4 % (3 y)   INR used for dosin.27 (10/5/2021)   Warfarin maintenance plan:  1.25 mg every Sun, Thu; 2.5 mg all other days   Weekly warfarin total:  15 mg   No change documented:  Divina Phillips, PharmD   Plan last modified:  Jayant White PharmD (2021)   Next INR check:  10/19/2021   Priority:  Maintenance   Target end date:  Indefinite    Indications    Long-term (current) use of anticoagulants (Resolved) [Z79.01]             Anticoagulation Episode Summary     INR check location:      Preferred lab:      Send INR reminders to:   [facility]    Comments:  [facility]      Anticoagulation Care Providers     Provider Role Specialty Phone number    Rafaela Leija MD Referring Cardiology [phone]          Clinic Interview:  Patient Findings     Negatives:  Signs/symptoms of thrombosis, Signs/symptoms of bleeding,   Laboratory test error suspected, Change in health, Change in alcohol use,   Change in activity, Upcoming invasive procedure, Emergency department   visit, Upcoming dental procedure, Missed doses, Extra doses, Change in   medications, Change in diet/appetite, Hospital admission, Bruising, Other   complaints      Clinical Outcomes     Negatives:  Major bleeding event, Thromboembolic event,   Anticoagulation-related hospital admission, Anticoagulation-related ED   visit, Anticoagulation-related fatality        INR History:  Anticoagulation Monitoring 2021 2021 10/6/2021   INR 1.76 2.17 2.27   INR Date 2021 2021 10/5/2021   INR Goal 2.0-3.0 2.0-3.0 2.0-3.0   Trend Up Same Same   Last Week Total 13.75 mg 15 mg 15 mg   Next Week Total 16.25 mg 15 mg 15 mg   Sun 1.25 mg 1.25 mg 1.25 mg   Mon 2.5 mg 2.5 mg 2.5 mg   Tue 2.5 mg 2.5 mg 2.5 mg   Wed 3.75 mg (); Otherwise 2.5 mg 2.5 mg 2.5 mg   Thu 1.25 mg 1.25 mg 1.25 mg   Fri 2.5 mg 2.5 mg 2.5 mg    Sat 2.5 mg 2.5 mg 2.5 mg   Visit Report - - -   Some recent data might be hidden       Plan:  1. INR is Therapeutic today- see above in Anticoagulation Summary.   Will instruct Diana Avalos to Continue their warfarin regimen- see above in Anticoagulation Summary.  2. Follow up in 2 weeks  3.They have been instructed to call if any changes in medications, doses, concerns, etc. Patient expresses understanding and has no further questions at this time.    Divina Phillips, PharmD

## 2021-10-19 LAB — INR PPP: 2.01

## 2021-10-20 ENCOUNTER — ANTICOAGULATION VISIT (OUTPATIENT)
Dept: PHARMACY | Facility: HOSPITAL | Age: 86
End: 2021-10-20

## 2021-10-20 NOTE — PROGRESS NOTES
Anticoagulation Clinic Progress Note    Anticoagulation Summary  As of 10/20/2021    INR goal:  2.0-3.0   TTR:  67.8 % (3 y)   INR used for dosin.01 (10/19/2021)   Warfarin maintenance plan:  1.25 mg every Sun, Thu; 2.5 mg all other days   Weekly warfarin total:  15 mg   No change documented:  Elise Davis RPH   Plan last modified:  Jayant White, PharmD (2021)   Next INR check:  2021   Priority:  Maintenance   Target end date:  Indefinite    Indications    Long-term (current) use of anticoagulants (Resolved) [Z79.01]             Anticoagulation Episode Summary     INR check location:      Preferred lab:      Send INR reminders to:  [facility]    Comments:  [facility]      Anticoagulation Care Providers     Provider Role Specialty Phone number    Rafaela Leija MD Referring Cardiology [phone]          Clinic Interview:  Patient Findings     Negatives:  Signs/symptoms of thrombosis, Signs/symptoms of bleeding,   Laboratory test error suspected, Change in health, Change in alcohol use,   Change in activity, Upcoming invasive procedure, Emergency department   visit, Upcoming dental procedure, Missed doses, Extra doses, Change in   medications, Change in diet/appetite, Hospital admission, Bruising, Other   complaints      Clinical Outcomes     Negatives:  Major bleeding event, Thromboembolic event,   Anticoagulation-related hospital admission, Anticoagulation-related ED   visit, Anticoagulation-related fatality        INR History:  Anticoagulation Monitoring 2021 10/6/2021 10/20/2021   INR 2.17 2.27 2.01   INR Date 2021 10/5/2021 10/19/2021   INR Goal 2.0-3.0 2.0-3.0 2.0-3.0   Trend Same Same Same   Last Week Total 15 mg 15 mg 15 mg   Next Week Total 15 mg 15 mg 15 mg   Sun 1.25 mg 1.25 mg 1.25 mg   Mon 2.5 mg 2.5 mg 2.5 mg   Tue 2.5 mg 2.5 mg 2.5 mg   Wed 2.5 mg 2.5 mg 2.5 mg   Thu 1.25 mg 1.25 mg 1.25 mg   Fri 2.5 mg 2.5 mg 2.5 mg   Sat 2.5 mg 2.5 mg 2.5  mg   Visit Report - - -   Some recent data might be hidden       Plan:  1. INR is Therapeutic today- see above in Anticoagulation Summary.   Will instruct Diana Avalos to Continue their warfarin regimen- see above in Anticoagulation Summary.  2. Follow up in 4 weeks  3. They have been instructed to call if any changes in medications, doses, concerns, etc. Patient expresses understanding and has no further questions at this time.    Elise Davis, [facility]

## 2021-11-14 PROCEDURE — 93294 REM INTERROG EVL PM/LDLS PM: CPT | Performed by: INTERNAL MEDICINE

## 2021-11-14 PROCEDURE — 93296 REM INTERROG EVL PM/IDS: CPT | Performed by: INTERNAL MEDICINE

## 2021-11-16 LAB — INR PPP: 2.09

## 2021-11-17 ENCOUNTER — ANTICOAGULATION VISIT (OUTPATIENT)
Dept: PHARMACY | Facility: HOSPITAL | Age: 86
End: 2021-11-17

## 2021-11-17 NOTE — PROGRESS NOTES
Anticoagulation Clinic Progress Note    Anticoagulation Summary  As of 2021    INR goal:  2.0-3.0   TTR:  68.6 % (3.1 y)   INR used for dosin.09 (2021)   Warfarin maintenance plan:  1.25 mg every Sun, Thu; 2.5 mg all other days   Weekly warfarin total:  15 mg   No change documented:  Jayant White PharmD   Plan last modified:  Jayant White PharmD (2021)   Next INR check:  2021   Priority:  Maintenance   Target end date:  Indefinite    Indications    Long-term (current) use of anticoagulants (Resolved) [Z79.01]             Anticoagulation Episode Summary     INR check location:      Preferred lab:      Send INR reminders to:   [facility]    Comments:  [facility]      Anticoagulation Care Providers     Provider Role Specialty Phone number    Rafaela Leija MD Referring Cardiology [phone]          Clinic Interview:  Patient Findings     Negatives:  Signs/symptoms of thrombosis, Signs/symptoms of bleeding,   Laboratory test error suspected, Change in health, Change in alcohol use,   Change in activity, Upcoming invasive procedure, Emergency department   visit, Upcoming dental procedure, Missed doses, Extra doses, Change in   medications, Change in diet/appetite, Hospital admission, Bruising, Other   complaints      Clinical Outcomes     Negatives:  Major bleeding event, Thromboembolic event,   Anticoagulation-related hospital admission, Anticoagulation-related ED   visit, Anticoagulation-related fatality        INR History:  Anticoagulation Monitoring 10/6/2021 10/20/2021 2021   INR 2.27 2.01 2.09   INR Date 10/5/2021 10/19/2021 2021   INR Goal 2.0-3.0 2.0-3.0 2.0-3.0   Trend Same Same Same   Last Week Total 15 mg 15 mg 15 mg   Next Week Total 15 mg 15 mg 15 mg   Sun 1.25 mg 1.25 mg 1.25 mg   Mon 2.5 mg 2.5 mg 2.5 mg   Tue 2.5 mg 2.5 mg 2.5 mg   Wed 2.5 mg 2.5 mg 2.5 mg   Thu 1.25 mg 1.25 mg 1.25 mg   Fri 2.5 mg 2.5 mg 2.5 mg   Sat 2.5 mg 2.5 mg  2.5 mg   Visit Report - - -   Some recent data might be hidden       Plan:  1. INR is Therapeutic today- see above in Anticoagulation Summary.   Will instruct Diana Avalos to Continue their warfarin regimen- see above in Anticoagulation Summary.  2. Follow up in 4 weeks  3. They have been instructed to call if any changes in medications, doses, concerns, etc. Patient expresses understanding and has no further questions at this time.    Jayant White, PharmD

## 2021-12-14 LAB — INR PPP: 2.4

## 2021-12-15 ENCOUNTER — ANTICOAGULATION VISIT (OUTPATIENT)
Dept: PHARMACY | Facility: HOSPITAL | Age: 86
End: 2021-12-15

## 2021-12-15 NOTE — PROGRESS NOTES
Anticoagulation Clinic Progress Note    Anticoagulation Summary  As of 12/15/2021    INR goal:  2.0-3.0   TTR:  69.3 % (3.2 y)   INR used for dosin.40 (2021)   Warfarin maintenance plan:  1.25 mg every Sun, Thu; 2.5 mg all other days   Weekly warfarin total:  15 mg   No change documented:  Elise Davis RPH   Plan last modified:  Jayant White, PharmD (2021)   Next INR check:  2022   Priority:  Maintenance   Target end date:  Indefinite    Indications    Long-term (current) use of anticoagulants (Resolved) [Z79.01]             Anticoagulation Episode Summary     INR check location:      Preferred lab:      Send INR reminders to:   [facility]    Comments:  [facility]      Anticoagulation Care Providers     Provider Role Specialty Phone number    Rafaela Leija MD Referring Cardiology [phone]          Clinic Interview:  Patient Findings     Negatives:  Signs/symptoms of thrombosis, Signs/symptoms of bleeding,   Laboratory test error suspected, Change in health, Change in alcohol use,   Change in activity, Upcoming invasive procedure, Emergency department   visit, Upcoming dental procedure, Missed doses, Extra doses, Change in   medications, Change in diet/appetite, Hospital admission, Bruising, Other   complaints      Clinical Outcomes     Negatives:  Major bleeding event, Thromboembolic event,   Anticoagulation-related hospital admission, Anticoagulation-related ED   visit, Anticoagulation-related fatality        INR History:  Anticoagulation Monitoring 10/20/2021 2021 12/15/2021   INR 2.01 2.09 2.40   INR Date 10/19/2021 2021 2021   INR Goal 2.0-3.0 2.0-3.0 2.0-3.0   Trend Same Same Same   Last Week Total 15 mg 15 mg 15 mg   Next Week Total 15 mg 15 mg 15 mg   Sun 1.25 mg 1.25 mg 1.25 mg   Mon 2.5 mg 2.5 mg 2.5 mg   Tue 2.5 mg 2.5 mg 2.5 mg   Wed 2.5 mg 2.5 mg 2.5 mg   Thu 1.25 mg 1.25 mg 1.25 mg   Fri 2.5 mg 2.5 mg 2.5 mg   Sat 2.5 mg 2.5 mg  2.5 mg   Visit Report - - -   Some recent data might be hidden       Plan:  1. INR is Therapeutic today- see above in Anticoagulation Summary.   Will instruct Diana Avalos to Continue their warfarin regimen- see above in Anticoagulation Summary.  2. Follow up in 4 weeks  3. They have been instructed to call if any changes in medications, doses, concerns, etc. Patient expresses understanding and has no further questions at this time.    Elise Davis [facility]

## 2022-01-12 LAB — INR PPP: 2.61

## 2022-01-13 ENCOUNTER — ANTICOAGULATION VISIT (OUTPATIENT)
Dept: PHARMACY | Facility: HOSPITAL | Age: 87
End: 2022-01-13

## 2022-01-13 NOTE — PROGRESS NOTES
Anticoagulation Clinic Progress Note    Anticoagulation Summary  As of 2022    INR goal:  2.0-3.0   TTR:  70.1 % (3.3 y)   INR used for dosin.61 (2022)   Warfarin maintenance plan:  1.25 mg every Sun, Thu; 2.5 mg all other days   Weekly warfarin total:  15 mg   No change documented:  Elise Davis RPH   Plan last modified:  Jayant White, PharmD (2021)   Next INR check:  2022   Priority:  Maintenance   Target end date:  Indefinite    Indications    Long-term (current) use of anticoagulants (Resolved) [Z79.01]             Anticoagulation Episode Summary     INR check location:      Preferred lab:      Send INR reminders to:   [facility]    Comments:  [facility]      Anticoagulation Care Providers     Provider Role Specialty Phone number    Rafaela Leija MD Referring Cardiology [phone]          Clinic Interview:  Patient Findings     Negatives:  Signs/symptoms of thrombosis, Signs/symptoms of bleeding,   Laboratory test error suspected, Change in health, Change in alcohol use,   Change in activity, Upcoming invasive procedure, Emergency department   visit, Upcoming dental procedure, Missed doses, Extra doses, Change in   medications, Change in diet/appetite, Hospital admission, Bruising, Other   complaints      Clinical Outcomes     Negatives:  Major bleeding event, Thromboembolic event,   Anticoagulation-related hospital admission, Anticoagulation-related ED   visit, Anticoagulation-related fatality        INR History:  Anticoagulation Monitoring 2021 12/15/2021 2022   INR 2.09 2.40 2.61   INR Date 2021   INR Goal 2.0-3.0 2.0-3.0 2.0-3.0   Trend Same Same Same   Last Week Total 15 mg 15 mg 15 mg   Next Week Total 15 mg 15 mg 15 mg   Sun 1.25 mg 1.25 mg 1.25 mg   Mon 2.5 mg 2.5 mg 2.5 mg   Tue 2.5 mg 2.5 mg 2.5 mg   Wed 2.5 mg 2.5 mg 2.5 mg   Thu 1.25 mg 1.25 mg 1.25 mg   Fri 2.5 mg 2.5 mg 2.5 mg   Sat 2.5 mg 2.5 mg 2.5  mg   Visit Report - - -   Some recent data might be hidden       Plan:  1. INR is Therapeutic today- see above in Anticoagulation Summary.   Will instruct Diana Avalos to Continue their warfarin regimen- see above in Anticoagulation Summary.  2. Follow up in 6 weeks  3. PThey have been instructed to call if any changes in medications, doses, concerns, etc. Patient expresses understanding and has no further questions at this time.    Elise Davis, [facility]

## 2022-02-03 RX ORDER — WARFARIN SODIUM 2.5 MG/1
TABLET ORAL
Qty: 80 TABLET | Refills: 1 | Status: SHIPPED | OUTPATIENT
Start: 2022-02-03 | End: 2022-08-10

## 2022-02-10 ENCOUNTER — OFFICE VISIT (OUTPATIENT)
Dept: CARDIOLOGY | Facility: CLINIC | Age: 87
End: 2022-02-10

## 2022-02-10 VITALS
BODY MASS INDEX: 24.33 KG/M2 | DIASTOLIC BLOOD PRESSURE: 70 MMHG | SYSTOLIC BLOOD PRESSURE: 124 MMHG | HEART RATE: 86 BPM | WEIGHT: 155 LBS | HEIGHT: 67 IN

## 2022-02-10 DIAGNOSIS — Z17.0 MALIGNANT NEOPLASM OF CENTRAL PORTION OF LEFT BREAST IN FEMALE, ESTROGEN RECEPTOR POSITIVE: ICD-10-CM

## 2022-02-10 DIAGNOSIS — I34.0 NONRHEUMATIC MITRAL VALVE REGURGITATION: Primary | ICD-10-CM

## 2022-02-10 DIAGNOSIS — I44.2 THIRD DEGREE AV BLOCK: ICD-10-CM

## 2022-02-10 DIAGNOSIS — I48.21 PERMANENT ATRIAL FIBRILLATION: ICD-10-CM

## 2022-02-10 DIAGNOSIS — I42.8 NICM (NONISCHEMIC CARDIOMYOPATHY): ICD-10-CM

## 2022-02-10 DIAGNOSIS — Z95.0 CARDIAC RESYNCHRONIZATION THERAPY PACEMAKER (CRT-P) IN PLACE: ICD-10-CM

## 2022-02-10 DIAGNOSIS — I45.10 RBBB (RIGHT BUNDLE BRANCH BLOCK): ICD-10-CM

## 2022-02-10 DIAGNOSIS — C50.112 MALIGNANT NEOPLASM OF CENTRAL PORTION OF LEFT BREAST IN FEMALE, ESTROGEN RECEPTOR POSITIVE: ICD-10-CM

## 2022-02-10 PROCEDURE — 99214 OFFICE O/P EST MOD 30 MIN: CPT | Performed by: INTERNAL MEDICINE

## 2022-02-10 PROCEDURE — 93000 ELECTROCARDIOGRAM COMPLETE: CPT | Performed by: INTERNAL MEDICINE

## 2022-02-10 NOTE — PROGRESS NOTES
Date of Office Visit: 02/10/2022  Encounter Provider: Rafaela Leija MD  Place of Service: [facility] CARDIOLOGY  Patient Name: Diana Avalos  :1932      Patient ID:  Diana Avalos is a 89 y.o. female is here for  followup for cardiomyopathy.        History of Present Illness    She has a history of nonischemic cardiomyopathy, third-degree AV block and atrial fibrillation, status post CRT-P and AV node ablation, history of left breast cancer, nonsustained ventricular tachycardia, hypothyroidism, mitral insufficiency.      She was seen at [facility] on 2011 for atrial  fibrillation and was placed on IV Cardizem. She was reluctant to take  Coumadin but was willing to take Pradaxa. During her hospitalization her  ejection fraction was 45 to 50% with no significant valvular heart disease.  She had a transesophageal echocardiogram done on September 15, 2011 which  showed marked left atrial enlargement, and there was slow velocity through  the left atrial appendage and the left atrial appendage itself had a  thrombus. The left ventricle showed a moderate reduction in function with a  markedly dilated left atrium. At that time we decided to go ahead and  control her atrial fibrillation with rate and anticoagulation. However, we  had difficulty controlling her rate so she was put on amiodarone just for  rate control. She underwent a stress nuclear perfusion study on 2011 which was normal. On  she called back in because she  was having severe nausea. She was on Pradaxa, digoxin, and amiodarone at  that time. She went off of all those medications and felt better but on  2012 she started having atrial fibrillation with rapid  ventricular response, and was readmitted to the hospital.      She had a single chamber Medtronic generator placed with an AV node ablation, 2012 for A. fib RVR uncontrolled medication.  She  was admitted June 2018 with acute on chronic systolic heart failure.  She was found to have profound septal dyssynchrony on echo.  she had an upgrade to a CRT-P.  Her echocardiogram which was done 6/8/18 showed ejection fraction 36% with moderate concentric LVH, marked synchrony of the septum and chronic apex to the RV pacing, moderate to severe mitral insufficiency, moderate to severe tricuspid insufficiency and RVSP of 46 mmHg.      She had a nonischemic stress PET done 10/4/18.       She presented to [facility] on 1/5/19 with left-sided chest pain, right arm pain, and shortness of breath occurring at rest.  She ruled out for myocardial infarction.  Repeat echocardiogram showed improvement of her ejection fraction to 63%, mild concentric hypertrophy, grade 2 diastolic dysfunction, left atrium volume severely increased, right atrium moderately to severely dilated, mild to moderate mitral valve regurgitation, mild tricuspid valve regurgitation, and RVSP normal.      In July 2021, she presented to the [facility] ED with chest pain that awoke her from sleep.    She ruled out for microinfarction.  Blood pressure elevated at 167/89.  EKG showed atrial fibrillation that was rate controlled.  Hemoglobin and hematocrit normal.  CMP normal.    She then saw Mercedes after this and had had no further chest pain so was advised to just continue to monitor..    Device interrogation on 1/15/2022 showed normal device function, 7 episodes of nonsustained ventricular tachycardia, the longest was 22 beats long at her 62 bpm.  No changes were made.    She gets really worn out with walking.  She has had no dizziness, syncope or falls.  She has mild exertional dyspnea.  She has no exertional chest tightness or pressure.  She does occasionally get orthopnea when this happens, she says sits up in bed and put a few more pills on herself.  She says it does not happen very often.  She is taking her medications as directed without difficulty.   She lives in her own home and her son lives with her.  He does all of her evening medications, her evening meal and watches over her through the nighttime.  Her daughter who lives around the corner then takes care of her during the day.  She fixes her own breakfast and lunch.  She walks around her own home and does not use wheelchair at home.  Her appetite is good, she has really no swelling in her legs.  Her weight has been stable.    Past Medical History:   Diagnosis Date   • Acute on chronic systolic congestive heart failure (HCC)    • Anemia    • Arthritis    • Asthma    • Atrial fibrillation (HCC)     With a history of an atrial clot   • Breast cancer (HCC)     Left, stage IIB   • Disease of thyroid gland     Hypothyroidism   • Essential hypertension    • Hypothyroidism    • Irritable bowel    • Mitral regurgitation    • NICM (nonischemic cardiomyopathy) (HCC)    • On warfarin therapy    • Osteoporosis    • Pneumothorax 6/14/2018   • Pulmonary hypertension (HCC)     Resolved per echocardiogram 1/2019   • Third degree AV block ([facility])    • Tricuspid regurgitation    • Ventricular tachycardia ([facility]) 01/17/2019    Nonsustained         Past Surgical History:   Procedure Laterality Date   • CARDIAC ELECTROPHYSIOLOGY PROCEDURE N/A 6/12/2018    Procedure: Device Upgrade-Upgrade to CRT-P-BIV Pacamaker Medtronic Has existing single chamber Medtronic pacemaker;  Surgeon: Leonardo David MD;  Location: [facility] INVASIVE LOCATION;  Service: Cardiovascular   • CHOLECYSTECTOMY  2000   • EYE SURGERY  2005    cataracts, Dr. Miramontes   • MASTECTOMY Left 10/2014   • PACEMAKER IMPLANTATION  2012    Dr. Leija       Current Outpatient Medications on File Prior to Visit   Medication Sig Dispense Refill   • carvedilol (COREG) 6.25 MG tablet Take 1 tablet by mouth 2 (Two) Times a Day With Meals. 180 tablet 3   • Cholecalciferol 50 MCG (2000 UT) capsule Take  by mouth.     • Diclofenac Sodium (VOLTAREN) 1 % gel gel Apply 4 g topically  "to the appropriate area as directed.     • diphenhydrAMINE HCl, Sleep, 25 MG capsule Take  by mouth.     • furosemide (LASIX) 40 MG tablet TAKE ONE TABLET BY MOUTH DAILY 90 tablet 3   • levothyroxine (SYNTHROID, LEVOTHROID) 75 MCG tablet Take 75 mcg by mouth Daily.     • nitroglycerin (NITROSTAT) 0.4 MG SL tablet Place 1 tablet under the tongue As Needed for Chest Pain. Take no more than 3 doses in 15 minutes. 30 tablet 2   • warfarin (COUMADIN) 2.5 MG tablet TAKE 1/2 TABLET BY MOUTH DAILY ON Sunday AND THURSDAY AND TAKE 1 BY MOUTH DAILY ON ALL OTHER DAYS 80 tablet 1     No current facility-administered medications on file prior to visit.       Social History     Socioeconomic History   • Marital status:      Spouse name: Rick   • Number of children: 8   Tobacco Use   • Smoking status: Never Smoker   • Smokeless tobacco: Never Used   Substance and Sexual Activity   • Alcohol use: Yes     Alcohol/week: 1.0 standard drink     Types: 1 Glasses of wine per week     Comment: caffeine use-tea   • Drug use: No   • Sexual activity: Defer           ROS    Procedures    ECG 12 Lead    Date/Time: 2/10/2022 1:11 PM  Performed by: Rafaela Leija MD  Authorized by: Rafaela Leija MD   Comparison: compared with previous ECG   Similar to previous ECG  Ectopy: unifocal PVCs  Pacing: ventricular paced rhythm  Clinical impression: abnormal EKG                Objective:      Vitals:    02/10/22 1259   BP: 124/70   BP Location: Right arm   Pulse: 86   Weight: 70.3 kg (155 lb)   Height: 170.2 cm (67\")     Body mass index is 24.28 kg/m².    Vitals reviewed.   Constitutional:       General: Not in acute distress.     Appearance: Well-developed. Not diaphoretic.   Eyes:      General: No scleral icterus.     Conjunctiva/sclera: Conjunctivae normal.   HENT:      Head: Normocephalic and atraumatic.   Neck:      Thyroid: No thyromegaly.      Vascular: No carotid bruit or JVD.      Lymphadenopathy: No cervical " adenopathy.   Pulmonary:      Effort: Pulmonary effort is normal. No respiratory distress.      Breath sounds: Normal breath sounds. No wheezing. No rhonchi. No rales.   Chest:      Chest wall: Not tender to palpatation.   Cardiovascular:      Normal rate. Regular rhythm.      Murmurs: There is no murmur.      No gallop.   Pulses:     Intact distal pulses.   Edema:     Peripheral edema absent.   Abdominal:      General: Bowel sounds are normal. There is no distension or abdominal bruit.      Palpations: Abdomen is soft. There is no abdominal mass.      Tenderness: There is no abdominal tenderness.   Musculoskeletal:         General: No deformity.      Extremities: No clubbing present.     Cervical back: Neck supple. Skin:     General: Skin is warm and dry. There is no cyanosis.      Coloration: Skin is not pale.      Findings: No rash.   Neurological:      Mental Status: Alert and oriented to person, place, and time.      Cranial Nerves: No cranial nerve deficit.   Psychiatric:         Judgment: Judgment normal.         Lab Review:       Assessment:      Diagnosis Plan   1. Nonrheumatic mitral valve regurgitation  Adult Transthoracic Echo Complete W/ Cont if Necessary Per Protocol   2. Permanent atrial fibrillation (HCC)  Adult Transthoracic Echo Complete W/ Cont if Necessary Per Protocol   3. RBBB (right bundle branch block)     4. Third degree AV block (HCC)     5. Cardiac resynchronization therapy pacemaker (CRT-P) in place     6. Malignant neoplasm of central portion of left breast in female, estrogen receptor positive (HCC)     7. NICM (nonischemic cardiomyopathy) (HCC)  Adult Transthoracic Echo Complete W/ Cont if Necessary Per Protocol     1. Chronic permanent atrial fibrillation; status post AV node ablation with  Medtronic single chamber pacemaker on January 9, 2012 with upgrade to CRT 6/2018 . She is on chronic  Coumadin therapy only.   2. Hypertension, under good control. She is really on no medications  except  Lasix.   3. Mild to moderate cardiomyopathy, nonischemic, LVEF 40%.  has septal dyssynchrony.   4. Moderate mitral insufficiency and moderate tricuspid insufficiency,  stable.   5. Hypothyroidism, stable.   6. Pulmonary hypertension, moderate.   7. Moderate pulmonary hypertension. This is source of dyspnea.   8. NsVT, continue coreg.  Noted intermittently pacer interrogation.      Plan:       See Mercedes in 6 months with an echo the same day, no medication changes, is overall doing well.

## 2022-02-13 PROCEDURE — 93294 REM INTERROG EVL PM/LDLS PM: CPT | Performed by: INTERNAL MEDICINE

## 2022-02-13 PROCEDURE — 93296 REM INTERROG EVL PM/IDS: CPT | Performed by: INTERNAL MEDICINE

## 2022-02-23 LAB — INR PPP: 3.52

## 2022-02-24 ENCOUNTER — ANTICOAGULATION VISIT (OUTPATIENT)
Dept: PHARMACY | Facility: HOSPITAL | Age: 87
End: 2022-02-24

## 2022-02-24 NOTE — PROGRESS NOTES
Anticoagulation Clinic Progress Note    Anticoagulation Summary  As of 2/24/2022    INR goal:  2.0-3.0   TTR:  69.2 % (3.4 y)   INR used for dosing:  3.52 (2/23/2022)   Warfarin maintenance plan:  1.25 mg every Sun, Thu; 2.5 mg all other days   Weekly warfarin total:  15 mg   Plan last modified:  Jayant White, PharmD (9/8/2021)   Next INR check:  3/8/2022   Priority:  Maintenance   Target end date:  Indefinite    Indications    Long-term (current) use of anticoagulants (Resolved) [Z79.01]             Anticoagulation Episode Summary     INR check location:      Preferred lab:      Send INR reminders to:   [facility]    Comments:  [facility]      Anticoagulation Care Providers     Provider Role Specialty Phone number    Rafaela Leija MD Referring Cardiology [phone]          Clinic Interview:  Patient Findings     Negatives:  Signs/symptoms of thrombosis, Signs/symptoms of bleeding,   Laboratory test error suspected, Change in health, Change in alcohol use,   Change in activity, Upcoming invasive procedure, Emergency department   visit, Upcoming dental procedure, Missed doses, Extra doses, Change in   medications, Change in diet/appetite, Hospital admission, Bruising, Other   complaints      Clinical Outcomes     Negatives:  Major bleeding event, Thromboembolic event,   Anticoagulation-related hospital admission, Anticoagulation-related ED   visit, Anticoagulation-related fatality        INR History:  Anticoagulation Monitoring 12/15/2021 1/13/2022 2/24/2022   INR 2.40 2.61 3.52   INR Date 12/14/2021 1/12/2022 2/23/2022   INR Goal 2.0-3.0 2.0-3.0 2.0-3.0   Trend Same Same Same   Last Week Total 15 mg 15 mg 15 mg   Next Week Total 15 mg 15 mg 12.5 mg   Sun 1.25 mg 1.25 mg 1.25 mg   Mon 2.5 mg 2.5 mg 2.5 mg   Tue 2.5 mg 2.5 mg 2.5 mg   Wed 2.5 mg 2.5 mg 2.5 mg   Thu 1.25 mg 1.25 mg Hold (2/24); Otherwise 1.25 mg   Fri 2.5 mg 2.5 mg 1.25 mg (2/25); Otherwise 2.5 mg   Sat 2.5 mg 2.5 mg  2.5 mg   Visit Report - - -   Some recent data might be hidden       Plan:  1. INR is Supratherapeutic today- see above in Anticoagulation Summary.   Will instruct Diana Avalos to Change their warfarin regimen to hold today's 1.25 mg dose and take partial dose of 1.25 mg tomorrow instead of usual 2.5 mg dose, then resume normal dose - see above in Anticoagulation Summary.  2. Follow up in 1.5 weeks    They have been instructed to call if any changes in medications, doses, concerns, etc. Patient expresses understanding and has no further questions at this time.    Blanca Horne, PharmD

## 2022-03-11 LAB — INR PPP: 3.09

## 2022-03-14 ENCOUNTER — ANTICOAGULATION VISIT (OUTPATIENT)
Dept: PHARMACY | Facility: HOSPITAL | Age: 87
End: 2022-03-14

## 2022-03-14 NOTE — PROGRESS NOTES
Anticoagulation Clinic Progress Note    Anticoagulation Summary  As of 3/14/2022    INR goal:  2.0-3.0   TTR:  68.3 % (3.4 y)   INR used for dosing:  3.09 (3/11/2022)   Warfarin maintenance plan:  1.25 mg every Sun, Thu; 2.5 mg all other days   Weekly warfarin total:  15 mg   Plan last modified:  Jayant White, PharmD (9/8/2021)   Next INR check:  3/21/2022   Priority:  Maintenance   Target end date:  Indefinite    Indications    Long-term (current) use of anticoagulants (Resolved) [Z79.01]             Anticoagulation Episode Summary     INR check location:      Preferred lab:      [facility]:   [facility]    Comments:  [facility]      Anticoagulation Care Providers     Provider Role Specialty Phone number    Rafaela Leija MD Referring Cardiology [phone]          Drug interactions: has remained unchanged.  Diet: has remained unchanged.    Clinic Interview:  No pertinent clinical findings have been reported.    INR History:  Anticoagulation Monitoring 1/13/2022 2/24/2022 3/14/2022   INR 2.61 3.52 3.09   INR Date 1/12/2022 2/23/2022 3/11/2022   INR Goal 2.0-3.0 2.0-3.0 2.0-3.0   Trend Same Same Same   Last Week Total 15 mg 15 mg 15 mg   Next Week Total 15 mg 12.5 mg 13.75 mg   Sun 1.25 mg 1.25 mg 1.25 mg   Mon 2.5 mg 2.5 mg 1.25 mg (3/14)   Tue 2.5 mg 2.5 mg 2.5 mg   Wed 2.5 mg 2.5 mg 2.5 mg   Thu 1.25 mg Hold (2/24); Otherwise 1.25 mg 1.25 mg   Fri 2.5 mg 1.25 mg (2/25); Otherwise 2.5 mg 2.5 mg   Sat 2.5 mg 2.5 mg 2.5 mg   Visit Report - - -   Some recent data might be hidden       Plan:  1. INR is 3.09 today- see above in Anticoagulation Summary.   Spoke w/ pt to take 1.25mg partial today, then cont same. Cassandra in 1 wk. [facility]ed [facility] to [facility] above in Anticoagulation Summary.  2. Follow up in 1 weeks  3. Pt has agreed to only be called if INR out of range. They have been instructed to call if any changes in medications, doses, concerns, etc. Patient expresses  understanding and has no further questions at this time.    Nikole Tiwari, formerly [facility]

## 2022-03-15 ENCOUNTER — ANTICOAGULATION VISIT (OUTPATIENT)
Dept: PHARMACY | Facility: HOSPITAL | Age: 87
End: 2022-03-15

## 2022-03-15 NOTE — PROGRESS NOTES
Anticoagulation Clinic Progress Note    Anticoagulation Summary  As of 3/15/2022    INR goal:  2.0-3.0   TTR:  68.3 % (3.4 y)   INR used for dosing:  No new INR was available at the time of this encounter.   Warfarin maintenance plan:  1.25 mg every Sun, Thu; 2.5 mg all other days   Weekly warfarin total:  15 mg   Plan last modified:  Jayant White, PharmD (9/8/2021)   Next INR check:  3/22/2022   Priority:  Maintenance   Target end date:  Indefinite    Indications    Long-term (current) use of anticoagulants (Resolved) [Z79.01]             Anticoagulation Episode Summary     INR check location:      Preferred lab:      [facility]    Comments:  [facility] --- SPEAK WITH DAUGHTER (LYNETTE) **DO NOT SPEAK WITH PATIENT (poor memory)**      Anticoagulation Care Providers     Provider Role Specialty Phone number    Rafaela Leija MD Referring Cardiology [phone]          Clinic Interview:  Patient Findings     Negatives:  Signs/symptoms of thrombosis, Signs/symptoms of bleeding,   Laboratory test error suspected, Change in health, Change in alcohol use,   Change in activity, Upcoming invasive procedure, Emergency department   visit, Upcoming dental procedure, Missed doses, Extra doses, Change in   medications, Change in diet/appetite, Hospital admission, Bruising, Other   complaints      Clinical Outcomes     Negatives:  Major bleeding event, Thromboembolic event,   Anticoagulation-related hospital admission, Anticoagulation-related ED   visit, Anticoagulation-related fatality        INR History:  Anticoagulation Monitoring 2/24/2022 3/14/2022 3/15/2022   INR 3.52 3.09 -   INR Date 2/23/2022 3/11/2022 -   INR Goal 2.0-3.0 2.0-3.0 2.0-3.0   Trend Same Same Same   Last Week Total 15 mg 15 mg 15 mg   Next Week Total 12.5 mg 13.75 mg 13.75 mg   Sun 1.25 mg 1.25 mg 1.25 mg   Mon 2.5 mg 1.25 mg (3/14) 2.5 mg   Tue 2.5 mg 2.5 mg 1.25 mg (3/15)   Wed 2.5 mg 2.5 mg 2.5 mg    Thu Hold (2/24); Otherwise 1.25 mg 1.25 mg 1.25 mg   Fri 1.25 mg (2/25); Otherwise 2.5 mg 2.5 mg 2.5 mg   Sat 2.5 mg 2.5 mg 2.5 mg   Visit Report - - -   Some recent data might be hidden       Plan:  1. INR was Supratherapeutic 3/11/22 (presumed to still be supratherapeutic) - see above in Anticoagulation Summary.   Will instruct Diana Avalos to Change their warfarin regimen- see above in Anticoagulation Summary.  2. Follow up in 1 week  3. Spoke with daughter, Magui. They have been instructed to call if any changes in medications, doses, concerns, etc. Patient expresses understanding and has no further questions at this time.    Jayant White, PharmD

## 2022-03-22 LAB — INR PPP: 2.76

## 2022-03-23 ENCOUNTER — ANTICOAGULATION VISIT (OUTPATIENT)
Dept: PHARMACY | Facility: HOSPITAL | Age: 87
End: 2022-03-23

## 2022-03-23 NOTE — PROGRESS NOTES
Anticoagulation Clinic Progress Note    Anticoagulation Summary  As of 3/23/2022    INR goal:  2.0-3.0   TTR:  68.3 % (3.5 y)   INR used for dosin.76 (3/22/2022)   Warfarin maintenance plan:  1.25 mg every Sun, Thu; 2.5 mg all other days   Weekly warfarin total:  15 mg   No change documented:  Jayant White, Zbigniew   Plan last modified:  Jayant White PharmD (2021)   Next INR check:  2022   Priority:  Maintenance   Target end date:  Indefinite    Indications    Long-term (current) use of anticoagulants (Resolved) [Z79.01]             Anticoagulation Episode Summary     INR check location:      Preferred lab:      Send INR reminders to:   [facility]    Comments:  [facility] --- SPEAK WITH DAUGHTER (LYNETTE) **DO NOT SPEAK WITH PATIENT (poor memory)**      Anticoagulation Care Providers     Provider Role Specialty Phone number    Rafaela Leija MD Referring Cardiology [phone]          Clinic Interview:  Patient Findings     Negatives:  Signs/symptoms of thrombosis, Signs/symptoms of bleeding,   Laboratory test error suspected, Change in health, Change in alcohol use,   Change in activity, Upcoming invasive procedure, Emergency department   visit, Upcoming dental procedure, Missed doses, Extra doses, Change in   medications, Change in diet/appetite, Hospital admission, Bruising, Other   complaints      Clinical Outcomes     Negatives:  Major bleeding event, Thromboembolic event,   Anticoagulation-related hospital admission, Anticoagulation-related ED   visit, Anticoagulation-related fatality        INR History:  Anticoagulation Monitoring 3/14/2022 3/15/2022 3/23/2022   INR 3.09 - 2.76   INR Date 3/11/2022 - 3/22/2022   INR Goal 2.0-3.0 2.0-3.0 2.0-3.0   Trend Same Same Same   Last Week Total 15 mg 15 mg 15 mg   Next Week Total 13.75 mg 13.75 mg 15 mg   Sun 1.25 mg 1.25 mg 1.25 mg   Mon 1.25 mg (3/14) 2.5 mg 2.5 mg   Tue 2.5 mg 1.25 mg (3/15) 2.5 mg   Wed 2.5 mg 2.5 mg 2.5  mg   Thu 1.25 mg 1.25 mg 1.25 mg   Fri 2.5 mg 2.5 mg 2.5 mg   Sat 2.5 mg 2.5 mg 2.5 mg   Visit Report - - -   Some recent data might be hidden       Plan:  1. INR was Therapeutic yesterday- see above in Anticoagulation Summary.   Will instruct Diana Avalos to Continue their warfarin regimen- see above in Anticoagulation Summary.  2. Follow up in 3 weeks (Will be out of town in 2 weeks).  3. They have been instructed to call if any changes in medications, doses, concerns, etc. Patient expresses understanding and has no further questions at this time.    Jayant White, PharmD

## 2022-04-04 RX ORDER — CARVEDILOL 6.25 MG/1
TABLET ORAL
Qty: 180 TABLET | Refills: 0 | Status: SHIPPED | OUTPATIENT
Start: 2022-04-04 | End: 2022-07-05

## 2022-04-13 LAB — INR PPP: 2.93

## 2022-04-14 ENCOUNTER — ANTICOAGULATION VISIT (OUTPATIENT)
Dept: PHARMACY | Facility: HOSPITAL | Age: 87
End: 2022-04-14

## 2022-04-15 ENCOUNTER — TELEPHONE (OUTPATIENT)
Dept: CARDIOLOGY | Facility: CLINIC | Age: 87
End: 2022-04-15

## 2022-04-15 DIAGNOSIS — I48.21 PERMANENT ATRIAL FIBRILLATION: Primary | ICD-10-CM

## 2022-04-15 NOTE — PROGRESS NOTES
Anticoagulation Clinic Progress Note    Anticoagulation Summary  As of 2022    INR goal:  2.0-3.0   TTR:  68.9 % (3.5 y)   INR used for dosin.93 (2022)   Warfarin maintenance plan:  1.25 mg every Sun, Thu; 2.5 mg all other days   Weekly warfarin total:  15 mg   No change documented:  Christianne Vera [facility]   Plan last modified:  Jayant White, PharmD (2021)   Next INR check:  5/3/2022   Priority:  Maintenance   Target end date:  Indefinite    Indications    Long-term (current) use of anticoagulants (Resolved) [Z79.01]             Anticoagulation Episode Summary     INR check location:      Preferred lab:      [facility]    Comments:  [facility] --- SPEAK WITH DAUGHTER (MAGUI) **DO NOT SPEAK WITH PATIENT (poor memory)**      Anticoagulation Care Providers     Provider Role Specialty Phone number    Rafaela Leija MD Referring Cardiology [phone]          Clinic Interview:  Patient Findings     Negatives:  Signs/symptoms of thrombosis, Signs/symptoms of bleeding,   Laboratory test error suspected, Change in health, Change in alcohol use,   Change in activity, Upcoming invasive procedure, Emergency department   visit, Upcoming dental procedure, Missed doses, Extra doses, Change in   medications, Change in diet/appetite, Hospital admission, Bruising, Other   complaints    Comments:  Spoke with Magui (daughter)says Diana is doing well. Denies   any med or diet changes.       Clinical Outcomes     Negatives:  Major bleeding event, Thromboembolic event,   Anticoagulation-related hospital admission, Anticoagulation-related ED   visit, Anticoagulation-related fatality    Comments:  Spoke with Magui (daughter)says Diana is doing well. Denies   any med or diet changes.         INR History:  Anticoagulation Monitoring 3/15/2022 3/23/2022 2022   INR - 2.76 2.93   INR Date - 3/22/2022 2022   INR Goal 2.0-3.0 2.0-3.0 2.0-3.0   Trend Same Same Same    Last Week Total 15 mg 15 mg 15 mg   Next Week Total 13.75 mg 15 mg 15 mg   Sun 1.25 mg 1.25 mg 1.25 mg   Mon 2.5 mg 2.5 mg 2.5 mg   Tue 1.25 mg (3/15) 2.5 mg 2.5 mg   Wed 2.5 mg 2.5 mg 2.5 mg   Thu 1.25 mg 1.25 mg 1.25 mg   Fri 2.5 mg 2.5 mg 2.5 mg   Sat 2.5 mg 2.5 mg 2.5 mg   Visit Report - - -   Some recent data might be hidden       Plan:  1. INR is Therapeutic today- see above in Anticoagulation Summary.   Will instruct Diana Avalos to Continue their warfarin regimen- see above in Anticoagulation Summary.  2. Follow up in 3 weeks (5/3/22)  3. They have been instructed to call if any changes in medications, doses, concerns, etc. Patient expresses understanding and has no further questions at this time.    Christianne Vera [facility]

## 2022-04-15 NOTE — TELEPHONE ENCOUNTER
Received an inr of 2.93 on this pt, she is not apart of the anti-coag clinic, do we handle this?. Please advise.    dd

## 2022-04-15 NOTE — TELEPHONE ENCOUNTER
False alarm she is followed by our anti coag clinic, I could not see any visits listed earlier. Thank you.    dd

## 2022-04-18 ENCOUNTER — APPOINTMENT (OUTPATIENT)
Dept: LAB | Facility: HOSPITAL | Age: 87
End: 2022-04-18

## 2022-05-04 LAB — INR PPP: 2.79

## 2022-05-05 ENCOUNTER — ANTICOAGULATION VISIT (OUTPATIENT)
Dept: PHARMACY | Facility: HOSPITAL | Age: 87
End: 2022-05-05

## 2022-05-05 DIAGNOSIS — I48.21 PERMANENT ATRIAL FIBRILLATION: Primary | ICD-10-CM

## 2022-05-05 NOTE — PROGRESS NOTES
Anticoagulation Clinic Progress Note    Anticoagulation Summary  As of 2022    INR goal:  2.0-3.0   TTR:  69.4 % (3.6 y)   INR used for dosin.79 (2022)   Warfarin maintenance plan:  1.25 mg every Sun, Thu; 2.5 mg all other days   Weekly warfarin total:  15 mg   No change documented:  Elise Davis RPH   Plan last modified:  Jayant White, PharmD (2021)   Next INR check:  2022   Priority:  Maintenance   Target end date:  Indefinite    Indications    Long-term (current) use of anticoagulants (Resolved) [Z79.01]             Anticoagulation Episode Summary     INR check location:      Preferred lab:      Send INR reminders to:   [facility]    Comments:  [facility] --- SPEAK WITH DAUGHTER (LYNETTE) **DO NOT SPEAK WITH PATIENT (poor memory)**      Anticoagulation Care Providers     Provider Role Specialty Phone number    Rafaela Leija MD Referring Cardiology [phone]          Clinic Interview:  Patient Findings     Negatives:  Signs/symptoms of thrombosis, Signs/symptoms of bleeding,   Laboratory test error suspected, Change in health, Change in alcohol use,   Change in activity, Upcoming invasive procedure, Emergency department   visit, Upcoming dental procedure, Missed doses, Extra doses, Change in   medications, Change in diet/appetite, Hospital admission, Bruising, Other   complaints      Clinical Outcomes     Negatives:  Major bleeding event, Thromboembolic event,   Anticoagulation-related hospital admission, Anticoagulation-related ED   visit, Anticoagulation-related fatality        INR History:  Anticoagulation Monitoring 3/23/2022 2022 2022   INR 2.76 2.93 2.79   INR Date 3/22/2022 2022 2022   INR Goal 2.0-3.0 2.0-3.0 2.0-3.0   Trend Same Same Same   Last Week Total 15 mg 15 mg 15 mg   Next Week Total 15 mg 15 mg 15 mg   Sun 1.25 mg 1.25 mg 1.25 mg   Mon 2.5 mg 2.5 mg 2.5 mg   Tue 2.5 mg 2.5 mg 2.5 mg   Wed 2.5 mg 2.5 mg 2.5 mg   Thu 1.25 mg  1.25 mg 1.25 mg   Fri 2.5 mg 2.5 mg 2.5 mg   Sat 2.5 mg 2.5 mg 2.5 mg   Visit Report - - -   Some recent data might be hidden       Plan:  1. INR is Therapeutic today- see above in Anticoagulation Summary.   Will instruct Diana Avalos to Continue their warfarin regimen- see above in Anticoagulation Summary.  2. Follow up in 4 weeks  3. They have been instructed to call if any changes in medications, doses, concerns, etc. Patient expresses understanding and has no further questions at this time.    Elise Davis, [facility]

## 2022-05-15 PROCEDURE — 93294 REM INTERROG EVL PM/LDLS PM: CPT | Performed by: INTERNAL MEDICINE

## 2022-05-15 PROCEDURE — 93296 REM INTERROG EVL PM/IDS: CPT | Performed by: INTERNAL MEDICINE

## 2022-05-18 PROCEDURE — G2066 INTER DEVC REMOTE 30D: HCPCS | Performed by: INTERNAL MEDICINE

## 2022-05-18 PROCEDURE — 93297 REM INTERROG DEV EVAL ICPMS: CPT | Performed by: INTERNAL MEDICINE

## 2022-06-02 ENCOUNTER — TELEPHONE (OUTPATIENT)
Dept: CARDIOLOGY | Facility: CLINIC | Age: 87
End: 2022-06-02

## 2022-06-02 NOTE — TELEPHONE ENCOUNTER
Patient daughter called and stated that they have been using [facility] for the patient INRs.  Magui stated that they were doing good when they first started coming to the patient home but for the last 4 months they have not been real consistent.  She said that they would call and tell her that they were coming one day and the patient would get up and ready and sit and wait and when they didn't show up, she would sit there and worry about them not being there and if something is wrong.  She would like to know if there is any thing else that they can do or another company that they can go with that would come to the home.  She stated that the patient is not able to get out of the house like she would like.  Please advise.    CB: [phone]    Chevy

## 2022-06-07 ENCOUNTER — APPOINTMENT (OUTPATIENT)
Dept: PHARMACY | Facility: HOSPITAL | Age: 87
End: 2022-06-07

## 2022-06-09 ENCOUNTER — ANTICOAGULATION VISIT (OUTPATIENT)
Dept: PHARMACY | Facility: HOSPITAL | Age: 87
End: 2022-06-09

## 2022-06-09 PROCEDURE — G0248 DEMONSTRATE USE HOME INR MON: HCPCS

## 2022-06-09 PROCEDURE — G0249 PROVIDE INR TEST MATER/EQUIP: HCPCS

## 2022-06-10 ENCOUNTER — ANTICOAGULATION VISIT (OUTPATIENT)
Dept: PHARMACY | Facility: HOSPITAL | Age: 87
End: 2022-06-10

## 2022-06-10 LAB — INR PPP: 2.9

## 2022-06-10 NOTE — PROGRESS NOTES
Anticoagulation Clinic Progress Note    Anticoagulation Summary  As of 6/10/2022    INR goal:  2.0-3.0   TTR:  70.2 % (3.7 y)   INR used for dosin.90 (6/10/2022)   Warfarin maintenance plan:  1.25 mg every Sun, Thu; 2.5 mg all other days   Weekly warfarin total:  15 mg   No change documented:  Jayant White, Zbigniew   Plan last modified:  Jayant White PharmD (2021)   Next INR check:  2022   Priority:  Maintenance   Target end date:  Indefinite    Indications    Long-term (current) use of anticoagulants (Resolved) [Z79.01]             Anticoagulation Episode Summary     INR check location:      Preferred lab:      Send INR reminders to:   [facility] HOME TEST POOL    Comments:  [facility] --- SPEAK WITH DAUGHTER (LYNETTE) **DO NOT SPEAK WITH PATIENT (poor memory)** **CALL EVERY TIME**      Anticoagulation Care Providers     Provider Role Specialty Phone number    Rafaela Leija MD Referring Cardiology [phone]          Clinic Interview:  Patient Findings     Negatives:  Signs/symptoms of thrombosis, Signs/symptoms of bleeding,   Laboratory test error suspected, Change in health, Change in alcohol use,   Change in activity, Upcoming invasive procedure, Emergency department   visit, Upcoming dental procedure, Missed doses, Extra doses, Change in   medications, Change in diet/appetite, Hospital admission, Bruising, Other   complaints      Clinical Outcomes     Negatives:  Major bleeding event, Thromboembolic event,   Anticoagulation-related hospital admission, Anticoagulation-related ED   visit, Anticoagulation-related fatality        INR History:  Anticoagulation Monitoring 2022 2022 6/10/2022   INR 2.79 - 2.90   INR Date 2022 - 6/10/2022   INR Goal 2.0-3.0 2.0-3.0 2.0-3.0   Trend Same Same Same   Last Week Total 15 mg 15 mg 15 mg   Next Week Total 15 mg 15 mg 15 mg   Sun 1.25 mg - 1.25 mg   Mon 2.5 mg - 2.5 mg   Tue 2.5 mg - -   Wed 2.5 mg - -   Thu 1.25 mg 1.25 mg -   Fri 2.5  mg - 2.5 mg   Sat 2.5 mg - 2.5 mg   Visit Report - - -   Some recent data might be hidden       Plan:  1. INR is Therapeutic today- see above in Anticoagulation Summary.   Will instruct Diana Avalos to Continue their warfarin regimen- see above in Anticoagulation Summary.  2. Follow up with INR check QTuesday per patient/daughter request.   3. They have been instructed to call if any changes in medications, doses, concerns, etc. Patient expresses understanding and has no further questions at this time.    Jayant White, PharmD

## 2022-06-10 NOTE — PROGRESS NOTES
Anticoagulation Clinic Progress Note - Initial Training for Home Testing    Anticoagulation Summary  As of 6/9/2022    INR goal:  2.0-3.0   TTR:  69.4 % (3.6 y)   INR used for dosing:  No new INR was available at the time of this encounter.   Warfarin maintenance plan:  1.25 mg every Sun, Thu; 2.5 mg all other days   Weekly warfarin total:  15 mg   No change documented:  Jayant White, PharmD   Plan last modified:  Jayant White, PharmD (9/8/2021)   Next INR check:  6/10/2022   Priority:  Maintenance   Target end date:  Indefinite    Indications    Long-term (current) use of anticoagulants (Resolved) [Z79.01]             Anticoagulation Episode Summary     INR check location:      Preferred lab:      Send INR reminders to:   [facility] [employer] HOME TEST POOL    Comments:  [facility] --- SPEAK WITH DAUGHTER (LYNETTE) **DO NOT SPEAK WITH PATIENT (poor memory)**      Anticoagulation Care Providers     Provider Role Specialty Phone number    Rafaela Leija MD Referring Cardiology [phone]          Clinic Interview:  Patient Findings     Negatives:  Signs/symptoms of thrombosis, Signs/symptoms of bleeding,   Laboratory test error suspected, Change in health, Change in alcohol use,   Change in activity, Upcoming invasive procedure, Emergency department   visit, Upcoming dental procedure, Missed doses, Extra doses, Change in   medications, Change in diet/appetite, Hospital admission, Bruising, Other   complaints      Clinical Outcomes     Negatives:  Major bleeding event, Thromboembolic event,   Anticoagulation-related hospital admission, Anticoagulation-related ED   visit, Anticoagulation-related fatality        INR History:  Anticoagulation Monitoring 4/14/2022 5/5/2022 6/9/2022   INR 2.93 2.79 -   INR Date 4/13/2022 5/4/2022 -   INR Goal 2.0-3.0 2.0-3.0 2.0-3.0   Trend Same Same Same   Last Week Total 15 mg 15 mg 15 mg   Next Week Total 15 mg 15 mg 15 mg   Sun 1.25 mg 1.25 mg -   Mon 2.5 mg 2.5 mg -   Tue 2.5 mg  2.5 mg -   Wed 2.5 mg 2.5 mg -   Thu 1.25 mg 1.25 mg 1.25 mg   Fri 2.5 mg 2.5 mg -   Sat 2.5 mg 2.5 mg -   Visit Report - - -   Some recent data might be hidden       Equipment Provided to Patient:   Coag-Sense - Serial Number: [id number]    Coag-Sense POC INR result performed by clinic staff on self to demonstrate use.  Coag-Sense POC INR result performed by caregiver on self, as elected to delay INR test on patient until tomorrow, which will give further practice.   Patient successfully completed self test on 1st attempt.    Education:  Diana Avalos is newly starting home testing through the [facility] home testing service. she expressed consent to adhere to the Home INR Monitoring guidelines as prescribed by the [facility].      Home testing training was provided in clinic. The following items were discussed and demonstrated:  1. Explained to patient that any cost not covered by her insurance is the responsibility of the patient. It is the responsibility of Diana Avalos to determine the cost of the service prior to proceeding.  2. Reviewed contents included within the Coag-Sense testing box including reference materials, meter, power cord, carrying case, and lancets. Discussed proper usage of all materials including turning on and setting up meter.  3. Instructed patient to get supplies out and ready prior to testing, including lancet, test strip, transfer tube, alcohol pad, bandage (optional). Instructed patient that if alcohol pads not available to wash hands with soap and water.  4. Reviewed use of test strips and process to confirm correct lot number and barcode number.   5. Reviewed how to use lancet including where on the finger to prick and recommendations for collecting the sample.  6. Instructed patient to contact [facility] after first failed attempt for further instruction in order to avoid wasting supplies.  7. Reviewed how to dispose  of all materials used during testing (i.e. sharps container or strong plastic container)  8. Explained that weekly testing is required, results should be reported to the [facility], and INR results will be addressed within one business day.  9. Instructed patient to call her referring physician if INR results require immediate attention and it is after clinic hours.  10. Informed patient that the meter is a loan rather than being owned by the patient. Should home testing services be discontinued for any reason, the meter must be returned to the [facility]. Repeated failure to comply with recommended testing intervals or non-adherence to recommendations will result in the patient being transitioned back to in-clinic visits.    Patient and/or caregiver successfully demonstrated correct use of Coag-Sense machine. All patient questions were answered satisfactorily.     Patient has agreed to only be called if INR is out of range - no (except in the case when patient reports a change or concern that would affect warfarin management upon submitting her INR result).     Plan:  1. INR is unknown today, as they request to perform INR test at home tomorrow. Instructed Diana Avalos to Continue their warfarin regimen- see above in Anticoagulation Summary.  2. Follow-up with home test tomorrow per patient/daughter request. Follow-up in clinic at least once per year.   3. Coag-Sense machine was loaned to patient as above. Test strips (#6), lancets (#8), transfer tubes (#54) were provided.   4. Patient declines warfarin refills.  5. Verbal and printed information was provided. They have been instructed to call if any changes in medications, doses, concerns, etc. Patient expresses understanding and has no further questions at this time.    Jayant White, PharmD

## 2022-06-14 ENCOUNTER — ANTICOAGULATION VISIT (OUTPATIENT)
Dept: PHARMACY | Facility: HOSPITAL | Age: 87
End: 2022-06-14

## 2022-06-14 LAB — INR PPP: 2.7

## 2022-06-14 NOTE — PROGRESS NOTES
Anticoagulation Clinic Progress Note    Anticoagulation Summary  As of 2022    INR goal:  2.0-3.0   TTR:  70.3 % (3.7 y)   INR used for dosin.70 (2022)   Warfarin maintenance plan:  1.25 mg every Sun, Thu; 2.5 mg all other days   Weekly warfarin total:  15 mg   No change documented:  Christianne Vera, PharmD   Plan last modified:  Jayant White, PharmD (2021)   Next INR check:  2022   Priority:  Maintenance   Target end date:  Indefinite    Indications    Long-term (current) use of anticoagulants (Resolved) [Z79.01]             Anticoagulation Episode Summary     INR check location:      Preferred lab:      Send INR reminders to:   [facility]    Comments:  [facility] --- SPEAK WITH DAUGHTER (LYNETTE) **DO NOT SPEAK WITH PATIENT (poor memory)** **CALL EVERY TIME**      Anticoagulation Care Providers     Provider Role Specialty Phone number    Rafaela Leija MD Referring Cardiology [phone]          Clinic Interview:  Patient Findings     Negatives:  Signs/symptoms of thrombosis, Signs/symptoms of bleeding,   Laboratory test error suspected, Change in health, Change in alcohol use,   Change in activity, Upcoming invasive procedure, Emergency department   visit, Upcoming dental procedure, Missed doses, Extra doses, Change in   medications, Change in diet/appetite, Hospital admission, Bruising, Other   complaints      Clinical Outcomes     Negatives:  Major bleeding event, Thromboembolic event,   Anticoagulation-related hospital admission, Anticoagulation-related ED   visit, Anticoagulation-related fatality        INR History:  Anticoagulation Monitoring 2022 6/10/2022 2022   INR - 2.90 2.70   INR Date - 6/10/2022 2022   INR Goal 2.0-3.0 2.0-3.0 2.0-3.0   Trend Same Same Same   Last Week Total 15 mg 15 mg 15 mg   Next Week Total 15 mg 15 mg 15 mg   Sun - 1.25 mg 1.25 mg   Mon - 2.5 mg 2.5 mg   Tue - - 2.5 mg   Wed - - 2.5 mg   Thu 1.25 mg - 1.25 mg    Fri - 2.5 mg 2.5 mg   Sat - 2.5 mg 2.5 mg   Visit Report - - -   Some recent data might be hidden       Plan:  1. INR is Therapeutic today- see above in Anticoagulation Summary.   Will instruct Diana Avalos to Continue their warfarin regimen- see above in Anticoagulation Summary.  2. Follow up in 1 weeks  3. They have been instructed to call if any changes in medications, doses, concerns, etc. Patient expresses understanding and has no further questions at this time.  4. Mailed home testing supplies per patient request.     Christianne Vera, ErnestoD

## 2022-06-21 ENCOUNTER — ANTICOAGULATION VISIT (OUTPATIENT)
Dept: PHARMACY | Facility: HOSPITAL | Age: 87
End: 2022-06-21

## 2022-06-21 LAB — INR PPP: 3.3

## 2022-06-21 NOTE — PROGRESS NOTES
Anticoagulation Clinic Progress Note    Anticoagulation Summary  As of 6/21/2022    INR goal:  2.0-3.0   TTR:  70.2 % (3.7 y)   INR used for dosing:  3.30 (6/21/2022)   Warfarin maintenance plan:  1.25 mg every Sun, Thu; 2.5 mg all other days   Weekly warfarin total:  15 mg   Plan last modified:  Jayant White, PharmD (9/8/2021)   Next INR check:  6/28/2022   Priority:  Maintenance   Target end date:  Indefinite    Indications    Long-term (current) use of anticoagulants (Resolved) [Z79.01]             Anticoagulation Episode Summary     INR check location:      Preferred lab:      Send INR reminders to:  [facility] HOME TEST POOL    Comments:  [facility] --- SPEAK WITH DAUGHTER (LYNETTE) **DO NOT SPEAK WITH PATIENT (poor memory)** **CALL EVERY TIME**      Anticoagulation Care Providers     Provider Role Specialty Phone number    Rafaela Leija MD Referring Cardiology [phone]          Clinic Interview:  Patient Findings     Negatives:  Signs/symptoms of thrombosis, Signs/symptoms of bleeding,   Laboratory test error suspected, Change in health, Change in alcohol use,   Change in activity, Upcoming invasive procedure, Emergency department   visit, Upcoming dental procedure, Missed doses, Extra doses, Change in   medications, Change in diet/appetite, Hospital admission, Bruising, Other   complaints      Clinical Outcomes     Negatives:  Major bleeding event, Thromboembolic event,   Anticoagulation-related hospital admission, Anticoagulation-related ED   visit, Anticoagulation-related fatality        INR History:  Anticoagulation Monitoring 6/10/2022 6/14/2022 6/21/2022   INR 2.90 2.70 3.30   INR Date 6/10/2022 6/14/2022 6/21/2022   INR Goal 2.0-3.0 2.0-3.0 2.0-3.0   Trend Same Same Same   Last Week Total 15 mg 15 mg 15 mg   Next Week Total 15 mg 15 mg 13.75 mg   Sun 1.25 mg 1.25 mg 1.25 mg   Mon 2.5 mg 2.5 mg 2.5 mg   Tue - 2.5 mg 1.25 mg (6/21)   Wed - 2.5 mg 2.5 mg   Thu - 1.25 mg 1.25 mg   Fri 2.5  mg 2.5 mg 2.5 mg   Sat 2.5 mg 2.5 mg 2.5 mg   Visit Report - - -   Some recent data might be hidden       Plan:  1. INR is Supratherapeutic today- see above in Anticoagulation Summary.   Will instruct Diana Avalos to Change their warfarin regimen- see above in Anticoagulation Summary.  Take 1.25mg today, then resume previous schedule until next INR.   2. Follow up in 1 weeks  3. They have been instructed to call if any changes in medications, doses, concerns, etc. Patient expresses understanding and has no further questions at this time.    Christianne Vera, PharmD

## 2022-06-28 ENCOUNTER — ANTICOAGULATION VISIT (OUTPATIENT)
Dept: PHARMACY | Facility: HOSPITAL | Age: 87
End: 2022-06-28

## 2022-06-28 LAB — INR PPP: 2.1

## 2022-06-28 NOTE — PROGRESS NOTES
Anticoagulation Clinic Progress Note    Anticoagulation Summary  As of 2022    INR goal:  2.0-3.0   TTR:  70.2 % (3.7 y)   INR used for dosin.10 (2022)   Warfarin maintenance plan:  1.25 mg every Sun, Thu; 2.5 mg all other days   Weekly warfarin total:  15 mg   No change documented:  Christianne Vera, PharmD   Plan last modified:  Jayant White PharmD (2021)   Next INR check:  2022   Priority:  Maintenance   Target end date:  Indefinite    Indications    Long-term (current) use of anticoagulants (Resolved) [Z79.01]             Anticoagulation Episode Summary     INR check location:      Preferred lab:      [facility]    Comments:  [facility] --- SPEAK WITH DAUGHTER (LYNETTE) **DO NOT SPEAK WITH PATIENT (poor memory)** **CALL EVERY TIME**      Anticoagulation Care Providers     Provider Role Specialty Phone number    Rafaela Leija MD Referring Cardiology [phone]          Clinic Interview:  Patient Findings     Negatives:  Signs/symptoms of thrombosis, Signs/symptoms of bleeding,   Laboratory test error suspected, Change in health, Change in alcohol use,   Change in activity, Upcoming invasive procedure, Emergency department   visit, Upcoming dental procedure, Missed doses, Extra doses, Change in   medications, Change in diet/appetite, Hospital admission, Bruising, Other   complaints      Clinical Outcomes     Negatives:  Major bleeding event, Thromboembolic event,   Anticoagulation-related hospital admission, Anticoagulation-related ED   visit, Anticoagulation-related fatality        INR History:  Anticoagulation Monitoring 2022   INR 2.70 3.30 2.10   INR Date 2022   INR Goal 2.0-3.0 2.0-3.0 2.0-3.0   Trend Same Same Same   Last Week Total 15 mg 15 mg 13.75 mg   Next Week Total 15 mg 13.75 mg 15 mg   Sun 1.25 mg 1.25 mg 1.25 mg   Mon 2.5 mg 2.5 mg 2.5 mg   Tue 2.5 mg 1.25 mg () 2.5 mg    Wed 2.5 mg 2.5 mg 2.5 mg   Thu 1.25 mg 1.25 mg 1.25 mg   Fri 2.5 mg 2.5 mg 2.5 mg   Sat 2.5 mg 2.5 mg 2.5 mg   Visit Report - - -   Some recent data might be hidden       Plan:  1. INR is Therapeutic today- see above in Anticoagulation Summary.   Will instruct Diana Avalos to Continue their warfarin regimen- see above in Anticoagulation Summary.  2. Follow up in 1 weeks  3.They have been instructed to call if any changes in medications, doses, concerns, etc. Patient expresses understanding and has no further questions at this time.    Christianne Vera, PharmD

## 2022-07-05 ENCOUNTER — ANTICOAGULATION VISIT (OUTPATIENT)
Dept: PHARMACY | Facility: HOSPITAL | Age: 87
End: 2022-07-05

## 2022-07-05 LAB — INR PPP: 2.6

## 2022-07-05 RX ORDER — CARVEDILOL 6.25 MG/1
TABLET ORAL
Qty: 180 TABLET | Refills: 0 | Status: SHIPPED | OUTPATIENT
Start: 2022-07-05 | End: 2022-08-11 | Stop reason: SDUPTHER

## 2022-07-05 NOTE — PROGRESS NOTES
Anticoagulation Clinic Progress Note    Anticoagulation Summary  As of 2022    INR goal:  2.0-3.0   TTR:  70.4 % (3.7 y)   INR used for dosin.60 (2022)   Warfarin maintenance plan:  1.25 mg every Sun, Thu; 2.5 mg all other days   Weekly warfarin total:  15 mg   No change documented:  Elise Davis, KULDEEP   Plan last modified:  Jayant White, PharmD (2021)   Next INR check:  2022   Priority:  Maintenance   Target end date:  Indefinite    Indications    Long-term (current) use of anticoagulants (Resolved) [Z79.01]             Anticoagulation Episode Summary     INR check location:      Preferred lab:      [facility]    Comments:  [facility] --- SPEAK WITH DAUGHTER (LYNETTE) **DO NOT SPEAK WITH PATIENT (poor memory)** **CALL EVERY TIME**      Anticoagulation Care Providers     Provider Role Specialty Phone number    Rafaela Leija MD Referring Cardiology [phone]          Clinic Interview:  No pertinent clinical findings have been reported.    INR History:  Anticoagulation Monitoring 2022   INR 3.30 2.10 2.60   INR Date 2022   INR Goal 2.0-3.0 2.0-3.0 2.0-3.0   Trend Same Same Same   Last Week Total 15 mg 13.75 mg 15 mg   Next Week Total 13.75 mg 15 mg 15 mg   Sun 1.25 mg 1.25 mg 1.25 mg   Mon 2.5 mg 2.5 mg 2.5 mg   Tue 1.25 mg () 2.5 mg 2.5 mg   Wed 2.5 mg 2.5 mg 2.5 mg   Thu 1.25 mg 1.25 mg 1.25 mg   Fri 2.5 mg 2.5 mg 2.5 mg   Sat 2.5 mg 2.5 mg 2.5 mg   Visit Report - - -   Some recent data might be hidden       Plan:  1. INR is therapeutic today- see above in Anticoagulation Summary.    Diana MARISEL Avalos to continue their warfarin regimen- see above in Anticoagulation Summary.  2. Follow up in 1 week  3. They have been instructed to call if any changes in medications, doses, concerns, etc. Patient expresses understanding and has no further questions at this time.    Elise Davis RP

## 2022-07-12 ENCOUNTER — ANTICOAGULATION VISIT (OUTPATIENT)
Dept: PHARMACY | Facility: HOSPITAL | Age: 87
End: 2022-07-12

## 2022-07-12 LAB — INR PPP: 2.2

## 2022-07-12 PROCEDURE — G0249 PROVIDE INR TEST MATER/EQUIP: HCPCS

## 2022-07-12 NOTE — PROGRESS NOTES
Anticoagulation Clinic Progress Note    Anticoagulation Summary  As of 2022    INR goal:  2.0-3.0   TTR:  70.5 % (3.8 y)   INR used for dosin.20 (2022)   Warfarin maintenance plan:  1.25 mg every Sun, Thu; 2.5 mg all other days   Weekly warfarin total:  15 mg   No change documented:  Elise Davis, KULDEEP   Plan last modified:  Jayant White, PharmD (2021)   Next INR check:  2022   Priority:  Maintenance   Target end date:  Indefinite    Indications    Long-term (current) use of anticoagulants (Resolved) [Z79.01]             Anticoagulation Episode Summary     INR check location:      Preferred lab:      [facility]    Comments:  [facility] --- SPEAK WITH DAUGHTER (LYNETTE) **DO NOT SPEAK WITH PATIENT (poor memory)** **CALL EVERY TIME**      Anticoagulation Care Providers     Provider Role Specialty Phone number    Rafaela Leija MD Referring Cardiology [phone]          Clinic Interview:  No pertinent clinical findings have been reported.    INR History:  Anticoagulation Monitoring 2022   INR 2.10 2.60 2.20   INR Date 2022   INR Goal 2.0-3.0 2.0-3.0 2.0-3.0   Trend Same Same Same   Last Week Total 13.75 mg 15 mg 15 mg   Next Week Total 15 mg 15 mg 15 mg   Sun 1.25 mg 1.25 mg 1.25 mg   Mon 2.5 mg 2.5 mg 2.5 mg   Tue 2.5 mg 2.5 mg 2.5 mg   Wed 2.5 mg 2.5 mg 2.5 mg   Thu 1.25 mg 1.25 mg 1.25 mg   Fri 2.5 mg 2.5 mg 2.5 mg   Sat 2.5 mg 2.5 mg 2.5 mg   Visit Report - - -   Some recent data might be hidden       Plan:  1. INR is therapeutic today- see above in Anticoagulation Summary.    Diana MARISEL Avalos to continue their warfarin regimen- see above in Anticoagulation Summary.  2. Follow up in 1 week  3.  They have been instructed to call if any changes in medications, doses, concerns, etc. Patient expresses understanding and has no further questions at this time.    Elise Davis, [facility]

## 2022-07-19 ENCOUNTER — ANTICOAGULATION VISIT (OUTPATIENT)
Dept: PHARMACY | Facility: HOSPITAL | Age: 87
End: 2022-07-19

## 2022-07-19 LAB — INR PPP: 2.7

## 2022-07-19 NOTE — PROGRESS NOTES
Anticoagulation Clinic Progress Note    Anticoagulation Summary  As of 2022    INR goal:  2.0-3.0   TTR:  70.7 % (3.8 y)   INR used for dosin.70 (2022)   Warfarin maintenance plan:  1.25 mg every Sun, Thu; 2.5 mg all other days   Weekly warfarin total:  15 mg   No change documented:  Elise Davis, KULDEEP   Plan last modified:  Jayant White, PharmD (2021)   Next INR check:  2022   Priority:  Maintenance   Target end date:  Indefinite    Indications    Long-term (current) use of anticoagulants (Resolved) [Z79.01]             Anticoagulation Episode Summary     INR check location:      Preferred lab:      [facility]    Comments:  [facility] --- SPEAK WITH DAUGHTER (LYNETTE) **DO NOT SPEAK WITH PATIENT (poor memory)** **CALL EVERY TIME**      Anticoagulation Care Providers     Provider Role Specialty Phone number    Rafaela Leija MD Referring Cardiology [phone]          Clinic Interview:  No pertinent clinical findings have been reported.    INR History:  Anticoagulation Monitoring 2022   INR 2.60 2.20 2.70   INR Date 2022   INR Goal 2.0-3.0 2.0-3.0 2.0-3.0   Trend Same Same Same   Last Week Total 15 mg 15 mg 15 mg   Next Week Total 15 mg 15 mg 15 mg   Sun 1.25 mg 1.25 mg 1.25 mg   Mon 2.5 mg 2.5 mg 2.5 mg   Tue 2.5 mg 2.5 mg 2.5 mg   Wed 2.5 mg 2.5 mg 2.5 mg   Thu 1.25 mg 1.25 mg 1.25 mg   Fri 2.5 mg 2.5 mg 2.5 mg   Sat 2.5 mg 2.5 mg 2.5 mg   Visit Report - - -   Some recent data might be hidden       Plan:  1. INR is therapeutic today- see above in Anticoagulation Summary.    Diana MARISEL Avalos to continue their warfarin regimen- see above in Anticoagulation Summary.  2. Follow up in 1 week  3. They have been instructed to call if any changes in medications, doses, concerns, etc. Patient expresses understanding and has no further questions at this time.    Elise Davis, [facility]

## 2022-07-20 RX ORDER — FUROSEMIDE 40 MG/1
TABLET ORAL
Qty: 90 TABLET | Refills: 0 | Status: SHIPPED | OUTPATIENT
Start: 2022-07-20 | End: 2022-08-11 | Stop reason: SDUPTHER

## 2022-07-26 ENCOUNTER — ANTICOAGULATION VISIT (OUTPATIENT)
Dept: PHARMACY | Facility: HOSPITAL | Age: 87
End: 2022-07-26

## 2022-07-26 LAB — INR PPP: 3

## 2022-07-26 NOTE — PROGRESS NOTES
Anticoagulation Clinic Progress Note    Anticoagulation Summary  As of 7/26/2022    INR goal:  2.0-3.0   TTR:  70.8 % (3.8 y)   INR used for dosing:  3.00 (7/26/2022)   Warfarin maintenance plan:  1.25 mg every Sun, Thu; 2.5 mg all other days   Weekly warfarin total:  15 mg   No change documented:  Christianne Vera, PharmD   Plan last modified:  Jayant White, PharmD (9/8/2021)   Next INR check:     Priority:  Maintenance   Target end date:  Indefinite    Indications    Long-term (current) use of anticoagulants (Resolved) [Z79.01]             Anticoagulation Episode Summary     INR check location:      Preferred lab:      Send INR reminders to:   [facility] PO[facility]    Comments:  [facility] --- SPEAK WITH DAUGHTER (LYNETTE) **DO NOT SPEAK WITH PATIENT (poor memory)** **CALL EVERY TIME**      Anticoagulation Care Providers     Provider Role Specialty Phone number    Rafaela Leija MD Referring Cardiology [phone]          Clinic Interview:  Patient Findings     Negatives:  Signs/symptoms of thrombosis, Signs/symptoms of bleeding,   Laboratory test error suspected, Change in health, Change in alcohol use,   Change in activity, Upcoming invasive procedure, Emergency department   visit, Upcoming dental procedure, Missed doses, Extra doses, Change in   medications, Change in diet/appetite, Hospital admission, Bruising, Other   complaints      Clinical Outcomes     Negatives:  Major bleeding event, Thromboembolic event,   Anticoagulation-related hospital admission, Anticoagulation-related ED   visit, Anticoagulation-related fatality        INR History:  Anticoagulation Monitoring 7/12/2022 7/19/2022 7/26/2022   INR 2.20 2.70 3.00   INR Date 7/12/2022 7/19/2022 7/26/2022   INR Goal 2.0-3.0 2.0-3.0 2.0-3.0   Trend Same Same Same   Last Week Total 15 mg 15 mg 15 mg   Next Week Total 15 mg 15 mg 15 mg   Sun 1.25 mg 1.25 mg 1.25 mg   Mon 2.5 mg 2.5 mg 2.5 mg   Tue 2.5 mg 2.5 mg 2.5 mg   Wed 2.5 mg 2.5 mg  2.5 mg   Thu 1.25 mg 1.25 mg 1.25 mg   Fri 2.5 mg 2.5 mg 2.5 mg   Sat 2.5 mg 2.5 mg 2.5 mg   Visit Report - - -   Some recent data might be hidden       Plan:  1. INR is Therapeutic today- see above in Anticoagulation Summary.   Will instruct Diana Avalos to Continue their warfarin regimen- see above in Anticoagulation Summary.  2. Follow up in 1 weeks  3. They have been instructed to call if any changes in medications, doses, concerns, etc. Patient expresses understanding and has no further questions at this time.    Christianne Vera, PharmD

## 2022-08-02 ENCOUNTER — ANTICOAGULATION VISIT (OUTPATIENT)
Dept: PHARMACY | Facility: HOSPITAL | Age: 87
End: 2022-08-02

## 2022-08-02 LAB — INR PPP: 2.5

## 2022-08-02 NOTE — PROGRESS NOTES
Anticoagulation Clinic Progress Note    Anticoagulation Summary  As of 2022    INR goal:  2.0-3.0   TTR:  71.0 % (3.8 y)   INR used for dosin.50 (2022)   Warfarin maintenance plan:  1.25 mg every Sun, Thu; 2.5 mg all other days   Weekly warfarin total:  15 mg   No change documented:  Elise Davis, KULDEEP   Plan last modified:  Jayant White, PharmD (2021)   Next INR check:  2022   Priority:  Maintenance   Target end date:  Indefinite    Indications    Long-term (current) use of anticoagulants (Resolved) [Z79.01]             Anticoagulation Episode Summary     INR check location:      Preferred lab:      [facility]    Comments:  [facility] --- SPEAK WITH DAUGHTER (LYNETTE) **DO NOT SPEAK WITH PATIENT (poor memory)** **CALL EVERY TIME**      Anticoagulation Care Providers     Provider Role Specialty Phone number    Rafaela Leija MD Referring Cardiology [phone]          Clinic Interview:  No pertinent clinical findings have been reported.    INR History:  Anticoagulation Monitoring 2022   INR 2.70 3.00 2.50   INR Date 2022   INR Goal 2.0-3.0 2.0-3.0 2.0-3.0   Trend Same Same Same   Last Week Total 15 mg 15 mg 15 mg   Next Week Total 15 mg 15 mg 15 mg   Sun 1.25 mg 1.25 mg 1.25 mg   Mon 2.5 mg 2.5 mg 2.5 mg   Tue 2.5 mg 2.5 mg 2.5 mg   Wed 2.5 mg 2.5 mg 2.5 mg   Thu 1.25 mg 1.25 mg 1.25 mg   Fri 2.5 mg 2.5 mg 2.5 mg   Sat 2.5 mg 2.5 mg 2.5 mg   Visit Report - - -   Some recent data might be hidden       Plan:  1. INR is therapeutic today- see above in Anticoagulation Summary.    Diana MARISEL Avalos to continue their warfarin regimen- see above in Anticoagulation Summary.  2. Follow up in 1 week  3. Pt has agreed to only be called if INR out of range. They have been instructed to call if any changes in medications, doses, concerns, etc. Patient expresses understanding and has no further questions at  this time.    Elise Davis [facility]

## 2022-08-09 ENCOUNTER — ANTICOAGULATION VISIT (OUTPATIENT)
Dept: PHARMACY | Facility: HOSPITAL | Age: 87
End: 2022-08-09

## 2022-08-09 LAB — INR PPP: 2.9

## 2022-08-09 PROCEDURE — G0249 PROVIDE INR TEST MATER/EQUIP: HCPCS

## 2022-08-09 NOTE — PROGRESS NOTES
Anticoagulation Clinic Progress Note    Anticoagulation Summary  As of 2022    INR goal:  2.0-3.0   TTR:  71.1 % (3.8 y)   INR used for dosin.90 (2022)   Warfarin maintenance plan:  1.25 mg every Sun, Thu; 2.5 mg all other days   Weekly warfarin total:  15 mg   No change documented:  Elise Davis, KULDEEP   Plan last modified:  Jayant White, PharmD (2021)   Next INR check:  2022   Priority:  Maintenance   Target end date:  Indefinite    Indications    Long-term (current) use of anticoagulants (Resolved) [Z79.01]             Anticoagulation Episode Summary     INR check location:      Preferred lab:      [facility]    Comments:  [facility] --- SPEAK WITH DAUGHTER (LYNETTE) **DO NOT SPEAK WITH PATIENT (poor memory)** **CALL EVERY TIME**      Anticoagulation Care Providers     Provider Role Specialty Phone number    Rafaela Leija MD Referring Cardiology [phone]          Clinic Interview:  No pertinent clinical findings have been reported.    INR History:  Anticoagulation Monitoring 2022   INR 3.00 2.50 2.90   INR Date 2022   INR Goal 2.0-3.0 2.0-3.0 2.0-3.0   Trend Same Same Same   Last Week Total 15 mg 15 mg 15 mg   Next Week Total 15 mg 15 mg 15 mg   Sun 1.25 mg 1.25 mg 1.25 mg   Mon 2.5 mg 2.5 mg 2.5 mg   Tue 2.5 mg 2.5 mg 2.5 mg   Wed 2.5 mg 2.5 mg 2.5 mg   Thu 1.25 mg 1.25 mg 1.25 mg   Fri 2.5 mg 2.5 mg 2.5 mg   Sat 2.5 mg 2.5 mg 2.5 mg   Visit Report - - -   Some recent data might be hidden       Plan:  1. INR is therapeutic today- see above in Anticoagulation Summary.    Diana MARISEL Avalos to continue their warfarin regimen- see above in Anticoagulation Summary.  2. Follow up in 1 week  3. They have been instructed to call if any changes in medications, doses, concerns, etc. Patient expresses understanding and has no further questions at this time.    Elise Davis, [facility]

## 2022-08-10 RX ORDER — WARFARIN SODIUM 2.5 MG/1
TABLET ORAL
Qty: 80 TABLET | Refills: 1 | Status: SHIPPED | OUTPATIENT
Start: 2022-08-10 | End: 2023-02-13

## 2022-08-11 ENCOUNTER — CLINICAL SUPPORT NO REQUIREMENTS (OUTPATIENT)
Dept: CARDIOLOGY | Facility: CLINIC | Age: 87
End: 2022-08-11

## 2022-08-11 ENCOUNTER — OFFICE VISIT (OUTPATIENT)
Dept: CARDIOLOGY | Facility: CLINIC | Age: 87
End: 2022-08-11

## 2022-08-11 ENCOUNTER — HOSPITAL ENCOUNTER (OUTPATIENT)
Dept: CARDIOLOGY | Facility: HOSPITAL | Age: 87
Discharge: HOME | End: 2022-08-11
Admitting: INTERNAL MEDICINE

## 2022-08-11 VITALS
WEIGHT: 156 LBS | SYSTOLIC BLOOD PRESSURE: 132 MMHG | HEIGHT: 67 IN | DIASTOLIC BLOOD PRESSURE: 80 MMHG | HEART RATE: 70 BPM | BODY MASS INDEX: 24.48 KG/M2

## 2022-08-11 VITALS
WEIGHT: 155 LBS | BODY MASS INDEX: 24.33 KG/M2 | HEIGHT: 67 IN | HEART RATE: 68 BPM | DIASTOLIC BLOOD PRESSURE: 73 MMHG | SYSTOLIC BLOOD PRESSURE: 135 MMHG

## 2022-08-11 DIAGNOSIS — I48.21 PERMANENT ATRIAL FIBRILLATION: ICD-10-CM

## 2022-08-11 DIAGNOSIS — I34.0 NONRHEUMATIC MITRAL VALVE REGURGITATION: ICD-10-CM

## 2022-08-11 DIAGNOSIS — I27.20 PULMONARY HYPERTENSION: ICD-10-CM

## 2022-08-11 DIAGNOSIS — I42.8 NICM (NONISCHEMIC CARDIOMYOPATHY): ICD-10-CM

## 2022-08-11 DIAGNOSIS — I48.21 PERMANENT ATRIAL FIBRILLATION: Primary | ICD-10-CM

## 2022-08-11 DIAGNOSIS — R06.02 SHORTNESS OF BREATH: ICD-10-CM

## 2022-08-11 DIAGNOSIS — I47.20 VENTRICULAR TACHYCARDIA: ICD-10-CM

## 2022-08-11 DIAGNOSIS — I42.8 NICM (NONISCHEMIC CARDIOMYOPATHY): Primary | ICD-10-CM

## 2022-08-11 DIAGNOSIS — I10 PRIMARY HYPERTENSION: ICD-10-CM

## 2022-08-11 DIAGNOSIS — I36.1 NONRHEUMATIC TRICUSPID VALVE REGURGITATION: ICD-10-CM

## 2022-08-11 DIAGNOSIS — Z95.0 CARDIAC RESYNCHRONIZATION THERAPY PACEMAKER (CRT-P) IN PLACE: ICD-10-CM

## 2022-08-11 LAB
AORTIC ARCH: 2.7 CM
AORTIC DIMENSIONLESS INDEX: 0.7 (DI)
ASCENDING AORTA: 2.8 CM
BH CV ECHO MEAS - ACS: 1.78 CM
BH CV ECHO MEAS - AO MAX PG: 3.2 MMHG
BH CV ECHO MEAS - AO MEAN PG: 1.83 MMHG
BH CV ECHO MEAS - AO ROOT DIAM: 2.7 CM
BH CV ECHO MEAS - AO V2 MAX: 89.3 CM/SEC
BH CV ECHO MEAS - AO V2 VTI: 19.1 CM
BH CV ECHO MEAS - AVA(I,D): 1.93 CM2
BH CV ECHO MEAS - EDV(CUBED): 88.1 ML
BH CV ECHO MEAS - EDV(MOD-SP2): 54 ML
BH CV ECHO MEAS - EDV(MOD-SP4): 48 ML
BH CV ECHO MEAS - EF(MOD-BP): 57 %
BH CV ECHO MEAS - EF(MOD-SP2): 59.3 %
BH CV ECHO MEAS - EF(MOD-SP4): 56.3 %
BH CV ECHO MEAS - ESV(CUBED): 29.9 ML
BH CV ECHO MEAS - ESV(MOD-SP2): 22 ML
BH CV ECHO MEAS - ESV(MOD-SP4): 21 ML
BH CV ECHO MEAS - FS: 30.3 %
BH CV ECHO MEAS - IVS/LVPW: 1.19 CM
BH CV ECHO MEAS - IVSD: 1.11 CM
BH CV ECHO MEAS - LAT PEAK E' VEL: 9.8 CM/SEC
BH CV ECHO MEAS - LV DIASTOLIC VOL/BSA (35-75): 26.5 CM2
BH CV ECHO MEAS - LV MASS(C)D: 155.1 GRAMS
BH CV ECHO MEAS - LV MAX PG: 1.3 MMHG
BH CV ECHO MEAS - LV MEAN PG: 0.75 MMHG
BH CV ECHO MEAS - LV SYSTOLIC VOL/BSA (12-30): 11.6 CM2
BH CV ECHO MEAS - LV V1 MAX: 57 CM/SEC
BH CV ECHO MEAS - LV V1 VTI: 12.5 CM
BH CV ECHO MEAS - LVIDD: 4.4 CM
BH CV ECHO MEAS - LVIDS: 3.1 CM
BH CV ECHO MEAS - LVOT AREA: 2.9 CM2
BH CV ECHO MEAS - LVOT DIAM: 1.93 CM
BH CV ECHO MEAS - LVPWD: 0.93 CM
BH CV ECHO MEAS - MED PEAK E' VEL: 8.9 CM/SEC
BH CV ECHO MEAS - MR MAX PG: 95.9 MMHG
BH CV ECHO MEAS - MR MAX VEL: 489.6 CM/SEC
BH CV ECHO MEAS - MR MEAN PG: 56.6 MMHG
BH CV ECHO MEAS - MR MEAN VEL: 355.7 CM/SEC
BH CV ECHO MEAS - MR VTI: 164.4 CM
BH CV ECHO MEAS - MV DEC SLOPE: 346.9 CM/SEC2
BH CV ECHO MEAS - MV DEC TIME: 0.17 MSEC
BH CV ECHO MEAS - MV E MAX VEL: 82.7 CM/SEC
BH CV ECHO MEAS - MV MAX PG: 3.3 MMHG
BH CV ECHO MEAS - MV MEAN PG: 1.14 MMHG
BH CV ECHO MEAS - MV P1/2T: 81.8 MSEC
BH CV ECHO MEAS - MV V2 VTI: 20.3 CM
BH CV ECHO MEAS - MVA(P1/2T): 2.7 CM2
BH CV ECHO MEAS - MVA(VTI): 1.81 CM2
BH CV ECHO MEAS - PA ACC TIME: 0.05 SEC
BH CV ECHO MEAS - PA PR(ACCEL): 56.4 MMHG
BH CV ECHO MEAS - PA V2 MAX: 66.5 CM/SEC
BH CV ECHO MEAS - PI END-D VEL: 55.7 CM/SEC
BH CV ECHO MEAS - PULM DIAS VEL: 54.8 CM/SEC
BH CV ECHO MEAS - PULM S/D: 0.56
BH CV ECHO MEAS - PULM SYS VEL: 30.8 CM/SEC
BH CV ECHO MEAS - QP/QS: 1.18
BH CV ECHO MEAS - RAP SYSTOLE: 3 MMHG
BH CV ECHO MEAS - RV MAX PG: 0.72 MMHG
BH CV ECHO MEAS - RV V1 MAX: 42.4 CM/SEC
BH CV ECHO MEAS - RV V1 VTI: 9.3 CM
BH CV ECHO MEAS - RVOT DIAM: 2.44 CM
BH CV ECHO MEAS - RVSP: 43.3 MMHG
BH CV ECHO MEAS - SI(MOD-SP2): 17.6 ML/M2
BH CV ECHO MEAS - SI(MOD-SP4): 14.9 ML/M2
BH CV ECHO MEAS - SV(LVOT): 36.7 ML
BH CV ECHO MEAS - SV(MOD-SP2): 32 ML
BH CV ECHO MEAS - SV(MOD-SP4): 27 ML
BH CV ECHO MEAS - SV(RVOT): 43.5 ML
BH CV ECHO MEAS - TAPSE (>1.6): 1.69 CM
BH CV ECHO MEAS - TR MAX PG: 40.3 MMHG
BH CV ECHO MEAS - TR MAX VEL: 317.4 CM/SEC
BH CV ECHO MEASUREMENTS AVERAGE E/E' RATIO: 8.84
BH CV XLRA - RV BASE: 3.3 CM
BH CV XLRA - RV LENGTH: 5.4 CM
BH CV XLRA - RV MID: 2.4 CM
BH CV XLRA - TDI S': 9.8 CM/SEC
LEFT ATRIUM VOLUME INDEX: 36.5 ML/M2
MAXIMAL PREDICTED HEART RATE: 130 BPM
SINUS: 2.8 CM
STJ: 2.08 CM
STRESS TARGET HR: 111 BPM

## 2022-08-11 PROCEDURE — 93306 TTE W/DOPPLER COMPLETE: CPT | Performed by: INTERNAL MEDICINE

## 2022-08-11 PROCEDURE — 93280 PM DEVICE PROGR EVAL DUAL: CPT | Performed by: INTERNAL MEDICINE

## 2022-08-11 PROCEDURE — 93306 TTE W/DOPPLER COMPLETE: CPT

## 2022-08-11 PROCEDURE — 99214 OFFICE O/P EST MOD 30 MIN: CPT | Performed by: NURSE PRACTITIONER

## 2022-08-11 PROCEDURE — 93000 ELECTROCARDIOGRAM COMPLETE: CPT | Performed by: NURSE PRACTITIONER

## 2022-08-11 RX ORDER — CARVEDILOL 6.25 MG/1
6.25 TABLET ORAL 2 TIMES DAILY WITH MEALS
Qty: 180 TABLET | Refills: 3 | Status: SHIPPED | OUTPATIENT
Start: 2022-08-11 | End: 2022-10-03

## 2022-08-11 RX ORDER — FUROSEMIDE 20 MG/1
20 TABLET ORAL DAILY
Qty: 90 TABLET | Refills: 3 | Status: SHIPPED | OUTPATIENT
Start: 2022-08-11

## 2022-08-11 NOTE — PROGRESS NOTES
Date of Office Visit: 2022  Encounter Provider: KIKA Vidal  Place of Service: [facility] CARDIOLOGY  Patient Name: Diana Avalos  :1932  Primary Cardiologist: Dr. Rafaela Leija     Chief Complaint   Patient presents with   • Follow-up   • Cardiomyopathy   • Atrial Fibrillation   :     HPI: Diana Avalos is a pleasant 89 y.o. female who presents today for follow up on atrial fibrillation and valvular heart disease. I have reviewed her medical records.     In 2011, she was diagnosed with atrial fibrillation.  In 2012, she underwent AV node ablation and single-chamber Medtronic pacemaker.  Her EF at that time was 35-40%.  Her ejection fraction has remained decreased.  In , she had an upgrade to a CRT-P device.    In 2019, echocardiogram showed improvement of her ejection fraction to 63%, mild concentric hypertrophy, grade 2 diastolic dysfunction, left atrium volume severely increased, right atrium moderately to severely dilated, mild to moderate mitral valve regurgitation, mild tricuspid valve regurgitation, and RVSP normal.     In 2021, she had chest pain that awoken her from sleep.  She went to the ED and was in atrial fibrillation.  She has remains on warfarin with INRs followed at the [facility] anticoagulation Clinic.    In 2022, she saw Dr. Leija.  She reported mild exertional dyspnea, fatigue with walking, and rare episodes of orthopnea.  Dr. Leija recommended continuing with same medicines, repeat an echocardiogram in 6 months and seeing me the same day.    Today is her follow-up visit and her daughter Magui is accompanying her. She just had a pacemaker check and 2D echocardiogram completed in the office.  Results pending.    She continues to have some mild fatigue with exertion, but feels that the shortness of breath has improved.  Her daughter explains this past May, her blood pressure was low at  the PCP office and he decreased her furosemide to 20 mg daily.  Her blood pressures been stable at home.  She denies chest pain, PND, orthopnea, edema, palpitations, dizziness, syncope, or bleeding.      Past Medical History:   Diagnosis Date   • Acute on chronic systolic congestive heart failure (HCC)    • Anemia    • Arthritis    • Asthma    • Atrial fibrillation (HCC)     With a history of an atrial clot   • Breast cancer (HCC)     Left, stage IIB   • Disease of thyroid gland     Hypothyroidism   • Essential hypertension    • Hypothyroidism    • Irritable bowel    • Mitral regurgitation    • NICM (nonischemic cardiomyopathy) (HCC)    • On warfarin therapy    • Osteoporosis    • Pneumothorax 6/14/2018   • Pulmonary hypertension (HCC)     Resolved per echocardiogram 1/2019   • Third degree AV block (HCC)    • Tricuspid regurgitation    • Ventricular tachycardia (HCC) 01/17/2019    Nonsustained       Past Surgical History:   Procedure Laterality Date   • CARDIAC ELECTROPHYSIOLOGY PROCEDURE N/A 6/12/2018    Procedure: Device Upgrade-Upgrade to CRT-P-BIV Pacamaker Medtronic Has existing single chamber Medtronic pacemaker;  Surgeon: Leonardo David MD;  Location: [facility] INVASIVE LOCATION;  Service: Cardiovascular   • CHOLECYSTECTOMY  2000   • EYE SURGERY  2005    cataracts, Dr. Miramontes   • MASTECTOMY Left 10/2014   • PACEMAKER IMPLANTATION  2012    Dr. Leija       Social History     Socioeconomic History   • Marital status:      Spouse name: Rick   • Number of children: 8   Tobacco Use   • Smoking status: Never Smoker   • Smokeless tobacco: Never Used   Substance and Sexual Activity   • Alcohol use: Yes     Alcohol/week: 1.0 standard drink     Types: 1 Glasses of wine per week     Comment: caffeine use-tea   • Drug use: No   • Sexual activity: Defer       Family History   Problem Relation Age of Onset   • Colon cancer Mother 75   • Colon cancer Sister 79   • Hypertension Sister    • Cholelithiasis  "Sister         2 sisters       The following portion of the patient's history were reviewed and updated as appropriate: past medical history, past surgical history, past social history, past family history, allergies, current medications, and problem list.    Review of Systems   Constitutional: Positive for malaise/fatigue.   Cardiovascular: Positive for dyspnea on exertion.   Respiratory: Negative.    Hematologic/Lymphatic: Negative.    Neurological: Negative.        Allergies   Allergen Reactions   • Iodinated Diagnostic Agents Hives   • Amiodarone Nausea Only   • Codeine Nausea Only   • Dabigatran Etexilate Mesylate Nausea Only   • Digoxin Nausea Only         Current Outpatient Medications:   •  carvedilol (COREG) 6.25 MG tablet, Take 1 tablet by mouth 2 (Two) Times a Day With Meals., Disp: 180 tablet, Rfl: 3  •  Cholecalciferol 50 MCG (2000 UT) capsule, Take  by mouth., Disp: , Rfl:   •  Diclofenac Sodium (VOLTAREN) 1 % gel gel, Apply 4 g topically to the appropriate area as directed Daily As Needed., Disp: , Rfl:   •  diphenhydrAMINE HCl, Sleep, 25 MG capsule, Take  by mouth., Disp: , Rfl:   •  furosemide (LASIX) 20 MG tablet, Take 1 tablet by mouth Daily., Disp: 90 tablet, Rfl: 3  •  levothyroxine (SYNTHROID, LEVOTHROID) 75 MCG tablet, Take 0.5 mcg by mouth Daily., Disp: , Rfl:   •  nitroglycerin (NITROSTAT) 0.4 MG SL tablet, Place 1 tablet under the tongue As Needed for Chest Pain. Take no more than 3 doses in 15 minutes., Disp: 30 tablet, Rfl: 2  •  warfarin (COUMADIN) 2.5 MG tablet, TAKE 1/2 TABLET BY MOUTH DAILY ON SUNDAY AND THURSDAY AND TAKE ONE TABLET BY MOUTH DAILY ON ALL OTHER DAYS, Disp: 80 tablet, Rfl: 1        Objective:     Vitals:    08/11/22 1431   BP: 132/80   BP Location: Right arm   Patient Position: Sitting   Cuff Size: Adult   Pulse: 70   Weight: 70.8 kg (156 lb)   Height: 170.2 cm (67\")     Body mass index is 24.43 kg/m².    PHYSICAL EXAM:    Vitals Reviewed.   General Appearance: No acute " distress, well developed and well nourished.  Appears younger than stated age.  Eyes: Conjunctiva and lids: No erythema, swelling, or discharge. Sclera non-icteric. Glasses.   HENT: Atraumatic, normocephalic. External eyes, ears, and nose normal. No hearing loss noted. Mucous membranes normal. Lips not cyanotic. Neck supple with no tenderness. Wearing mask.   Respiratory: No signs of respiratory distress. Respiration rhythm and depth normal.   Clear to auscultation. No rales, crackles, rhonchi, or wheezing auscultated.   Cardiovascular:  Jugular Venous Pressure: Normal  Heart Rate and Rhythm: Irregular.  Paced rhythm.  Heart Sounds: Normal S1 and S2. No S3 or S4 noted.  Murmurs: No murmurs noted. No rubs, thrills, or gallops.   Lower Extremities: No edema noted.  Gastrointestinal:  Abdomen soft, non-distended, non-tender.    Musculoskeletal: Normal movement of extremities.  Skin and Nails: General appearance normal. No pallor, cyanosis, diaphoresis. Skin temperature normal. No clubbing of fingernails.   Psychiatric: Patient alert and oriented to person, place, and time. Speech and behavior appropriate. Normal mood and affect.       ECG 12 Lead    Date/Time: 8/11/2022 2:45 PM  Performed by: Mercedes Phipps APRN  Authorized by: Mercedes Phipps APRN   Comparison: compared with previous ECG from 2/10/2022  Similar to previous ECG  Rhythm: atrial fibrillation and paced  Rate: normal  BPM: 70  Conduction: conduction normal  ST Segments: ST segments normal  T Waves: T waves normal  QRS axis: normal  Pacing: ventricular paced rhythm and 100% capture  Clinical impression: abnormal EKG              Assessment:       Diagnosis Plan   1. Permanent atrial fibrillation (HCC)     2. Nonrheumatic mitral valve regurgitation     3. Nonrheumatic tricuspid valve regurgitation     4. Pulmonary hypertension (HCC)     5. Shortness of breath     6. Cardiac resynchronization therapy pacemaker (CRT-P) in place     7. Ventricular  tachycardia (HCC)     8. Primary hypertension            Plan:       1.  Permanent Atrial Fibrillation: Status post AV node ablation in January 2012.  Asymptomatic.  Paced rhythm.  Continue carvedilol and warfarin for stroke prevention.    Atrial Fibrillation and Atrial Flutter  Assessment  • The patient has permanent atrial fibrillation  • The patient's CHADS2-VASc score is 4  • A [id number] score of 2 or more is considered a high risk for a thromboembolic event  • Warfarin prescribed      2/3.  Valvular Heart Disease: Mild to moderate mitral valve regurgitation and mild tricuspid regurgitation per echo in 2019.  I will call with echocardiogram results from today.    4.  History of pulmonary hypertension: Resolved.    5.  Chronic shortness of breath: Unchanged.  Continue furosemide 20 mg daily.    6.  CRT-P: Continue routine pacemaker checks.  Pacemaker check today showed 1 new episode of nonsustained VT/atrial fibrillation 11 beats in duration at a rate of 170.  4 years left on battery.    7.  History of ventricular tachycardia: Continue beta-blocker.    8.  Hypertension: Blood pressure stable.    9.  I will call her with the echocardiogram results.  I recommended a 1 year follow-up visit with Dr. Rafaela Leija.  She will be having blood work completed at her PCP office in October.    ADDENDUM 8/11/2022:    Echocardiogram Result Text  · Left ventricular wall thickness is consistent with mild concentric hypertrophy.  · Calculated left ventricular EF = 57% Estimated left ventricular EF was in agreement with the calculated left ventricular EF. Left ventricular systolic function is normal.  · Estimated right ventricular systolic pressure from tricuspid regurgitation is mildly elevated (35-45 mmHg). Calculated right ventricular systolic pressure from tricuspid regurgitation is 43.3 mmHg.  · Mild pulmonary hypertension is present.  · The right ventricular cavity is mild to moderately dilated.  · The right atrial  cavity is severely dilated.  · Left atrial volume is mildly increased.     · Very stable echocardiogram for age.  Continue current medications.  Daughter Magui informed.    As always, it has been a pleasure to participate in your patient's care. Thank you.       Sincerely,         KIKA Castelan  [facility] Cardiology      · Dictated utilizing Dragon Dictation  · COVID-19 Precautions - Patient was compliant in wearing a mask. When I saw the patient, I used appropriate personal protective equipment (PPE) including mask and eye shield (standard procedure).  Additionally, I used gown and gloves if indicated.  Hand hygiene was completed before and after seeing the patient.

## 2022-08-14 PROCEDURE — 93296 REM INTERROG EVL PM/IDS: CPT | Performed by: INTERNAL MEDICINE

## 2022-08-14 PROCEDURE — 93294 REM INTERROG EVL PM/LDLS PM: CPT | Performed by: INTERNAL MEDICINE

## 2022-08-16 ENCOUNTER — ANTICOAGULATION VISIT (OUTPATIENT)
Dept: PHARMACY | Facility: HOSPITAL | Age: 87
End: 2022-08-16

## 2022-08-16 LAB — INR PPP: 2.8

## 2022-08-16 NOTE — PROGRESS NOTES
Anticoagulation Clinic Progress Note    Anticoagulation Summary  As of 2022    INR goal:  2.0-3.0   TTR:  71.2 % (3.9 y)   INR used for dosin.80 (2022)   Warfarin maintenance plan:  1.25 mg every Sun, Thu; 2.5 mg all other days   Weekly warfarin total:  15 mg   No change documented:  Eilse Davis, KULDEEP   Plan last modified:  Jayant White, PharmD (2021)   Next INR check:  2022   Priority:  Maintenance   Target end date:  Indefinite    Indications    Long-term (current) use of anticoagulants (Resolved) [Z79.01]             Anticoagulation Episode Summary     INR check location:      Preferred lab:      [facility]    Comments:  [facility] --- SPEAK WITH DAUGHTER (LYNETTE) **DO NOT SPEAK WITH PATIENT (poor memory)** **CALL EVERY TIME**      Anticoagulation Care Providers     Provider Role Specialty Phone number    Rafaela Leija MD Referring Cardiology [phone]          Clinic Interview:  No pertinent clinical findings have been reported.    INR History:  Anticoagulation Monitoring 2022   INR 2.50 2.90 2.80   INR Date 2022   INR Goal 2.0-3.0 2.0-3.0 2.0-3.0   Trend Same Same Same   Last Week Total 15 mg 15 mg 15 mg   Next Week Total 15 mg 15 mg 15 mg   Sun 1.25 mg 1.25 mg 1.25 mg   Mon 2.5 mg 2.5 mg 2.5 mg   Tue 2.5 mg 2.5 mg 2.5 mg   Wed 2.5 mg 2.5 mg 2.5 mg   Thu 1.25 mg 1.25 mg 1.25 mg   Fri 2.5 mg 2.5 mg 2.5 mg   Sat 2.5 mg 2.5 mg 2.5 mg   Visit Report - - -   Some recent data might be hidden       Plan:  1. INR is therapeutic today- see above in Anticoagulation Summary.    Diana MARISEL Avalos to continue their warfarin regimen- see above in Anticoagulation Summary.  2. Follow up in 1 week  3. They have been instructed to call if any changes in medications, doses, concerns, etc. Patient expresses understanding and has no further questions at this time.    Elise Davis, [facility]

## 2022-08-23 ENCOUNTER — ANTICOAGULATION VISIT (OUTPATIENT)
Dept: PHARMACY | Facility: HOSPITAL | Age: 87
End: 2022-08-23

## 2022-08-23 LAB — INR PPP: 3.2

## 2022-08-23 NOTE — PROGRESS NOTES
Anticoagulation Clinic Progress Note    Anticoagulation Summary  As of 8/23/2022    INR goal:  2.0-3.0   TTR:  71.1 % (3.9 y)   INR used for dosing:  3.20 (8/23/2022)   Warfarin maintenance plan:  1.25 mg every Sun, Thu; 2.5 mg all other days   Weekly warfarin total:  15 mg   Plan last modified:  Jayant White, PharmD (9/8/2021)   Next INR check:  8/30/2022   Priority:  Maintenance   Target end date:  Indefinite    Indications    Long-term (current) use of anticoagulants (Resolved) [Z79.01]             Anticoagulation Episode Summary     INR check location:      Preferred lab:      Send INR reminders to:   [facility] HOME TEST POOL    Comments:  [facility] --- SPEAK WITH DAUGHTER (LYNETTE) **DO NOT SPEAK WITH PATIENT (poor memory)** **CALL EVERY TIME**      Anticoagulation Care Providers     Provider Role Specialty Phone number    Rafaela Leija MD Referring Cardiology [phone]          Clinic Interview:  Patient Findings     Negatives:  Signs/symptoms of thrombosis, Signs/symptoms of bleeding,   Laboratory test error suspected, Change in health, Change in alcohol use,   Change in activity, Upcoming invasive procedure, Emergency department   visit, Upcoming dental procedure, Missed doses, Extra doses, Change in   medications, Change in diet/appetite, Hospital admission, Bruising, Other   complaints      Clinical Outcomes     Negatives:  Major bleeding event, Thromboembolic event,   Anticoagulation-related hospital admission, Anticoagulation-related ED   visit, Anticoagulation-related fatality        INR History:  Anticoagulation Monitoring 8/9/2022 8/16/2022 8/23/2022   INR 2.90 2.80 3.20   INR Date 8/9/2022 8/16/2022 8/23/2022   INR Goal 2.0-3.0 2.0-3.0 2.0-3.0   Trend Same Same Same   Last Week Total 15 mg 15 mg 15 mg   Next Week Total 15 mg 15 mg 13.75 mg   Sun 1.25 mg 1.25 mg 1.25 mg   Mon 2.5 mg 2.5 mg 2.5 mg   Tue 2.5 mg 2.5 mg 1.25 mg (8/23)   Wed 2.5 mg 2.5 mg 2.5 mg   Thu 1.25 mg 1.25 mg  1.25 mg   Fri 2.5 mg 2.5 mg 2.5 mg   Sat 2.5 mg 2.5 mg 2.5 mg   Visit Report - - -   Some recent data might be hidden       Plan:  1. INR is Supratherapeutic today- see above in Anticoagulation Summary.   Will instruct Diana Avalos to Change their warfarin regimen- see above in Anticoagulation Summary.  2. Follow up in 1 week  3. They have been instructed to call if any changes in medications, doses, concerns, etc. Patient expresses understanding and has no further questions at this time.    Elise Davis, [facility]

## 2022-08-30 ENCOUNTER — ANTICOAGULATION VISIT (OUTPATIENT)
Dept: PHARMACY | Facility: HOSPITAL | Age: 87
End: 2022-08-30

## 2022-08-30 LAB — INR PPP: 3

## 2022-08-30 NOTE — PROGRESS NOTES
Anticoagulation Clinic Progress Note    Anticoagulation Summary  As of 8/30/2022    INR goal:  2.0-3.0   TTR:  70.8 % (3.9 y)   INR used for dosing:  3.00 (8/30/2022)   Warfarin maintenance plan:  1.25 mg every Sun, Tue, Thu; 2.5 mg all other days   Weekly warfarin total:  13.75 mg   Plan last modified:  Elise Davis RPH (8/30/2022)   Next INR check:  9/6/2022   Priority:  Maintenance   Target end date:  Indefinite    Indications    Long-term (current) use of anticoagulants (Resolved) [Z79.01]             Anticoagulation Episode Summary     INR check location:      Preferred lab:      Send INR reminders to:  [facility] [employer] HOME TEST POOL    Comments:  [facility] --- SPEAK WITH DAUGHTER (LYNETTE) **DO NOT SPEAK WITH PATIENT (poor memory)** **CALL EVERY TIME**      Anticoagulation Care Providers     Provider Role Specialty Phone number    Rafaela Leija MD Referring Cardiology [phone]          Clinic Interview:  Patient Findings     Negatives:  Signs/symptoms of thrombosis, Signs/symptoms of bleeding,   Laboratory test error suspected, Change in health, Change in alcohol use,   Change in activity, Upcoming invasive procedure, Emergency department   visit, Upcoming dental procedure, Missed doses, Extra doses, Change in   medications, Change in diet/appetite, Hospital admission, Bruising, Other   complaints      Clinical Outcomes     Negatives:  Major bleeding event, Thromboembolic event,   Anticoagulation-related hospital admission, Anticoagulation-related ED   visit, Anticoagulation-related fatality        INR History:  Anticoagulation Monitoring 8/16/2022 8/23/2022 8/30/2022   INR 2.80 3.20 3.00   INR Date 8/16/2022 8/23/2022 8/30/2022   INR Goal 2.0-3.0 2.0-3.0 2.0-3.0   Trend Same Same Down   Last Week Total 15 mg 15 mg 13.75 mg   Next Week Total 15 mg 13.75 mg 13.75 mg   Sun 1.25 mg 1.25 mg 1.25 mg   Mon 2.5 mg 2.5 mg 2.5 mg   Tue 2.5 mg 1.25 mg (8/23) 1.25 mg   Wed 2.5 mg 2.5 mg 2.5 mg   Thu  1.25 mg 1.25 mg 1.25 mg   Fri 2.5 mg 2.5 mg 2.5 mg   Sat 2.5 mg 2.5 mg 2.5 mg   Visit Report - - -   Some recent data might be hidden       Plan:  1. INR is Therapeutic today- see above in Anticoagulation Summary.   Will instruct Diana Avalos to Change their warfarin regimen- see above in Anticoagulation Summary.  2. Follow up in 1 weeks  3. They have been instructed to call if any changes in medications, doses, concerns, etc. Patient expresses understanding and has no further questions at this time.    Elise Davis, [facility]

## 2022-09-06 ENCOUNTER — ANTICOAGULATION VISIT (OUTPATIENT)
Dept: PHARMACY | Facility: HOSPITAL | Age: 87
End: 2022-09-06

## 2022-09-06 LAB — INR PPP: 2.7

## 2022-09-06 PROCEDURE — G0249 PROVIDE INR TEST MATER/EQUIP: HCPCS

## 2022-09-06 NOTE — PROGRESS NOTES
Anticoagulation Clinic Progress Note    Anticoagulation Summary  As of 2022    INR goal:  2.0-3.0   TTR:  70.9 % (3.9 y)   INR used for dosin.70 (2022)   Warfarin maintenance plan:  1.25 mg every Sun, Tue, Thu; 2.5 mg all other days   Weekly warfarin total:  13.75 mg   No change documented:  Elise Davis RPH   Plan last modified:  Elise Davis RPH (2022)   Next INR check:  2022   Priority:  Maintenance   Target end date:  Indefinite    Indications    Long-term (current) use of anticoagulants (Resolved) [Z79.01]             Anticoagulation Episode Summary     INR check location:      Preferred lab:      [facility]    Comments:  [facility] --- SPEAK WITH DAUGHTER (LYNETTE) **DO NOT SPEAK WITH PATIENT (poor memory)** **CALL EVERY TIME**      Anticoagulation Care Providers     Provider Role Specialty Phone number    Rafaela Leija MD Referring Cardiology [phone]          Clinic Interview:  No pertinent clinical findings have been reported.    INR History:  Anticoagulation Monitoring 2022   INR 3.20 3.00 2.70   INR Date 2022   INR Goal 2.0-3.0 2.0-3.0 2.0-3.0   Trend Same Down Same   Last Week Total 15 mg 13.75 mg 13.75 mg   Next Week Total 13.75 mg 13.75 mg 13.75 mg   Sun 1.25 mg 1.25 mg 1.25 mg   Mon 2.5 mg 2.5 mg 2.5 mg   Tue 1.25 mg () 1.25 mg 1.25 mg   Wed 2.5 mg 2.5 mg 2.5 mg   Thu 1.25 mg 1.25 mg 1.25 mg   Fri 2.5 mg 2.5 mg 2.5 mg   Sat 2.5 mg 2.5 mg 2.5 mg   Visit Report - - -   Some recent data might be hidden       Plan:  1. INR is therapeutic today- see above in Anticoagulation Summary.    Diana Avalos to continue their warfarin regimen- see above in Anticoagulation Summary.  2. Follow up in 1 week  3. Pt has agreed to only be called if INR out of range. They have been instructed to call if any changes in medications, doses, concerns, etc. Patient expresses  understanding and has no further questions at this time.    Elise Davis, RP

## 2022-09-13 ENCOUNTER — ANTICOAGULATION VISIT (OUTPATIENT)
Dept: PHARMACY | Facility: HOSPITAL | Age: 87
End: 2022-09-13

## 2022-09-13 LAB — INR PPP: 2.9

## 2022-09-13 NOTE — PROGRESS NOTES
Anticoagulation Clinic Progress Note    Anticoagulation Summary  As of 2022    INR goal:  2.0-3.0   TTR:  71.1 % (3.9 y)   INR used for dosin.90 (2022)   Warfarin maintenance plan:  1.25 mg every Sun, Tue, Thu; 2.5 mg all other days   Weekly warfarin total:  13.75 mg   No change documented:  Elise Davis RPH   Plan last modified:  Elise Davis RPH (2022)   Next INR check:  2022   Priority:  Maintenance   Target end date:  Indefinite    Indications    Long-term (current) use of anticoagulants (Resolved) [Z79.01]             Anticoagulation Episode Summary     INR check location:      Preferred lab:      [facility]    Comments:  [facility] --- SPEAK WITH DAUGHTER (LYNETTE) **DO NOT SPEAK WITH PATIENT (poor memory)** **CALL EVERY TIME**      Anticoagulation Care Providers     Provider Role Specialty Phone number    Rafaela Leija MD Referring Cardiology [phone]          Clinic Interview:  No pertinent clinical findings have been reported.    INR History:  Anticoagulation Monitoring 2022   INR 3.00 2.70 2.90   INR Date 2022   INR Goal 2.0-3.0 2.0-3.0 2.0-3.0   Trend Down Same Same   Last Week Total 13.75 mg 13.75 mg 13.75 mg   Next Week Total 13.75 mg 13.75 mg 13.75 mg   Sun 1.25 mg 1.25 mg 1.25 mg   Mon 2.5 mg 2.5 mg 2.5 mg   Tue 1.25 mg 1.25 mg 1.25 mg   Wed 2.5 mg 2.5 mg 2.5 mg   Thu 1.25 mg 1.25 mg 1.25 mg   Fri 2.5 mg 2.5 mg 2.5 mg   Sat 2.5 mg 2.5 mg 2.5 mg   Visit Report - - -   Some recent data might be hidden       Plan:  1. INR is therapeutic today- see above in Anticoagulation Summary.    Diana Avalos to continue their warfarin regimen- see above in Anticoagulation Summary.  2. Follow up in 1 week  3. They have been instructed to call if any changes in medications, doses, concerns, etc. Patient expresses understanding and has no further questions at this time.    Elise  Ryan, [facility]

## 2022-09-20 ENCOUNTER — ANTICOAGULATION VISIT (OUTPATIENT)
Dept: PHARMACY | Facility: HOSPITAL | Age: 87
End: 2022-09-20

## 2022-09-20 LAB — INR PPP: 2.7

## 2022-09-20 NOTE — PROGRESS NOTES
Anticoagulation Clinic Progress Note    Anticoagulation Summary  As of 2022    INR goal:  2.0-3.0   TTR:  71.2 % (4 y)   INR used for dosin.70 (2022)   Warfarin maintenance plan:  1.25 mg every Sun, Tue, Thu; 2.5 mg all other days   Weekly warfarin total:  13.75 mg   No change documented:  Christianne Vera, Zbigniew   Plan last modified:  Elise Davis RPH (2022)   Next INR check:  2022   Priority:  Maintenance   Target end date:  Indefinite    Indications    Long-term (current) use of anticoagulants (Resolved) [Z79.01]             Anticoagulation Episode Summary     INR check location:      Preferred lab:      Send INR reminders to:   [facility] [employer] HOME TEST POOL    Comments:  [facility] --- SPEAK WITH DAUGHTER (LYNETTE) **DO NOT SPEAK WITH PATIENT (poor memory)** **CALL EVERY TIME**      Anticoagulation Care Providers     Provider Role Specialty Phone number    Rafaela Leija MD Referring Cardiology [phone]          Clinic Interview:  Patient Findings     Negatives:  Signs/symptoms of thrombosis, Signs/symptoms of bleeding,   Laboratory test error suspected, Change in health, Change in alcohol use,   Change in activity, Upcoming invasive procedure, Emergency department   visit, Upcoming dental procedure, Missed doses, Extra doses, Change in   medications, Change in diet/appetite, Hospital admission, Bruising, Other   complaints      Clinical Outcomes     Negatives:  Major bleeding event, Thromboembolic event,   Anticoagulation-related hospital admission, Anticoagulation-related ED   visit, Anticoagulation-related fatality        INR History:  Anticoagulation Monitoring 2022   INR 2.70 2.90 2.70   INR Date 2022   INR Goal 2.0-3.0 2.0-3.0 2.0-3.0   Trend Same Same Same   Last Week Total 13.75 mg 13.75 mg 13.75 mg   Next Week Total 13.75 mg 13.75 mg 13.75 mg   Sun 1.25 mg 1.25 mg 1.25 mg   Mon 2.5 mg 2.5 mg 2.5 mg   Tue 1.25 mg  1.25 mg 1.25 mg   Wed 2.5 mg 2.5 mg 2.5 mg   Thu 1.25 mg 1.25 mg 1.25 mg   Fri 2.5 mg 2.5 mg 2.5 mg   Sat 2.5 mg 2.5 mg 2.5 mg   Visit Report - - -   Some recent data might be hidden       Plan:  1. INR is Therapeutic today- see above in Anticoagulation Summary.   Will instruct Diana Avalos to Continue their warfarin regimen- see above in Anticoagulation Summary.  2. Follow up in 1 weeks  3.They have been instructed to call if any changes in medications, doses, concerns, etc. Patient expresses understanding and has no further questions at this time.    Christianne Vera, PharmD

## 2022-09-27 ENCOUNTER — ANTICOAGULATION VISIT (OUTPATIENT)
Dept: PHARMACY | Facility: HOSPITAL | Age: 87
End: 2022-09-27

## 2022-09-27 LAB — INR PPP: 2.8

## 2022-09-27 NOTE — PROGRESS NOTES
Anticoagulation Clinic Progress Note    Anticoagulation Summary  As of 2022    INR goal:  2.0-3.0   TTR:  71.4 % (4 y)   INR used for dosin.80 (2022)   Warfarin maintenance plan:  1.25 mg every Sun, Tue, Thu; 2.5 mg all other days   Weekly warfarin total:  13.75 mg   No change documented:  Elise Davis RPH   Plan last modified:  Elise Davis RPH (2022)   Next INR check:  10/4/2022   Priority:  Maintenance   Target end date:  Indefinite    Indications    Long-term (current) use of anticoagulants (Resolved) [Z79.01]             Anticoagulation Episode Summary     INR check location:      Preferred lab:      Send INR reminders to:   [facility] HOME TEST POOL    Comments:  [facility] --- SPEAK WITH DAUGHTER (LYNETTE) **DO NOT SPEAK WITH PATIENT (poor memory)** **CALL EVERY TIME**      Anticoagulation Care Providers     Provider Role Specialty Phone number    Rafaela Leija MD Referring Cardiology [phone]          Clinic Interview:  No pertinent clinical findings have been reported.    INR History:  Anticoagulation Monitoring 2022   INR 2.90 2.70 2.80   INR Date 2022   INR Goal 2.0-3.0 2.0-3.0 2.0-3.0   Trend Same Same Same   Last Week Total 13.75 mg 13.75 mg 13.75 mg   Next Week Total 13.75 mg 13.75 mg 13.75 mg   Sun 1.25 mg 1.25 mg 1.25 mg   Mon 2.5 mg 2.5 mg 2.5 mg   Tue 1.25 mg 1.25 mg 1.25 mg   Wed 2.5 mg 2.5 mg 2.5 mg   Thu 1.25 mg 1.25 mg 1.25 mg   Fri 2.5 mg 2.5 mg 2.5 mg   Sat 2.5 mg 2.5 mg 2.5 mg   Visit Report - - -   Some recent data might be hidden       Plan:  1. INR is therapeutic today- see above in Anticoagulation Summary.    Diana Avalos to continue their warfarin regimen- see above in Anticoagulation Summary.  2. Follow up in 1 week  3. They have been instructed to call if any changes in medications, doses, concerns, etc. Patient expresses understanding and has no further questions at this time.    Elise  Ryan, [facility]

## 2022-10-03 RX ORDER — CARVEDILOL 6.25 MG/1
TABLET ORAL
Qty: 180 TABLET | Refills: 3 | Status: SHIPPED | OUTPATIENT
Start: 2022-10-03

## 2022-10-04 ENCOUNTER — ANTICOAGULATION VISIT (OUTPATIENT)
Dept: PHARMACY | Facility: HOSPITAL | Age: 87
End: 2022-10-04

## 2022-10-04 LAB — INR PPP: 2.6

## 2022-10-04 PROCEDURE — G0249 PROVIDE INR TEST MATER/EQUIP: HCPCS

## 2022-10-04 NOTE — PROGRESS NOTES
Anticoagulation Clinic Progress Note    Anticoagulation Summary  As of 10/4/2022    INR goal:  2.0-3.0   TTR:  71.5 % (4 y)   INR used for dosin.60 (10/4/2022)   Warfarin maintenance plan:  1.25 mg every Sun, Tue, Thu; 2.5 mg all other days   Weekly warfarin total:  13.75 mg   No change documented:  Elise Davis RPH   Plan last modified:  Elise Davis RPH (2022)   Next INR check:  10/11/2022   Priority:  Maintenance   Target end date:  Indefinite    Indications    Long-term (current) use of anticoagulants (Resolved) [Z79.01]             Anticoagulation Episode Summary     INR check location:      Preferred lab:      Send INR reminders to:   [facility] HOME TEST POOL    Comments:  [facility] --- SPEAK WITH DAUGHTER (LYNETTE) **DO NOT SPEAK WITH PATIENT (poor memory)** **CALL EVERY TIME**      Anticoagulation Care Providers     Provider Role Specialty Phone number    Rafaela Leija MD Referring Cardiology [phone]          Clinic Interview:  No pertinent clinical findings have been reported.    INR History:  Anticoagulation Monitoring 2022 2022 10/4/2022   INR 2.70 2.80 2.60   INR Date 2022 2022 10/4/2022   INR Goal 2.0-3.0 2.0-3.0 2.0-3.0   Trend Same Same Same   Last Week Total 13.75 mg 13.75 mg 13.75 mg   Next Week Total 13.75 mg 13.75 mg 13.75 mg   Sun 1.25 mg 1.25 mg 1.25 mg   Mon 2.5 mg 2.5 mg 2.5 mg   Tue 1.25 mg 1.25 mg 1.25 mg   Wed 2.5 mg 2.5 mg 2.5 mg   Thu 1.25 mg 1.25 mg 1.25 mg   Fri 2.5 mg 2.5 mg 2.5 mg   Sat 2.5 mg 2.5 mg 2.5 mg   Visit Report - - -   Some recent data might be hidden       Plan:  1. INR is therapeutic today- see above in Anticoagulation Summary.    Diana Avalos to continue their warfarin regimen- see above in Anticoagulation Summary.  2. Follow up in 1 week  3. They have been instructed to call if any changes in medications, doses, concerns, etc. Patient expresses understanding and has no further questions at this time.    Elise  Ryan, [facility]

## 2022-10-11 ENCOUNTER — ANTICOAGULATION VISIT (OUTPATIENT)
Dept: PHARMACY | Facility: HOSPITAL | Age: 87
End: 2022-10-11

## 2022-10-11 LAB — INR PPP: 2.6

## 2022-10-11 NOTE — PROGRESS NOTES
Anticoagulation Clinic Progress Note    Anticoagulation Summary  As of 10/11/2022    INR goal:  2.0-3.0   TTR:  71.6 % (4 y)   INR used for dosin.60 (10/11/2022)   Warfarin maintenance plan:  1.25 mg every Sun, Tue, Thu; 2.5 mg all other days   Weekly warfarin total:  13.75 mg   No change documented:  Elise Davis RPH   Plan last modified:  Elise Davis RPH (2022)   Next INR check:  10/18/2022   Priority:  Maintenance   Target end date:  Indefinite    Indications    Long-term (current) use of anticoagulants (Resolved) [Z79.01]             Anticoagulation Episode Summary     INR check location:      Preferred lab:      Send INR reminders to:   [facility] HOME TEST POOL    Comments:  [facility] --- SPEAK WITH DAUGHTER (LYNETTE) **DO NOT SPEAK WITH PATIENT (poor memory)** **CALL EVERY TIME**      Anticoagulation Care Providers     Provider Role Specialty Phone number    Rafaela Leija MD Referring Cardiology [phone]          Clinic Interview:  No pertinent clinical findings have been reported.    INR History:  Anticoagulation Monitoring 2022 10/4/2022 10/11/2022   INR 2.80 2.60 2.60   INR Date 2022 10/4/2022 10/11/2022   INR Goal 2.0-3.0 2.0-3.0 2.0-3.0   Trend Same Same Same   Last Week Total 13.75 mg 13.75 mg 13.75 mg   Next Week Total 13.75 mg 13.75 mg 13.75 mg   Sun 1.25 mg 1.25 mg 1.25 mg   Mon 2.5 mg 2.5 mg 2.5 mg   Tue 1.25 mg 1.25 mg 1.25 mg   Wed 2.5 mg 2.5 mg 2.5 mg   Thu 1.25 mg 1.25 mg 1.25 mg   Fri 2.5 mg 2.5 mg 2.5 mg   Sat 2.5 mg 2.5 mg 2.5 mg   Visit Report - - -   Some recent data might be hidden       Plan:  1. INR is therapeutic today- see above in Anticoagulation Summary.    Diana Avalos to continue their warfarin regimen- see above in Anticoagulation Summary.  2. Follow up in 1 week  3. They have been instructed to call if any changes in medications, doses, concerns, etc. Patient expresses understanding and has no further questions at this  time.    Elise Davis, [facility]

## 2022-10-18 LAB — INR PPP: 3.1

## 2022-10-19 ENCOUNTER — ANTICOAGULATION VISIT (OUTPATIENT)
Dept: PHARMACY | Facility: HOSPITAL | Age: 87
End: 2022-10-19

## 2022-10-19 NOTE — PROGRESS NOTES
Anticoagulation Clinic Progress Note    Anticoagulation Summary  As of 10/19/2022    INR goal:  2.0-3.0   TTR:  71.6 % (4 y)   INR used for dosing:  3.10 (10/18/2022)   Warfarin maintenance plan:  1.25 mg every Sun, Tue, Thu; 2.5 mg all other days   Weekly warfarin total:  13.75 mg   Plan last modified:  Elise Davis RPH (8/30/2022)   Next INR check:  10/26/2022   Priority:  Maintenance   Target end date:  Indefinite    Indications    Long-term (current) use of anticoagulants (Resolved) [Z79.01]             Anticoagulation Episode Summary     INR check location:      Preferred lab:      Send INR reminders to:  [facility] [employer] HOME TEST POOL    Comments:  [facility] --- SPEAK WITH DAUGHTER (LYNETTE) **DO NOT SPEAK WITH PATIENT (poor memory)** **CALL EVERY TIME**      Anticoagulation Care Providers     Provider Role Specialty Phone number    Rafaela Leija MD Referring Cardiology [phone]          Clinic Interview:  Patient Findings     Negatives:  Signs/symptoms of thrombosis, Signs/symptoms of bleeding,   Laboratory test error suspected, Change in health, Change in alcohol use,   Change in activity, Upcoming invasive procedure, Emergency department   visit, Upcoming dental procedure, Missed doses, Extra doses, Change in   medications, Change in diet/appetite, Hospital admission, Bruising, Other   complaints      Clinical Outcomes     Negatives:  Major bleeding event, Thromboembolic event,   Anticoagulation-related hospital admission, Anticoagulation-related ED   visit, Anticoagulation-related fatality        INR History:  Anticoagulation Monitoring 10/4/2022 10/11/2022 10/19/2022   INR 2.60 2.60 3.10   INR Date 10/4/2022 10/11/2022 10/18/2022   INR Goal 2.0-3.0 2.0-3.0 2.0-3.0   Trend Same Same Same   Last Week Total 13.75 mg 13.75 mg 13.75 mg   Next Week Total 13.75 mg 13.75 mg 12.5 mg   Sun 1.25 mg 1.25 mg 1.25 mg   Mon 2.5 mg 2.5 mg 2.5 mg   Tue 1.25 mg 1.25 mg 1.25 mg   Wed 2.5 mg 2.5 mg 2.5 mg    Thu 1.25 mg 1.25 mg 1.25 mg   Fri 2.5 mg 2.5 mg 1.25 mg (10/21)   Sat 2.5 mg 2.5 mg 2.5 mg   Visit Report - - -   Some recent data might be hidden       Plan:  1. INR is Supratherapeutic today- see above in Anticoagulation Summary.   Will instruct Diana Avalos to Change their warfarin regimen- see above in Anticoagulation Summary.  2. Follow up in 1 weeks  3. Spoke with Magui, patient's daughter.  They have been instructed to call if any changes in medications, doses, concerns, etc. Patient expresses understanding and has no further questions at this time.    Christianne Vera, PharmD

## 2022-10-25 ENCOUNTER — ANTICOAGULATION VISIT (OUTPATIENT)
Dept: PHARMACY | Facility: HOSPITAL | Age: 87
End: 2022-10-25

## 2022-10-25 LAB — INR PPP: 2.5

## 2022-10-25 NOTE — PROGRESS NOTES
Anticoagulation Clinic Progress Note    Anticoagulation Summary  As of 10/25/2022    INR goal:  2.0-3.0   TTR:  71.7 % (4.1 y)   INR used for dosin.50 (10/25/2022)   Warfarin maintenance plan:  1.25 mg every Sun, Tue, Thu; 2.5 mg all other days   Weekly warfarin total:  13.75 mg   No change documented:  Elise Davis RPH   Plan last modified:  Elise Davis RPH (2022)   Next INR check:  2022   Priority:  Maintenance   Target end date:  Indefinite    Indications    Long-term (current) use of anticoagulants (Resolved) [Z79.01]             Anticoagulation Episode Summary     INR check location:      Preferred lab:      [facility]    Comments:  [facility] --- SPEAK WITH DAUGHTER (LYNETTE) **DO NOT SPEAK WITH PATIENT (poor memory)** **CALL EVERY TIME**      Anticoagulation Care Providers     Provider Role Specialty Phone number    Rafaela Leija MD Referring Cardiology [phone]          Clinic Interview:  No pertinent clinical findings have been reported.    INR History:  Anticoagulation Monitoring 10/11/2022 10/19/2022 10/25/2022   INR 2.60 3.10 2.50   INR Date 10/11/2022 10/18/2022 10/25/2022   INR Goal 2.0-3.0 2.0-3.0 2.0-3.0   Trend Same Same Same   Last Week Total 13.75 mg 13.75 mg 12.5 mg   Next Week Total 13.75 mg 12.5 mg 13.75 mg   Sun 1.25 mg 1.25 mg 1.25 mg   Mon 2.5 mg 2.5 mg 2.5 mg   Tue 1.25 mg 1.25 mg 1.25 mg   Wed 2.5 mg 2.5 mg 2.5 mg   Thu 1.25 mg 1.25 mg 1.25 mg   Fri 2.5 mg 1.25 mg (10/21) 2.5 mg   Sat 2.5 mg 2.5 mg 2.5 mg   Visit Report - - -   Some recent data might be hidden       Plan:  1. INR is therapeutic today- see above in Anticoagulation Summary.    Diana Avalos to continue their warfarin regimen- see above in Anticoagulation Summary.  2. Follow up in 1 week  3.  They have been instructed to call if any changes in medications, doses, concerns, etc. Patient expresses understanding and has no further questions at  this time.    Elise Davis [facility]

## 2022-11-01 ENCOUNTER — ANTICOAGULATION VISIT (OUTPATIENT)
Dept: PHARMACY | Facility: HOSPITAL | Age: 87
End: 2022-11-01

## 2022-11-01 LAB — INR PPP: 2.9

## 2022-11-01 PROCEDURE — G0249 PROVIDE INR TEST MATER/EQUIP: HCPCS

## 2022-11-01 NOTE — PROGRESS NOTES
Anticoagulation Clinic Progress Note    Anticoagulation Summary  As of 2022    INR goal:  2.0-3.0   TTR:  71.8 % (4.1 y)   INR used for dosin.90 (2022)   Warfarin maintenance plan:  1.25 mg every Sun, Tue, Thu; 2.5 mg all other days   Weekly warfarin total:  13.75 mg   Plan last modified:  Elise Davis RPH (2022)   Next INR check:  2022   Priority:  Maintenance   Target end date:  Indefinite    Indications    Long-term (current) use of anticoagulants (Resolved) [Z79.01]             Anticoagulation Episode Summary     INR check location:      Preferred lab:      Send INR reminders to:  [facility] [employer] HOME TEST POOL    Comments:  [facility] --- SPEAK WITH DAUGHTER (LYNETTE) **DO NOT SPEAK WITH PATIENT (poor memory)** **CALL EVERY TIME**      Anticoagulation Care Providers     Provider Role Specialty Phone number    Rafaela Leija MD Referring Cardiology [phone]          Clinic Interview:  No pertinent clinical findings have been reported.    INR History:  Anticoagulation Monitoring 10/19/2022 10/25/2022 2022   INR 3.10 2.50 2.90   INR Date 10/18/2022 10/25/2022 2022   INR Goal 2.0-3.0 2.0-3.0 2.0-3.0   Trend Same Same Same   Last Week Total 13.75 mg 12.5 mg 13.75 mg   Next Week Total 12.5 mg 13.75 mg 13.75 mg   Sun 1.25 mg 1.25 mg 1.25 mg   Mon 2.5 mg 2.5 mg 2.5 mg   Tue 1.25 mg 1.25 mg 1.25 mg   Wed 2.5 mg 2.5 mg 2.5 mg   Thu 1.25 mg 1.25 mg 1.25 mg   Fri 1.25 mg (10/21) 2.5 mg 2.5 mg   Sat 2.5 mg 2.5 mg 2.5 mg   Visit Report - - -   Some recent data might be hidden       Plan:  1. INR is therapeutic today- see above in Anticoagulation Summary.    Diana Avalos to continue their warfarin regimen- see above in Anticoagulation Summary.  2. Follow up in 1 week  3. They have been instructed to call if any changes in medications, doses, concerns, etc. Patient expresses understanding and has no further questions at this time.    Elise Davis, [facility]

## 2022-11-09 ENCOUNTER — ANTICOAGULATION VISIT (OUTPATIENT)
Dept: PHARMACY | Facility: HOSPITAL | Age: 87
End: 2022-11-09

## 2022-11-09 LAB — INR PPP: 2.8

## 2022-11-09 NOTE — PROGRESS NOTES
Anticoagulation Clinic Progress Note    Anticoagulation Summary  As of 2022    INR goal:  2.0-3.0   TTR:  72.0 % (4.1 y)   INR used for dosin.80 (2022)   Warfarin maintenance plan:  1.25 mg every Sun, Tue, Thu; 2.5 mg all other days; Starting 2022   Weekly warfarin total:  13.75 mg   No change documented:  Elise Davis RPH   Plan last modified:  Elise Davis RPH (2022)   Next INR check:  2022   Priority:  Maintenance   Target end date:  Indefinite    Indications    Long-term (current) use of anticoagulants (Resolved) [Z79.01]             Anticoagulation Episode Summary     INR check location:      Preferred lab:      Send INR reminders to:   [facility] TEST POOL    Comments:  [facility] --- SPEAK WITH DAUGHTER (LYNETTE) **DO NOT SPEAK WITH PATIENT (poor memory)** **CALL EVERY TIME**      Anticoagulation Care Providers     Provider Role Specialty Phone number    Rafaela Leija MD Referring Cardiology [phone]          Clinic Interview:  No pertinent clinical findings have been reported.    INR History:  Anticoagulation Monitoring 10/25/2022 2022 2022   INR 2.50 2.90 2.80   INR Date 10/25/2022 2022 2022   INR Goal 2.0-3.0 2.0-3.0 2.0-3.0   Trend Same Same Same   Last Week Total 12.5 mg 13.75 mg 13.75 mg   Next Week Total 13.75 mg 13.75 mg 13.75 mg   Sun 1.25 mg 1.25 mg 1.25 mg   Mon 2.5 mg 2.5 mg 2.5 mg   Tue 1.25 mg 1.25 mg 1.25 mg   Wed 2.5 mg 2.5 mg 2.5 mg   Thu 1.25 mg 1.25 mg 1.25 mg   Fri 2.5 mg 2.5 mg 2.5 mg   Sat 2.5 mg 2.5 mg 2.5 mg   Visit Report - - -   Some recent data might be hidden       Plan:  1. INR is therapeutic today- see above in Anticoagulation Summary.    Diana Avalos to continue their warfarin regimen- see above in Anticoagulation Summary.  2. Follow up in 1 week  3. They have been instructed to call if any changes in medications, doses, concerns, etc. Patient expresses understanding and has no further questions  at this time.    Elise Davis RP

## 2022-11-15 ENCOUNTER — TELEPHONE (OUTPATIENT)
Dept: CARDIOLOGY | Facility: CLINIC | Age: 87
End: 2022-11-15

## 2022-11-16 ENCOUNTER — ANTICOAGULATION VISIT (OUTPATIENT)
Dept: PHARMACY | Facility: HOSPITAL | Age: 87
End: 2022-11-16

## 2022-11-16 LAB — INR PPP: 2.1

## 2022-11-16 NOTE — PROGRESS NOTES
Anticoagulation Clinic Progress Note    Anticoagulation Summary  As of 2022    INR goal:  2.0-3.0   TTR:  72.1 % (4.1 y)   INR used for dosin.10 (2022)   Warfarin maintenance plan:  1.25 mg every Sun, Tue, Thu; 2.5 mg all other days; Starting 2022   Weekly warfarin total:  13.75 mg   No change documented:  Elise Davis RPH   Plan last modified:  Elise Davis RPH (2022)   Next INR check:  2022   Priority:  Maintenance   Target end date:  Indefinite    Indications    Long-term (current) use of anticoagulants (Resolved) [Z79.01]             Anticoagulation Episode Summary     INR check location:      Preferred lab:      Send INR reminders to:   [facility] TEST POOL    Comments:  [facility] --- SPEAK WITH DAUGHTER (LYNETTE) **DO NOT SPEAK WITH PATIENT (poor memory)** **CALL EVERY TIME**      Anticoagulation Care Providers     Provider Role Specialty Phone number    Rafaela Leija MD Referring Cardiology [phone]          Clinic Interview:  No pertinent clinical findings have been reported.    INR History:  Anticoagulation Monitoring 2022   INR 2.90 2.80 2.10   INR Date 2022   INR Goal 2.0-3.0 2.0-3.0 2.0-3.0   Trend Same Same Same   Last Week Total 13.75 mg 13.75 mg 13.75 mg   Next Week Total 13.75 mg 13.75 mg 13.75 mg   Sun 1.25 mg 1.25 mg 1.25 mg   Mon 2.5 mg 2.5 mg 2.5 mg   Tue 1.25 mg 1.25 mg 1.25 mg   Wed 2.5 mg 2.5 mg 2.5 mg   Thu 1.25 mg 1.25 mg 1.25 mg   Fri 2.5 mg 2.5 mg 2.5 mg   Sat 2.5 mg 2.5 mg 2.5 mg   Visit Report - - -   Some recent data might be hidden       Plan:  1. INR is therapeutic today- see above in Anticoagulation Summary.    Diana Avalos to continue their warfarin regimen- see above in Anticoagulation Summary.  2. Follow up in 1 week  3. They have been instructed to call if any changes in medications, doses, concerns, etc. Patient expresses understanding and has no further  questions at this time.    Elise Davis, RP

## 2022-11-16 NOTE — TELEPHONE ENCOUNTER
She just had blood work completed at her PCP office on 11/8/2022.  TSH normal.  CMP normal except for potassium of 5.2 and GFR was 55.  Stable blood work.

## 2022-11-23 ENCOUNTER — ANTICOAGULATION VISIT (OUTPATIENT)
Dept: PHARMACY | Facility: HOSPITAL | Age: 87
End: 2022-11-23

## 2022-11-23 LAB — INR PPP: 2.7

## 2022-11-23 NOTE — PROGRESS NOTES
Anticoagulation Clinic Progress Note    Anticoagulation Summary  As of 2022    INR goal:  2.0-3.0   TTR:  72.3 % (4.1 y)   INR used for dosin.70 (2022)   Warfarin maintenance plan:  1.25 mg every Sun, Tue, Thu; 2.5 mg all other days; Starting 2022   Weekly warfarin total:  13.75 mg   No change documented:  Jayant White, PharmD   Plan last modified:  Elise Davis RPH (2022)   Next INR check:  2022   Priority:  Maintenance   Target end date:  Indefinite    Indications    Long-term (current) use of anticoagulants (Resolved) [Z79.01]             Anticoagulation Episode Summary     INR check location:      Preferred lab:      Send INR reminders to:   [facility] TEST POOL    Comments:  [facility] --- SPEAK WITH DAUGHTER (LYNETTE) **DO NOT SPEAK WITH PATIENT (poor memory)** **CALL EVERY TIME**      Anticoagulation Care Providers     Provider Role Specialty Phone number    Rafaela Leija MD Referring Cardiology [phone]          Clinic Interview:  No pertinent clinical findings have been reported.    INR History:  Anticoagulation Monitoring 2022   INR 2.80 2.10 2.70   INR Date 2022   INR Goal 2.0-3.0 2.0-3.0 2.0-3.0   Trend Same Same Same   Last Week Total 13.75 mg 13.75 mg 13.75 mg   Next Week Total 13.75 mg 13.75 mg 13.75 mg   Sun 1.25 mg 1.25 mg 1.25 mg   Mon 2.5 mg 2.5 mg 2.5 mg   Tue 1.25 mg 1.25 mg 1.25 mg   Wed 2.5 mg 2.5 mg 2.5 mg   Thu 1.25 mg 1.25 mg 1.25 mg   Fri 2.5 mg 2.5 mg 2.5 mg   Sat 2.5 mg 2.5 mg 2.5 mg   Visit Report - - -   Some recent data might be hidden       Plan:  1. INR is therapeutic today- see above in Anticoagulation Summary.    Diana Avalos to continue their warfarin regimen- see above in Anticoagulation Summary.  2. Follow up in 1 week  3. They have been instructed to call if any changes in medications, doses, concerns, etc. Patient expresses understanding and has no further  questions at this time.    Jayant White, PharmD

## 2022-11-29 ENCOUNTER — ANTICOAGULATION VISIT (OUTPATIENT)
Dept: PHARMACY | Facility: HOSPITAL | Age: 87
End: 2022-11-29

## 2022-11-29 LAB — INR PPP: 2.4

## 2022-11-29 PROCEDURE — G0249 PROVIDE INR TEST MATER/EQUIP: HCPCS

## 2022-11-30 NOTE — PROGRESS NOTES
Anticoagulation Clinic Progress Note    Anticoagulation Summary  As of 2022    INR goal:  2.0-3.0   TTR:  72.4 % (4.1 y)   INR used for dosin.40 (2022)   Warfarin maintenance plan:  1.25 mg every Sun, e, Thu; 2.5 mg all other days; Starting 2022   Weekly warfarin total:  13.75 mg   No change documented:  Jayant White, PharmD   Plan last modified:  Elise Davis RPH (2022)   Next INR check:  2022   Priority:  Maintenance   Target end date:  Indefinite    Indications    Long-term (current) use of anticoagulants (Resolved) [Z79.01]             Anticoagulation Episode Summary     INR check location:      Preferred lab:      [facility]:   [facility]    Comments:  [facility] --- SPEAK WITH DAUGHTER (LYNETTE) **DO NOT SPEAK WITH PATIENT (poor memory)** **CALL EVERY TIME**      Anticoagulation Care Providers     Provider Role Specialty Phone number    Rafaela Leija MD Referring Cardiology [phone]          Clinic Interview:  Patient Findings     Positives:  Missed doses    Negatives:  Signs/symptoms of thrombosis, Signs/symptoms of bleeding,   Laboratory test error suspected, Change in health, Change in alcohol use,   Change in activity, Upcoming invasive procedure, Emergency department   visit, Upcoming dental procedure, Extra doses, Change in medications,   Change in diet/appetite, Hospital admission, Bruising, Other complaints    Comments:  Missed warfarin 22.       Clinical Outcomes     Negatives:  Major bleeding event, Thromboembolic event,   Anticoagulation-related hospital admission, Anticoagulation-related ED   visit, Anticoagulation-related fatality    Comments:  Missed warfarin 22.         INR History:  Anticoagulation Monitoring 2022   INR 2.10 2.70 2.40   INR Date 2022   INR Goal 2.0-3.0 2.0-3.0 2.0-3.0   Trend Same Same Same   Last Week Total 13.75 mg 13.75 mg  11.25 mg   Next Week Total 13.75 mg 13.75 mg 13.75 mg   Sun 1.25 mg 1.25 mg 1.25 mg   Mon 2.5 mg 2.5 mg 2.5 mg   Tue 1.25 mg 1.25 mg 1.25 mg   Wed 2.5 mg 2.5 mg 2.5 mg   Thu 1.25 mg 1.25 mg 1.25 mg   Fri 2.5 mg 2.5 mg 2.5 mg   Sat 2.5 mg 2.5 mg 2.5 mg   Visit Report - - -   Some recent data might be hidden       Plan:  1. INR is Therapeutic today- see above in Anticoagulation Summary.   Will instruct Diana Avalos to Continue their warfarin regimen- see above in Anticoagulation Summary.  2. Follow up in 1 week  3. They have been instructed to call if any changes in medications, doses, concerns, etc. Patient expresses understanding and has no further questions at this time.    Jayant White, PharmD

## 2022-12-06 ENCOUNTER — ANTICOAGULATION VISIT (OUTPATIENT)
Dept: PHARMACY | Facility: HOSPITAL | Age: 87
End: 2022-12-06

## 2022-12-06 LAB — INR PPP: 2.1

## 2022-12-06 NOTE — PROGRESS NOTES
Anticoagulation Clinic Progress Note    Anticoagulation Summary  As of 2022    INR goal:  2.0-3.0   TTR:  72.5 % (4.2 y)   INR used for dosin.10 (2022)   Warfarin maintenance plan:  1.25 mg every Sun, Tue, Thu; 2.5 mg all other days; Starting 2022   Weekly warfarin total:  13.75 mg   No change documented:  Elise Davis RPH   Plan last modified:  Elise Davis RPH (2022)   Next INR check:  2022   Priority:  Maintenance   Target end date:  Indefinite    Indications    Long-term (current) use of anticoagulants (Resolved) [Z79.01]             Anticoagulation Episode Summary     INR check location:      Preferred lab:      Send INR reminders to:   [facility] TEST POOL    Comments:  [facility] --- SPEAK WITH DAUGHTER (LYNETTE) **DO NOT SPEAK WITH PATIENT (poor memory)** **CALL EVERY TIME**      Anticoagulation Care Providers     Provider Role Specialty Phone number    Rafaela Leija MD Referring Cardiology [phone]          Clinic Interview:  No pertinent clinical findings have been reported.    INR History:  Anticoagulation Monitoring 2022   INR 2.70 2.40 2.10   INR Date 2022   INR Goal 2.0-3.0 2.0-3.0 2.0-3.0   Trend Same Same Same   Last Week Total 13.75 mg 11.25 mg 13.75 mg   Next Week Total 13.75 mg 13.75 mg 13.75 mg   Sun 1.25 mg 1.25 mg 1.25 mg   Mon 2.5 mg 2.5 mg 2.5 mg   Tue 1.25 mg 1.25 mg 1.25 mg   Wed 2.5 mg 2.5 mg 2.5 mg   Thu 1.25 mg 1.25 mg 1.25 mg   Fri 2.5 mg 2.5 mg 2.5 mg   Sat 2.5 mg 2.5 mg 2.5 mg   Visit Report - - -   Some recent data might be hidden       Plan:  1. INR is therapeutic today- see above in Anticoagulation Summary.    Diana Avalos to continue their warfarin regimen- see above in Anticoagulation Summary.  2. Follow up in 1 week  3.  They have been instructed to call if any changes in medications, doses, concerns, etc. Patient expresses understanding and has no further  questions at this time.    Elise Davis, RP

## 2022-12-13 ENCOUNTER — ANTICOAGULATION VISIT (OUTPATIENT)
Dept: PHARMACY | Facility: HOSPITAL | Age: 87
End: 2022-12-13

## 2022-12-13 LAB — INR PPP: 2.6

## 2022-12-13 NOTE — PROGRESS NOTES
Anticoagulation Clinic Progress Note    Anticoagulation Summary  As of 2022    INR goal:  2.0-3.0   TTR:  72.6 % (4.2 y)   INR used for dosin.60 (2022)   Warfarin maintenance plan:  1.25 mg every Sun, Tue, Thu; 2.5 mg all other days; Starting 2022   Weekly warfarin total:  13.75 mg   No change documented:  Elise Davis RPH   Plan last modified:  Elise Davis RPH (2022)   Next INR check:  2022   Priority:  Maintenance   Target end date:  Indefinite    Indications    Long-term (current) use of anticoagulants (Resolved) [Z79.01]             Anticoagulation Episode Summary     INR check location:      Preferred lab:      Send INR reminders to:   [facility]    Comments:  [facility] --- SPEAK WITH DAUGHTER (LYNETTE) **DO NOT SPEAK WITH PATIENT (poor memory)** **CALL EVERY TIME**      Anticoagulation Care Providers     Provider Role Specialty Phone number    Rafaela Leija MD Referring Cardiology [phone]          Clinic Interview:  No pertinent clinical findings have been reported.    INR History:  Anticoagulation Monitoring 2022   INR 2.40 2.10 2.60   INR Date 2022   INR Goal 2.0-3.0 2.0-3.0 2.0-3.0   Trend Same Same Same   Last Week Total 11.25 mg 13.75 mg 13.75 mg   Next Week Total 13.75 mg 13.75 mg 13.75 mg   Sun 1.25 mg 1.25 mg 1.25 mg   Mon 2.5 mg 2.5 mg 2.5 mg   Tue 1.25 mg 1.25 mg 1.25 mg   Wed 2.5 mg 2.5 mg 2.5 mg   Thu 1.25 mg 1.25 mg 1.25 mg   Fri 2.5 mg 2.5 mg 2.5 mg   Sat 2.5 mg 2.5 mg 2.5 mg   Visit Report - - -   Some recent data might be hidden       Plan:  1. INR is therapeutic today- see above in Anticoagulation Summary.    Diana Avalos to continue their warfarin regimen- see above in Anticoagulation Summary.  2. Follow up in 1 week  3.  They have been instructed to call if any changes in medications, doses, concerns, etc. Patient expresses understanding and has no further  questions at this time.    Elise Davis, RP

## 2022-12-21 ENCOUNTER — ANTICOAGULATION VISIT (OUTPATIENT)
Dept: PHARMACY | Facility: HOSPITAL | Age: 87
End: 2022-12-21

## 2022-12-21 LAB — INR PPP: 1.5

## 2022-12-21 NOTE — PROGRESS NOTES
Anticoagulation Clinic Progress Note    Anticoagulation Summary  As of 2022    INR goal:  2.0-3.0   TTR:  72.5 % (4.2 y)   INR used for dosin.50 (2022)   Warfarin maintenance plan:  1.25 mg every Sun, Tue, Thu; 2.5 mg all other days; Starting 2022   Weekly warfarin total:  13.75 mg   No change documented:  Elise Davis RPH   Plan last modified:  Elise Davis RPH (2022)   Next INR check:  2022   Priority:  Maintenance   Target end date:  Indefinite    Indications    Long-term (current) use of anticoagulants (Resolved) [Z79.01]             Anticoagulation Episode Summary     INR check location:      Preferred lab:      Send INR reminders to:   [facility]    Comments:  [facility] --- SPEAK WITH DAUGHTER (LYNETTE) **DO NOT SPEAK WITH PATIENT (poor memory)** **CALL EVERY TIME**      Anticoagulation Care Providers     Provider Role Specialty Phone number    Rafaela Leija MD Referring Cardiology [phone]          Clinic Interview:  Patient Findings     Positives:  Other complaints    Comments:  No changes, reports she had to do the reading 4 times. She is   reporting the machine was having issues (too little sample, then 1.5, then   didn't read at all x 2). She reports the 1.5 was from the same blood   sample so it is worrisome that it may have had longer to   clot). Will recheck INR tomorrow.       Clinical Outcomes     Comments:  No changes, reports she had to do the reading 4 times. She is   reporting the machine was having issues (too little sample, then 1.5, then   didn't read at all x 2). She reports the 1.5 was from the same blood   sample so it is worrisome that it may have had longer to   clot). Will recheck INR tomorrow.         INR History:  Anticoagulation Monitoring 2022   INR 2.10 2.60 1.50   INR Date 2022   INR Goal 2.0-3.0 2.0-3.0 2.0-3.0   Trend Same Same Same   Last Week Total  13.75 mg 13.75 mg 13.75 mg   Next Week Total 13.75 mg 13.75 mg 13.75 mg   Sun 1.25 mg 1.25 mg -   Mon 2.5 mg 2.5 mg -   Tue 1.25 mg 1.25 mg -   Wed 2.5 mg 2.5 mg 2.5 mg   Thu 1.25 mg 1.25 mg -   Fri 2.5 mg 2.5 mg -   Sat 2.5 mg 2.5 mg -   Visit Report - - -   Some recent data might be hidden       Plan:  1. INR is Subtherapeutic today- see above in Anticoagulation Summary.   Will instruct Diana Avalos to Continue their warfarin regimen- see above in Anticoagulation Summary.    No changes, reports she had to do the reading 4 times. She is   reporting the machine was having issues (too little sample, then 1.5, then   didn't read at all x 2). She reports the 1.5 was from the same blood   sample as the too little of a blood sample, so it is worrisome that it may have had longer to   clot). Will recheck INR tomorrow.       2. Follow up in 1 day.   3.  They have been instructed to call if any changes in medications, doses, concerns, etc. Patient expresses understanding and has no further questions at this time.    Elise Davis [facility]

## 2022-12-22 ENCOUNTER — ANTICOAGULATION VISIT (OUTPATIENT)
Dept: PHARMACY | Facility: HOSPITAL | Age: 87
End: 2022-12-22

## 2022-12-22 LAB — INR PPP: 2.6

## 2022-12-22 NOTE — PROGRESS NOTES
Anticoagulation Clinic Progress Note    Anticoagulation Summary  As of 2022    INR goal:  2.0-3.0   TTR:  72.5 % (4.2 y)   INR used for dosin.60 (2022)   Warfarin maintenance plan:  1.25 mg every Sun, Tue, Thu; 2.5 mg all other days; Starting 2022   Weekly warfarin total:  13.75 mg   No change documented:  Elise Davis RPH   Plan last modified:  Elise Davis RPH (2022)   Next INR check:  2022   Priority:  Maintenance   Target end date:  Indefinite    Indications    Long-term (current) use of anticoagulants (Resolved) [Z79.01]             Anticoagulation Episode Summary     INR check location:      Preferred lab:      Send INR reminders to:   [facility] TEST PO[facility]    Comments:  [facility] --- SPEAK WITH DAUGHTER (LYNETTE) **DO NOT SPEAK WITH PATIENT (poor memory)** **CALL EVERY TIME**      Anticoagulation Care Providers     Provider Role Specialty Phone number    Rafaela Leija MD Referring Cardiology [phone]          Clinic Interview:  No pertinent clinical findings have been reported.    INR History:  Anticoagulation Monitoring 2022   INR 2.60 1.50 2.60   INR Date 2022   INR Goal 2.0-3.0 2.0-3.0 2.0-3.0   Trend Same Same Same   Last Week Total 13.75 mg 13.75 mg 13.75 mg   Next Week Total 13.75 mg 13.75 mg 13.75 mg   Sun 1.25 mg - 1.25 mg   Mon 2.5 mg - 2.5 mg   Tue 1.25 mg - 1.25 mg   Wed 2.5 mg 2.5 mg 2.5 mg   Thu 1.25 mg - 1.25 mg   Fri 2.5 mg - 2.5 mg   Sat 2.5 mg - 2.5 mg   Visit Report - - -   Some recent data might be hidden       Plan:  1. INR is therapeutic today- see above in Anticoagulation Summary.    Diana MARISEL Robbie to continue their warfarin regimen- see above in Anticoagulation Summary.  2. Follow up in 1 week  3.  They have been instructed to call if any changes in medications, doses, concerns, etc. Patient expresses understanding and has no further questions at this time.    Elise  Ryan, [facility]   1-2x/week

## 2022-12-28 ENCOUNTER — ANTICOAGULATION VISIT (OUTPATIENT)
Dept: PHARMACY | Facility: HOSPITAL | Age: 87
End: 2022-12-28

## 2022-12-28 LAB — INR PPP: 2.6

## 2022-12-28 PROCEDURE — G0249 PROVIDE INR TEST MATER/EQUIP: HCPCS

## 2022-12-28 NOTE — PROGRESS NOTES
Anticoagulation Clinic Progress Note    Anticoagulation Summary  As of 2022    INR goal:  2.0-3.0   TTR:  72.6 % (4.2 y)   INR used for dosin.60 (2022)   Warfarin maintenance plan:  1.25 mg every Sun, Tue, Thu; 2.5 mg all other days; Starting 2022   Weekly warfarin total:  13.75 mg   No change documented:  Elise Davis RPH   Plan last modified:  Elise Davis RPH (2022)   Next INR check:  2023   Priority:  Maintenance   Target end date:  Indefinite    Indications    Long-term (current) use of anticoagulants (Resolved) [Z79.01]             Anticoagulation Episode Summary     INR check location:      Preferred lab:      Send INR reminders to:   [facility] POOL    Comments:  [facility] --- SPEAK WITH DAUGHTER (LYNETTE) **DO NOT SPEAK WITH PATIENT (poor memory)** **CALL EVERY TIME**      Anticoagulation Care Providers     Provider Role Specialty Phone number    Rafaela Leija MD Referring Cardiology [phone]          Clinic Interview:  No pertinent clinical findings have been reported.    INR History:  Anticoagulation Monitoring 2022   INR 1.50 2.60 2.60   INR Date 2022   INR Goal 2.0-3.0 2.0-3.0 2.0-3.0   Trend Same Same Same   Last Week Total 13.75 mg 13.75 mg 13.75 mg   Next Week Total 13.75 mg 13.75 mg 13.75 mg   Sun - 1.25 mg 1.25 mg   Mon - 2.5 mg 2.5 mg   Tue - 1.25 mg 1.25 mg   Wed 2.5 mg 2.5 mg 2.5 mg   Thu - 1.25 mg 1.25 mg   Fri - 2.5 mg 2.5 mg   Sat - 2.5 mg 2.5 mg   Visit Report - - -   Some recent data might be hidden       Plan:  1. INR is therapeutic today- see above in Anticoagulation Summary.    Diana MARISEL Robbie to continue their warfarin regimen- see above in Anticoagulation Summary.  2. Follow up in 1 week  3.  They have been instructed to call if any changes in medications, doses, concerns, etc. Patient expresses understanding and has no further questions at this time.    Elise  Ryan, [facility]

## 2023-01-04 ENCOUNTER — ANTICOAGULATION VISIT (OUTPATIENT)
Dept: PHARMACY | Facility: HOSPITAL | Age: 88
End: 2023-01-04
Payer: MEDICARE

## 2023-01-04 LAB — INR PPP: 2.1

## 2023-01-04 NOTE — PROGRESS NOTES
Anticoagulation Clinic Progress Note    Anticoagulation Summary  As of 2023    INR goal:  2.0-3.0   TTR:  72.7 % (4.2 y)   INR used for dosin.10 (2023)   Warfarin maintenance plan:  1.25 mg every Sun, Tue, Thu; 2.5 mg all other days; Starting 2023   Weekly warfarin total:  13.75 mg   No change documented:  Elise Davis RPH   Plan last modified:  Elise Davis RPH (2022)   Next INR check:  2023   Priority:  Maintenance   Target end date:  Indefinite    Indications    Long-term (current) use of anticoagulants (Resolved) [Z79.01]             Anticoagulation Episode Summary     INR check location:      Preferred lab:      Send INR reminders to:   [facility]    Comments:  [facility] --- SPEAK WITH DAUGHTER (LYNETTE) **DO NOT SPEAK WITH PATIENT (poor memory)** **CALL EVERY TIME**      Anticoagulation Care Providers     Provider Role Specialty Phone number    Rafaela Leija MD Referring Cardiology [phone]          Clinic Interview:  No pertinent clinical findings have been reported.    INR History:  Anticoagulation Monitoring 2022   INR 2.60 2.60 2.10   INR Date 2022   INR Goal 2.0-3.0 2.0-3.0 2.0-3.0   Trend Same Same Same   Last Week Total 13.75 mg 13.75 mg 13.75 mg   Next Week Total 13.75 mg 13.75 mg 13.75 mg   Sun 1.25 mg 1.25 mg 1.25 mg   Mon 2.5 mg 2.5 mg 2.5 mg   Tue 1.25 mg 1.25 mg 1.25 mg   Wed 2.5 mg 2.5 mg 2.5 mg   Thu 1.25 mg 1.25 mg 1.25 mg   Fri 2.5 mg 2.5 mg 2.5 mg   Sat 2.5 mg 2.5 mg 2.5 mg   Visit Report - - -   Some recent data might be hidden       Plan:  1. INR is therapeutic today- see above in Anticoagulation Summary.    Diana Avalos to continue their warfarin regimen- see above in Anticoagulation Summary.  2. Follow up in 1 week  3. They have been instructed to call if any changes in medications, doses, concerns, etc. Patient expresses understanding and has no further questions at  this time.    Elise Davis [facility]

## 2023-01-11 ENCOUNTER — ANTICOAGULATION VISIT (OUTPATIENT)
Dept: PHARMACY | Facility: HOSPITAL | Age: 88
End: 2023-01-11
Payer: MEDICARE

## 2023-01-11 LAB — INR PPP: 2.1

## 2023-01-18 LAB — INR PPP: 2.5

## 2023-01-19 ENCOUNTER — ANTICOAGULATION VISIT (OUTPATIENT)
Dept: PHARMACY | Facility: HOSPITAL | Age: 88
End: 2023-01-19
Payer: MEDICARE

## 2023-01-19 NOTE — PROGRESS NOTES
Anticoagulation Clinic Progress Note    Anticoagulation Summary  As of 2023    INR goal:  2.0-3.0   TTR:  72.9 % (4.3 y)   INR used for dosin.50 (2023)   Warfarin maintenance plan:  1.25 mg every Sun, Tue, Thu; 2.5 mg all other days; Starting 2023   Weekly warfarin total:  13.75 mg   No change documented:  Elise Davis RPH   Plan last modified:  Elise Davis RPH (2022)   Next INR check:  2023   Priority:  Maintenance   Target end date:  Indefinite    Indications    Long-term (current) use of anticoagulants (Resolved) [Z79.01]             Anticoagulation Episode Summary     INR check location:      Preferred lab:      [facility]    Comments:  [facility] --- SPEAK WITH DAUGHTER (LYNETTE) **DO NOT SPEAK WITH PATIENT (poor memory)** **CALL EVERY TIME**      Anticoagulation Care Providers     Provider Role Specialty Phone number    Rafaela Leija MD Referring Cardiology [phone]          Clinic Interview:  No pertinent clinical findings have been reported.    INR History:  Anticoagulation Monitoring 2023   INR 2.10 2.10 2.50   INR Date 2023   INR Goal 2.0-3.0 2.0-3.0 2.0-3.0   Trend Same Same Same   Last Week Total 13.75 mg 13.75 mg 13.75 mg   Next Week Total 13.75 mg 13.75 mg 13.75 mg   Sun 1.25 mg 1.25 mg 1.25 mg   Mon 2.5 mg 2.5 mg 2.5 mg   Tue 1.25 mg 1.25 mg 1.25 mg   Wed 2.5 mg 2.5 mg -   Thu 1.25 mg 1.25 mg 1.25 mg   Fri 2.5 mg 2.5 mg 2.5 mg   Sat 2.5 mg 2.5 mg 2.5 mg   Visit Report - - -   Some recent data might be hidden       Plan:  1. INR is therapeutic today- see above in Anticoagulation Summary.    Diana Avalos to continue their warfarin regimen- see above in Anticoagulation Summary.  2. Follow up in 1 week  3. They have been instructed to call if any changes in medications, doses, concerns, etc. Patient expresses understanding and has no further questions at this  time.    Elise Davis, [facility]

## 2023-01-25 ENCOUNTER — ANTICOAGULATION VISIT (OUTPATIENT)
Dept: PHARMACY | Facility: HOSPITAL | Age: 88
End: 2023-01-25
Payer: MEDICARE

## 2023-01-25 LAB — INR PPP: 2.6

## 2023-01-25 PROCEDURE — G0249 PROVIDE INR TEST MATER/EQUIP: HCPCS

## 2023-01-25 NOTE — PROGRESS NOTES
Anticoagulation Clinic Progress Note    Anticoagulation Summary  As of 2023    INR goal:  2.0-3.0   TTR:  73.0 % (4.3 y)   INR used for dosin.60 (2023)   Warfarin maintenance plan:  1.25 mg every Sun, Tue, Thu; 2.5 mg all other days; Starting 2023   Weekly warfarin total:  13.75 mg   No change documented:  Jayant White, PharmD   Plan last modified:  Elise Davis RPH (2022)   Next INR check:  2023   Priority:  Maintenance   Target end date:  Indefinite    Indications    Long-term (current) use of anticoagulants (Resolved) [Z79.01]             Anticoagulation Episode Summary     INR check location:      Preferred lab:      Send INR reminders to:   [facility] TEST POOL    Comments:  [facility] --- SPEAK WITH DAUGHTER (LYNETTE) **DO NOT SPEAK WITH PATIENT (poor memory)** **CALL EVERY TIME**      Anticoagulation Care Providers     Provider Role Specialty Phone number    Rafaela Leija MD Referring Cardiology [phone]          Clinic Interview:  Patient Findings     Negatives:  Signs/symptoms of thrombosis, Signs/symptoms of bleeding,   Laboratory test error suspected, Change in health, Change in alcohol use,   Change in activity, Upcoming invasive procedure, Emergency department   visit, Upcoming dental procedure, Missed doses, Extra doses, Change in   medications, Change in diet/appetite, Hospital admission, Bruising, Other   complaints      Clinical Outcomes     Negatives:  Major bleeding event, Thromboembolic event,   Anticoagulation-related hospital admission, Anticoagulation-related ED   visit, Anticoagulation-related fatality        INR History:  Anticoagulation Monitoring 2023   INR 2.10 2.50 2.60   INR Date 2023   INR Goal 2.0-3.0 2.0-3.0 2.0-3.0   Trend Same Same Same   Last Week Total 13.75 mg 13.75 mg 13.75 mg   Next Week Total 13.75 mg 13.75 mg 13.75 mg   Sun 1.25 mg 1.25 mg 1.25 mg   Mon 2.5 mg 2.5 mg 2.5  mg   Tue 1.25 mg 1.25 mg 1.25 mg   Wed 2.5 mg - 2.5 mg   Thu 1.25 mg 1.25 mg 1.25 mg   Fri 2.5 mg 2.5 mg 2.5 mg   Sat 2.5 mg 2.5 mg 2.5 mg   Visit Report - - -   Some recent data might be hidden       Plan:  1. INR is Therapeutic today- see above in Anticoagulation Summary.   Will instruct Diana Avalos to Continue their warfarin regimen- see above in Anticoagulation Summary.  2. Follow up in 1 week  3. They have been instructed to call if any changes in medications, doses, concerns, etc. Patient expresses understanding and has no further questions at this time.    Jayant White, PharmD

## 2023-02-01 ENCOUNTER — ANTICOAGULATION VISIT (OUTPATIENT)
Dept: PHARMACY | Facility: HOSPITAL | Age: 88
End: 2023-02-01
Payer: MEDICARE

## 2023-02-01 LAB — INR PPP: 2.7

## 2023-02-01 NOTE — PROGRESS NOTES
Anticoagulation Clinic Progress Note    Anticoagulation Summary  As of 2023    INR goal:  2.0-3.0   TTR:  73.2 % (4.3 y)   INR used for dosin.70 (2023)   Warfarin maintenance plan:  1.25 mg every Sun, Tue, Thu; 2.5 mg all other days; Starting 2023   Weekly warfarin total:  13.75 mg   No change documented:  Elise Davis RPH   Plan last modified:  Elise Davis RPH (2022)   Next INR check:  2023   Priority:  Maintenance   Target end date:  Indefinite    Indications    Long-term (current) use of anticoagulants (Resolved) [Z79.01]             Anticoagulation Episode Summary     INR check location:      Preferred lab:      [facility]:   [facility]    Comments:  [facility] --- SPEAK WITH DAUGHTER (LYNETTE) **DO NOT SPEAK WITH PATIENT (poor memory)** **CALL EVERY TIME**      Anticoagulation Care Providers     Provider Role Specialty Phone number    Rafaela Leija MD Referring Cardiology [phone]          Clinic Interview:  No pertinent clinical findings have been reported.    INR History:  Anticoagulation Monitoring 2023   INR 2.50 2.60 2.70   INR Date 2023   INR Goal 2.0-3.0 2.0-3.0 2.0-3.0   Trend Same Same Same   Last Week Total 13.75 mg 13.75 mg 13.75 mg   Next Week Total 13.75 mg 13.75 mg 13.75 mg   Sun 1.25 mg 1.25 mg 1.25 mg   Mon 2.5 mg 2.5 mg 2.5 mg   Tue 1.25 mg 1.25 mg 1.25 mg   Wed - 2.5 mg 2.5 mg   Thu 1.25 mg 1.25 mg 1.25 mg   Fri 2.5 mg 2.5 mg 2.5 mg   Sat 2.5 mg 2.5 mg 2.5 mg   Visit Report - - -   Some recent data might be hidden       Plan:  1. INR is therapeutic today- see above in Anticoagulation Summary.    Diana Avalos to continue their warfarin regimen- see above in Anticoagulation Summary.  2. Follow up in 1 week  3.They have been instructed to call if any changes in medications, doses, concerns, etc. Patient expresses understanding and has no further questions at this  time.    Elise Davis, [facility]

## 2023-02-08 LAB — INR PPP: 2.7

## 2023-02-09 ENCOUNTER — ANTICOAGULATION VISIT (OUTPATIENT)
Dept: PHARMACY | Facility: HOSPITAL | Age: 88
End: 2023-02-09
Payer: MEDICARE

## 2023-02-09 NOTE — PROGRESS NOTES
Anticoagulation Clinic Progress Note    Anticoagulation Summary  As of 2023    INR goal:  2.0-3.0   TTR:  73.3 % (4.3 y)   INR used for dosin.70 (2023)   Warfarin maintenance plan:  1.25 mg every Sun, Tue, Thu; 2.5 mg all other days; Starting 2023   Weekly warfarin total:  13.75 mg   No change documented:  Jayant White, PharmD   Plan last modified:  Elise Davis RPH (2022)   Next INR check:  2/15/2023   Priority:  Maintenance   Target end date:  Indefinite    Indications    Long-term (current) use of anticoagulants (Resolved) [Z79.01]             Anticoagulation Episode Summary     INR check location:      Preferred lab:      [facility]:   [facility]    Comments:  [facility] --- SPEAK WITH DAUGHTER (MAGUI) **DO NOT SPEAK WITH PATIENT (poor memory)** **CALL EVERY TIME**      Anticoagulation Care Providers     Provider Role Specialty Phone number    Rafaela Leija MD Referring Cardiology [phone]          Clinic Interview:  No pertinent clinical findings have been reported.    INR History:  Anticoagulation Monitoring 2023   INR 2.60 2.70 2.70   INR Date 2023   INR Goal 2.0-3.0 2.0-3.0 2.0-3.0   Trend Same Same Same   Last Week Total 13.75 mg 13.75 mg 13.75 mg   Next Week Total 13.75 mg 13.75 mg 13.75 mg   Sun 1.25 mg 1.25 mg 1.25 mg   Mon 2.5 mg 2.5 mg 2.5 mg   Tue 1.25 mg 1.25 mg 1.25 mg   Wed 2.5 mg 2.5 mg -   Thu 1.25 mg 1.25 mg 1.25 mg   Fri 2.5 mg 2.5 mg 2.5 mg   Sat 2.5 mg 2.5 mg 2.5 mg   Visit Report - - -   Some recent data might be hidden       Plan:  1. INR is therapeutic today- see above in Anticoagulation Summary.    Diana Avalos to continue their warfarin regimen- see above in Anticoagulation Summary.  2. Follow up in 1 week  3. Left secure voicemail for Magui Cassidy (daughter) with instructions. They have been instructed to call if any changes in medications, doses, concerns, etc.  Patient expresses understanding and has no further questions at this time.    Jayant White, PharmD

## 2023-02-13 RX ORDER — WARFARIN SODIUM 2.5 MG/1
TABLET ORAL
Qty: 80 TABLET | Refills: 1 | Status: SHIPPED | OUTPATIENT
Start: 2023-02-13

## 2023-02-15 LAB — INR PPP: 2.4

## 2023-02-16 ENCOUNTER — ANTICOAGULATION VISIT (OUTPATIENT)
Dept: PHARMACY | Facility: HOSPITAL | Age: 88
End: 2023-02-16
Payer: MEDICARE

## 2023-02-16 NOTE — PROGRESS NOTES
Anticoagulation Clinic Progress Note    Anticoagulation Summary  As of 2023    INR goal:  2.0-3.0   TTR:  73.4 % (4.4 y)   INR used for dosin.40 (2/15/2023)   Warfarin maintenance plan:  1.25 mg every Sun, Tue, Thu; 2.5 mg all other days; Starting 2023   Weekly warfarin total:  13.75 mg   No change documented:  Elise Davis RPH   Plan last modified:  Elise Davis RPH (2022)   Next INR check:  2023   Priority:  Maintenance   Target end date:  Indefinite    Indications    Long-term (current) use of anticoagulants (Resolved) [Z79.01]             Anticoagulation Episode Summary     INR check location:      Preferred lab:      Send INR reminders to:   [facility]    Comments:  [facility] --- SPEAK WITH DAUGHTER (LYNETTE) **DO NOT SPEAK WITH PATIENT (poor memory)** **CALL EVERY TIME**      Anticoagulation Care Providers     Provider Role Specialty Phone number    Rafaela Leija MD Referring Cardiology [phone]          Clinic Interview:  No pertinent clinical findings have been reported.    INR History:  Anticoagulation Monitoring 2023   INR 2.70 2.70 2.40   INR Date 2023 2023 2/15/2023   INR Goal 2.0-3.0 2.0-3.0 2.0-3.0   Trend Same Same Same   Last Week Total 13.75 mg 13.75 mg 13.75 mg   Next Week Total 13.75 mg 13.75 mg 13.75 mg   Sun 1.25 mg 1.25 mg 1.25 mg   Mon 2.5 mg 2.5 mg 2.5 mg   Tue 1.25 mg 1.25 mg 1.25 mg   Wed 2.5 mg - -   Thu 1.25 mg 1.25 mg 1.25 mg   Fri 2.5 mg 2.5 mg 2.5 mg   Sat 2.5 mg 2.5 mg 2.5 mg   Visit Report - - -   Some recent data might be hidden       Plan:  1. INR is therapeutic today- see above in Anticoagulation Summary.    Diana Avalos to continue their warfarin regimen- see above in Anticoagulation Summary.  2. Follow up in 1 week  3. Left voicemail for Lynette Cassidy (daughter).  They have been instructed to call if any changes in medications, doses, concerns, etc. Patient expresses understanding  and has no further questions at this time.    Elise Davis, RP

## 2023-02-22 ENCOUNTER — ANTICOAGULATION VISIT (OUTPATIENT)
Dept: PHARMACY | Facility: HOSPITAL | Age: 88
End: 2023-02-22
Payer: MEDICARE

## 2023-02-22 LAB — INR PPP: 2.2

## 2023-02-22 PROCEDURE — G0249 PROVIDE INR TEST MATER/EQUIP: HCPCS

## 2023-02-22 NOTE — PROGRESS NOTES
Anticoagulation Clinic Progress Note    Anticoagulation Summary  As of 2023    INR goal:  2.0-3.0   TTR:  73.5 % (4.4 y)   INR used for dosin.20 (2023)   Warfarin maintenance plan:  1.25 mg every Sun, Tue, Thu; 2.5 mg all other days; Starting 2023   Weekly warfarin total:  13.75 mg   No change documented:  Elise Davis RPH   Plan last modified:  Elise Davis RPH (2022)   Next INR check:  3/1/2023   Priority:  Maintenance   Target end date:  Indefinite    Indications    Long-term (current) use of anticoagulants (Resolved) [Z79.01]             Anticoagulation Episode Summary     INR check location:      Preferred lab:      [facility]:   [facility]    Comments:  [facility] --- SPEAK WITH DAUGHTER (LYNETTE) **DO NOT SPEAK WITH PATIENT (poor memory)** **CALL EVERY TIME**      Anticoagulation Care Providers     Provider Role Specialty Phone number    Rafaela Leija MD Referring Cardiology [phone]          Clinic Interview:  No pertinent clinical findings have been reported.    INR History:  Anticoagulation Monitoring 2023   INR 2.70 2.40 2.20   INR Date 2023 2/15/2023 2023   INR Goal 2.0-3.0 2.0-3.0 2.0-3.0   Trend Same Same Same   Last Week Total 13.75 mg 13.75 mg 13.75 mg   Next Week Total 13.75 mg 13.75 mg 13.75 mg   Sun 1.25 mg 1.25 mg 1.25 mg   Mon 2.5 mg 2.5 mg 2.5 mg   Tue 1.25 mg 1.25 mg 1.25 mg   Wed - - 2.5 mg   Thu 1.25 mg 1.25 mg 1.25 mg   Fri 2.5 mg 2.5 mg 2.5 mg   Sat 2.5 mg 2.5 mg 2.5 mg   Visit Report - - -   Some recent data might be hidden       Plan:  1. INR is therapeutic today- see above in Anticoagulation Summary.    Diana Avalos to continue their warfarin regimen- see above in Anticoagulation Summary.  2. Follow up in 1 week  3.  They have been instructed to call if any changes in medications, doses, concerns, etc. Patient expresses understanding and has no further questions at this  time.    Elise Davis, [facility]

## 2023-03-01 ENCOUNTER — ANTICOAGULATION VISIT (OUTPATIENT)
Dept: PHARMACY | Facility: HOSPITAL | Age: 88
End: 2023-03-01
Payer: MEDICARE

## 2023-03-01 LAB — INR PPP: 2

## 2023-03-01 NOTE — PROGRESS NOTES
Anticoagulation Clinic Progress Note    Anticoagulation Summary  As of 3/1/2023    INR goal:  2.0-3.0   TTR:  73.6 % (4.4 y)   INR used for dosin.00 (3/1/2023)   Warfarin maintenance plan:  1.25 mg every Sun, Tue, Thu; 2.5 mg all other days; Starting 3/1/2023   Weekly warfarin total:  13.75 mg   No change documented:  Elies Davis RPH   Plan last modified:  Elise Davis RPH (2022)   Next INR check:  3/8/2023   Priority:  Maintenance   Target end date:  Indefinite    Indications    Long-term (current) use of anticoagulants (Resolved) [Z79.01]             Anticoagulation Episode Summary     INR check location:      Preferred lab:      [facility]:   [facility]    Comments:  [facility] --- SPEAK WITH DAUGHTER (LYNETTE) **DO NOT SPEAK WITH PATIENT (poor memory)** **CALL EVERY TIME**      Anticoagulation Care Providers     Provider Role Specialty Phone number    Rafaela Leija MD Referring Cardiology [phone]          Clinic Interview:  No pertinent clinical findings have been reported.    INR History:  Anticoagulation Monitoring 2023 2023 3/1/2023   INR 2.40 2.20 2.00   INR Date 2/15/2023 2023 3/1/2023   INR Goal 2.0-3.0 2.0-3.0 2.0-3.0   Trend Same Same Same   Last Week Total 13.75 mg 13.75 mg 13.75 mg   Next Week Total 13.75 mg 13.75 mg 13.75 mg   Sun 1.25 mg 1.25 mg 1.25 mg   Mon 2.5 mg 2.5 mg 2.5 mg   Tue 1.25 mg 1.25 mg 1.25 mg   Wed - 2.5 mg 2.5 mg   Thu 1.25 mg 1.25 mg 1.25 mg   Fri 2.5 mg 2.5 mg 2.5 mg   Sat 2.5 mg 2.5 mg 2.5 mg   Visit Report - - -   Some recent data might be hidden       Plan:  1. INR is therapeutic today- see above in Anticoagulation Summary.    Diana Avalos to continue their warfarin regimen- see above in Anticoagulation Summary.  2. Follow up in 1 week  3. They have been instructed to call if any changes in medications, doses, concerns, etc. Patient expresses understanding and has no further questions at this  time.    Elise Davis, [facility]

## 2023-03-10 ENCOUNTER — ANTICOAGULATION VISIT (OUTPATIENT)
Dept: PHARMACY | Facility: HOSPITAL | Age: 88
End: 2023-03-10
Payer: MEDICARE

## 2023-03-10 LAB — INR PPP: 2.3

## 2023-03-10 NOTE — PROGRESS NOTES
Anticoagulation Clinic Progress Note    Anticoagulation Summary  As of 3/10/2023    INR goal:  2.0-3.0   TTR:  73.8 % (4.4 y)   INR used for dosin.30 (3/10/2023)   Warfarin maintenance plan:  1.25 mg every Sun, Tue, Thu; 2.5 mg all other days; Starting 3/10/2023   Weekly warfarin total:  13.75 mg   No change documented:  Cristi Abdul RPH   Plan last modified:  Elise Davis RPH (2022)   Next INR check:  3/17/2023   Priority:  Maintenance   Target end date:  Indefinite    Indications    Long-term (current) use of anticoagulants (Resolved) [Z79.01]             Anticoagulation Episode Summary     INR check location:      Preferred lab:      Send INR reminders to:   [facility]    Comments:  [facility] --- SPEAK WITH DAUGHTER (LYNETTE) **DO NOT SPEAK WITH PATIENT (poor memory)** **CALL EVERY TIME**      Anticoagulation Care Providers     Provider Role Specialty Phone number    Rafaela Leija MD Referring Cardiology [phone]          Clinic Interview:  Patient Findings     Negatives:  Signs/symptoms of thrombosis, Signs/symptoms of bleeding,   Laboratory test error suspected, Change in health, Change in alcohol use,   Change in activity, Upcoming invasive procedure, Emergency department   visit, Upcoming dental procedure, Missed doses, Extra doses, Change in   medications, Change in diet/appetite, Hospital admission, Bruising, Other   complaints      Clinical Outcomes     Negatives:  Major bleeding event, Thromboembolic event,   Anticoagulation-related hospital admission, Anticoagulation-related ED   visit, Anticoagulation-related fatality        INR History:  Anticoagulation Monitoring 2023 3/1/2023 3/10/2023   INR 2.20 2.00 2.30   INR Date 2023 3/1/2023 3/10/2023   INR Goal 2.0-3.0 2.0-3.0 2.0-3.0   Trend Same Same Same   Last Week Total 13.75 mg 13.75 mg 13.75 mg   Next Week Total 13.75 mg 13.75 mg 13.75 mg   Sun 1.25 mg 1.25 mg 1.25 mg   Mon 2.5 mg 2.5 mg 2.5  mg   Tue 1.25 mg 1.25 mg 1.25 mg   Wed 2.5 mg 2.5 mg 2.5 mg   Thu 1.25 mg 1.25 mg 1.25 mg   Fri 2.5 mg 2.5 mg 2.5 mg   Sat 2.5 mg 2.5 mg 2.5 mg   Visit Report - - -   Some recent data might be hidden       Plan:  1. INR is Therapeutic today- see above in Anticoagulation Summary.   Will instruct Diana Avalos to Continue their warfarin regimen- see above in Anticoagulation Summary. Information communicated with daughter/POMagui JOINER.  2. Follow up in 1 week  3. They have been instructed to call if any changes in medications, doses, concerns, etc. Patient expresses understanding and has no further questions at this time.    Cristi Abdul [facility]

## 2023-03-15 LAB — INR PPP: 2.6

## 2023-03-16 ENCOUNTER — ANTICOAGULATION VISIT (OUTPATIENT)
Dept: PHARMACY | Facility: HOSPITAL | Age: 88
End: 2023-03-16
Payer: MEDICARE

## 2023-03-16 NOTE — PROGRESS NOTES
Anticoagulation Clinic Progress Note    Anticoagulation Summary  As of 3/16/2023    INR goal:  2.0-3.0   TTR:  73.9 % (4.4 y)   INR used for dosin.60 (3/15/2023)   Warfarin maintenance plan:  1.25 mg every Sun, Tue, Thu; 2.5 mg all other days; Starting 3/16/2023   Weekly warfarin total:  13.75 mg   No change documented:  Christianne Vera, Zbigniew   Plan last modified:  Elise Davis RPH (2022)   Next INR check:  3/23/2023   Priority:  Maintenance   Target end date:  Indefinite    Indications    Long-term (current) use of anticoagulants (Resolved) [Z79.01]             Anticoagulation Episode Summary     INR check location:      Preferred lab:      Send INR reminders to:   [facility] PO[facility]    Comments:  [facility] --- SPEAK WITH DAUGHTER (LYNETTE) **DO NOT SPEAK WITH PATIENT (poor memory)** **CALL EVERY TIME**      Anticoagulation Care Providers     Provider Role Specialty Phone number    Rafaela Leija MD Referring Cardiology [phone]          Clinic Interview:  Patient Findings     Negatives:  Signs/symptoms of thrombosis, Signs/symptoms of bleeding,   Laboratory test error suspected, Change in health, Change in alcohol use,   Change in activity, Upcoming invasive procedure, Emergency department   visit, Upcoming dental procedure, Missed doses, Extra doses, Change in   medications, Change in diet/appetite, Hospital admission, Bruising, Other   complaints      Clinical Outcomes     Negatives:  Major bleeding event, Thromboembolic event,   Anticoagulation-related hospital admission, Anticoagulation-related ED   visit, Anticoagulation-related fatality        INR History:  Anticoagulation Monitoring 3/1/2023 3/10/2023 3/16/2023   INR 2.00 2.30 2.60   INR Date 3/1/2023 3/10/2023 3/15/2023   INR Goal 2.0-3.0 2.0-3.0 2.0-3.0   Trend Same Same Same   Last Week Total 13.75 mg 13.75 mg 13.75 mg   Next Week Total 13.75 mg 13.75 mg 13.75 mg   Sun 1.25 mg 1.25 mg 1.25 mg   Mon 2.5 mg 2.5 mg  2.5 mg   Tue 1.25 mg 1.25 mg 1.25 mg   Wed 2.5 mg 2.5 mg 2.5 mg   Thu 1.25 mg 1.25 mg 1.25 mg   Fri 2.5 mg 2.5 mg 2.5 mg   Sat 2.5 mg 2.5 mg 2.5 mg   Visit Report - - -   Some recent data might be hidden       Plan:  1. INR is Therapeutic today- see above in Anticoagulation Summary.   Will instruct Diana Avalos to Continue their warfarin regimen- see above in Anticoagulation Summary.  2. Follow up in 1 weeks  3. Left secure voicemail for Magui, patient's caregiver with instructions and follow up plan. Requested she call clinic with any changes or questions.   Christianne Vera, PharmD

## 2023-03-23 ENCOUNTER — ANTICOAGULATION VISIT (OUTPATIENT)
Dept: PHARMACY | Facility: HOSPITAL | Age: 88
End: 2023-03-23
Payer: MEDICARE

## 2023-03-23 LAB — INR PPP: 2.5

## 2023-03-23 PROCEDURE — G0463 HOSPITAL OUTPT CLINIC VISIT: HCPCS

## 2023-03-23 NOTE — PROGRESS NOTES
Anticoagulation Clinic Progress Note    Anticoagulation Summary  As of 3/23/2023    INR goal:  2.0-3.0   TTR:  74.0 % (4.5 y)   INR used for dosin.50 (3/23/2023)   Warfarin maintenance plan:  1.25 mg every Sun, e, Thu; 2.5 mg all other days; Starting 3/23/2023   Weekly warfarin total:  13.75 mg   No change documented:  Christianne Vera, PharmD   Plan last modified:  Elise Davis RPH (2022)   Next INR check:  3/30/2023   Priority:  Maintenance   Target end date:  Indefinite    Indications    Long-term (current) use of anticoagulants (Resolved) [Z79.01]             Anticoagulation Episode Summary     INR check location:      Preferred lab:      Send INR reminders to:  [facility] PO[facility]    Comments:  [facility] --- SPEAK WITH DAUGHTER (LYNETTE) **DO NOT SPEAK WITH PATIENT (poor memory)** **CALL EVERY TIME**      Anticoagulation Care Providers     Provider Role Specialty Phone number    Rafaela Leija MD Referring Cardiology [phone]          Clinic Interview:  Patient Findings     Negatives:  Signs/symptoms of thrombosis, Signs/symptoms of bleeding,   Laboratory test error suspected, Change in health, Change in alcohol use,   Change in activity, Upcoming invasive procedure, Emergency department   visit, Upcoming dental procedure, Missed doses, Extra doses, Change in   medications, Change in diet/appetite, Hospital admission, Bruising, Other   complaints    Comments:  Lynette (daughter) says machine is not holding a charge.      Clinical Outcomes     Negatives:  Major bleeding event, Thromboembolic event,   Anticoagulation-related hospital admission, Anticoagulation-related ED   visit, Anticoagulation-related fatality    Comments:  Lynette (daughter) says machine is not holding a charge.        INR History:  Anticoagulation Monitoring 3/10/2023 3/16/2023 3/23/2023   INR 2.30 2.60 2.50   INR Date 3/10/2023 3/15/2023 3/23/2023   INR Goal 2.0-3.0 2.0-3.0 2.0-3.0   Trend Same Same Same    Last Week Total 13.75 mg 13.75 mg 13.75 mg   Next Week Total 13.75 mg 13.75 mg 13.75 mg   Sun 1.25 mg 1.25 mg 1.25 mg   Mon 2.5 mg 2.5 mg 2.5 mg   Tue 1.25 mg 1.25 mg 1.25 mg   Wed 2.5 mg 2.5 mg 2.5 mg   Thu 1.25 mg 1.25 mg 1.25 mg   Fri 2.5 mg 2.5 mg 2.5 mg   Sat 2.5 mg 2.5 mg 2.5 mg   Visit Report - - -   Some recent data might be hidden       Plan:  1. INR is Therapeutic today- see above in Anticoagulation Summary.   Will instruct Diana Avalos to Continue their warfarin regimen- see above in Anticoagulation Summary.  2. Follow up in 1 weeks  3. Left secure voicemail for Magui (patient's daughter).  Recommended she make an appointment for clinic to evaluate issues with INR machine. Requested call back if any questions or concerns.  Christianne Vera, ErnestoD

## 2023-03-29 LAB — INR PPP: 2.4

## 2023-03-30 ENCOUNTER — ANTICOAGULATION VISIT (OUTPATIENT)
Dept: PHARMACY | Facility: HOSPITAL | Age: 88
End: 2023-03-30
Payer: MEDICARE

## 2023-03-30 NOTE — PROGRESS NOTES
Anticoagulation Clinic Progress Note    Anticoagulation Summary  As of 3/30/2023    INR goal:  2.0-3.0   TTR:  74.1 % (4.5 y)   INR used for dosin.40 (3/29/2023)   Warfarin maintenance plan:  1.25 mg every Sun, Tue, Thu; 2.5 mg all other days; Starting 3/30/2023   Weekly warfarin total:  13.75 mg   No change documented:  Elise Davis RPH   Plan last modified:  Elise Davis RPH (2022)   Next INR check:  2023   Priority:  Maintenance   Target end date:  Indefinite    Indications    Long-term (current) use of anticoagulants (Resolved) [Z79.01]             Anticoagulation Episode Summary     INR check location:      Preferred lab:      Send INR reminders to:   [facility]    Comments:  [facility] --- SPEAK WITH DAUGHTER (LYNETTE) **DO NOT SPEAK WITH PATIENT (poor memory)** **CALL EVERY TIME**      Anticoagulation Care Providers     Provider Role Specialty Phone number    Rafaela Leija MD Referring Cardiology [phone]          Clinic Interview:  No pertinent clinical findings have been reported.    INR History:  Anticoagulation Monitoring 3/16/2023 3/23/2023 3/30/2023   INR 2.60 2.50 2.40   INR Date 3/15/2023 3/23/2023 3/29/2023   INR Goal 2.0-3.0 2.0-3.0 2.0-3.0   Trend Same Same Same   Last Week Total 13.75 mg 13.75 mg 13.75 mg   Next Week Total 13.75 mg 13.75 mg 13.75 mg   Sun 1.25 mg 1.25 mg 1.25 mg   Mon 2.5 mg 2.5 mg 2.5 mg   Tue 1.25 mg 1.25 mg 1.25 mg   Wed 2.5 mg 2.5 mg -   Thu 1.25 mg 1.25 mg 1.25 mg   Fri 2.5 mg 2.5 mg 2.5 mg   Sat 2.5 mg 2.5 mg 2.5 mg   Visit Report - - -   Some recent data might be hidden       Plan:  1. INR is therapeutic today- see above in Anticoagulation Summary.    Diana Avalos to continue their warfarin regimen- see above in Anticoagulation Summary.  2. Follow up in 1 week  3.They have been instructed to call if any changes in medications, doses, concerns, etc. Patient expresses understanding and has no further questions at this  time.    Elise Davis, [facility]

## 2023-04-05 LAB — INR PPP: 2.5

## 2023-04-12 LAB — INR PPP: 1.7

## 2023-04-13 ENCOUNTER — ANTICOAGULATION VISIT (OUTPATIENT)
Dept: PHARMACY | Facility: HOSPITAL | Age: 88
End: 2023-04-13
Payer: MEDICARE

## 2023-04-13 NOTE — PROGRESS NOTES
Anticoagulation Clinic Progress Note    Anticoagulation Summary  As of 2023    INR goal:  2.0-3.0   TTR:  74.1 % (4.5 y)   INR used for dosin.70 (2023)   Warfarin maintenance plan:  1.25 mg every Sun, Tue, Thu; 2.5 mg all other days; Starting 2023   Weekly warfarin total:  13.75 mg   Plan last modified:  Elise Davis RPH (2022)   Next INR check:  2023   Priority:  Maintenance   Target end date:  Indefinite    Indications    Long-term (current) use of anticoagulants (Resolved) [Z79.01]             Anticoagulation Episode Summary     INR check location:      Preferred lab:      Send INR reminders to:   [facility] TEST POOL    Comments:  [facility] --- SPEAK WITH DAUGHTER (LYNETTE) **DO NOT SPEAK WITH PATIENT (poor memory)** **CALL EVERY TIME**      Anticoagulation Care Providers     Provider Role Specialty Phone number    Rafaela Leija MD Referring Cardiology [phone]          Clinic Interview:  Patient Findings     Negatives:  Signs/symptoms of thrombosis, Signs/symptoms of bleeding,   Laboratory test error suspected, Change in health, Change in alcohol use,   Change in activity, Upcoming invasive procedure, Emergency department   visit, Upcoming dental procedure, Missed doses, Extra doses, Change in   medications, Change in diet/appetite, Hospital admission, Bruising, Other   complaints      Clinical Outcomes     Negatives:  Major bleeding event, Thromboembolic event,   Anticoagulation-related hospital admission, Anticoagulation-related ED   visit, Anticoagulation-related fatality        INR History:      3/23/2023    12:00 AM 3/23/2023     8:30 AM 3/29/2023    12:00 AM 3/30/2023     8:35 AM 2023    12:00 AM 2023    12:00 AM 2023     9:16 AM   Anticoagulation Monitoring   INR  2.50  2.40   1.70   INR Date  3/23/2023  3/29/2023   2023   INR Goal  2.0-3.0  2.0-3.0   2.0-3.0   Trend  Same  Same   Same   Last Week Total  13.75 mg  13.75 mg    13.75 mg   Next Week Total  13.75 mg  13.75 mg   15 mg   Sun  1.25 mg  1.25 mg   1.25 mg   Mon  2.5 mg  2.5 mg   2.5 mg   Tue  1.25 mg  1.25 mg   1.25 mg   Wed  2.5 mg  -   2.5 mg   Thu  1.25 mg  1.25 mg   2.5 mg (4/13)   Fri  2.5 mg  2.5 mg   2.5 mg   Sat  2.5 mg  2.5 mg   2.5 mg   Historical INR 2.50       2.40       2.50      1.70             This result is from an external source.       Plan:  1. INR is Subtherapeutic today- see above in Anticoagulation Summary.   Will instruct Diana Avalos to Change their warfarin regimen- see above in Anticoagulation Summary.  2. Follow up in 1 weeks  3. They have been instructed to call if any changes in medications, doses, concerns, etc. Patient expresses understanding and has no further questions at this time.    Christianne Vera, PharmD

## 2023-04-19 LAB — INR PPP: 3.1

## 2023-04-20 ENCOUNTER — ANTICOAGULATION VISIT (OUTPATIENT)
Dept: PHARMACY | Facility: HOSPITAL | Age: 88
End: 2023-04-20
Payer: MEDICARE

## 2023-04-20 NOTE — PROGRESS NOTES
Anticoagulation Clinic Progress Note    Anticoagulation Summary  As of 4/20/2023    INR goal:  2.0-3.0   TTR:  74.1 % (4.5 y)   INR used for dosing:  3.10 (4/19/2023)   Warfarin maintenance plan:  1.25 mg every Sun, Tue, Thu; 2.5 mg all other days; Starting 4/20/2023   Weekly warfarin total:  13.75 mg   No change documented:  Elise Davis RPH   Plan last modified:  Elise Davis RPH (8/30/2022)   Next INR check:  4/26/2023   Priority:  Maintenance   Target end date:  Indefinite    Indications    Long-term (current) use of anticoagulants (Resolved) [Z79.01]             Anticoagulation Episode Summary     INR check location:      Preferred lab:      Send INR reminders to:   [facility]    Comments:  [facility] --- SPEAK WITH DAUGHTER (LYNETTE) **DO NOT SPEAK WITH PATIENT (poor memory)** **CALL EVERY TIME**      Anticoagulation Care Providers     Provider Role Specialty Phone number    Rafaela Leija MD Referring Cardiology [phone]          Clinic Interview:  Patient Findings     Negatives:  Signs/symptoms of thrombosis, Signs/symptoms of bleeding,   Laboratory test error suspected, Change in health, Change in alcohol use,   Change in activity, Upcoming invasive procedure, Emergency department   visit, Upcoming dental procedure, Missed doses, Extra doses, Change in   medications, Change in diet/appetite, Hospital admission, Bruising, Other   complaints      Clinical Outcomes     Negatives:  Major bleeding event, Thromboembolic event,   Anticoagulation-related hospital admission, Anticoagulation-related ED   visit, Anticoagulation-related fatality        INR History:      3/29/2023    12:00 AM 3/30/2023     8:35 AM 4/5/2023    12:00 AM 4/12/2023    12:00 AM 4/13/2023     9:16 AM 4/19/2023    12:00 AM 4/20/2023     9:36 AM   Anticoagulation Monitoring   INR  2.40   1.70  3.10   INR Date  3/29/2023   4/12/2023  4/19/2023   INR Goal  2.0-3.0   2.0-3.0  2.0-3.0   Trend  Same   Same   Same   Last Week Total  13.75 mg   13.75 mg  15 mg   Next Week Total  13.75 mg   15 mg  13.75 mg   Sun  1.25 mg   1.25 mg  1.25 mg   Mon  2.5 mg   2.5 mg  2.5 mg   Tue  1.25 mg   1.25 mg  1.25 mg   Wed  -   2.5 mg  -   Thu  1.25 mg   2.5 mg (4/13)  1.25 mg   Fri  2.5 mg   2.5 mg  2.5 mg   Sat  2.5 mg   2.5 mg  2.5 mg   Historical INR 2.40       2.50      1.70       3.10             This result is from an external source.       Plan:  1. INR is Supratherapeutic today- see above in Anticoagulation Summary.   Will instruct Diana Avalos to Continue their warfarin regimen as INR is very close to goal - see above in Anticoagulation Summary.  2. Follow up in 1 weeks  3.  They have been instructed to call if any changes in medications, doses, concerns, etc. Patient expresses understanding and has no further questions at this time.    Elise Davis [facility]

## 2023-04-26 LAB — INR PPP: 2.6

## 2023-04-27 ENCOUNTER — ANTICOAGULATION VISIT (OUTPATIENT)
Dept: PHARMACY | Facility: HOSPITAL | Age: 88
End: 2023-04-27
Payer: MEDICARE

## 2023-04-27 PROCEDURE — G0249 PROVIDE INR TEST MATER/EQUIP: HCPCS

## 2023-04-27 NOTE — PROGRESS NOTES
Anticoagulation Clinic Progress Note    Anticoagulation Summary  As of 2023    INR goal:  2.0-3.0   TTR:  74.1 % (4.6 y)   INR used for dosin.60 (2023)   Warfarin maintenance plan:  1.25 mg every Sun, Tue, Thu; 2.5 mg all other days; Starting 2023   Weekly warfarin total:  13.75 mg   No change documented:  Elise Davis RPH   Plan last modified:  Elise Davis RPH (2022)   Next INR check:  5/3/2023   Priority:  Maintenance   Target end date:  Indefinite    Indications    Long-term (current) use of anticoagulants (Resolved) [Z79.01]             Anticoagulation Episode Summary     INR check location:      Preferred lab:      Send INR reminders to:   [facility] HOME TEST POOL    Comments:  [facility] --- SPEAK WITH DAUGHTER (LYNETTE) **DO NOT SPEAK WITH PATIENT (poor memory)** **CALL EVERY TIME**      Anticoagulation Care Providers     Provider Role Specialty Phone number    Rafaela Leija MD Referring Cardiology [phone]          Clinic Interview:  No pertinent clinical findings have been reported.    INR History:      2023    12:00 AM 2023    12:00 AM 2023     9:16 AM 2023    12:00 AM 2023     9:36 AM 2023    12:00 AM 2023     8:45 AM   Anticoagulation Monitoring   INR   1.70  3.10  2.60   INR Date   2023   INR Goal   2.0-3.0  2.0-3.0  2.0-3.0   Trend   Same  Same  Same   Last Week Total   13.75 mg  15 mg  13.75 mg   Next Week Total   15 mg  13.75 mg  13.75 mg   Sun   1.25 mg  1.25 mg  1.25 mg   Mon   2.5 mg  2.5 mg  2.5 mg   Tue   1.25 mg  1.25 mg  1.25 mg   Wed   2.5 mg  -  -   Thu   2.5 mg ()  1.25 mg  1.25 mg   Fri   2.5 mg  2.5 mg  2.5 mg   Sat   2.5 mg  2.5 mg  2.5 mg   Historical INR 2.50      1.70       3.10       2.60             This result is from an external source.       Plan:  1. INR is therapeutic today- see above in Anticoagulation Summary.    Diana Avalos to continue their warfarin  regimen- see above in Anticoagulation Summary.  2. Follow up in 1 week  3. They have been instructed to call if any changes in medications, doses, concerns, etc. Patient expresses understanding and has no further questions at this time.    Elise Davis, [facility]

## 2023-05-04 LAB — INR PPP: 2.4

## 2023-05-05 ENCOUNTER — ANTICOAGULATION VISIT (OUTPATIENT)
Dept: PHARMACY | Facility: HOSPITAL | Age: 88
End: 2023-05-05
Payer: MEDICARE

## 2023-05-05 NOTE — PROGRESS NOTES
Anticoagulation Clinic Progress Note    Anticoagulation Summary  As of 2023    INR goal:  2.0-3.0   TTR:  74.3 % (4.6 y)   INR used for dosin.40 (2023)   Warfarin maintenance plan:  1.25 mg every Sun, Tue, Thu; 2.5 mg all other days; Starting 2023   Weekly warfarin total:  13.75 mg   No change documented:  Elise Davis RPH   Plan last modified:  Elise Davis RPH (2022)   Next INR check:  2023   Priority:  Maintenance   Target end date:  Indefinite    Indications    Long-term (current) use of anticoagulants (Resolved) [Z79.01]             Anticoagulation Episode Summary     INR check location:      Preferred lab:      [facility]    Comments:  [facility] --- SPEAK WITH DAUGHTER (LYNETTE) **DO NOT SPEAK WITH PATIENT (poor memory)** **CALL EVERY TIME**      Anticoagulation Care Providers     Provider Role Specialty Phone number    Rafaela Leija MD Referring Cardiology [phone]          Clinic Interview:  No pertinent clinical findings have been reported.    INR History:      2023     9:16 AM 2023    12:00 AM 2023     9:36 AM 2023    12:00 AM 2023     8:45 AM 2023    12:00 AM 2023     9:06 AM   Anticoagulation Monitoring   INR 1.70  3.10  2.60  2.40   INR Date 2023   INR Goal 2.0-3.0  2.0-3.0  2.0-3.0  2.0-3.0   Trend Same  Same  Same  Same   Last Week Total 13.75 mg  15 mg  13.75 mg  13.75 mg   Next Week Total 15 mg  13.75 mg  13.75 mg  13.75 mg   Sun 1.25 mg  1.25 mg  1.25 mg  1.25 mg   Mon 2.5 mg  2.5 mg  2.5 mg  2.5 mg   Tue 1.25 mg  1.25 mg  1.25 mg  1.25 mg   Wed 2.5 mg  -  -  2.5 mg   Thu 2.5 mg ()  1.25 mg  1.25 mg  -   Fri 2.5 mg  2.5 mg  2.5 mg  2.5 mg   Sat 2.5 mg  2.5 mg  2.5 mg  2.5 mg   Historical INR  3.10       2.60       2.40             This result is from an external source.       Plan:  1. INR is therapeutic today- see above in  Anticoagulation Summary.    Diana Avalos to continue their warfarin regimen- see above in Anticoagulation Summary.  2. Follow up in 1 week  3.  They have been instructed to call if any changes in medications, doses, concerns, etc. Patient expresses understanding and has no further questions at this time.    Elise Davis, [facility]

## 2023-05-10 ENCOUNTER — ANTICOAGULATION VISIT (OUTPATIENT)
Dept: PHARMACY | Facility: HOSPITAL | Age: 88
End: 2023-05-10
Payer: MEDICARE

## 2023-05-10 LAB — INR PPP: 2.3

## 2023-05-10 NOTE — PROGRESS NOTES
Anticoagulation Clinic Progress Note    Anticoagulation Summary  As of 5/10/2023    INR goal:  2.0-3.0   TTR:  74.3 % (4.6 y)   INR used for dosin.30 (5/10/2023)   Warfarin maintenance plan:  1.25 mg every Sun, Tue, Thu; 2.5 mg all other days; Starting 5/10/2023   Weekly warfarin total:  13.75 mg   No change documented:  Elise Davis RPH   Plan last modified:  Elise Davis RPH (2022)   Next INR check:  2023   Priority:  Maintenance   Target end date:  Indefinite    Indications    Long-term (current) use of anticoagulants (Resolved) [Z79.01]             Anticoagulation Episode Summary     INR check location:      Preferred lab:      Send INR reminders to:   [facility] TEST [facility]    Comments:  [facility] --- SPEAK WITH DAUGHTER (LYNETTE) **DO NOT SPEAK WITH PATIENT (poor memory)** **CALL EVERY TIME**      Anticoagulation Care Providers     Provider Role Specialty Phone number    Rafaela Leija MD Referring Cardiology [phone]          Clinic Interview:  No pertinent clinical findings have been reported.    INR History:      2023     9:36 AM 2023    12:00 AM 2023     8:45 AM 2023    12:00 AM 2023     9:06 AM 5/10/2023    12:00 AM 5/10/2023     2:49 PM   Anticoagulation Monitoring   INR 3.10  2.60  2.40  2.30   INR Date 2023  2023  2023  5/10/2023   INR Goal 2.0-3.0  2.0-3.0  2.0-3.0  2.0-3.0   Trend Same  Same  Same  Same   Last Week Total 15 mg  13.75 mg  13.75 mg  13.75 mg   Next Week Total 13.75 mg  13.75 mg  13.75 mg  13.75 mg   Sun 1.25 mg  1.25 mg  1.25 mg  1.25 mg   Mon 2.5 mg  2.5 mg  2.5 mg  2.5 mg   Tue 1.25 mg  1.25 mg  1.25 mg  1.25 mg   Wed -  -  2.5 mg  2.5 mg   Thu 1.25 mg  1.25 mg  -  1.25 mg   Fri 2.5 mg  2.5 mg  2.5 mg  2.5 mg   Sat 2.5 mg  2.5 mg  2.5 mg  2.5 mg   Historical INR  2.60       2.40       2.30             This result is from an external source.       Plan:  1. INR is therapeutic today- see above in  Anticoagulation Summary.    Diana Avalos to continue their warfarin regimen- see above in Anticoagulation Summary.  2. Follow up in 1 week  3. They have been instructed to call if any changes in medications, doses, concerns, etc. Patient expresses understanding and has no further questions at this time.    Elise Davis, [facility]

## 2023-05-13 ENCOUNTER — HOSPITAL ENCOUNTER (EMERGENCY)
Facility: HOSPITAL | Age: 88
Discharge: HOME | End: 2023-05-13
Attending: EMERGENCY MEDICINE
Payer: MEDICARE

## 2023-05-13 ENCOUNTER — APPOINTMENT (OUTPATIENT)
Dept: GENERAL RADIOLOGY | Facility: HOSPITAL | Age: 88
End: 2023-05-13
Payer: MEDICARE

## 2023-05-13 VITALS
OXYGEN SATURATION: 95 % | TEMPERATURE: 97.8 F | DIASTOLIC BLOOD PRESSURE: 87 MMHG | SYSTOLIC BLOOD PRESSURE: 175 MMHG | HEART RATE: 71 BPM | RESPIRATION RATE: 16 BRPM | BODY MASS INDEX: 22.76 KG/M2 | WEIGHT: 145 LBS | HEIGHT: 67 IN

## 2023-05-13 DIAGNOSIS — I34.0 NONRHEUMATIC MITRAL VALVE REGURGITATION: ICD-10-CM

## 2023-05-13 DIAGNOSIS — I36.1 NONRHEUMATIC TRICUSPID VALVE REGURGITATION: ICD-10-CM

## 2023-05-13 DIAGNOSIS — R06.02 SHORTNESS OF BREATH: Primary | ICD-10-CM

## 2023-05-13 DIAGNOSIS — I48.21 PERMANENT ATRIAL FIBRILLATION: ICD-10-CM

## 2023-05-13 DIAGNOSIS — I27.20 PULMONARY HYPERTENSION: ICD-10-CM

## 2023-05-13 LAB
ALBUMIN SERPL-MCNC: 3.6 G/DL (ref 3.5–5.2)
ALBUMIN/GLOB SERPL: 1.3 G/DL
ALP SERPL-CCNC: 75 U/L (ref 39–117)
ALT SERPL W P-5'-P-CCNC: 16 U/L (ref 1–33)
ANION GAP SERPL CALCULATED.3IONS-SCNC: 10.5 MMOL/L (ref 5–15)
AST SERPL-CCNC: 20 U/L (ref 1–32)
BASOPHILS # BLD AUTO: 0.04 10*3/MM3 (ref 0–0.2)
BASOPHILS NFR BLD AUTO: 0.7 % (ref 0–1.5)
BILIRUB SERPL-MCNC: 0.6 MG/DL (ref 0–1.2)
BUN SERPL-MCNC: 20 MG/DL (ref 8–23)
BUN/CREAT SERPL: 22.2 (ref 7–25)
CALCIUM SPEC-SCNC: 9 MG/DL (ref 8.2–9.6)
CHLORIDE SERPL-SCNC: 102 MMOL/L (ref 98–107)
CO2 SERPL-SCNC: 28.5 MMOL/L (ref 22–29)
CREAT SERPL-MCNC: 0.9 MG/DL (ref 0.57–1)
DEPRECATED RDW RBC AUTO: 44.9 FL (ref 37–54)
EGFRCR SERPLBLD CKD-EPI 2021: 60.9 ML/MIN/1.73
EOSINOPHIL # BLD AUTO: 0.09 10*3/MM3 (ref 0–0.4)
EOSINOPHIL NFR BLD AUTO: 1.5 % (ref 0.3–6.2)
ERYTHROCYTE [DISTWIDTH] IN BLOOD BY AUTOMATED COUNT: 12.9 % (ref 12.3–15.4)
GEN 5 2HR TROPONIN T REFLEX: 24 NG/L
GLOBULIN UR ELPH-MCNC: 2.8 GM/DL
GLUCOSE SERPL-MCNC: 87 MG/DL (ref 65–99)
HCT VFR BLD AUTO: 43.9 % (ref 34–46.6)
HGB BLD-MCNC: 14.8 G/DL (ref 12–15.9)
IMM GRANULOCYTES # BLD AUTO: 0.01 10*3/MM3 (ref 0–0.05)
IMM GRANULOCYTES NFR BLD AUTO: 0.2 % (ref 0–0.5)
INR PPP: 2.4 (ref 0.9–1.1)
LYMPHOCYTES # BLD AUTO: 1.69 10*3/MM3 (ref 0.7–3.1)
LYMPHOCYTES NFR BLD AUTO: 27.7 % (ref 19.6–45.3)
MCH RBC QN AUTO: 31.6 PG (ref 26.6–33)
MCHC RBC AUTO-ENTMCNC: 33.7 G/DL (ref 31.5–35.7)
MCV RBC AUTO: 93.6 FL (ref 79–97)
MONOCYTES # BLD AUTO: 0.83 10*3/MM3 (ref 0.1–0.9)
MONOCYTES NFR BLD AUTO: 13.6 % (ref 5–12)
NEUTROPHILS NFR BLD AUTO: 3.45 10*3/MM3 (ref 1.7–7)
NEUTROPHILS NFR BLD AUTO: 56.3 % (ref 42.7–76)
NRBC BLD AUTO-RTO: 0 /100 WBC (ref 0–0.2)
NT-PROBNP SERPL-MCNC: 1941 PG/ML (ref 0–1800)
PLATELET # BLD AUTO: 166 10*3/MM3 (ref 140–450)
PMV BLD AUTO: 10.5 FL (ref 6–12)
POTASSIUM SERPL-SCNC: 4.1 MMOL/L (ref 3.5–5.2)
PROT SERPL-MCNC: 6.4 G/DL (ref 6–8.5)
PROTHROMBIN TIME: 26.5 SECONDS (ref 11.7–14.2)
RBC # BLD AUTO: 4.69 10*6/MM3 (ref 3.77–5.28)
SODIUM SERPL-SCNC: 141 MMOL/L (ref 136–145)
TROPONIN T DELTA: 0 NG/L
TROPONIN T SERPL HS-MCNC: 24 NG/L
WBC NRBC COR # BLD: 6.11 10*3/MM3 (ref 3.4–10.8)

## 2023-05-13 PROCEDURE — 85610 PROTHROMBIN TIME: CPT | Performed by: EMERGENCY MEDICINE

## 2023-05-13 PROCEDURE — 85025 COMPLETE CBC W/AUTO DIFF WBC: CPT | Performed by: EMERGENCY MEDICINE

## 2023-05-13 PROCEDURE — 80053 COMPREHEN METABOLIC PANEL: CPT | Performed by: EMERGENCY MEDICINE

## 2023-05-13 PROCEDURE — 83880 ASSAY OF NATRIURETIC PEPTIDE: CPT | Performed by: EMERGENCY MEDICINE

## 2023-05-13 PROCEDURE — 93005 ELECTROCARDIOGRAM TRACING: CPT

## 2023-05-13 PROCEDURE — 99284 EMERGENCY DEPT VISIT MOD MDM: CPT

## 2023-05-13 PROCEDURE — 84484 ASSAY OF TROPONIN QUANT: CPT | Performed by: EMERGENCY MEDICINE

## 2023-05-13 PROCEDURE — 71045 X-RAY EXAM CHEST 1 VIEW: CPT

## 2023-05-13 PROCEDURE — 93005 ELECTROCARDIOGRAM TRACING: CPT | Performed by: EMERGENCY MEDICINE

## 2023-05-13 PROCEDURE — 36415 COLL VENOUS BLD VENIPUNCTURE: CPT

## 2023-05-13 NOTE — ED NOTES
Pt arrives with family member via car from home.    Pt states she developed SOA and chest pain that is sustained. It started approx 5 pm. Pt states this has happened before but usually resolves after rest.

## 2023-05-13 NOTE — ED PROVIDER NOTES
EMERGENCY DEPARTMENT ENCOUNTER    Room Number:  35/35  Date seen:  5/13/2023  PCP: Javi Brand MD  Historian: Patient, daughters at bedside      HPI:  Chief Complaint: Chest pain, shortness of air  A complete HPI/ROS/PMH/PSH/SH/FH are unobtainable due to:   Context: Diana Avalos is a 89 y.o. female who presents to the ED c/o chest pain, shortness of air.  Patient has had some increased shortness of breath over the last roughly 1 week or so.  She was on her way to [employer] today when she became more short of breath when walking from the car.  While sitting in the [employer] she developed chest pain which she described as a tightness.  It was moderate and lasted for about 30 minutes and is now resolved.  She continues to complain of mild shortness of breath.  Denies recent fever.  Denies cough.  Denies weight change or lower extremity swelling.  She is compliant with medications      MEDICAL RECORD REVIEW (non ED)  I reviewed prior medical records including most recent cardiology office visit.    PAST MEDICAL HISTORY  Active Ambulatory Problems     Diagnosis Date Noted   • Malignant neoplasm of central portion of left female breast 04/25/2016   • Osteoporosis 04/27/2016   • Permanent atrial fibrillation 10/02/2013   • Mitral valve insufficiency 11/15/2016   • Pulmonary hypertension 11/15/2016   • Osteopenia of multiple sites 04/30/2018   • Malignant neoplasm of central portion of left breast in female, estrogen receptor positive 04/30/2018   • Hypothyroidism 10/02/2013   • Pulmonary nodule 04/03/2017   • Tricuspid regurgitation 06/08/2018   • Ventricular tachycardia 01/17/2019   • Third degree AV block 07/31/2019   • Cardiac resynchronization therapy pacemaker (CRT-P) in place 07/31/2019   • RBBB (right bundle branch block) 08/04/2021   • History of breast cancer 04/25/2016   • Shortness of breath 08/11/2022     Resolved Ambulatory Problems     Diagnosis Date Noted   • Status post biventricular pacemaker  10/02/2013   • Cardiomyopathy 11/15/2016   • Pulmonary embolism 04/11/2017   • Chronic systolic congestive heart failure 06/13/2018   • Pneumothorax 06/14/2018   • S/P AV daniela ablation 07/31/2018   • Long-term (current) use of anticoagulants 09/27/2018   • Chest pain 01/05/2019   • Cardiac pacemaker in situ 07/31/2019   • Essential hypertension 10/21/2020   • Pulmonary nodule 04/03/2017   • History of CHF (congestive heart failure) 10/01/2018   • History of pulmonary embolus (PE) 04/11/2017   • Cardiac resynchronization therapy pacemaker (CRT-P) in place 10/02/2013     Past Medical History:   Diagnosis Date   • Acute on chronic systolic congestive heart failure    • Anemia    • Arthritis    • Asthma    • Atrial fibrillation    • Breast cancer    • Disease of thyroid gland    • Irritable bowel    • Mitral regurgitation    • NICM (nonischemic cardiomyopathy)    • On warfarin therapy          PAST SURGICAL HISTORY  Past Surgical History:   Procedure Laterality Date   • CARDIAC ELECTROPHYSIOLOGY PROCEDURE N/A 6/12/2018    Procedure: Device Upgrade-Upgrade to CRT-P-BIV Pacamaker Medtronic Has existing single chamber Medtronic pacemaker;  Surgeon: Leonardo David MD;  Location: [facility] INVASIVE LOCATION;  Service: Cardiovascular   • CHOLECYSTECTOMY  2000   • EYE SURGERY  2005    cataracts, Dr. Miramontes   • MASTECTOMY Left 10/2014   • PACEMAKER IMPLANTATION  2012    Dr. Leija         FAMILY HISTORY  Family History   Problem Relation Age of Onset   • Colon cancer Mother 75   • Colon cancer Sister 79   • Hypertension Sister    • Cholelithiasis Sister         2 sisters         SOCIAL HISTORY  Social History     Socioeconomic History   • Marital status:      Spouse name: Rick   • Number of children: 8   Tobacco Use   • Smoking status: Never   • Smokeless tobacco: Never   Substance and Sexual Activity   • Alcohol use: Yes     Alcohol/week: 1.0 standard drink     Types: 1 Glasses of wine per week     Comment:  caffeine use-tea   • Drug use: No   • Sexual activity: Defer         ALLERGIES  Iodinated contrast media, Amiodarone, Codeine, Dabigatran etexilate mesylate, and Digoxin        REVIEW OF SYSTEMS  Review of Systems   Constitutional: Negative for fever.   Respiratory: Positive for shortness of breath.    Cardiovascular: Positive for chest pain.   All other systems reviewed and are negative.           PHYSICAL EXAM  ED Triage Vitals [05/13/23 1743]   Temp Heart Rate Resp BP SpO2   97.8 °F (36.6 °C) 74 22 116/76 97 %      Temp src Heart Rate Source Patient Position BP Location FiO2 (%)   -- -- -- -- --       Physical Exam    GENERAL: Alert and pleasant female in no obvious distress, looks younger than stated age.  Triage vitals reviewed notable for blood pressure 116/76.  O2 sats 97% on room air.  Afebrile.  Pulse 74.  HENT: nares patent  EYES: no scleral icterus  CV: regular rhythm, regular rate-systolic murmur appreciated  RESPIRATORY: normal effort, clear to auscultation bilaterally-O2 sats mid 90s on room air  ABDOMEN: soft, nontender to palpation-no mass  MUSCULOSKELETAL: no deformity-no Cepheid swelling or tenderness to palpation  NEURO: Strength sensation and coordination are grossly intact.  Speech and mentation are unremarkable  SKIN: warm, dry-no unusual rashes are noted      Vital signs and nursing notes reviewed.          LAB RESULTS  Recent Results (from the past 24 hour(s))   ECG 12 Lead Chest Pain    Collection Time: 05/13/23  5:53 PM   Result Value Ref Range    QT Interval 424 ms   ECG 12 Lead    Collection Time: 05/13/23  6:47 PM   Result Value Ref Range    QT Interval 443 ms   Comprehensive Metabolic Panel    Collection Time: 05/13/23  7:14 PM    Specimen: Blood   Result Value Ref Range    Glucose 87 65 - 99 mg/dL    BUN 20 8 - 23 mg/dL    Creatinine 0.90 0.57 - 1.00 mg/dL    Sodium 141 136 - 145 mmol/L    Potassium 4.1 3.5 - 5.2 mmol/L    Chloride 102 98 - 107 mmol/L    CO2 28.5 22.0 - 29.0 mmol/L     Calcium 9.0 8.2 - 9.6 mg/dL    Total Protein 6.4 6.0 - 8.5 g/dL    Albumin 3.6 3.5 - 5.2 g/dL    ALT (SGPT) 16 1 - 33 U/L    AST (SGOT) 20 1 - 32 U/L    Alkaline Phosphatase 75 39 - 117 U/L    Total Bilirubin 0.6 0.0 - 1.2 mg/dL    Globulin 2.8 gm/dL    A/G Ratio 1.3 g/dL    BUN/Creatinine Ratio 22.2 7.0 - 25.0    Anion Gap 10.5 5.0 - 15.0 mmol/L    eGFR 60.9 >60.0 mL/min/1.73   Protime-INR    Collection Time: 05/13/23  7:14 PM    Specimen: Blood   Result Value Ref Range    Protime 26.5 (H) 11.7 - 14.2 Seconds    INR 2.40 (H) 0.90 - 1.10   BNP    Collection Time: 05/13/23  7:14 PM    Specimen: Blood   Result Value Ref Range    proBNP 1,941.0 (H) 0.0 - 1,800.0 pg/mL   High Sensitivity Troponin T    Collection Time: 05/13/23  7:14 PM    Specimen: Blood   Result Value Ref Range    HS Troponin T 24 (H) <10 ng/L   CBC Auto Differential    Collection Time: 05/13/23  7:14 PM    Specimen: Blood   Result Value Ref Range    WBC 6.11 3.40 - 10.80 10*3/mm3    RBC 4.69 3.77 - 5.28 10*6/mm3    Hemoglobin 14.8 12.0 - 15.9 g/dL    Hematocrit 43.9 34.0 - 46.6 %    MCV 93.6 79.0 - 97.0 fL    MCH 31.6 26.6 - 33.0 pg    MCHC 33.7 31.5 - 35.7 g/dL    RDW 12.9 12.3 - 15.4 %    RDW-SD 44.9 37.0 - 54.0 fl    MPV 10.5 6.0 - 12.0 fL    Platelets 166 140 - 450 10*3/mm3    Neutrophil % 56.3 42.7 - 76.0 %    Lymphocyte % 27.7 19.6 - 45.3 %    Monocyte % 13.6 (H) 5.0 - 12.0 %    Eosinophil % 1.5 0.3 - 6.2 %    Basophil % 0.7 0.0 - 1.5 %    Immature Grans % 0.2 0.0 - 0.5 %    Neutrophils, Absolute 3.45 1.70 - 7.00 10*3/mm3    Lymphocytes, Absolute 1.69 0.70 - 3.10 10*3/mm3    Monocytes, Absolute 0.83 0.10 - 0.90 10*3/mm3    Eosinophils, Absolute 0.09 0.00 - 0.40 10*3/mm3    Basophils, Absolute 0.04 0.00 - 0.20 10*3/mm3    Immature Grans, Absolute 0.01 0.00 - 0.05 10*3/mm3    nRBC 0.0 0.0 - 0.2 /100 WBC   High Sensitivity Troponin T 2Hr    Collection Time: 05/13/23  8:17 PM    Specimen: Blood   Result Value Ref Range    HS Troponin T 24 (H) <10  ng/L    Troponin T Delta 0 >=-4 - <+4 ng/L       Ordered the above labs and independently interpreted results. My findings will be discussed in the medical decision making section below        RADIOLOGY  XR Chest 1 View    Result Date: 5/13/2023  SINGLE VIEW OF THE CHEST  HISTORY: Chest pain and shortness of breath  COMPARISON: 07/17/2021  FINDINGS: There is cardiomegaly. There is calcification of the aorta. There is no vascular congestion. Left-sided pacemaker is present. No pneumothorax is seen. There is some scarring versus atelectasis the left lung base. No acute infiltrates are seen. No definite effusion is identified.      Left basilar scarring versus atelectasis.  This report was finalized on 5/13/2023 7:08 PM by Dr. Sravani Taveras M.D.        I ordered and independently reviewed the above noted radiographic studies.      I viewed images of chest x-ray which showed chronic appearing changes per my independent interpretation.    See radiologist's dictation for official interpretation.             PROCEDURES  Procedures          MEDICATIONS GIVEN IN ER  Medications - No data to display            MEDICAL DECISION MAKING, PROGRESS, and CONSULTS    All labs have been independently reviewed by me.  All radiology studies have been reviewed by me and I have also reviewed the radiology report.   EKG's independently viewed and interpreted by me.  Discussion below represents my analysis of pertinent findings related to patient's condition, differential diagnosis, treatment plan and final disposition.      Additional sources:  - Discussed/ obtained information from independent historians: Family at bedside    - External (non-ED) record review: Please see documented above    - Chronic or social conditions impacting care: Patient with valvular heart disease, pulmonary hypertension and atrial fibrillation.  Age 89    - Shared decision making: I discussed ED evaluation and treatment plan with patient who is in agreement.   Complicated patient.  I did offer and recommend observation admission the patient was adamant about not staying.    ED testing showed flat troponins at 24 suggesting chronic coronary disease rather than acute coronary syndrome.  Patient has some chronic congestive failure.  Weight is near baseline without significant weight gain.  O2 sats benign.  Chest x-ray did not show obvious failure.    At this point patient refusing to stay and so will have her follow-up with cardiology as outpatient.  I think that this is not unreasonable and family is comfortable with this plan.      Orders placed during this visit:  Orders Placed This Encounter   Procedures   • XR Chest 1 View   • Comprehensive Metabolic Panel   • Protime-INR   • BNP   • High Sensitivity Troponin T   • CBC Auto Differential   • High Sensitivity Troponin T 2Hr   • Ambulatory Referral to Cardiology   • ECG 12 Lead Chest Pain   • ECG 12 Lead   • CBC & Differential           Differential diagnosis:    Please see as documented below in ED course      Independent interpretation of labs, radiology studies, and discussions with consultants:  ED Course as of 05/13/23 2134   Sat May 13, 2023   1800 EKG independently interpreted by me    Time 5:53 PM    Atrial fibrillation with ventricular paced rhythm  P waves-A-fib  QRS-ventricular paced with IVCD  ST, T wave-unremarkable    Not significant change when compared to 2021 [DB]   1831 RTA-27-nutl-old female with history of valvular heart disease and A-fib presents with shortness of breath and chest pain onset earlier tonight.    On exam she is well-appearing with fairly benign vitals.  O2 sats reassuring.    Assessment-I am concerned about congestive heart failure with known history of valvular heart disease and A-fib.  Certainly with chest pain and age could consider acute coronary syndrome but patient has not had ischemic disease in the past.  Pulmonary embolism would be unlikely given her anticoagulation on  Coumadin.  Pneumonia seems unlikely as well given lack of cough or fever.     [DB]   1857 EKG independently interpreted by me    Time 6:47 PM    Ventricular paced 70  P waves-A-fib  QRS-ventricular paced with IVCD  ST, T wave-unremarkable    Not significant change compared to prior [DB]   1922 Chest x-ray independently interpreted shows some bilateral opacities, no obvious pneumonia.    Official radiology interpretation suggest atelectasis or scarring. [DB]   2118 Serial troponins remain mildly elevated but without significant change at 24.  This would go against acute coronary syndrome but more suggestive of chronic coronary disease.    INR therapeutic at 2.4.    CBC and chemistries benign without evidence of infection or anemia.  No evidence of hepatic or renal failure and electrolytes are benign. [DB]   2118 At this point etiology of shortness of breath and chest pain is unclear.  I do not see overt pathology from EKG x-ray or labs but given patient's age and symptoms certainly we could put her in the observation unit and asked cardiology to see her for consultation and follow serial troponins [DB]   2127 I went to talk to patient and family at bedside.  Son is now present as well as the 2 daughters.    I explained that we certainly could admit the patient to her observation unit and have her seen by cardiology with plan to get serial troponins and echocardiogram.  Patient is adamant about not staying in the hospital.  She denies any further chest pain and states that her breathing is fine.    At this point her objective testing is not overtly bad and I think she is okay to go home.  We will try to arrange for outpatient follow-up with cardiology, Dr. Leija is seen her in the past. [DB]      ED Course User Index  [DB] Elmer Shell MD             I used full protective equipment while examining this patient.  This includes face mask, gloves and protective eyewear.  I washed my hands before entering the  room and immediately upon leaving the room    DIAGNOSIS  Final diagnoses:   Shortness of breath   Permanent atrial fibrillation   Pulmonary hypertension   Nonrheumatic tricuspid valve regurgitation   Nonrheumatic mitral valve regurgitation         DISPOSITION  DISCHARGE    Patient discharged in stable condition.    Reviewed implications of results, diagnosis, meds, responsibility to follow up, warning signs and symptoms of possible worsening, potential complications and reasons to return to ER, including increased chest pain, shortness of breath or as needed.    Patient/Family voiced understanding of above instructions.    Discussed plan for discharge, as there is no emergent indication for admission. Patient referred to primary care provider for BP management due to today's BP. Pt/family is agreeable and understands need for follow up and repeat testing.  Pt is aware that discharge does not mean that nothing is wrong but it indicates no emergency is present that requires admission and they must continue care with follow-up as given below or physician of their choice.     FOLLOW-UP  Rafaela Leija MD  [address] [facility] [address]  [place] [address]  [phone]          [facility]  [address] [id number] [phone]             Medication List      No changes were made to your prescriptions during this visit.                   Latest Documented Vital Signs:  As of 21:34 EDT  BP- 173/81 HR- 70 Temp- 97.8 °F (36.6 °C) O2 sat- 96%              --    Please note that portions of this were completed with a voice recognition program.       Note Disclaimer: At [facility], we believe that sharing information builds trust and better relationships. You are receiving this note because you are receiving care at [facility] or recently visited. It is possible you will see health information before a provider has talked with you about it. This kind of  information can be easy to misunderstand. To help you fully understand what it means for your health, we urge you to discuss this note with your provider.           Elmer Shell MD  05/13/23 2139

## 2023-05-14 LAB
QT INTERVAL: 424 MS
QT INTERVAL: 443 MS

## 2023-05-19 ENCOUNTER — ANTICOAGULATION VISIT (OUTPATIENT)
Dept: PHARMACY | Facility: HOSPITAL | Age: 88
End: 2023-05-19
Payer: MEDICARE

## 2023-05-19 LAB — INR PPP: 2.4

## 2023-05-19 NOTE — PROGRESS NOTES
Anticoagulation Clinic Progress Note    Anticoagulation Summary  As of 2023    INR goal:  2.0-3.0   TTR:  74.5 % (4.6 y)   INR used for dosin.40 (2023)   Warfarin maintenance plan:  1.25 mg every Sun, Tue, Thu; 2.5 mg all other days; Starting 2023   Weekly warfarin total:  13.75 mg   No change documented:  Elise Davis RPH   Plan last modified:  Elise Davis RPH (2022)   Next INR check:  2023   Priority:  Maintenance   Target end date:  Indefinite    Indications    Long-term (current) use of anticoagulants (Resolved) [Z79.01]             Anticoagulation Episode Summary     INR check location:      Preferred lab:      Send IN[facility] reminders to:   [facility] TEST [facility]    Comments:  [facility] --- SPEAK WITH DAUGHTER (LYNETTE) **DO NOT SPEAK WITH PATIENT (poor memory)** **CALL EVERY TIME**      Anticoagulation Care Providers     Provider Role Specialty Phone number    Rafaela Leija MD Referring Cardiology [phone]          Clinic Interview:  No pertinent clinical findings have been reported.    INR History:      2023    12:00 AM 2023     9:06 AM 5/10/2023    12:00 AM 5/10/2023     2:49 PM 2023     7:14 PM 2023    12:00 AM 2023     8:23 AM   Anticoagulation Monitoring   INR  2.40  2.30   2.40   INR Date  2023  5/10/2023   2023   INR Goal  2.0-3.0  2.0-3.0   2.0-3.0   Trend  Same  Same   Same   Last Week Total  13.75 mg  13.75 mg   13.75 mg   Next Week Total  13.75 mg  13.75 mg   13.75 mg   Sun  1.25 mg  1.25 mg   1.25 mg   Mon  2.5 mg  2.5 mg   2.5 mg   Tue  1.25 mg  1.25 mg   1.25 mg   Wed  2.5 mg  2.5 mg   2.5 mg   Thu  -  1.25 mg   1.25 mg   Fri  2.5 mg  2.5 mg   2.5 mg   Sat  2.5 mg  2.5 mg   2.5 mg   Historical INR 2.40       2.30       2.40   2.40             This result is from an external source.       Plan:  1. INR is therapeutic today- see above in Anticoagulation Summary.    Diana Avalos to continue their warfarin  regimen- see above in Anticoagulation Summary.  2. Follow up in 1 week  3. LVM with instructions. They have been instructed to call if any changes in medications, doses, concerns, etc. Patient expresses understanding and has no further questions at this time.    Elise Davis [facility]

## 2023-05-22 RX ORDER — WARFARIN SODIUM 2.5 MG/1
TABLET ORAL
Qty: 80 TABLET | Refills: 1 | Status: SHIPPED | OUTPATIENT
Start: 2023-05-22

## 2023-05-24 ENCOUNTER — ANTICOAGULATION VISIT (OUTPATIENT)
Dept: PHARMACY | Facility: HOSPITAL | Age: 88
End: 2023-05-24
Payer: MEDICARE

## 2023-05-24 LAB — INR PPP: 2.3

## 2023-05-24 NOTE — PROGRESS NOTES
Anticoagulation Clinic Progress Note    Anticoagulation Summary  As of 2023    INR goal:  2.0-3.0   TTR:  74.5 % (4.6 y)   INR used for dosin.30 (2023)   Warfarin maintenance plan:  1.25 mg every Sun, Tue, Thu; 2.5 mg all other days; Starting 2023   Weekly warfarin total:  13.75 mg   No change documented:  Elise Davis RPH   Plan last modified:  Elise Davis RPH (2022)   Next INR check:  2023   Priority:  Maintenance   Target end date:  Indefinite    Indications    Long-term (current) use of anticoagulants (Resolved) [Z79.01]             Anticoagulation Episode Summary     INR check location:      Preferred lab:      [facility]    Comments:  [facility] --- SPEAK WITH DAUGHTER (LYNETTE) **DO NOT SPEAK WITH PATIENT (poor memory)** **CALL EVERY TIME**      Anticoagulation Care Providers     Provider Role Specialty Phone number    Rafaela Leija MD Referring Cardiology [phone]          Clinic Interview:  No pertinent clinical findings have been reported.    INR History:      5/10/2023    12:00 AM 5/10/2023     2:49 PM 2023     7:14 PM 2023    12:00 AM 2023     8:23 AM 2023    12:00 AM 2023     3:07 PM   Anticoagulation Monitoring   INR  2.30   2.40  2.30   INR Date  5/10/2023   2023  2023   INR Goal  2.0-3.0   2.0-3.0  2.0-3.0   Trend  Same   Same  Same   Last Week Total  13.75 mg   13.75 mg  13.75 mg   Next Week Total  13.75 mg   13.75 mg  13.75 mg   Sun  1.25 mg   1.25 mg  1.25 mg   Mon  2.5 mg   2.5 mg  2.5 mg   Tue  1.25 mg   1.25 mg  1.25 mg   Wed  2.5 mg   2.5 mg  2.5 mg   Thu  1.25 mg   1.25 mg  1.25 mg   Fri  2.5 mg   2.5 mg  2.5 mg   Sat  2.5 mg   2.5 mg  2.5 mg   Historical INR 2.30       2.40   2.40       2.30             This result is from an external source.       Plan:  1. INR is therapeutic today- see above in Anticoagulation Summary.    Diana Avalos to continue their  warfarin regimen- see above in Anticoagulation Summary.  2. Follow up in 1 week  3. Daughter requested no phone call today. They have been instructed to call if any changes in medications, doses, concerns, etc. Patient expresses understanding and has no further questions at this time.    Elise Davis, [facility]

## 2023-05-31 ENCOUNTER — OFFICE VISIT (OUTPATIENT)
Dept: CARDIOLOGY | Facility: CLINIC | Age: 88
End: 2023-05-31

## 2023-05-31 ENCOUNTER — CLINICAL SUPPORT NO REQUIREMENTS (OUTPATIENT)
Dept: CARDIOLOGY | Facility: CLINIC | Age: 88
End: 2023-05-31
Payer: MEDICARE

## 2023-05-31 VITALS
SYSTOLIC BLOOD PRESSURE: 110 MMHG | HEART RATE: 74 BPM | HEIGHT: 67 IN | DIASTOLIC BLOOD PRESSURE: 74 MMHG | WEIGHT: 157 LBS | BODY MASS INDEX: 24.64 KG/M2

## 2023-05-31 DIAGNOSIS — I27.20 PULMONARY HYPERTENSION: ICD-10-CM

## 2023-05-31 DIAGNOSIS — R07.89 OTHER CHEST PAIN: Primary | ICD-10-CM

## 2023-05-31 DIAGNOSIS — I34.0 NONRHEUMATIC MITRAL VALVE REGURGITATION: ICD-10-CM

## 2023-05-31 DIAGNOSIS — I45.10 RBBB (RIGHT BUNDLE BRANCH BLOCK): ICD-10-CM

## 2023-05-31 DIAGNOSIS — R06.09 DYSPNEA ON EXERTION: ICD-10-CM

## 2023-05-31 DIAGNOSIS — I47.20 VENTRICULAR TACHYCARDIA: ICD-10-CM

## 2023-05-31 DIAGNOSIS — Z95.0 CARDIAC RESYNCHRONIZATION THERAPY PACEMAKER (CRT-P) IN PLACE: ICD-10-CM

## 2023-05-31 DIAGNOSIS — Z85.3 HISTORY OF BREAST CANCER: ICD-10-CM

## 2023-05-31 DIAGNOSIS — I48.21 PERMANENT ATRIAL FIBRILLATION: ICD-10-CM

## 2023-05-31 DIAGNOSIS — E03.9 HYPOTHYROIDISM, UNSPECIFIED TYPE: ICD-10-CM

## 2023-05-31 LAB — INR PPP: 2.5

## 2023-05-31 NOTE — PROGRESS NOTES
"      [facility] CARDIOLOGY  [address] [facility] [address]  [place] [address]  Phone: [phone]  Fax: [phone]  Patient Name: Diana Avalos  :1932  Age: 89 y.o.  Primary Cardiologist: Rafaela Leija MD  Encounter Provider:  KIKA Vela    History of Present Illness     Diana Avalos is a 89 y.o.  female whose medical history includes left breast cancer. She is followed in our office by Dr. 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 I have reviewed the past medical records in preparation of today's visit.     23 Follow-up:  She is here for 6 month follow up and I'm seeing her for the first time today.  She had a stable echocardiogram 6 months ago.  She is feeling very good.  She did go to the emergency room about 2 weeks ago as she had chest pain and shortness of breath while sitting in [employer]; she was ruled out for ACS.  She has had no further episodes.  Her primary care physician gave her some nitroglycerin to use as needed.  She lives with her son but is still doing most all of her activities on her own; she notices that she does get a little more fatigued with doing this; her fatigue and shortness of breath improve after a few minutes of rest.  She does not notice her heart racing or skipping beats.  She does not feel lightheaded or dizzy and her blood pressure at home is similar to today's reading.  She has no orthopnea or leg swelling.  Her appetite is good.  She is taking her medications as prescribed.    23 Device interrogation today shows normal device function, 2.92 years of battery life, normal sensing/impedance/thresholds, and 1 episode of NSVT for 6 beats at  bpm.     Data Review     The following data was reviewed by KIKA Vela on 23:    Vital Signs:   /74   Pulse 74   Ht 170.2 cm (67\")   Wt 71.2 " kg (157 lb)   BMI 24.59 kg/m²       Weight:  Wt Readings from Last 3 Encounters:   05/31/23 71.2 kg (157 lb)   05/13/23 65.8 kg (145 lb)   08/11/22 70.3 kg (155 lb)     Body mass index is 24.59 kg/m².    Below is a summary of pertinent cardiology findings:  • September 2011 she was [facility] to [facility] with [facility]-fib and started on IV Cardizem; she was anticoagulated with Pradaxa.  Echocardiogram showed EF 45 to 50% with no significant valvular heart disease.  She did have a VICK which showed marked left atrial enlargement, slow velocity through the left atrial appendage, and the left atrial appendage was noted to have thrombus; the LV showed moderate reduction in function with a markedly dilated left atrium.  Her A-fib was managed with rate controlling medications.  She did require amiodarone and digoxin for a brief period of time for rate control.  She then had nausea and her medications had to be stopped; she then had recurrent A-fib with RVR.  • September 2011 nuclear stress test showed no evidence of ischemia.  • January 2012 she had AV node ablation and single-chamber Medtronic pacemaker placed for poorly controlled A-fib.  • June 2018 she was admitted for acute on chronic HFrEF and found to have profound septal dyssynchrony on echo and RVSP 46 mmHg; her device was upgraded to CRT-P.  • October 2018 she had a nonischemic stress PET study.  • January 2019 she was seen for left-sided chest pain and dyspnea occurring at rest; she was ruled out for MI.  Echocardiogram showed her EF had improved to 63%, mild concentric LVH, grade 2 diastolic dysfunction, severely increased left atrial volume, moderately to severely dilated right atrium, mild to moderate mitral valve regurgitation, mild tricuspid valve regurgitation, and normal RVSP.  • July 2021 she presented to [facility] ER with chest pain that awoke her from sleep; she was ruled out for MI.  Her blood pressure was elevated and her EKG showed  A-fib which was rate controlled.  At follow-up she had had no further episodes of chest pain and this has been monitored.  • August 2022 echocardiogram showed EF 57%, RVSP 43.3 mmHg, mild pulmonary hypertension, mild to moderately dilated RV cavity, severely dilated right atrial cavity, mildly increased left atrial volume, and mild concentric LVH.  • May 2023 she was seen in the emergency room for chest pain and dyspnea with exertion when leaving [employer].  She did have a mildly elevated proBNP but chest x-ray showed no vascular congestion.  There was consideration of admitting her for observation but she elected to go home.  She has had no further episodes of chest pain.    Labs:  • 05/13/2023:  cr 0.9, K 4.1, otherwise unremarkable CMP, Hgb 14.8, Plt 166, proBNP 1941      ECG 12 Lead    Date/Time: 5/31/2023 2:52 PM  Performed by: Angle Sena APRN  Authorized by: Angle Sena APRN   Comparison: compared with previous ECG from 5/13/2023  Similar to previous ECG  Rhythm: paced  Rate: normal  BPM: 74  Pacing: ventricular paced rhythm  Clinical impression: normal ECG            Medications     Allergies as of 05/31/2023 - Reviewed 05/31/2023   Allergen Reaction Noted   • Iodinated contrast media Hives 03/21/2016   • Amiodarone Nausea Only 03/21/2016   • Codeine Nausea Only 03/21/2016   • Dabigatran etexilate mesylate Nausea Only 03/21/2016   • Digoxin Nausea Only 03/21/2016         Current Outpatient Medications:   •  carvedilol (COREG) 6.25 MG tablet, TAKE ONE TABLET BY MOUTH TWICE A DAY WITH MEALS, Disp: 180 tablet, Rfl: 3  •  Cholecalciferol 50 MCG (2000 UT) capsule, Take  by mouth., Disp: , Rfl:   •  Diclofenac Sodium (VOLTAREN) 1 % gel gel, Apply 4 g topically to the appropriate area as directed Daily As Needed., Disp: , Rfl:   •  diphenhydrAMINE HCl, Sleep, 25 MG capsule, Take  by mouth., Disp: , Rfl:   •  furosemide (LASIX) 20 MG tablet, Take 1 tablet by mouth Daily., Disp: 90  tablet, Rfl: 3  •  nitroglycerin (NITROSTAT) 0.4 MG SL tablet, Place 1 tablet under the tongue As Needed for Chest Pain. Take no more than 3 doses in 15 minutes., Disp: 30 tablet, Rfl: 2  •  warfarin (COUMADIN) 2.5 MG tablet, TAKE 1/2 TABLET BY MOUTH ON SUNDAYS, TUESDAYS  AND THURSDAYS, TAKE 1 TABLET BY MOUTH DAILY ON ALL OTHER DAYS, Disp: 80 tablet, Rfl: 1     Past History, Review of Systems, Exam     Past Medical History:   Diagnosis Date   • Acute on chronic systolic congestive heart failure    • Anemia    • Arthritis    • Asthma    • Atrial fibrillation    • Breast cancer    • Clotting disorder    • Congenital heart disease 2011   • Deep vein thrombosis    • Disease of thyroid gland    • Essential hypertension    • Hypothyroidism    • Irritable bowel    • Mitral regurgitation    • NICM (nonischemic cardiomyopathy)    • On warfarin therapy    • Osteoporosis    • Pneumothorax 06/14/2018   • Pulmonary hypertension    • Third degree AV block    • Tricuspid regurgitation    • Ventricular tachycardia 01/17/2019       Past Surgical History:   has a past surgical history that includes Pacemaker Implantation (2012); Cholecystectomy (2000); Eye surgery (2005); Mastectomy (Left, 10/2014); Cardiac electrophysiology procedure (N/A, 06/12/2018); and Insert / replace / remove pacemaker (2011).     Social History     Socioeconomic History   • Marital status:      Spouse name: Rick   • Number of children: 8   Tobacco Use   • Smoking status: Never     Passive exposure: Never   • Smokeless tobacco: Never   Substance and Sexual Activity   • Alcohol use: Yes     Alcohol/week: 1.0 standard drink     Types: 1 Shots of liquor per week     Comment: 1 High Ball each night   • Drug use: Never   • Sexual activity: Not Currently     Partners: Male     Birth control/protection: None       Review of Systems   Constitutional: Positive for malaise/fatigue.   Cardiovascular: Negative.        Vitals reviewed.   Constitutional:        Appearance: Not in distress.   Eyes:      Conjunctiva/sclera: Conjunctivae normal.      Pupils: Pupils are equal, round, and reactive to light.   HENT:      Head: Normocephalic.      Nose: Nose normal.    Mouth/Throat:      Pharynx: Oropharynx is clear.   Neck:      Vascular: JVD normal.   Pulmonary:      Effort: Pulmonary effort is normal.      Breath sounds: Normal breath sounds. No wheezing. No rhonchi. No rales.   Cardiovascular:      Normal rate. Regular rhythm. Normal S1. Normal S2.      Murmurs: There is no murmur.   Pulses:     Intact distal pulses.   Edema:     Peripheral edema absent.   Abdominal:      General: Bowel sounds are normal. There is no distension.      Palpations: Abdomen is soft.      Tenderness: There is no abdominal tenderness.   Musculoskeletal: Normal range of motion.      Cervical back: Normal range of motion and neck supple. Skin:     General: Skin is warm and dry.   Neurological:      Mental Status: Alert and oriented to person, place and time.   Psychiatric:         Attention and Perception: Attention normal.         Mood and Affect: Mood normal.         Speech: Speech normal.         Behavior: Behavior is cooperative.          Assessment and Plan     Assessment:  1. Other chest pain    2. Dyspnea on exertion    3. Permanent atrial fibrillation    4. Cardiac resynchronization therapy pacemaker (CRT-P) in place    5. RBBB (right bundle branch block)    6. Nonrheumatic mitral valve regurgitation    7. Ventricular tachycardia    8. Hypothyroidism, unspecified type    9. History of breast cancer    10. Pulmonary hypertension         1. Chest pain.  She presented to the emergency room in mid May 2023 with chest pain and dyspnea with exertion when leaving [employer].  She was ruled out for MI.  She has had no further episodes of chest pain.  There are no ischemic changes noted on her EKG today.  Her primary care physician gave her an order for as needed nitroglycerin which she has not  needed.  2. Dyspnea on exertion: Again she was seen in the emergency room about 2 weeks ago; she did have a mildly elevated proBNP but chest x-ray showed no signs of vascular congestion.  She is compensated by exam today.  She has dyspnea when she overexerts herself but this quickly resolves with rest.  It is not limiting her activity at home.  3. Permanent atrial fibrillation: She had difficult to control A-fib with medications and underwent AV node ablation in January 2012 with implant of single-chamber Medtronic pacemaker.  In June 2018 she was noted to have dyspnea with exertion and found to have mild septal dyssynchrony; she was upgraded to a CRT-P device.  Device interrogation today shows normal device function.  4. Right bundle branch block: This is chronic; paced rhythm noted today.  5. Mitral valve regurgitation: August 2022 echocardiogram shows mild pulmonary hypertension, no significant valvular disease, and EF 57%.  She was noted to have mild to moderate mitral valve regurgitation in January 2019.  She is compensated by exam today.  6. VT: She does have very brief episodes of nonsustained VT on her pacemaker interrogation.  These are brief and she is asymptomatic with them.  7. Hypothyroidism: No recent labs to review.  8. History of breast cancer:  9. Pulmonary hypertension: This was noted to be 43.3 mmHg on August 2022 echocardiogram.    Ms. Avalos is a patient of Dr. Leija's with history of difficult to control A-fib s/p AV node ablation and CR T-P pacemaker implant; she had to be upgraded after she had profound septal dyssynchrony.  She was recently seen in the emergency room for chest pain and dyspnea with exertion; she was ruled out for ACS and elected to be discharged.  Her primary care has given her some sublingual nitroglycerin which she has not used.  We did discuss doing a stress test but she has stated that she would not want to have any intervention at this time.  For now we will watch  the chest pain; I did asked her to notify it the office if this becomes worse and we will consider long-acting nitrate.  For now I will have her see Dr. Leija in 6 months.    Return in about 6 months (around 11/30/2023) for Follow-up with Dr. Leija.  Orders Placed This Encounter   Procedures   • ECG 12 Lead      No orders of the defined types were placed in this encounter.        Thank you for the opportunity to participate in this patient's care.    KIKA Richey    This office note has been dictated.

## 2023-06-01 ENCOUNTER — ANTICOAGULATION VISIT (OUTPATIENT)
Dept: PHARMACY | Facility: HOSPITAL | Age: 88
End: 2023-06-01

## 2023-06-01 PROCEDURE — G0249 PROVIDE INR TEST MATER/EQUIP: HCPCS

## 2023-06-01 NOTE — PROGRESS NOTES
Anticoagulation Clinic Progress Note    Anticoagulation Summary  As of 2023    INR goal:  2.0-3.0   TTR:  74.6 % (4.6 y)   INR used for dosin.50 (2023)   Warfarin maintenance plan:  1.25 mg every Sun, Tue, Thu; 2.5 mg all other days; Starting 2023   Weekly warfarin total:  13.75 mg   No change documented:  Elise Davis RPH   Plan last modified:  Elise Davis RPH (2022)   Next INR check:  2023   Priority:  Maintenance   Target end date:  Indefinite    Indications    Long-term (current) use of anticoagulants (Resolved) [Z79.01]             Anticoagulation Episode Summary     INR check location:      Preferred lab:      [facility]:   [facility]    Comments:  [facility] --- SPEAK WITH DAUGHTER (LYNETTE) **DO NOT SPEAK WITH PATIENT (poor memory)** **CALL EVERY TIME**      Anticoagulation Care Providers     Provider Role Specialty Phone number    Rafaela Leija MD Referring Cardiology [phone]          Clinic Interview:  No pertinent clinical findings have been reported.    INR History:      2023     7:14 PM 2023    12:00 AM 2023     8:23 AM 2023    12:00 AM 2023     3:07 PM 2023    12:00 AM 2023     9:45 AM   Anticoagulation Monitoring   INR   2.40  2.30  2.50   INR Date   2023   INR Goal   2.0-3.0  2.0-3.0  2.0-3.0   Trend   Same  Same  Same   Last Week Total   13.75 mg  13.75 mg  13.75 mg   Next Week Total   13.75 mg  13.75 mg  13.75 mg   Sun   1.25 mg  1.25 mg  1.25 mg   Mon   2.5 mg  2.5 mg  2.5 mg   Tue   1.25 mg  1.25 mg  1.25 mg   Wed   2.5 mg  2.5 mg  -   Thu   1.25 mg  1.25 mg  1.25 mg   Fri   2.5 mg  2.5 mg  2.5 mg   Sat   2.5 mg  2.5 mg  2.5 mg   Historical INR 2.40   2.40       2.30       2.50         Visit Report      Report        This result is from an external source.       Plan:  1. INR is therapeutic today- see above in Anticoagulation Summary.    Diana Avalos to  continue their warfarin regimen- see above in Anticoagulation Summary.  2. Follow up in 1 week  3. Pt has agreed to only be called if INR out of range. They have been instructed to call if any changes in medications, doses, concerns, etc. Patient expresses understanding and has no further questions at this time.    Elise Davis, [facility]

## 2023-06-07 ENCOUNTER — ANTICOAGULATION VISIT (OUTPATIENT)
Dept: PHARMACY | Facility: HOSPITAL | Age: 88
End: 2023-06-07
Payer: MEDICARE

## 2023-06-07 LAB — INR PPP: 2.6

## 2023-06-07 NOTE — PROGRESS NOTES
Anticoagulation Clinic Progress Note    Anticoagulation Summary  As of 2023      INR goal:  2.0-3.0   TTR:  74.7 % (4.7 y)   INR used for dosin.60 (2023)   Warfarin maintenance plan:  1.25 mg every Sun, Tue, Thu; 2.5 mg all other days; Starting 2023   Weekly warfarin total:  13.75 mg   No change documented:  Elise Davis RPH   Plan last modified:  Elise Davis RPH (2022)   Next INR check:  2023   Priority:  Maintenance   Target end date:  Indefinite    Indications    Long-term (current) use of anticoagulants (Resolved) [Z79.01]                 Anticoagulation Episode Summary       INR check location:      Preferred lab:      [facility]:   [facility]    Comments:  [facility] --- SPEAK WITH DAUGHTER (LYNETTE) **DO NOT SPEAK WITH PATIENT (poor memory)** **CALL EVERY TIME**          Anticoagulation Care Providers       Provider Role Specialty Phone number    Rafaela Leija MD Referring Cardiology [phone]            Clinic Interview:  No pertinent clinical findings have been reported.    INR History:      2023     8:23 AM 2023    12:00 AM 2023     3:07 PM 2023    12:00 AM 2023     9:45 AM 2023    12:00 AM 2023     3:20 PM   Anticoagulation Monitoring   INR 2.40  2.30  2.50  2.60   INR Date 2023   INR Goal 2.0-3.0  2.0-3.0  2.0-3.0  2.0-3.0   Trend Same  Same  Same  Same   Last Week Total 13.75 mg  13.75 mg  13.75 mg  13.75 mg   Next Week Total 13.75 mg  13.75 mg  13.75 mg  13.75 mg   Sun 1.25 mg  1.25 mg  1.25 mg  1.25 mg   Mon 2.5 mg  2.5 mg  2.5 mg  2.5 mg   Tue 1.25 mg  1.25 mg  1.25 mg  1.25 mg   Wed 2.5 mg  2.5 mg  -  2.5 mg   Thu 1.25 mg  1.25 mg  1.25 mg  1.25 mg   Fri 2.5 mg  2.5 mg  2.5 mg  2.5 mg   Sat 2.5 mg  2.5 mg  2.5 mg  2.5 mg   Historical INR  2.30      2.50      2.60        Visit Report    Report          This result is from an external source.        Plan:  1. INR is therapeutic today- see above in Anticoagulation Summary.    Diana JOINER Robbie to continue their warfarin regimen- see above in Anticoagulation Summary.  2. Follow up in 1 week  3. They have been instructed to call if any changes in medications, doses, concerns, etc. Patient expresses understanding and has no further questions at this time.    Elise Davis, [facility]

## 2023-06-16 ENCOUNTER — ANTICOAGULATION VISIT (OUTPATIENT)
Dept: PHARMACY | Facility: HOSPITAL | Age: 88
End: 2023-06-16
Payer: MEDICARE

## 2023-06-16 LAB — INR PPP: 2.4

## 2023-06-16 NOTE — PROGRESS NOTES
Anticoagulation Clinic Progress Note    Anticoagulation Summary  As of 2023      INR goal:  2.0-3.0   TTR:  74.9 % (4.7 y)   INR used for dosin.40 (2023)   Warfarin maintenance plan:  1.25 mg every Sun, e, Thu; 2.5 mg all other days; Starting 2023   Weekly warfarin total:  13.75 mg   No change documented:  Cristi Abdul RPH   Plan last modified:  Elise Davis RPH (2022)   Next INR check:  2023   Priority:  Maintenance   Target end date:  Indefinite    Indications    Long-term (current) use of anticoagulants (Resolved) [Z79.01]                 Anticoagulation Episode Summary       INR check location:      Preferred lab:      Send INR reminders to:   [facility] TEST POOL    Comments:  [facility] --- SPEAK WITH DAUGHTER (LYNETTE) **DO NOT SPEAK WITH PATIENT (poor memory)** **CALL EVERY TIME**          Anticoagulation Care Providers       Provider Role Specialty Phone number    Rafaela Leija MD Referring Cardiology [phone]            Clinic Interview:  Patient Findings     Negatives:  Signs/symptoms of thrombosis, Signs/symptoms of bleeding,   Laboratory test error suspected, Change in health, Change in alcohol use,   Change in activity, Upcoming invasive procedure, Emergency department   visit, Upcoming dental procedure, Missed doses, Extra doses, Change in   medications, Change in diet/appetite, Hospital admission, Bruising, Other   complaints      Clinical Outcomes     Negatives:  Major bleeding event, Thromboembolic event,   Anticoagulation-related hospital admission, Anticoagulation-related ED   visit, Anticoagulation-related fatality        INR History:      2023     3:07 PM 2023    12:00 AM 2023     9:45 AM 2023    12:00 AM 2023     3:20 PM 2023    12:00 AM 2023     3:25 PM   Anticoagulation Monitoring   INR 2.30  2.50  2.60  2.40   INR Date 2023   INR Goal 2.0-3.0  2.0-3.0  2.0-3.0   2.0-3.0   Trend Same  Same  Same  Same   Last Week Total 13.75 mg  13.75 mg  13.75 mg  13.75 mg   Next Week Total 13.75 mg  13.75 mg  13.75 mg  13.75 mg   Sun 1.25 mg  1.25 mg  1.25 mg  1.25 mg   Mon 2.5 mg  2.5 mg  2.5 mg  2.5 mg   Tue 1.25 mg  1.25 mg  1.25 mg  1.25 mg   Wed 2.5 mg  -  2.5 mg  2.5 mg   Thu 1.25 mg  1.25 mg  1.25 mg  1.25 mg   Fri 2.5 mg  2.5 mg  2.5 mg  2.5 mg   Sat 2.5 mg  2.5 mg  2.5 mg  2.5 mg   Historical INR  2.50      2.60      2.40        Visit Report  Report            This result is from an external source.       Plan:  1. INR is Therapeutic today- see above in Anticoagulation Summary.   Will instruct Diana Avalos to Continue their warfarin regimen- see above in Anticoagulation Summary. These instructions were communicated with daughterMagui.   2. Follow up in 1 week  3.  They have been instructed to call if any changes in medications, doses, concerns, etc. Patient expresses understanding and has no further questions at this time.    Cristi Abdul [facility]

## 2023-07-27 PROCEDURE — 93296 REM INTERROG EVL PM/IDS: CPT | Performed by: INTERNAL MEDICINE

## 2023-07-27 PROCEDURE — 93294 REM INTERROG EVL PM/LDLS PM: CPT | Performed by: INTERNAL MEDICINE

## 2023-07-31 ENCOUNTER — ANTICOAGULATION VISIT (OUTPATIENT)
Dept: PHARMACY | Facility: HOSPITAL | Age: 88
End: 2023-07-31
Payer: MEDICARE

## 2023-07-31 LAB — INR PPP: 2.3

## 2023-07-31 RX ORDER — FUROSEMIDE 20 MG/1
TABLET ORAL
Qty: 90 TABLET | Refills: 3 | Status: SHIPPED | OUTPATIENT
Start: 2023-07-31

## 2023-08-08 ENCOUNTER — ANTICOAGULATION VISIT (OUTPATIENT)
Dept: PHARMACY | Facility: HOSPITAL | Age: 88
End: 2023-08-08
Payer: MEDICARE

## 2023-08-08 LAB — INR PPP: 2.5

## 2023-08-08 PROCEDURE — G0249 PROVIDE INR TEST MATER/EQUIP: HCPCS

## 2023-08-08 NOTE — PROGRESS NOTES
Anticoagulation Clinic Progress Note    Anticoagulation Summary  As of 2023      INR goal:  2.0-3.0   TTR:  75.1 % (4.8 y)   INR used for dosin.50 (2023)   Warfarin maintenance plan:  1.25 mg every Sun, Tue, Thu; 2.5 mg all other days   Weekly warfarin total:  13.75 mg   No change documented:  Lashay Moss [facility]   Plan last modified:  Elise Davis [facility] (2022)   Next INR check:  8/15/2023   Priority:  Maintenance   Target end date:  Indefinite    Indications    Long-term (current) use of anticoagulants (Resolved) [Z79.01]                 Anticoagulation Episode Summary       INR check location:      Preferred lab:      Send INR reminders to:  [facility] HOME TEST POOL    Comments:  [facility] --- SPEAK WITH DAUGHTER (LYNETTE) **DO NOT SPEAK WITH PATIENT (poor memory)** **CALL EVERY TIME**          Anticoagulation Care Providers       Provider Role Specialty Phone number    Rafaela Leija MD Referring Cardiology [phone]            Clinic Interview:  No pertinent clinical findings have been reported.    INR History:      2023    10:53 AM 2023    12:00 AM 2023     3:28 PM 2023    12:00 AM 2023     4:05 PM 2023    12:00 AM 2023     2:00 PM   Anticoagulation Monitoring   INR 2.70  2.40  2.30  2.50   INR Date 2023   INR Goal 2.0-3.0  2.0-3.0  2.0-3.0  2.0-3.0   Trend Same  Same  Same  Same   Last Week Total 13.75 mg  13.75 mg  13.75 mg  13.75 mg   Next Week Total 13.75 mg  13.75 mg  13.75 mg  13.75 mg   Sun 1.25 mg  1.25 mg  1.25 mg  1.25 mg   Mon 2.5 mg  2.5 mg  2.5 mg  2.5 mg   Tue 1.25 mg  1.25 mg  1.25 mg  1.25 mg   Wed 2.5 mg  2.5 mg  2.5 mg  2.5 mg   Thu 1.25 mg  1.25 mg  1.25 mg  1.25 mg   Fri 2.5 mg  2.5 mg  2.5 mg  2.5 mg   Sat 2.5 mg  2.5 mg  2.5 mg  2.5 mg   Historical INR  2.40      2.30      2.50            This result is from an external source.       Plan:  1. INR is therapeutic today- see above  in Anticoagulation Summary.    Diana Avalos to continue their current warfarin regimen- see above in Anticoagulation Summary.  2. Follow up in 1 week  3. Patient/Family have been instructed to call if any changes in medications, doses, concerns, etc.     Lashay Moss [facility]

## 2023-08-15 ENCOUNTER — ANTICOAGULATION VISIT (OUTPATIENT)
Dept: PHARMACY | Facility: HOSPITAL | Age: 88
End: 2023-08-15
Payer: MEDICARE

## 2023-08-15 LAB — INR PPP: 2.5

## 2023-08-15 NOTE — PROGRESS NOTES
Anticoagulation Clinic Progress Note    Anticoagulation Summary  As of 8/15/2023      INR goal:  2.0-3.0   TTR:  75.2 % (4.9 y)   INR used for dosin.50 (8/15/2023)   Warfarin maintenance plan:  1.25 mg every Sun, Tue, Thu; 2.5 mg all other days   Weekly warfarin total:  13.75 mg   No change documented:  Christianne Vera, PharmD   Plan last modified:  Elise Davis RPH (2022)   Next INR check:  2023   Priority:  Maintenance   Target end date:  Indefinite    Indications    Long-term (current) use of anticoagulants (Resolved) [Z79.01]                 Anticoagulation Episode Summary       INR check location:      Preferred lab:      Send INR reminders to:   [facility] TEST POOL    Comments:  [facility] --- SPEAK WITH DAUGHTER (LYNETTE) **DO NOT SPEAK WITH PATIENT (poor memory)** **CALL EVERY TIME**          Anticoagulation Care Providers       Provider Role Specialty Phone number    Rafaela Leija MD Referring Cardiology [phone]            Clinic Interview:  Patient Findings     Negatives:  Signs/symptoms of thrombosis, Signs/symptoms of bleeding,   Laboratory test error suspected, Change in health, Change in alcohol use,   Change in activity, Upcoming invasive procedure, Emergency department   visit, Upcoming dental procedure, Missed doses, Extra doses, Change in   medications, Change in diet/appetite, Hospital admission, Bruising, Other   complaints      Clinical Outcomes     Negatives:  Major bleeding event, Thromboembolic event,   Anticoagulation-related hospital admission, Anticoagulation-related ED   visit, Anticoagulation-related fatality        INR History:      2023     3:28 PM 2023    12:00 AM 2023     4:05 PM 2023    12:00 AM 2023     2:00 PM 8/15/2023    12:00 AM 8/15/2023     3:01 PM   Anticoagulation Monitoring   INR 2.40  2.30  2.50  2.50   INR Date 2023  2023  2023  8/15/2023   INR Goal 2.0-3.0  2.0-3.0  2.0-3.0  2.0-3.0   Trend  Same  Same  Same  Same   Last Week Total 13.75 mg  13.75 mg  13.75 mg  13.75 mg   Next Week Total 13.75 mg  13.75 mg  13.75 mg  13.75 mg   Sun 1.25 mg  1.25 mg  1.25 mg  1.25 mg   Mon 2.5 mg  2.5 mg  2.5 mg  2.5 mg   Tue 1.25 mg  1.25 mg  1.25 mg  1.25 mg   Wed 2.5 mg  2.5 mg  2.5 mg  2.5 mg   Thu 1.25 mg  1.25 mg  1.25 mg  1.25 mg   Fri 2.5 mg  2.5 mg  2.5 mg  2.5 mg   Sat 2.5 mg  2.5 mg  2.5 mg  2.5 mg   Historical INR  2.30      2.50      2.50            This result is from an external source.       Plan:  1. INR is Therapeutic today- see above in Anticoagulation Summary.   Will instruct Diana Avalos to Continue their warfarin regimen- see above in Anticoagulation Summary.  2. Follow up in 1 weeks  3.  They have been instructed to call if any changes in medications, doses, concerns, etc. Patient expresses understanding and has no further questions at this time.    Christianne Vera, PharmD

## 2023-08-23 LAB — INR PPP: 2.7

## 2023-08-24 ENCOUNTER — ANTICOAGULATION VISIT (OUTPATIENT)
Dept: PHARMACY | Facility: HOSPITAL | Age: 88
End: 2023-08-24
Payer: MEDICARE

## 2023-08-24 NOTE — PROGRESS NOTES
Anticoagulation Clinic Progress Note    Anticoagulation Summary  As of 2023      INR goal:  2.0-3.0   TTR:  75.3 % (4.9 y)   INR used for dosin.70 (2023)   Warfarin maintenance plan:  1.25 mg every Sun, Tue, Thu; 2.5 mg all other days   Weekly warfarin total:  13.75 mg   No change documented:  Christianne Vera, PharmD   Plan last modified:  Elise Davis RPH (2022)   Next INR check:  2023   Priority:  Maintenance   Target end date:  Indefinite    Indications    Long-term (current) use of anticoagulants (Resolved) [Z79.01]                 Anticoagulation Episode Summary       INR check location:      Preferred lab:      Send INR reminders to:  [facility] HOME TEST POOL    Comments:  [facility] --- SPEAK WITH DAUGHTER (LYNETTE) **DO NOT SPEAK WITH PATIENT (poor memory)** **CALL EVERY TIME**          Anticoagulation Care Providers       Provider Role Specialty Phone number    Rafaela Leija MD Referring Cardiology [phone]            Clinic Interview:  No pertinent clinical findings have been reported.    INR History:      2023     4:05 PM 2023    12:00 AM 2023     2:00 PM 8/15/2023    12:00 AM 8/15/2023     3:01 PM 2023    12:00 AM 2023     7:58 AM   Anticoagulation Monitoring   INR 2.30  2.50  2.50  2.70   INR Date 2023  2023  8/15/2023  2023   INR Goal 2.0-3.0  2.0-3.0  2.0-3.0  2.0-3.0   Trend Same  Same  Same  Same   Last Week Total 13.75 mg  13.75 mg  13.75 mg  13.75 mg   Next Week Total 13.75 mg  13.75 mg  13.75 mg  13.75 mg   Sun 1.25 mg  1.25 mg  1.25 mg  1.25 mg   Mon 2.5 mg  2.5 mg  2.5 mg  2.5 mg   Tue 1.25 mg  1.25 mg  1.25 mg  1.25 mg   Wed 2.5 mg  2.5 mg  2.5 mg  -   Thu 1.25 mg  1.25 mg  1.25 mg  1.25 mg   Fri 2.5 mg  2.5 mg  2.5 mg  2.5 mg   Sat 2.5 mg  2.5 mg  2.5 mg  2.5 mg   Historical INR  2.50      2.50      2.70            This result is from an external source.       Plan:  1. INR is therapeutic today- see above in  Anticoagulation Summary.    Diana Avalos to continue their warfarin regimen- see above in Anticoagulation Summary.  2. Follow up in 1 week  3. Pt has agreed to only be called if INR out of range. They have been instructed to call if any changes in medications, doses, concerns, etc. Patient expresses understanding and has no further questions at this time.    Christianne Vera, PharmD

## 2023-08-29 ENCOUNTER — ANTICOAGULATION VISIT (OUTPATIENT)
Dept: PHARMACY | Facility: HOSPITAL | Age: 88
End: 2023-08-29
Payer: MEDICARE

## 2023-08-29 LAB — INR PPP: 2.8

## 2023-08-29 NOTE — PROGRESS NOTES
Anticoagulation Clinic Progress Note    Anticoagulation Summary  As of 2023      INR goal:  2.0-3.0   TTR:  75.4 % (4.9 y)   INR used for dosin.80 (2023)   Warfarin maintenance plan:  1.25 mg every Sun, e, Thu; 2.5 mg all other days   Weekly warfarin total:  13.75 mg   No change documented:  Jayant White, PharmD   Plan last modified:  Elise Davis RPH (2022)   Next INR check:  2023   Priority:  Maintenance   Target end date:  Indefinite    Indications    Long-term (current) use of anticoagulants (Resolved) [Z79.01]                 Anticoagulation Episode Summary       INR check location:      Preferred lab:      Send INR reminders to:   [facility] HOME TEST POOL    Comments:  [facility] --- SPEAK WITH DAUGHTER (LYNETTE) **DO NOT SPEAK WITH PATIENT (poor memory)** **CALL EVERY TIME**          Anticoagulation Care Providers       Provider Role Specialty Phone number    Rafaela Leija MD Referring Cardiology [phone]            Clinic Interview:  Patient Findings     Negatives:  Signs/symptoms of thrombosis, Signs/symptoms of bleeding,   Laboratory test error suspected, Change in health, Change in alcohol use,   Change in activity, Upcoming invasive procedure, Emergency department   visit, Upcoming dental procedure, Missed doses, Extra doses, Change in   medications, Change in diet/appetite, Hospital admission, Bruising, Other   complaints      Clinical Outcomes     Negatives:  Major bleeding event, Thromboembolic event,   Anticoagulation-related hospital admission, Anticoagulation-related ED   visit, Anticoagulation-related fatality        INR History:      2023     2:00 PM 8/15/2023    12:00 AM 8/15/2023     3:01 PM 2023    12:00 AM 2023     7:58 AM 2023    12:00 AM 2023     2:51 PM   Anticoagulation Monitoring   INR 2.50  2.50  2.70  2.80   INR Date 2023  8/15/2023  2023  2023   INR Goal 2.0-3.0  2.0-3.0  2.0-3.0  2.0-3.0   Trend  Same  Same  Same  Same   Last Week Total 13.75 mg  13.75 mg  13.75 mg  13.75 mg   Next Week Total 13.75 mg  13.75 mg  13.75 mg  13.75 mg   Sun 1.25 mg  1.25 mg  1.25 mg  1.25 mg   Mon 2.5 mg  2.5 mg  2.5 mg  2.5 mg   Tue 1.25 mg  1.25 mg  1.25 mg  1.25 mg   Wed 2.5 mg  2.5 mg  -  2.5 mg   Thu 1.25 mg  1.25 mg  1.25 mg  1.25 mg   Fri 2.5 mg  2.5 mg  2.5 mg  2.5 mg   Sat 2.5 mg  2.5 mg  2.5 mg  2.5 mg   Historical INR  2.50      2.70      2.80            This result is from an external source.       Plan:  1. INR is Therapeutic today- see above in Anticoagulation Summary.   Will instruct Diana Avalos to Continue their warfarin regimen- see above in Anticoagulation Summary.  2. Follow up in 1 week.  3. They have been instructed to call if any changes in medications, doses, concerns, etc. Patient expresses understanding and has no further questions at this time.    Jayant White, PharmD

## 2023-09-06 ENCOUNTER — ANTICOAGULATION VISIT (OUTPATIENT)
Dept: PHARMACY | Facility: HOSPITAL | Age: 88
End: 2023-09-06
Payer: MEDICARE

## 2023-09-06 LAB — INR PPP: 2.4

## 2023-09-06 PROCEDURE — G0249 PROVIDE INR TEST MATER/EQUIP: HCPCS

## 2023-09-06 NOTE — PROGRESS NOTES
Anticoagulation Clinic Progress Note    Anticoagulation Summary  As of 2023      INR goal:  2.0-3.0   TTR:  75.5 % (4.9 y)   INR used for dosin.40 (2023)   Warfarin maintenance plan:  1.25 mg every Sun, Tue, Thu; 2.5 mg all other days   Weekly warfarin total:  13.75 mg   No change documented:  Elise Davis RPH   Plan last modified:  Elise Davis RPH (2022)   Next INR check:  2023   Priority:  Maintenance   Target end date:  Indefinite    Indications    Long-term (current) use of anticoagulants (Resolved) [Z79.01]                 Anticoagulation Episode Summary       INR check location:      Preferred lab:      Send INR reminders to:  [facility] HOME TEST POOL    Comments:  [facility] --- SPEAK WITH DAUGHTER (LYNETTE) **DO NOT SPEAK WITH PATIENT (poor memory)** **CALL EVERY TIME**          Anticoagulation Care Providers       Provider Role Specialty Phone number    Rafaela Leija MD Referring Cardiology [phone]            Clinic Interview:  No pertinent clinical findings have been reported.    INR History:      8/15/2023     3:01 PM 2023    12:00 AM 2023     7:58 AM 2023    12:00 AM 2023     2:51 PM 2023    12:00 AM 2023     3:00 PM   Anticoagulation Monitoring   INR 2.50  2.70  2.80  2.40   INR Date 8/15/2023  2023  2023  2023   INR Goal 2.0-3.0  2.0-3.0  2.0-3.0  2.0-3.0   Trend Same  Same  Same  Same   Last Week Total 13.75 mg  13.75 mg  13.75 mg  13.75 mg   Next Week Total 13.75 mg  13.75 mg  13.75 mg  13.75 mg   Sun 1.25 mg  1.25 mg  1.25 mg  1.25 mg   Mon 2.5 mg  2.5 mg  2.5 mg  2.5 mg   Tue 1.25 mg  1.25 mg  1.25 mg  1.25 mg   Wed 2.5 mg  -  2.5 mg  2.5 mg   Thu 1.25 mg  1.25 mg  1.25 mg  1.25 mg   Fri 2.5 mg  2.5 mg  2.5 mg  2.5 mg   Sat 2.5 mg  2.5 mg  2.5 mg  2.5 mg   Historical INR  2.70      2.80      2.40            This result is from an external source.       Plan:  1. INR is therapeutic today- see above in  Anticoagulation Summary.    Diana Avalos to continue their warfarin regimen- see above in Anticoagulation Summary.  2. Follow up in 1 week  3. Pt has agreed to only be called if INR out of range. They have been instructed to call if any changes in medications, doses, concerns, etc. Patient expresses understanding and has no further questions at this time.    Elise Davis, [facility]

## 2023-09-12 ENCOUNTER — ANTICOAGULATION VISIT (OUTPATIENT)
Dept: PHARMACY | Facility: HOSPITAL | Age: 88
End: 2023-09-12
Payer: MEDICARE

## 2023-09-12 LAB — INR PPP: 2.8

## 2023-09-12 NOTE — PROGRESS NOTES
Anticoagulation Clinic Progress Note    Anticoagulation Summary  As of 2023      INR goal:  2.0-3.0   TTR:  75.6 % (4.9 y)   INR used for dosin.80 (2023)   Warfarin maintenance plan:  1.25 mg every Sun, Tue, Thu; 2.5 mg all other days   Weekly warfarin total:  13.75 mg   No change documented:  Jayant White, PharmD   Plan last modified:  Elise Davis RPH (2022)   Next INR check:  2023   Priority:  Maintenance   Target end date:  Indefinite    Indications    Long-term (current) use of anticoagulants (Resolved) [Z79.01]                 Anticoagulation Episode Summary       INR check location:      Preferred lab:      Send INR reminders to:  [facility] HOME TEST POOL    Comments:  [facility] --- SPEAK WITH DAUGHTER (LYNETTE) **DO NOT SPEAK WITH PATIENT (poor memory)** **CALL EVERY TIME**          Anticoagulation Care Providers       Provider Role Specialty Phone number    Rafaela Leija MD Referring Cardiology [phone]            Clinic Interview:  No pertinent clinical findings have been reported.    INR History:      2023     7:58 AM 2023    12:00 AM 2023     2:51 PM 2023    12:00 AM 2023     3:00 PM 2023    12:00 AM 2023     3:38 PM   Anticoagulation Monitoring   INR 2.70  2.80  2.40  2.80   INR Date 2023   INR Goal 2.0-3.0  2.0-3.0  2.0-3.0  2.0-3.0   Trend Same  Same  Same  Same   Last Week Total 13.75 mg  13.75 mg  13.75 mg  13.75 mg   Next Week Total 13.75 mg  13.75 mg  13.75 mg  13.75 mg   Sun 1.25 mg  1.25 mg  1.25 mg  1.25 mg   Mon 2.5 mg  2.5 mg  2.5 mg  2.5 mg   Tue 1.25 mg  1.25 mg  1.25 mg  1.25 mg   Wed -  2.5 mg  2.5 mg  2.5 mg   Thu 1.25 mg  1.25 mg  1.25 mg  1.25 mg   Fri 2.5 mg  2.5 mg  2.5 mg  2.5 mg   Sat 2.5 mg  2.5 mg  2.5 mg  2.5 mg   Historical INR  2.80      2.40      2.80            This result is from an external source.       Plan:  1. INR is therapeutic today- see above in  Anticoagulation Summary.    Diana Avalos to continue their warfarin regimen- see above in Anticoagulation Summary.  2. Follow up in 1 week  3. Left secure voicemail for Magui (daughter) with instructions and request for return call to confirm plan. They have been instructed to call if any changes in medications, doses, concerns, etc.     Jayant White, ErnestoD

## 2023-09-20 ENCOUNTER — ANTICOAGULATION VISIT (OUTPATIENT)
Dept: PHARMACY | Facility: HOSPITAL | Age: 88
End: 2023-09-20
Payer: MEDICARE

## 2023-09-20 LAB — INR PPP: 2.4

## 2023-09-20 NOTE — PROGRESS NOTES
Anticoagulation Clinic Progress Note    Anticoagulation Summary  As of 2023      INR goal:  2.0-3.0   TTR:  75.7 % (5 y)   INR used for dosin.40 (2023)   Warfarin maintenance plan:  1.25 mg every Sun, Tue, Thu; 2.5 mg all other days   Weekly warfarin total:  13.75 mg   No change documented:  Elise Davis RPH   Plan last modified:  Elise Davis RPH (2022)   Next INR check:  2023   Priority:  Maintenance   Target end date:  Indefinite    Indications    Long-term (current) use of anticoagulants (Resolved) [Z79.01]                 Anticoagulation Episode Summary       INR check location:      Preferred lab:      [facility]    Comments:  [facility] --- SPEAK WITH DAUGHTER (LYNETTE) **DO NOT SPEAK WITH PATIENT (poor memory)** **CALL EVERY TIME**          Anticoagulation Care Providers       Provider Role Specialty Phone number    Rafaela Leija MD Referring Cardiology [phone]            Clinic Interview:  No pertinent clinical findings have been reported.    INR History:      2023     2:51 PM 2023    12:00 AM 2023     3:00 PM 2023    12:00 AM 2023     3:38 PM 2023    12:00 AM 2023     2:21 PM   Anticoagulation Monitoring   INR 2.80  2.40  2.80  2.40   INR Date 2023   INR Goal 2.0-3.0  2.0-3.0  2.0-3.0  2.0-3.0   Trend Same  Same  Same  Same   Last Week Total 13.75 mg  13.75 mg  13.75 mg  13.75 mg   Next Week Total 13.75 mg  13.75 mg  13.75 mg  13.75 mg   Sun 1.25 mg  1.25 mg  1.25 mg  1.25 mg   Mon 2.5 mg  2.5 mg  2.5 mg  2.5 mg   Tue 1.25 mg  1.25 mg  1.25 mg  1.25 mg   Wed 2.5 mg  2.5 mg  2.5 mg  2.5 mg   Thu 1.25 mg  1.25 mg  1.25 mg  1.25 mg   Fri 2.5 mg  2.5 mg  2.5 mg  2.5 mg   Sat 2.5 mg  2.5 mg  2.5 mg  2.5 mg   Historical INR  2.40      2.80      2.40            This result is from an external source.       Plan:  1. INR is therapeutic today- see above in  Anticoagulation Summary.    Diana Avalos to continue their warfarin regimen- see above in Anticoagulation Summary.  2. Follow up in 1 week  3. Patient left voicemail that no return phone call was needed. They have been instructed to call if any changes in medications, doses, concerns, etc. Patient expresses understanding and has no further questions at this time.    Elise Davis, [facility]

## 2023-09-26 ENCOUNTER — ANTICOAGULATION VISIT (OUTPATIENT)
Dept: PHARMACY | Facility: HOSPITAL | Age: 88
End: 2023-09-26
Payer: MEDICARE

## 2023-09-26 LAB — INR PPP: 2.4

## 2023-10-03 ENCOUNTER — ANTICOAGULATION VISIT (OUTPATIENT)
Dept: PHARMACY | Facility: HOSPITAL | Age: 88
End: 2023-10-03
Payer: MEDICARE

## 2023-10-03 LAB — INR PPP: 2.5

## 2023-10-03 RX ORDER — CARVEDILOL 6.25 MG/1
TABLET ORAL
Qty: 180 TABLET | Refills: 0 | Status: SHIPPED | OUTPATIENT
Start: 2023-10-03

## 2023-10-03 NOTE — PROGRESS NOTES
Anticoagulation Clinic Progress Note    Anticoagulation Summary  As of 10/3/2023      INR goal:  2.0-3.0   TTR:  75.9 % (5 y)   INR used for dosin.50 (10/3/2023)   Warfarin maintenance plan:  1.25 mg every Sun, Tue, Thu; 2.5 mg all other days   Weekly warfarin total:  13.75 mg   No change documented:  Elise Davis RPH   Plan last modified:  Elise Davis RPH (2022)   Next INR check:  10/10/2023   Priority:  Maintenance   Target end date:  Indefinite    Indications    Long-term (current) use of anticoagulants (Resolved) [Z79.01]                 Anticoagulation Episode Summary       INR check location:      Preferred lab:      [facility]    Comments:  [facility] --- SPEAK WITH DAUGHTER (LYNETTE) **DO NOT SPEAK WITH PATIENT (poor memory)** **CALL EVERY TIME**          Anticoagulation Care Providers       Provider Role Specialty Phone number    Rafaela Leija MD Referring Cardiology [phone]            Clinic Interview:  No pertinent clinical findings have been reported.    INR History:      2023     3:38 PM 2023    12:00 AM 2023     2:21 PM 2023    12:00 AM 2023     2:55 PM 10/3/2023    12:00 AM 10/3/2023     3:00 PM   Anticoagulation Monitoring   INR 2.80  2.40  2.40  2.50   INR Date 2023  2023  2023  10/3/2023   INR Goal 2.0-3.0  2.0-3.0  2.0-3.0  2.0-3.0   Trend Same  Same  Same  Same   Last Week Total 13.75 mg  13.75 mg  13.75 mg  13.75 mg   Next Week Total 13.75 mg  13.75 mg  13.75 mg  13.75 mg   Sun 1.25 mg  1.25 mg  1.25 mg  1.25 mg   Mon 2.5 mg  2.5 mg  2.5 mg  2.5 mg   Tue 1.25 mg  1.25 mg  1.25 mg  1.25 mg   Wed 2.5 mg  2.5 mg  2.5 mg  2.5 mg   Thu 1.25 mg  1.25 mg  1.25 mg  1.25 mg   Fri 2.5 mg  2.5 mg  2.5 mg  2.5 mg   Sat 2.5 mg  2.5 mg  2.5 mg  2.5 mg   Historical INR  2.40      2.40      2.50            This result is from an external source.       Plan:  1. INR is therapeutic today- see  above in Anticoagulation Summary.    Diana Avalos to continue their warfarin regimen- see above in Anticoagulation Summary.  2. Follow up in 1 week  3. LVM with instructions. They have been instructed to call if any changes in medications, doses, concerns, etc. Patient expresses understanding and has no further questions at this time.    Elise Davis [facility]

## 2023-10-11 ENCOUNTER — ANTICOAGULATION VISIT (OUTPATIENT)
Dept: PHARMACY | Facility: HOSPITAL | Age: 88
End: 2023-10-11
Payer: MEDICARE

## 2023-10-11 LAB — INR PPP: 2.8

## 2023-10-11 PROCEDURE — G0249 PROVIDE INR TEST MATER/EQUIP: HCPCS

## 2023-10-11 NOTE — PROGRESS NOTES
Anticoagulation Clinic Progress Note    Anticoagulation Summary  As of 10/11/2023      INR goal:  2.0-3.0   TTR:  76.0% (5 y)   INR used for dosin.80 (10/11/2023)   Warfarin maintenance plan:  1.25 mg every Sun, Tue, Thu; 2.5 mg all other days   Weekly warfarin total:  13.75 mg   No change documented:  Elise Davis RPH   Plan last modified:  Elise Davis RPH (2022)   Next INR check:  10/18/2023   Priority:  Maintenance   Target end date:  Indefinite    Indications    Long-term (current) use of anticoagulants (Resolved) [Z79.01]                 Anticoagulation Episode Summary       INR check location:      Preferred lab:      [facility]    Comments:  [facility] --- SPEAK WITH DAUGHTER (LYNETTE) **DO NOT SPEAK WITH PATIENT (poor memory)** **CALL EVERY TIME**          Anticoagulation Care Providers       Provider Role Specialty Phone number    Rafaela Leija MD Referring Cardiology [phone]            Clinic Interview:  No pertinent clinical findings have been reported.    INR History:      2023     2:21 PM 2023    12:00 AM 2023     2:55 PM 10/3/2023    12:00 AM 10/3/2023     3:00 PM 10/11/2023    12:00 AM 10/11/2023     3:30 PM   Anticoagulation Monitoring   INR 2.40  2.40  2.50  2.80   INR Date 2023  2023  10/3/2023  10/11/2023   INR Goal 2.0-3.0  2.0-3.0  2.0-3.0  2.0-3.0   Trend Same  Same  Same  Same   Last Week Total 13.75 mg  13.75 mg  13.75 mg  13.75 mg   Next Week Total 13.75 mg  13.75 mg  13.75 mg  13.75 mg   Sun 1.25 mg  1.25 mg  1.25 mg  1.25 mg   Mon 2.5 mg  2.5 mg  2.5 mg  2.5 mg   Tue 1.25 mg  1.25 mg  1.25 mg  1.25 mg   Wed 2.5 mg  2.5 mg  2.5 mg  2.5 mg   Thu 1.25 mg  1.25 mg  1.25 mg  1.25 mg   Fri 2.5 mg  2.5 mg  2.5 mg  2.5 mg   Sat 2.5 mg  2.5 mg  2.5 mg  2.5 mg   Historical INR  2.40      2.50      2.80            This result is from an external source.       Plan:  1. INR is therapeutic today- see  above in Anticoagulation Summary.    Diana Avalos to continue their warfarin regimen- see above in Anticoagulation Summary.  2. Follow up in 1 week  3. Lvm with instructions. They have been instructed to call if any changes in medications, doses, concerns, etc. Patient expresses understanding and has no further questions at this time.    Elise Daivs formerly [facility]

## 2023-10-18 LAB — INR PPP: 2.5

## 2023-10-19 ENCOUNTER — ANTICOAGULATION VISIT (OUTPATIENT)
Dept: PHARMACY | Facility: HOSPITAL | Age: 88
End: 2023-10-19
Payer: MEDICARE

## 2023-10-19 NOTE — PROGRESS NOTES
Anticoagulation Clinic Progress Note    Anticoagulation Summary  As of 10/19/2023      INR goal:  2.0-3.0   TTR:  76.1% (5 y)   INR used for dosin.50 (10/18/2023)   Warfarin maintenance plan:  1.25 mg every Sun, Tue, Thu; 2.5 mg all other days   Weekly warfarin total:  13.75 mg   No change documented:  Elise Davis RPH   Plan last modified:  Elise Davis RPH (2022)   Next INR check:  10/25/2023   Priority:  Maintenance   Target end date:  Indefinite    Indications    Long-term (current) use of anticoagulants (Resolved) [Z79.01]                 Anticoagulation Episode Summary       INR check location:      Preferred lab:      Send INR reminders to:   [facility] HOME TEST POOL    Comments:  [facility] --- SPEAK WITH DAUGHTER (LYNETTE) **DO NOT SPEAK WITH PATIENT (poor memory)** **CALL EVERY TIME**          Anticoagulation Care Providers       Provider Role Specialty Phone number    Rafaela Leija MD Referring Cardiology [phone]            Clinic Interview:  No pertinent clinical findings have been reported.    INR History:      2023     2:55 PM 10/3/2023    12:00 AM 10/3/2023     3:00 PM 10/11/2023    12:00 AM 10/11/2023     3:30 PM 10/18/2023    12:00 AM 10/19/2023     9:11 AM   Anticoagulation Monitoring   INR 2.40  2.50  2.80  2.50   INR Date 2023  10/3/2023  10/11/2023  10/18/2023   INR Goal 2.0-3.0  2.0-3.0  2.0-3.0  2.0-3.0   Trend Same  Same  Same  Same   Last Week Total 13.75 mg  13.75 mg  13.75 mg  13.75 mg   Next Week Total 13.75 mg  13.75 mg  13.75 mg  13.75 mg   Sun 1.25 mg  1.25 mg  1.25 mg  1.25 mg   Mon 2.5 mg  2.5 mg  2.5 mg  2.5 mg   Tue 1.25 mg  1.25 mg  1.25 mg  1.25 mg   Wed 2.5 mg  2.5 mg  2.5 mg  -   Thu 1.25 mg  1.25 mg  1.25 mg  1.25 mg   Fri 2.5 mg  2.5 mg  2.5 mg  2.5 mg   Sat 2.5 mg  2.5 mg  2.5 mg  2.5 mg   Historical INR  2.50      2.80      2.50            This result is from an external source.       Plan:  1. INR is therapeutic today- see  above in Anticoagulation Summary.    Diana Avalos to continue their warfarin regimen- see above in Anticoagulation Summary.  2. Follow up in 1 week  3. They have been instructed to call if any changes in medications, doses, concerns, etc. Patient expresses understanding and has no further questions at this time.    Elise Davis, [facility]

## 2023-10-24 ENCOUNTER — ANTICOAGULATION VISIT (OUTPATIENT)
Dept: PHARMACY | Facility: HOSPITAL | Age: 88
End: 2023-10-24
Payer: MEDICARE

## 2023-10-24 LAB — INR PPP: 2.7

## 2023-10-24 NOTE — PROGRESS NOTES
Anticoagulation Clinic Progress Note    Anticoagulation Summary  As of 10/24/2023      INR goal:  2.0-3.0   TTR:  76.2% (5 y)   INR used for dosin.70 (10/24/2023)   Warfarin maintenance plan:  1.25 mg every Sun, Tue, Thu; 2.5 mg all other days   Weekly warfarin total:  13.75 mg   No change documented:  Christianne Vera, Zbigniew   Plan last modified:  Elise Davis RPH (2022)   Next INR check:  10/31/2023   Priority:  Maintenance   Target end date:  Indefinite    Indications    Long-term (current) use of anticoagulants (Resolved) [Z79.01]                 Anticoagulation Episode Summary       INR check location:      Preferred lab:      Send INR reminders to:   [facility] HOME TEST POOL    Comments:  [facility] --- SPEAK WITH DAUGHTER (LYNETTE) **DO NOT SPEAK WITH PATIENT (poor memory)** **CALL EVERY TIME**          Anticoagulation Care Providers       Provider Role Specialty Phone number    Rafaela Leija MD Referring Cardiology [phone]            Clinic Interview:  Patient Findings     Negatives:  Signs/symptoms of thrombosis, Signs/symptoms of bleeding,   Laboratory test error suspected, Change in health, Change in alcohol use,   Change in activity, Upcoming invasive procedure, Emergency department   visit, Upcoming dental procedure, Missed doses, Extra doses, Change in   medications, Change in diet/appetite, Hospital admission, Bruising, Other   complaints      Clinical Outcomes     Negatives:  Major bleeding event, Thromboembolic event,   Anticoagulation-related hospital admission, Anticoagulation-related ED   visit, Anticoagulation-related fatality        INR History:      10/3/2023     3:00 PM 10/11/2023    12:00 AM 10/11/2023     3:30 PM 10/18/2023    12:00 AM 10/19/2023     9:11 AM 10/24/2023    12:00 AM 10/24/2023     3:55 PM   Anticoagulation Monitoring   INR 2.50  2.80  2.50  2.70   INR Date 10/3/2023  10/11/2023  10/18/2023  10/24/2023   INR Goal 2.0-3.0  2.0-3.0  2.0-3.0   2.0-3.0   Trend Same  Same  Same  Same   Last Week Total 13.75 mg  13.75 mg  13.75 mg  13.75 mg   Next Week Total 13.75 mg  13.75 mg  13.75 mg  13.75 mg   Sun 1.25 mg  1.25 mg  1.25 mg  1.25 mg   Mon 2.5 mg  2.5 mg  2.5 mg  2.5 mg   Tue 1.25 mg  1.25 mg  1.25 mg  1.25 mg   Wed 2.5 mg  2.5 mg  -  2.5 mg   Thu 1.25 mg  1.25 mg  1.25 mg  1.25 mg   Fri 2.5 mg  2.5 mg  2.5 mg  2.5 mg   Sat 2.5 mg  2.5 mg  2.5 mg  2.5 mg   Historical INR  2.80      2.50      2.70            This result is from an external source.       Plan:  1. INR is Therapeutic today- see above in Anticoagulation Summary.   Will instruct Diana Avalos to Continue their warfarin regimen- see above in Anticoagulation Summary.  2. Follow up in 1 weeks  3. Secure voicemail with instructions and follow up plan to Magui (patient's daughter).They have been instructed to call if any changes in medications, doses, concerns, etc. Patient expresses understanding and has no further questions at this time.    Christianne Vera, PharmD

## 2023-10-31 ENCOUNTER — ANTICOAGULATION VISIT (OUTPATIENT)
Dept: PHARMACY | Facility: HOSPITAL | Age: 88
End: 2023-10-31
Payer: MEDICARE

## 2023-10-31 LAB — INR PPP: 2.4

## 2023-10-31 NOTE — PROGRESS NOTES
Anticoagulation Clinic Progress Note    Anticoagulation Summary  As of 10/31/2023      INR goal:  2.0-3.0   TTR:  76.3% (5.1 y)   INR used for dosin.40 (10/31/2023)   Warfarin maintenance plan:  1.25 mg every Sun, Tue, Thu; 2.5 mg all other days   Weekly warfarin total:  13.75 mg   No change documented:  Elise Davis RPH   Plan last modified:  Elise Davis RPH (2022)   Next INR check:  2023   Priority:  Maintenance   Target end date:  Indefinite    Indications    Long-term (current) use of anticoagulants (Resolved) [Z79.01]                 Anticoagulation Episode Summary       INR check location:      Preferred lab:      Send INR reminders to:  [facility] HOME TEST POOL    Comments:  [facility] --- SPEAK WITH DAUGHTER (LYNETTE) **DO NOT SPEAK WITH PATIENT (poor memory)** **CALL EVERY TIME**          Anticoagulation Care Providers       Provider Role Specialty Phone number    Rafaela Leija MD Referring Cardiology [phone]            Clinic Interview:  No pertinent clinical findings have been reported.    INR History:      10/11/2023     3:30 PM 10/18/2023    12:00 AM 10/19/2023     9:11 AM 10/24/2023    12:00 AM 10/24/2023     3:55 PM 10/31/2023    12:00 AM 10/31/2023     2:10 PM   Anticoagulation Monitoring   INR 2.80  2.50  2.70  2.40   INR Date 10/11/2023  10/18/2023  10/24/2023  10/31/2023   INR Goal 2.0-3.0  2.0-3.0  2.0-3.0  2.0-3.0   Trend Same  Same  Same  Same   Last Week Total 13.75 mg  13.75 mg  13.75 mg  13.75 mg   Next Week Total 13.75 mg  13.75 mg  13.75 mg  13.75 mg   Sun 1.25 mg  1.25 mg  1.25 mg  1.25 mg   Mon 2.5 mg  2.5 mg  2.5 mg  2.5 mg   Tue 1.25 mg  1.25 mg  1.25 mg  1.25 mg   Wed 2.5 mg  -  2.5 mg  2.5 mg   Thu 1.25 mg  1.25 mg  1.25 mg  1.25 mg   Fri 2.5 mg  2.5 mg  2.5 mg  2.5 mg   Sat 2.5 mg  2.5 mg  2.5 mg  2.5 mg   Historical INR  2.50      2.70      2.40            This result is from an external source.       Plan:  1. INR is therapeutic today-  see above in Anticoagulation Summary.    Diana Avalos to continue their warfarin regimen- see above in Anticoagulation Summary.  2. Follow up in 1 week  3. Pt has agreed to only be called if INR out of range. They have been instructed to call if any changes in medications, doses, concerns, etc. Patient expresses understanding and has no further questions at this time.    Elise Davis, [facility]

## 2023-11-08 ENCOUNTER — ANTICOAGULATION VISIT (OUTPATIENT)
Dept: PHARMACY | Facility: HOSPITAL | Age: 88
End: 2023-11-08
Payer: MEDICARE

## 2023-11-08 LAB — INR PPP: 2.8

## 2023-11-08 PROCEDURE — G0249 PROVIDE INR TEST MATER/EQUIP: HCPCS

## 2023-11-08 NOTE — PROGRESS NOTES
Anticoagulation Clinic Progress Note    Anticoagulation Summary  As of 2023      INR goal:  2.0-3.0   TTR:  76.4% (5.1 y)   INR used for dosin.80 (2023)   Warfarin maintenance plan:  1.25 mg every Sun, Tue, Thu; 2.5 mg all other days   Weekly warfarin total:  13.75 mg   No change documented:  Elise Davis RPH   Plan last modified:  Elise Davis RPH (2022)   Next INR check:  11/15/2023   Priority:  Maintenance   Target end date:  Indefinite    Indications    Long-term (current) use of anticoagulants (Resolved) [Z79.01]                 Anticoagulation Episode Summary       INR check location:      Preferred lab:      Send INR reminders to:  [facility] HOME TEST POOL    Comments:  [facility] --- SPEAK WITH DAUGHTER (LYNETTE) **DO NOT SPEAK WITH PATIENT (poor memory)** **CALL EVERY TIME**          Anticoagulation Care Providers       Provider Role Specialty Phone number    Rafaela Leija MD Referring Cardiology [phone]            Clinic Interview:  No pertinent clinical findings have been reported.    INR History:      10/19/2023     9:11 AM 10/24/2023    12:00 AM 10/24/2023     3:55 PM 10/31/2023    12:00 AM 10/31/2023     2:10 PM 2023    12:00 AM 2023     2:45 PM   Anticoagulation Monitoring   INR 2.50  2.70  2.40  2.80   INR Date 10/18/2023  10/24/2023  10/31/2023  2023   INR Goal 2.0-3.0  2.0-3.0  2.0-3.0  2.0-3.0   Trend Same  Same  Same  Same   Last Week Total 13.75 mg  13.75 mg  13.75 mg  13.75 mg   Next Week Total 13.75 mg  13.75 mg  13.75 mg  13.75 mg   Sun 1.25 mg  1.25 mg  1.25 mg  1.25 mg   Mon 2.5 mg  2.5 mg  2.5 mg  2.5 mg   Tue 1.25 mg  1.25 mg  1.25 mg  1.25 mg   Wed -  2.5 mg  2.5 mg  2.5 mg   Thu 1.25 mg  1.25 mg  1.25 mg  1.25 mg   Fri 2.5 mg  2.5 mg  2.5 mg  2.5 mg   Sat 2.5 mg  2.5 mg  2.5 mg  2.5 mg   Historical INR  2.70      2.40      2.80            This result is from an external source.       Plan:  1. INR is therapeutic today- see  above in Anticoagulation Summary.    Diana Avalos to continue their warfarin regimen- see above in Anticoagulation Summary.  2. Follow up in 1 week  3. Patients daughter did not request a return phone call. They have been instructed to call if any changes in medications, doses, concerns, etc. Patient expresses understanding and has no further questions at this time.    Elise Davis, [facility]

## 2023-11-15 ENCOUNTER — ANTICOAGULATION VISIT (OUTPATIENT)
Dept: PHARMACY | Facility: HOSPITAL | Age: 88
End: 2023-11-15
Payer: MEDICARE

## 2023-11-15 LAB — INR PPP: 2.3

## 2023-11-15 NOTE — PROGRESS NOTES
Anticoagulation Clinic Progress Note    Anticoagulation Summary  As of 11/15/2023      INR goal:  2.0-3.0   TTR:  76.5% (5.1 y)   INR used for dosin.30 (11/15/2023)   Warfarin maintenance plan:  1.25 mg every Sun, Tue, Thu; 2.5 mg all other days   Weekly warfarin total:  13.75 mg   No change documented:  Elise Davis RPH   Plan last modified:  Elise Davis RPH (2022)   Next INR check:  2023   Priority:  Maintenance   Target end date:  Indefinite    Indications    Long-term (current) use of anticoagulants (Resolved) [Z79.01]                 Anticoagulation Episode Summary       INR check location:      Preferred lab:      Send INR reminders to:  [facility] HOME TEST POOL    Comments:  [facility] --- SPEAK WITH DAUGHTER (LYNETTE) **DO NOT SPEAK WITH PATIENT (poor memory)** **CALL EVERY TIME**          Anticoagulation Care Providers       Provider Role Specialty Phone number    Rafaela Leija MD Referring Cardiology [phone]            Clinic Interview:  No pertinent clinical findings have been reported.    INR History:      10/24/2023     3:55 PM 10/31/2023    12:00 AM 10/31/2023     2:10 PM 2023    12:00 AM 2023     2:45 PM 11/15/2023    12:00 AM 11/15/2023     3:18 PM   Anticoagulation Monitoring   INR 2.70  2.40  2.80  2.30   INR Date 10/24/2023  10/31/2023  2023  11/15/2023   INR Goal 2.0-3.0  2.0-3.0  2.0-3.0  2.0-3.0   Trend Same  Same  Same  Same   Last Week Total 13.75 mg  13.75 mg  13.75 mg  13.75 mg   Next Week Total 13.75 mg  13.75 mg  13.75 mg  13.75 mg   Sun 1.25 mg  1.25 mg  1.25 mg  1.25 mg   Mon 2.5 mg  2.5 mg  2.5 mg  2.5 mg   Tue 1.25 mg  1.25 mg  1.25 mg  1.25 mg   Wed 2.5 mg  2.5 mg  2.5 mg  2.5 mg   Thu 1.25 mg  1.25 mg  1.25 mg  1.25 mg   Fri 2.5 mg  2.5 mg  2.5 mg  2.5 mg   Sat 2.5 mg  2.5 mg  2.5 mg  2.5 mg   Historical INR  2.40      2.80      2.30            This result is from an external source.       Plan:  1. INR is therapeutic  today- see above in Anticoagulation Summary.    Diana JOINER Robbie to continue their warfarin regimen- see above in Anticoagulation Summary.  2. Follow up in 1 week  3. They have been instructed to call if any changes in medications, doses, concerns, etc. Patient expresses understanding and has no further questions at this time.    Elise Davis, [facility]

## 2023-11-28 ENCOUNTER — ANTICOAGULATION VISIT (OUTPATIENT)
Dept: PHARMACY | Facility: HOSPITAL | Age: 88
End: 2023-11-28
Payer: MEDICARE

## 2023-11-28 LAB — INR PPP: 3.3

## 2023-11-28 NOTE — PROGRESS NOTES
Anticoagulation Clinic Progress Note    Anticoagulation Summary  As of 11/28/2023      INR goal:  2.0-3.0   TTR:  76.4% (5.1 y)   INR used for dosing:  3.30 (11/28/2023)   Warfarin maintenance plan:  1.25 mg every Sun, Tue, Thu; 2.5 mg all other days   Weekly warfarin total:  13.75 mg   Plan last modified:  Elise Davis RPH (8/30/2022)   Next INR check:  12/5/2023   Priority:  Maintenance   Target end date:  Indefinite    Indications    Long-term (current) use of anticoagulants (Resolved) [Z79.01]                 Anticoagulation Episode Summary       INR check location:      Preferred lab:      Send INR reminders to:  [email or web address] HOME TEST POOL    Comments:  [facility] --- SPEAK WITH DAUGHTER (LYNETTE) **DO NOT SPEAK WITH PATIENT (poor memory)** **CALL EVERY TIME**          Anticoagulation Care Providers       Provider Role Specialty Phone number    Rafaela Leija MD Referring Cardiology [phone]            Clinic Interview:  Patient Findings     Negatives:  Signs/symptoms of thrombosis, Signs/symptoms of bleeding,   Laboratory test error suspected, Change in health, Change in alcohol use,   Change in activity, Upcoming invasive procedure, Emergency department   visit, Upcoming dental procedure, Missed doses, Extra doses, Change in   medications, Change in diet/appetite, Hospital admission, Bruising, Other   complaints      Clinical Outcomes     Negatives:  Major bleeding event, Thromboembolic event,   Anticoagulation-related hospital admission, Anticoagulation-related ED   visit, Anticoagulation-related fatality        INR History:      10/31/2023     2:10 PM 11/8/2023    12:00 AM 11/8/2023     2:45 PM 11/15/2023    12:00 AM 11/15/2023     3:18 PM 11/28/2023    12:00 AM 11/28/2023     3:13 PM   Anticoagulation Monitoring   INR 2.40  2.80  2.30  3.30   INR Date 10/31/2023  11/8/2023  11/15/2023  11/28/2023   INR Goal 2.0-3.0  2.0-3.0  2.0-3.0  2.0-3.0   Trend Same  Same  Same  Same   Last Week  Total 13.75 mg  13.75 mg  13.75 mg  13.75 mg   Next Week Total 13.75 mg  13.75 mg  13.75 mg  12.5 mg   Sun 1.25 mg  1.25 mg  1.25 mg  1.25 mg   Mon 2.5 mg  2.5 mg  2.5 mg  2.5 mg   Tue 1.25 mg  1.25 mg  1.25 mg  1.25 mg   Wed 2.5 mg  2.5 mg  2.5 mg  1.25 mg (11/29)   Thu 1.25 mg  1.25 mg  1.25 mg  1.25 mg   Fri 2.5 mg  2.5 mg  2.5 mg  2.5 mg   Sat 2.5 mg  2.5 mg  2.5 mg  2.5 mg   Historical INR  2.80      2.30      3.30            This result is from an external source.       Plan:  1. INR is Supratherapeutic today- see above in Anticoagulation Summary.   Will instruct Diana Avalos to Change their warfarin regimen- see above in Anticoagulation Summary.  no changes other than thanksgiving, partial tomorrow as patient has already taken her dose of warfarin today, rck 1 week   2. Follow up in 1 weeks  3. They have been instructed to call if any changes in medications, doses, concerns, etc. Patient expresses understanding and has no further questions at this time.    Elise Davis RP

## 2023-12-06 ENCOUNTER — ANTICOAGULATION VISIT (OUTPATIENT)
Dept: PHARMACY | Facility: HOSPITAL | Age: 88
End: 2023-12-06
Payer: MEDICARE

## 2023-12-06 LAB — INR PPP: 2.3

## 2023-12-06 NOTE — PROGRESS NOTES
Anticoagulation Clinic Progress Note    Anticoagulation Summary  As of 2023      INR goal:  2.0-3.0   TTR:  76.4% (5.2 y)   INR used for dosin.30 (2023)   Warfarin maintenance plan:  1.25 mg every Sun, Tue, Thu; 2.5 mg all other days   Weekly warfarin total:  13.75 mg   No change documented:  Elise Davis RPH   Plan last modified:  Elise Davis RPH (2022)   Next INR check:  2023   Priority:  Maintenance   Target end date:  Indefinite    Indications    Long-term (current) use of anticoagulants (Resolved) [Z79.01]                 Anticoagulation Episode Summary       INR check location:      Preferred lab:      Send INR reminders to:  [facility] HOME TEST POOL    Comments:  [facility] --- SPEAK WITH DAUGHTER (LYNETTE) **DO NOT SPEAK WITH PATIENT (poor memory)** **CALL EVERY TIME**          Anticoagulation Care Providers       Provider Role Specialty Phone number    Rafaela Leija MD Referring Cardiology [phone]            Clinic Interview:  No pertinent clinical findings have been reported.    INR History:      2023     2:45 PM 11/15/2023    12:00 AM 11/15/2023     3:18 PM 2023    12:00 AM 2023     3:13 PM 2023    12:00 AM 2023     2:42 PM   Anticoagulation Monitoring   INR 2.80  2.30  3.30  2.30   INR Date 2023  11/15/2023  2023  2023   INR Goal 2.0-3.0  2.0-3.0  2.0-3.0  2.0-3.0   Trend Same  Same  Same  Same   Last Week Total 13.75 mg  13.75 mg  13.75 mg  12.5 mg   Next Week Total 13.75 mg  13.75 mg  12.5 mg  13.75 mg   Sun 1.25 mg  1.25 mg  1.25 mg  1.25 mg   Mon 2.5 mg  2.5 mg  2.5 mg  2.5 mg   Tue 1.25 mg  1.25 mg  1.25 mg  1.25 mg   Wed 2.5 mg  2.5 mg  1.25 mg ()  2.5 mg   Thu 1.25 mg  1.25 mg  1.25 mg  1.25 mg   Fri 2.5 mg  2.5 mg  2.5 mg  2.5 mg   Sat 2.5 mg  2.5 mg  2.5 mg  2.5 mg   Historical INR  2.30      3.30      2.30            This result is from an external source.       Plan:  1. INR is therapeutic  today- see above in Anticoagulation Summary.    Diana MARISEL Avalos to continue their warfarin regimen- see above in Anticoagulation Summary.  2. Follow up in 1 week  3. Lvm with instructions for Magui. They have been instructed to call if any changes in medications, doses, concerns, etc. Patient expresses understanding and has no further questions at this time.    Elise Davis, [facility]

## 2023-12-13 LAB — INR PPP: 2.7

## 2023-12-14 ENCOUNTER — ANTICOAGULATION VISIT (OUTPATIENT)
Dept: PHARMACY | Facility: HOSPITAL | Age: 88
End: 2023-12-14
Payer: MEDICARE

## 2023-12-14 PROCEDURE — G0249 PROVIDE INR TEST MATER/EQUIP: HCPCS

## 2023-12-14 NOTE — PROGRESS NOTES
Anticoagulation Clinic Progress Note    Anticoagulation Summary  As of 2023      INR goal:  2.0-3.0   TTR:  76.5% (5.2 y)   INR used for dosin.70 (2023)   Warfarin maintenance plan:  1.25 mg every Sun, Tue, Thu; 2.5 mg all other days   Weekly warfarin total:  13.75 mg   No change documented:  Jayant White, PharmD   Plan last modified:  Elise Davis RPH (2022)   Next INR check:  2023   Priority:  Maintenance   Target end date:  Indefinite    Indications    Long-term (current) use of anticoagulants (Resolved) [Z79.01]                 Anticoagulation Episode Summary       INR check location:      Preferred lab:      Send INR reminders to:   [facility] HOME TEST POOL    Comments:  [facility] --- SPEAK WITH DAUGHTER (LYNETTE) **DO NOT SPEAK WITH PATIENT (poor memory)** **CALL EVERY TIME**          Anticoagulation Care Providers       Provider Role Specialty Phone number    Rafaela Leija MD Referring Cardiology [phone]            Clinic Interview:  Patient Findings     Negatives:  Signs/symptoms of thrombosis, Signs/symptoms of bleeding,   Laboratory test error suspected, Change in health, Change in alcohol use,   Change in activity, Upcoming invasive procedure, Emergency department   visit, Upcoming dental procedure, Missed doses, Extra doses, Change in   medications, Change in diet/appetite, Hospital admission, Bruising, Other   complaints      Clinical Outcomes     Negatives:  Major bleeding event, Thromboembolic event,   Anticoagulation-related hospital admission, Anticoagulation-related ED   visit, Anticoagulation-related fatality       INR History:      11/15/2023     3:18 PM 2023    12:00 AM 2023     3:13 PM 2023    12:00 AM 2023     2:42 PM 2023    12:00 AM 2023     8:00 AM   Anticoagulation Monitoring   INR 2.30  3.30  2.30  2.70   INR Date 11/15/2023  2023  2023  2023   INR Goal 2.0-3.0  2.0-3.0  2.0-3.0   2.0-3.0   Trend Same  Same  Same  Same   Last Week Total 13.75 mg  13.75 mg  12.5 mg  13.75 mg   Next Week Total 13.75 mg  12.5 mg  13.75 mg  13.75 mg   Sun 1.25 mg  1.25 mg  1.25 mg  1.25 mg   Mon 2.5 mg  2.5 mg  2.5 mg  2.5 mg   Tue 1.25 mg  1.25 mg  1.25 mg  1.25 mg   Wed 2.5 mg  1.25 mg (11/29)  2.5 mg  -   Thu 1.25 mg  1.25 mg  1.25 mg  1.25 mg   Fri 2.5 mg  2.5 mg  2.5 mg  2.5 mg   Sat 2.5 mg  2.5 mg  2.5 mg  2.5 mg   Historical INR  3.30      2.30      2.70            This result is from an external source.       Plan:  1. INR was Therapeutic yesterday- see above in Anticoagulation Summary.   Will instruct Diana Avalos to Continue their warfarin regimen- see above in Anticoagulation Summary.  2. Follow up in 1 week.  3. Spoke with Magui (daughter). They have been instructed to call if any changes in medications, doses, concerns, etc. Patient expresses understanding and has no further questions at this time.    Jayant White, PharmD

## 2023-12-20 ENCOUNTER — ANTICOAGULATION VISIT (OUTPATIENT)
Dept: PHARMACY | Facility: HOSPITAL | Age: 88
End: 2023-12-20
Payer: MEDICARE

## 2023-12-20 LAB — INR PPP: 3

## 2023-12-20 NOTE — PROGRESS NOTES
Anticoagulation Clinic Progress Note    Anticoagulation Summary  As of 12/20/2023      INR goal:  2.0-3.0   TTR:  76.6% (5.2 y)   INR used for dosing:  3.00 (12/20/2023)   Warfarin maintenance plan:  1.25 mg every Sun, Tue, Thu; 2.5 mg all other days   Weekly warfarin total:  13.75 mg   No change documented:  Christianne Vera, PharmD   Plan last modified:  Elise Davis RPH (8/30/2022)   Next INR check:  12/27/2023   Priority:  Maintenance   Target end date:  Indefinite    Indications    Long-term (current) use of anticoagulants (Resolved) [Z79.01]                 Anticoagulation Episode Summary       INR check location:      Preferred lab:      Send INR reminders to:  [facility] HOME TEST POOL    Comments:  [facility] --- SPEAK WITH DAUGHTER (LYNETTE) **DO NOT SPEAK WITH PATIENT (poor memory)** **CALL EVERY TIME**          Anticoagulation Care Providers       Provider Role Specialty Phone number    Rafaela Leija MD Referring Cardiology [phone]            Clinic Interview:  No pertinent clinical findings have been reported.    INR History:      11/28/2023     3:13 PM 12/6/2023    12:00 AM 12/6/2023     2:42 PM 12/13/2023    12:00 AM 12/14/2023     8:00 AM 12/20/2023    12:00 AM 12/20/2023     2:34 PM   Anticoagulation Monitoring   INR 3.30  2.30  2.70  3.00   INR Date 11/28/2023 12/6/2023 12/13/2023 12/20/2023   INR Goal 2.0-3.0  2.0-3.0  2.0-3.0  2.0-3.0   Trend Same  Same  Same  Same   Last Week Total 13.75 mg  12.5 mg  13.75 mg  13.75 mg   Next Week Total 12.5 mg  13.75 mg  13.75 mg  13.75 mg   Sun 1.25 mg  1.25 mg  1.25 mg  1.25 mg   Mon 2.5 mg  2.5 mg  2.5 mg  2.5 mg   Tue 1.25 mg  1.25 mg  1.25 mg  1.25 mg   Wed 1.25 mg (11/29)  2.5 mg  -  2.5 mg   Thu 1.25 mg  1.25 mg  1.25 mg  1.25 mg   Fri 2.5 mg  2.5 mg  2.5 mg  2.5 mg   Sat 2.5 mg  2.5 mg  2.5 mg  2.5 mg   Historical INR  2.30      2.70      3.00            This result is from an external source.       Plan:  1. INR is therapeutic  today- see above in Anticoagulation Summary.    Diana Avalos to continue their warfarin regimen- see above in Anticoagulation Summary.  2. Follow up in 1 week  3. Pt has agreed to only be called if INR out of range. They have been instructed to call if any changes in medications, doses, concerns, etc. Patient expresses understanding and has no further questions at this time.    Christianne Vera, PharmD

## 2023-12-27 LAB — INR PPP: 2

## 2023-12-28 ENCOUNTER — ANTICOAGULATION VISIT (OUTPATIENT)
Dept: PHARMACY | Facility: HOSPITAL | Age: 88
End: 2023-12-28
Payer: MEDICARE

## 2023-12-28 NOTE — PROGRESS NOTES
Anticoagulation Clinic Progress Note    Anticoagulation Summary  As of 2023      INR goal:  2.0-3.0   TTR:  76.6% (5.2 y)   INR used for dosin.00 (2023)   Warfarin maintenance plan:  1.25 mg every Sun, e, Thu; 2.5 mg all other days   Weekly warfarin total:  13.75 mg   No change documented:  Jayant White, PharmD   Plan last modified:  Elise Davis RPH (2022)   Next INR check:  1/3/2024   Priority:  Maintenance   Target end date:  Indefinite    Indications    Long-term (current) use of anticoagulants (Resolved) [Z79.01]                 Anticoagulation Episode Summary       INR check location:      Preferred lab:      Send INR reminders to:  [facility] HOME TEST POOL    Comments:  [facility] --- SPEAK WITH DAUGHTER (LYNETTE) **DO NOT SPEAK WITH PATIENT (poor memory)** **CALL EVERY TIME**          Anticoagulation Care Providers       Provider Role Specialty Phone number    Rafaela Leija MD Referring Cardiology [phone]            Clinic Interview:  Patient Findings     Positives:  Change in health, Missed doses, Change in diet/appetite    Negatives:  Signs/symptoms of thrombosis, Signs/symptoms of bleeding,   Laboratory test error suspected, Change in alcohol use, Change in   activity, Upcoming invasive procedure, Emergency department visit,   Upcoming dental procedure, Extra doses, Change in medications, Hospital   admission, Bruising, Other complaints    Comments:  Daughter reported that Mrs. Avalos experienced a GI virus this   past week (diarrhea/vomiting) from 23 - 23. She missed her   warfarin both days. She is improving; however, her food intake remains   lower than usual.       Clinical Outcomes     Negatives:  Major bleeding event, Thromboembolic event,   Anticoagulation-related hospital admission, Anticoagulation-related ED   visit, Anticoagulation-related fatality    Comments:  Daughter reported that Mrs. Avalos experienced a GI virus this   past week  (diarrhea/vomiting) from 12/25/23 - 12/26/23. She missed her   warfarin both days. She is improving; however, her food intake remains   lower than usual.         INR History:      12/6/2023     2:42 PM 12/13/2023    12:00 AM 12/14/2023     8:00 AM 12/20/2023    12:00 AM 12/20/2023     2:34 PM 12/27/2023    12:00 AM 12/28/2023    10:39 AM   Anticoagulation Monitoring   INR 2.30  2.70  3.00  2.00   INR Date 12/6/2023 12/13/2023 12/20/2023 12/27/2023   INR Goal 2.0-3.0  2.0-3.0  2.0-3.0  2.0-3.0   Trend Same  Same  Same  Same   Last Week Total 12.5 mg  13.75 mg  13.75 mg  10 mg   Next Week Total 13.75 mg  13.75 mg  13.75 mg  13.75 mg   Sun 1.25 mg  1.25 mg  1.25 mg  1.25 mg   Mon 2.5 mg  2.5 mg  2.5 mg  2.5 mg   Tue 1.25 mg  1.25 mg  1.25 mg  1.25 mg   Wed 2.5 mg  -  2.5 mg  -   Thu 1.25 mg  1.25 mg  1.25 mg  1.25 mg   Fri 2.5 mg  2.5 mg  2.5 mg  2.5 mg   Sat 2.5 mg  2.5 mg  2.5 mg  2.5 mg   Historical INR  2.70      3.00      2.00            This result is from an external source.       Plan:  1. INR is Therapeutic today- see above in Anticoagulation Summary.   Will instruct Diana Avalos to Continue their warfarin regimen- see above in Anticoagulation Summary.  2. Follow up in 1 week.  3. They have been instructed to call if any changes in medications, doses, concerns, etc. Patient expresses understanding and has no further questions at this time.    Jayant White, PharmD

## 2024-01-02 RX ORDER — CARVEDILOL 6.25 MG/1
TABLET ORAL
Qty: 180 TABLET | Refills: 0 | Status: SHIPPED | OUTPATIENT
Start: 2024-01-02

## 2024-01-03 ENCOUNTER — ANTICOAGULATION VISIT (OUTPATIENT)
Dept: PHARMACY | Facility: HOSPITAL | Age: 89
End: 2024-01-03
Payer: MEDICARE

## 2024-01-03 LAB — INR PPP: 2.8

## 2024-01-03 NOTE — PROGRESS NOTES
Anticoagulation Clinic Progress Note    Anticoagulation Summary  As of 1/3/2024      INR goal:  2.0-3.0   TTR:  76.7% (5.2 y)   INR used for dosin.80 (1/3/2024)   Warfarin maintenance plan:  1.25 mg every Sun, Tue, Thu; 2.5 mg all other days   Weekly warfarin total:  13.75 mg   No change documented:  Elise Davis RPH   Plan last modified:  Elise Davis RPH (2022)   Next INR check:  1/10/2024   Priority:  Maintenance   Target end date:  Indefinite    Indications    Long-term (current) use of anticoagulants (Resolved) [Z79.01]                 Anticoagulation Episode Summary       INR check location:      Preferred lab:      [facility]:   [facility]    Comments:  [facility] --- SPEAK WITH DAUGHTER (LYNETTE) **DO NOT SPEAK WITH PATIENT (poor memory)** **CALL EVERY TIME**          Anticoagulation Care Providers       Provider Role Specialty Phone number    Rafaela Leija MD Referring Cardiology [phone]            Clinic Interview:  No pertinent clinical findings have been reported.    INR History:      2023     8:00 AM 2023    12:00 AM 2023     2:34 PM 2023    12:00 AM 2023    10:39 AM 1/3/2024    12:00 AM 1/3/2024     3:15 PM   Anticoagulation Monitoring   INR 2.70  3.00  2.00  2.80   INR Date 2023  2023  2023  1/3/2024   INR Goal 2.0-3.0  2.0-3.0  2.0-3.0  2.0-3.0   Trend Same  Same  Same  Same   Last Week Total 13.75 mg  13.75 mg  10 mg  13.75 mg   Next Week Total 13.75 mg  13.75 mg  13.75 mg  13.75 mg   Sun 1.25 mg  1.25 mg  1.25 mg  1.25 mg   Mon 2.5 mg  2.5 mg  2.5 mg  2.5 mg   Tue 1.25 mg  1.25 mg  1.25 mg  1.25 mg   Wed -  2.5 mg  -  2.5 mg   Thu 1.25 mg  1.25 mg  1.25 mg  1.25 mg   Fri 2.5 mg  2.5 mg  2.5 mg  2.5 mg   Sat 2.5 mg  2.5 mg  2.5 mg  2.5 mg   Historical INR  3.00      2.00      2.80            This result is from an external source.       Plan:  1. INR is therapeutic today- see above in  Anticoagulation Summary.    Diana Avalos to continue their warfarin regimen- see above in Anticoagulation Summary.  2. Follow up in 1 week  3. Pts daughter left voicemail stating we did not need to return her phone call. They have been instructed to call if any changes in medications, doses, concerns, etc. Patient expresses understanding and has no further questions at this time.    Elise Davis, [facility]

## 2024-01-10 ENCOUNTER — ANTICOAGULATION VISIT (OUTPATIENT)
Dept: PHARMACY | Facility: HOSPITAL | Age: 89
End: 2024-01-10
Payer: MEDICARE

## 2024-01-10 LAB — INR PPP: 3

## 2024-01-10 NOTE — PROGRESS NOTES
Anticoagulation Clinic Progress Note    Anticoagulation Summary  As of 1/10/2024      INR goal:  2.0-3.0   TTR:  76.8% (5.3 y)   INR used for dosing:  3.00 (1/10/2024)   Warfarin maintenance plan:  1.25 mg every Sun, Tue, Thu; 2.5 mg all other days   Weekly warfarin total:  13.75 mg   No change documented:  Elise Davis RPH   Plan last modified:  Elise Davis RPH (8/30/2022)   Next INR check:  1/17/2024   Priority:  Maintenance   Target end date:  Indefinite    Indications    Long-term (current) use of anticoagulants (Resolved) [Z79.01]                 Anticoagulation Episode Summary       INR check location:      Preferred lab:      [facility]:   [facility]    Comments:  [facility] --- SPEAK WITH DAUGHTER (LYNETTE) **DO NOT SPEAK WITH PATIENT (poor memory)** **CALL EVERY TIME**          Anticoagulation Care Providers       Provider Role Specialty Phone number    Rafaela Leija MD Referring Cardiology [phone]            Clinic Interview:  No pertinent clinical findings have been reported.    INR History:      12/20/2023     2:34 PM 12/27/2023    12:00 AM 12/28/2023    10:39 AM 1/3/2024    12:00 AM 1/3/2024     3:15 PM 1/10/2024    12:00 AM 1/10/2024     3:36 PM   Anticoagulation Monitoring   INR 3.00  2.00  2.80  3.00   INR Date 12/20/2023  12/27/2023  1/3/2024  1/10/2024   INR Goal 2.0-3.0  2.0-3.0  2.0-3.0  2.0-3.0   Trend Same  Same  Same  Same   Last Week Total 13.75 mg  10 mg  13.75 mg  13.75 mg   Next Week Total 13.75 mg  13.75 mg  13.75 mg  13.75 mg   Sun 1.25 mg  1.25 mg  1.25 mg  1.25 mg   Mon 2.5 mg  2.5 mg  2.5 mg  2.5 mg   Tue 1.25 mg  1.25 mg  1.25 mg  1.25 mg   Wed 2.5 mg  -  2.5 mg  2.5 mg   Thu 1.25 mg  1.25 mg  1.25 mg  1.25 mg   Fri 2.5 mg  2.5 mg  2.5 mg  2.5 mg   Sat 2.5 mg  2.5 mg  2.5 mg  2.5 mg   Historical INR  2.00      2.80      3.00            This result is from an external source.       Plan:  1. INR is therapeutic today- see above in  Anticoagulation Summary.    Diana Avalos to continue their warfarin regimen- see above in Anticoagulation Summary.  2. Follow up in 1 week  3. They have been instructed to call if any changes in medications, doses, concerns, etc. Patient expresses understanding and has no further questions at this time.    Elise Davis, formerly [facility]

## 2024-01-17 ENCOUNTER — ANTICOAGULATION VISIT (OUTPATIENT)
Dept: PHARMACY | Facility: HOSPITAL | Age: 89
End: 2024-01-17
Payer: MEDICARE

## 2024-01-17 LAB — INR PPP: 3.1

## 2024-01-17 PROCEDURE — G0249 PROVIDE INR TEST MATER/EQUIP: HCPCS

## 2024-01-17 NOTE — PROGRESS NOTES
Anticoagulation Clinic Progress Note    Anticoagulation Summary  As of 1/17/2024      INR goal:  2.0-3.0   TTR:  76.5% (5.3 y)   INR used for dosing:  3.10 (1/17/2024)   Warfarin maintenance plan:  1.25 mg every Sun, Tue, Thu; 2.5 mg all other days   Weekly warfarin total:  13.75 mg   No change documented:  Elise Davis RPH   Plan last modified:  Elise Davis RPH (8/30/2022)   Next INR check:  1/24/2024   Priority:  Maintenance   Target end date:  Indefinite    Indications    Long-term (current) use of anticoagulants (Resolved) [Z79.01]                 Anticoagulation Episode Summary       INR check location:      Preferred lab:      Send INR reminders to:   [facility] HOME TEST POOL    Comments:  [facility] --- SPEAK WITH DAUGHTER (LYNETTE) **DO NOT SPEAK WITH PATIENT (poor memory)** **CALL EVERY TIME**          Anticoagulation Care Providers       Provider Role Specialty Phone number    Rafaela Leija MD Referring Cardiology [phone]            Clinic Interview:  Patient Findings     Negatives:  Signs/symptoms of thrombosis, Signs/symptoms of bleeding,   Laboratory test error suspected, Change in health, Change in alcohol use,   Change in activity, Upcoming invasive procedure, Emergency department   visit, Upcoming dental procedure, Missed doses, Extra doses, Change in   medications, Change in diet/appetite, Hospital admission, Bruising, Other   complaints      Clinical Outcomes     Negatives:  Major bleeding event, Thromboembolic event,   Anticoagulation-related hospital admission, Anticoagulation-related ED   visit, Anticoagulation-related fatality        INR History:      12/28/2023    10:39 AM 1/3/2024    12:00 AM 1/3/2024     3:15 PM 1/10/2024    12:00 AM 1/10/2024     3:36 PM 1/17/2024    12:00 AM 1/17/2024     3:30 PM   Anticoagulation Monitoring   INR 2.00  2.80  3.00  3.10   INR Date 12/27/2023  1/3/2024  1/10/2024  1/17/2024   INR Goal 2.0-3.0  2.0-3.0  2.0-3.0  2.0-3.0   Trend  Same  Same  Same  Same   Last Week Total 10 mg  13.75 mg  13.75 mg  13.75 mg   Next Week Total 13.75 mg  13.75 mg  13.75 mg  13.75 mg   Sun 1.25 mg  1.25 mg  1.25 mg  1.25 mg   Mon 2.5 mg  2.5 mg  2.5 mg  2.5 mg   Tue 1.25 mg  1.25 mg  1.25 mg  1.25 mg   Wed -  2.5 mg  2.5 mg  2.5 mg   Thu 1.25 mg  1.25 mg  1.25 mg  1.25 mg   Fri 2.5 mg  2.5 mg  2.5 mg  2.5 mg   Sat 2.5 mg  2.5 mg  2.5 mg  2.5 mg   Historical INR  2.80      3.00      3.10            This result is from an external source.       Plan:  1. INR is Supratherapeutic today- see above in Anticoagulation Summary.   Will instruct Diana Avalos to Continue their warfarin regimen as INR is very close to goal- see above in Anticoagulation Summary.  2. Follow up in 1 weeks  3. LVM with instructions. They have been instructed to call if any changes in medications, doses, concerns, etc. Patient expresses understanding and has no further questions at this time.    Elise Davis [facility]

## 2024-01-23 ENCOUNTER — ANTICOAGULATION VISIT (OUTPATIENT)
Dept: PHARMACY | Facility: HOSPITAL | Age: 89
End: 2024-01-23
Payer: MEDICARE

## 2024-01-23 LAB — INR PPP: 2.6

## 2024-01-23 NOTE — PROGRESS NOTES
Anticoagulation Clinic Progress Note    Anticoagulation Summary  As of 2024      INR goal:  2.0-3.0   TTR:  76.6% (5.3 y)   INR used for dosin.60 (2024)   Warfarin maintenance plan:  1.25 mg every Sun, Tue, Thu; 2.5 mg all other days   Weekly warfarin total:  13.75 mg   No change documented:  Elise Davis RPH   Plan last modified:  Elise Davis RPH (2022)   Next INR check:  2024   Priority:  Maintenance   Target end date:  Indefinite    Indications    Long-term (current) use of anticoagulants (Resolved) [Z79.01]                 Anticoagulation Episode Summary       INR check location:      Preferred lab:      Send INR reminders to:  [facility] HOME TEST POOL    Comments:  [facility] --- SPEAK WITH DAUGHTER (LYNETTE) **DO NOT SPEAK WITH PATIENT (poor memory)** **CALL EVERY TIME**          Anticoagulation Care Providers       Provider Role Specialty Phone number    Rafaela Leija MD Referring Cardiology [phone]            Clinic Interview:  No pertinent clinical findings have been reported.    INR History:      1/3/2024     3:15 PM 1/10/2024    12:00 AM 1/10/2024     3:36 PM 2024    12:00 AM 2024     3:30 PM 2024    12:00 AM 2024    11:20 AM   Anticoagulation Monitoring   INR 2.80  3.00  3.10  2.60   INR Date 1/3/2024  1/10/2024  2024  2024   INR Goal 2.0-3.0  2.0-3.0  2.0-3.0  2.0-3.0   Trend Same  Same  Same  Same   Last Week Total 13.75 mg  13.75 mg  13.75 mg  13.75 mg   Next Week Total 13.75 mg  13.75 mg  13.75 mg  13.75 mg   Sun 1.25 mg  1.25 mg  1.25 mg  1.25 mg   Mon 2.5 mg  2.5 mg  2.5 mg  2.5 mg   Tue 1.25 mg  1.25 mg  1.25 mg  1.25 mg   Wed 2.5 mg  2.5 mg  2.5 mg  2.5 mg   Thu 1.25 mg  1.25 mg  1.25 mg  1.25 mg   Fri 2.5 mg  2.5 mg  2.5 mg  2.5 mg   Sat 2.5 mg  2.5 mg  2.5 mg  2.5 mg   Historical INR  3.00      3.10      2.60            This result is from an external source.       Plan:  1. INR is therapeutic today- see above  in Anticoagulation Summary.    Diana Sanatel to continue their warfarin regimen- see above in Anticoagulation Summary.  2. Follow up in 1 week  3. Daughter left message requesting no return phone call. They have been instructed to call if any changes in medications, doses, concerns, etc. Patient expresses understanding and has no further questions at this time.    Elise Davis, [facility]

## 2024-01-31 ENCOUNTER — ANTICOAGULATION VISIT (OUTPATIENT)
Dept: PHARMACY | Facility: HOSPITAL | Age: 89
End: 2024-01-31
Payer: MEDICARE

## 2024-01-31 LAB — INR PPP: 2.3

## 2024-01-31 NOTE — PROGRESS NOTES
Anticoagulation Clinic Progress Note    Anticoagulation Summary  As of 2024      INR goal:  2.0-3.0   TTR:  76.6% (5.3 y)   INR used for dosin.30 (2024)   Warfarin maintenance plan:  1.25 mg every Sun, Tue, Thu; 2.5 mg all other days   Weekly warfarin total:  13.75 mg   Plan last modified:  Elise Davis RPH (2022)   Next INR check:  2024   Priority:  Maintenance   Target end date:  Indefinite    Indications    Long-term (current) use of anticoagulants (Resolved) [Z79.01]                 Anticoagulation Episode Summary       INR check location:      Preferred lab:      Send INR reminders to:  [facility] HOME TEST POOL    Comments:  [facility] --- SPEAK WITH DAUGHTER (LYNETTE) **DO NOT SPEAK WITH PATIENT (poor memory)** **CALL EVERY TIME**          Anticoagulation Care Providers       Provider Role Specialty Phone number    Rafaela Leija MD Referring Cardiology [phone]            Clinic Interview:  Patient Findings     Negatives:  Signs/symptoms of thrombosis, Signs/symptoms of bleeding,   Laboratory test error suspected, Change in health, Change in alcohol use,   Change in activity, Upcoming invasive procedure, Emergency department   visit, Upcoming dental procedure, Missed doses, Extra doses, Change in   medications, Change in diet/appetite, Hospital admission, Bruising, Other   complaints      Clinical Outcomes     Negatives:  Major bleeding event, Thromboembolic event,   Anticoagulation-related hospital admission, Anticoagulation-related ED   visit, Anticoagulation-related fatality        INR History:      1/10/2024     3:36 PM 2024    12:00 AM 2024     3:30 PM 2024    12:00 AM 2024    11:20 AM 2024    12:00 AM 2024     1:42 PM   Anticoagulation Monitoring   INR 3.00  3.10  2.60  2.30   INR Date 1/10/2024  2024  2024  2024   INR Goal 2.0-3.0  2.0-3.0  2.0-3.0  2.0-3.0   Trend Same  Same  Same  Same   Last Week Total 13.75  mg  13.75 mg  13.75 mg  13.75 mg   Next Week Total 13.75 mg  13.75 mg  13.75 mg  13.75 mg   Sun 1.25 mg  1.25 mg  1.25 mg  1.25 mg   Mon 2.5 mg  2.5 mg  2.5 mg  2.5 mg   Tue 1.25 mg  1.25 mg  1.25 mg  1.25 mg   Wed 2.5 mg  2.5 mg  2.5 mg  2.5 mg   Thu 1.25 mg  1.25 mg  1.25 mg  1.25 mg   Fri 2.5 mg  2.5 mg  2.5 mg  2.5 mg   Sat 2.5 mg  2.5 mg  2.5 mg  2.5 mg   Historical INR  3.10      2.60      2.30            This result is from an external source.       Plan:  1. INR is Therapeutic today- see above in Anticoagulation Summary.   Will instruct Diana Avalos to Continue their warfarin regimen- see above in Anticoagulation Summary.  2. Follow up in 1 weeks  3. They have been instructed to call if any changes in medications, doses, concerns, etc. Patient expresses understanding and has no further questions at this time.    Christianne Vera, PharmD

## 2024-02-07 ENCOUNTER — ANTICOAGULATION VISIT (OUTPATIENT)
Dept: PHARMACY | Facility: HOSPITAL | Age: 89
End: 2024-02-07
Payer: MEDICARE

## 2024-02-07 LAB — INR PPP: 2.4

## 2024-02-07 NOTE — PROGRESS NOTES
Anticoagulation Clinic Progress Note    Anticoagulation Summary  As of 2024      INR goal:  2.0-3.0   TTR:  76.7% (5.3 y)   INR used for dosin.40 (2024)   Warfarin maintenance plan:  1.25 mg every Sun, Tue, Thu; 2.5 mg all other days   Weekly warfarin total:  13.75 mg   No change documented:  Jayant White, PharmD   Plan last modified:  Elise Dvais RPH (2022)   Next INR check:  2024   Priority:  Maintenance   Target end date:  Indefinite    Indications    Long-term (current) use of anticoagulants (Resolved) [Z79.01]                 Anticoagulation Episode Summary       INR check location:      Preferred lab:      Send INR reminders to:   [facility] HOME TEST POOL    Comments:  [facility] --- SPEAK WITH DAUGHTER (LYNETTE) **DO NOT SPEAK WITH PATIENT (poor memory)** **CALL EVERY TIME**          Anticoagulation Care Providers       Provider Role Specialty Phone number    Rafaela Leija MD Referring Cardiology [phone]            Clinic Interview:  Patient Findings     Negatives:  Signs/symptoms of thrombosis, Signs/symptoms of bleeding,   Laboratory test error suspected, Change in health, Change in alcohol use,   Change in activity, Upcoming invasive procedure, Emergency department   visit, Upcoming dental procedure, Missed doses, Extra doses, Change in   medications, Change in diet/appetite, Hospital admission, Bruising, Other   complaints      Clinical Outcomes     Negatives:  Major bleeding event, Thromboembolic event,   Anticoagulation-related hospital admission, Anticoagulation-related ED   visit, Anticoagulation-related fatality        INR History:      2024     3:30 PM 2024    12:00 AM 2024    11:20 AM 2024    12:00 AM 2024     1:42 PM 2024    12:00 AM 2024     1:02 PM   Anticoagulation Monitoring   INR 3.10  2.60  2.30  2.40   INR Date 2024   INR Goal 2.0-3.0  2.0-3.0  2.0-3.0  2.0-3.0   Trend Same   Same  Same  Same   Last Week Total 13.75 mg  13.75 mg  13.75 mg  13.75 mg   Next Week Total 13.75 mg  13.75 mg  13.75 mg  13.75 mg   Sun 1.25 mg  1.25 mg  1.25 mg  1.25 mg   Mon 2.5 mg  2.5 mg  2.5 mg  2.5 mg   Tue 1.25 mg  1.25 mg  1.25 mg  1.25 mg   Wed 2.5 mg  2.5 mg  2.5 mg  2.5 mg   Thu 1.25 mg  1.25 mg  1.25 mg  1.25 mg   Fri 2.5 mg  2.5 mg  2.5 mg  2.5 mg   Sat 2.5 mg  2.5 mg  2.5 mg  2.5 mg   Historical INR  2.60      2.30      2.40            This result is from an external source.       Plan:  1. INR is Therapeutic today- see above in Anticoagulation Summary.   Will instruct Diana Avalos to Continue their warfarin regimen- see above in Anticoagulation Summary.  2. Follow up in 1 week.  3. Spoke with Magui (daughter). They have been instructed to call if any changes in medications, doses, concerns, etc. Patient expresses understanding and has no further questions at this time.    Jayant White, PharmD

## 2024-02-14 ENCOUNTER — ANTICOAGULATION VISIT (OUTPATIENT)
Dept: PHARMACY | Facility: HOSPITAL | Age: 89
End: 2024-02-14
Payer: MEDICARE

## 2024-02-14 LAB — INR PPP: 2.6

## 2024-02-14 PROCEDURE — G0249 PROVIDE INR TEST MATER/EQUIP: HCPCS

## 2024-02-21 ENCOUNTER — ANTICOAGULATION VISIT (OUTPATIENT)
Dept: PHARMACY | Facility: HOSPITAL | Age: 89
End: 2024-02-21
Payer: MEDICARE

## 2024-02-21 LAB — INR PPP: 2.2

## 2024-02-21 NOTE — PROGRESS NOTES
Anticoagulation Clinic Progress Note    Anticoagulation Summary  As of 2024      INR goal:  2.0-3.0   TTR:  76.9% (5.4 y)   INR used for dosin.20 (2024)   Warfarin maintenance plan:  1.25 mg every Sun, Tue, Thu; 2.5 mg all other days   Weekly warfarin total:  13.75 mg   No change documented:  Elise Davis RPH   Plan last modified:  Elise Davis RPH (2022)   Next INR check:  2024   Priority:  Maintenance   Target end date:  Indefinite    Indications    Long-term (current) use of anticoagulants (Resolved) [Z79.01]                 Anticoagulation Episode Summary       INR check location:      Preferred lab:      Send INR reminders to:  [facility] HOME TEST POOL    Comments:  [facility] --- SPEAK WITH DAUGHTER (LYNETTE) **DO NOT SPEAK WITH PATIENT (poor memory)** **CALL EVERY TIME**          Anticoagulation Care Providers       Provider Role Specialty Phone number    Rafaela Leija MD Referring Cardiology [phone]            Clinic Interview:  No pertinent clinical findings have been reported.    INR History:      2024     1:42 PM 2024    12:00 AM 2024     1:02 PM 2024    12:00 AM 2024     3:15 PM 2024    12:00 AM 2024     2:46 PM   Anticoagulation Monitoring   INR 2.30  2.40  2.60  2.20   INR Date 2024   INR Goal 2.0-3.0  2.0-3.0  2.0-3.0  2.0-3.0   Trend Same  Same  Same  Same   Last Week Total 13.75 mg  13.75 mg  13.75 mg  13.75 mg   Next Week Total 13.75 mg  13.75 mg  13.75 mg  13.75 mg   Sun 1.25 mg  1.25 mg  1.25 mg  1.25 mg   Mon 2.5 mg  2.5 mg  2.5 mg  2.5 mg   Tue 1.25 mg  1.25 mg  1.25 mg  1.25 mg   Wed 2.5 mg  2.5 mg  2.5 mg  2.5 mg   Thu 1.25 mg  1.25 mg  1.25 mg  1.25 mg   Fri 2.5 mg  2.5 mg  2.5 mg  2.5 mg   Sat 2.5 mg  2.5 mg  2.5 mg  2.5 mg   Historical INR  2.40      2.60      2.20            This result is from an external source.       Plan:  1. INR is therapeutic today- see above  in Anticoagulation Summary.    Diana Avalos to continue their warfarin regimen- see above in Anticoagulation Summary.  2. Follow up in 1 week  3.  They have been instructed to call if any changes in medications, doses, concerns, etc. Patient expresses understanding and has no further questions at this time.    Elise Davis, [facility]

## 2024-02-28 LAB — INR PPP: 2.1

## 2024-02-29 ENCOUNTER — ANTICOAGULATION VISIT (OUTPATIENT)
Dept: PHARMACY | Facility: HOSPITAL | Age: 89
End: 2024-02-29
Payer: MEDICARE

## 2024-02-29 NOTE — PROGRESS NOTES
I have supervised and reviewed the notes, assessments, and/or procedures performed by our  pharmacy resident . The documented assessment and plan were developed cooperatively, and the plan was not implemented in my presence. I concur with the documentation of this patient encounter.

## 2024-02-29 NOTE — PROGRESS NOTES
Anticoagulation Clinic Progress Note    Anticoagulation Summary  As of 2024      INR goal:  2.0-3.0   TTR:  77.0% (5.4 y)   INR used for dosin.10 (2024)   Warfarin maintenance plan:  1.25 mg every Sun, Tue, Thu; 2.5 mg all other days   Weekly warfarin total:  13.75 mg   No change documented:  Seema Rojo RPH   Plan last modified:  Elise Davis RPH (2022)   Next INR check:  3/6/2024   Priority:  Maintenance   Target end date:  Indefinite    Indications    Long-term (current) use of anticoagulants (Resolved) [Z79.01]                 Anticoagulation Episode Summary       INR check location:      Preferred lab:      Send INR reminders to:  [facility] HOME TEST POOL    Comments:  [facility] --- SPEAK WITH DAUGHTER (LYNETTE) **DO NOT SPEAK WITH PATIENT (poor memory)** **CALL EVERY TIME**          Anticoagulation Care Providers       Provider Role Specialty Phone number    Rafaela Leija MD Referring Cardiology [phone]            Clinic Interview:  No pertinent clinical findings have been reported.    INR History:      2024     1:02 PM 2024    12:00 AM 2024     3:15 PM 2024    12:00 AM 2024     2:46 PM 2024    12:00 AM 2024     8:07 AM   Anticoagulation Monitoring   INR 2.40  2.60  2.20  2.10   INR Date 2024   INR Goal 2.0-3.0  2.0-3.0  2.0-3.0  2.0-3.0   Trend Same  Same  Same  Same   Last Week Total 13.75 mg  13.75 mg  13.75 mg  13.75 mg   Next Week Total 13.75 mg  13.75 mg  13.75 mg  13.75 mg   Sun 1.25 mg  1.25 mg  1.25 mg  1.25 mg   Mon 2.5 mg  2.5 mg  2.5 mg  2.5 mg   Tue 1.25 mg  1.25 mg  1.25 mg  1.25 mg   Wed 2.5 mg  2.5 mg  2.5 mg  -   Thu 1.25 mg  1.25 mg  1.25 mg  1.25 mg   Fri 2.5 mg  2.5 mg  2.5 mg  2.5 mg   Sat 2.5 mg  2.5 mg  2.5 mg  2.5 mg   Historical INR  2.60      2.20      2.10            This result is from an external source.       Plan:  1. INR is therapeutic today- see above in  Anticoagulation Summary.    Diana Avalos to continue their warfarin regimen- see above in Anticoagulation Summary.  2. Follow up in 1 week  3. Pt has agreed to only be called if INR out of range. They have been instructed to call if any changes in medications, doses, concerns, etc. Patient expresses understanding and has no further questions at this time.    Seema Rojo [facility]

## 2024-03-06 ENCOUNTER — ANTICOAGULATION VISIT (OUTPATIENT)
Dept: PHARMACY | Facility: HOSPITAL | Age: 89
End: 2024-03-06
Payer: MEDICARE

## 2024-03-06 LAB — INR PPP: 3.2

## 2024-03-06 NOTE — PROGRESS NOTES
Anticoagulation Clinic Progress Note    Anticoagulation Summary  As of 3/6/2024      INR goal:  2.0-3.0   TTR:  77.0% (5.4 y)   INR used for dosing:  3.20 (3/6/2024)   Warfarin maintenance plan:  1.25 mg every Sun, Tue, Thu; 2.5 mg all other days   Weekly warfarin total:  13.75 mg   Plan last modified:  Elise Davis RPH (8/30/2022)   Next INR check:  3/13/2024   Priority:  Maintenance   Target end date:  Indefinite    Indications    Long-term (current) use of anticoagulants (Resolved) [Z79.01]                 Anticoagulation Episode Summary       INR check location:      Preferred lab:      Send INR reminders to:  [facility] [employer] HOME TEST POOL    Comments:  [facility] --- SPEAK WITH DAUGHTER (LYNETTE) **DO NOT SPEAK WITH PATIENT (poor memory)** **CALL EVERY TIME**          Anticoagulation Care Providers       Provider Role Specialty Phone number    Rafaela Leija MD Referring Cardiology [phone]            Clinic Interview:  Patient Findings     Positives:  Change in health, Change in diet/appetite    Negatives:  Signs/symptoms of thrombosis, Signs/symptoms of bleeding,   Laboratory test error suspected, Change in alcohol use, Change in   activity, Upcoming invasive procedure, Emergency department visit,   Upcoming dental procedure, Missed doses, Extra doses, Change in   medications, Hospital admission, Bruising, Other complaints    Comments:  Daughter reports that patient has been feeling ill and had   reduced appetite. Patient already received warfarin dose today.      Clinical Outcomes     Negatives:  Major bleeding event, Thromboembolic event,   Anticoagulation-related hospital admission, Anticoagulation-related ED   visit, Anticoagulation-related fatality    Comments:  Daughter reports that patient has been feeling ill and had   reduced appetite. Patient already received warfarin dose today.        INR History:      2/14/2024     3:15 PM 2/21/2024    12:00 AM 2/21/2024     2:46 PM 2/28/2024     12:00 AM 2/29/2024     8:07 AM 3/6/2024    12:00 AM 3/6/2024     2:50 PM   Anticoagulation Monitoring   INR 2.60  2.20  2.10  3.20   INR Date 2/14/2024  2/21/2024  2/28/2024  3/6/2024   INR Goal 2.0-3.0  2.0-3.0  2.0-3.0  2.0-3.0   Trend Same  Same  Same  Same   Last Week Total 13.75 mg  13.75 mg  13.75 mg  13.75 mg   Next Week Total 13.75 mg  13.75 mg  13.75 mg  12.5 mg   Sun 1.25 mg  1.25 mg  1.25 mg  1.25 mg   Mon 2.5 mg  2.5 mg  2.5 mg  2.5 mg   Tue 1.25 mg  1.25 mg  1.25 mg  1.25 mg   Wed 2.5 mg  2.5 mg  -  2.5 mg   Thu 1.25 mg  1.25 mg  1.25 mg  1.25 mg   Fri 2.5 mg  2.5 mg  2.5 mg  1.25 mg (3/8)   Sat 2.5 mg  2.5 mg  2.5 mg  2.5 mg   Historical INR  2.20      2.10      3.20            This result is from an external source.       Plan:  1. INR is Supratherapeutic today- see above in Anticoagulation Summary.  Will instruct Diana Avalos to Continue their warfarin regimen (take 1.25 mg 3/8/24; otherwise, continue 1.25 mg Sun/Tues/Thurs, 2.5 mg all other days) - see above in Anticoagulation Summary.  2. Follow up in 1 week  3. Patient declines warfarin refills.  4. Verbal and written information provided. Patient expresses understanding and has no further questions at this time.    Seema Rojo [facility]

## 2024-03-13 ENCOUNTER — ANTICOAGULATION VISIT (OUTPATIENT)
Dept: PHARMACY | Facility: HOSPITAL | Age: 89
End: 2024-03-13
Payer: MEDICARE

## 2024-03-13 LAB — INR PPP: 2.9

## 2024-03-13 PROCEDURE — G0249 PROVIDE INR TEST MATER/EQUIP: HCPCS

## 2024-03-14 NOTE — PROGRESS NOTES
Anticoagulation Clinic Progress Note    Anticoagulation Summary  As of 3/13/2024      INR goal:  2.0-3.0   TTR:  76.8% (5.4 y)   INR used for dosin.90 (3/13/2024)   Warfarin maintenance plan:  1.25 mg every Sun, Tue, Thu; 2.5 mg all other days   Weekly warfarin total:  13.75 mg   No change documented:  Elise Davis RPH   Plan last modified:  Elise Davis RPH (2022)   Next INR check:  3/19/2024   Priority:  Maintenance   Target end date:  Indefinite    Indications    Long-term (current) use of anticoagulants (Resolved) [Z79.01]                 Anticoagulation Episode Summary       INR check location:      Preferred lab:      Send INR reminders to:  [facility] HOME TEST POOL    Comments:  [facility] --- SPEAK WITH DAUGHTER (LYNETTE) **DO NOT SPEAK WITH PATIENT (poor memory)** **CALL EVERY TIME**          Anticoagulation Care Providers       Provider Role Specialty Phone number    Rafaela Leija MD Referring Cardiology [phone]            Clinic Interview:  No pertinent clinical findings have been reported.    INR History:      2024     2:46 PM 2024    12:00 AM 2024     8:07 AM 3/6/2024    12:00 AM 3/6/2024     2:50 PM 3/13/2024    12:00 AM 3/13/2024     3:00 PM   Anticoagulation Monitoring   INR 2.20  2.10  3.20  2.90   INR Date 2024  2024  3/6/2024  3/13/2024   INR Goal 2.0-3.0  2.0-3.0  2.0-3.0  2.0-3.0   Trend Same  Same  Same  Same   Last Week Total 13.75 mg  13.75 mg  13.75 mg  12.5 mg   Next Week Total 13.75 mg  13.75 mg  12.5 mg  13.75 mg   Sun 1.25 mg  1.25 mg  1.25 mg  1.25 mg   Mon 2.5 mg  2.5 mg  2.5 mg  2.5 mg   Tue 1.25 mg  1.25 mg  1.25 mg  -   Wed 2.5 mg  -  2.5 mg  2.5 mg   Thu 1.25 mg  1.25 mg  1.25 mg  1.25 mg   Fri 2.5 mg  2.5 mg  1.25 mg (3/8)  2.5 mg   Sat 2.5 mg  2.5 mg  2.5 mg  2.5 mg   Historical INR  2.10      3.20      2.90            This result is from an external source.       Plan:  1. INR is therapeutic today- see above in  Anticoagulation Summary.    Diana Avalos to continue their warfarin regimen- see above in Anticoagulation Summary.  2. Follow up in 1 week  3. They have been instructed to call if any changes in medications, doses, concerns, etc. Patient expresses understanding and has no further questions at this time.    Elise Davis, [facility]

## 2024-03-20 ENCOUNTER — ANTICOAGULATION VISIT (OUTPATIENT)
Dept: PHARMACY | Facility: HOSPITAL | Age: 89
End: 2024-03-20
Payer: MEDICARE

## 2024-03-20 LAB — INR PPP: 3

## 2024-03-20 NOTE — PROGRESS NOTES
Anticoagulation Clinic Progress Note    Anticoagulation Summary  As of 3/20/2024      INR goal:  2.0-3.0   TTR:  76.9% (5.5 y)   INR used for dosing:  3.00 (3/20/2024)   Warfarin maintenance plan:  1.25 mg every Sun, Tue, Thu; 2.5 mg all other days   Weekly warfarin total:  13.75 mg   No change documented:  Christianne Vera, PharmD   Plan last modified:  Elise Davis RPH (8/30/2022)   Next INR check:  3/27/2024   Priority:  Maintenance   Target end date:  Indefinite    Indications    Long-term (current) use of anticoagulants (Resolved) [Z79.01]                 Anticoagulation Episode Summary       INR check location:      Preferred lab:      Send INR reminders to:  [facility] HOME TEST POOL    Comments:  [facility] --- SPEAK WITH DAUGHTER (LYNETTE) **DO NOT SPEAK WITH PATIENT (poor memory)** **CALL EVERY TIME**          Anticoagulation Care Providers       Provider Role Specialty Phone number    Rafaela Leija MD Referring Cardiology [phone]            Clinic Interview:  No pertinent clinical findings have been reported.    INR History:      2/29/2024     8:07 AM 3/6/2024    12:00 AM 3/6/2024     2:50 PM 3/13/2024    12:00 AM 3/13/2024     3:00 PM 3/20/2024    12:00 AM 3/20/2024     3:24 PM   Anticoagulation Monitoring   INR 2.10  3.20  2.90  3.00   INR Date 2/28/2024  3/6/2024  3/13/2024  3/20/2024   INR Goal 2.0-3.0  2.0-3.0  2.0-3.0  2.0-3.0   Trend Same  Same  Same  Same   Last Week Total 13.75 mg  13.75 mg  12.5 mg  13.75 mg   Next Week Total 13.75 mg  12.5 mg  13.75 mg  13.75 mg   Sun 1.25 mg  1.25 mg  1.25 mg  1.25 mg   Mon 2.5 mg  2.5 mg  2.5 mg  2.5 mg   Tue 1.25 mg  1.25 mg  -  1.25 mg   Wed -  2.5 mg  2.5 mg  2.5 mg   Thu 1.25 mg  1.25 mg  1.25 mg  1.25 mg   Fri 2.5 mg  1.25 mg (3/8)  2.5 mg  2.5 mg   Sat 2.5 mg  2.5 mg  2.5 mg  2.5 mg   Historical INR  3.20      2.90      3.00            This result is from an external source.       Plan:  1. INR is therapeutic today- see above in  Anticoagulation Summary.    Diana JOINER Robbie to continue their warfarin regimen- see above in Anticoagulation Summary.  2. Follow up in 1 week  3.Secure voicemail for Magui with instructions and follow up plan.  They have been instructed to call if any changes in medications, doses, concerns, etc. Patient expresses understanding and has no further questions at this time.    Christianne Vera, PharmD

## 2024-03-27 ENCOUNTER — ANTICOAGULATION VISIT (OUTPATIENT)
Dept: PHARMACY | Facility: HOSPITAL | Age: 89
End: 2024-03-27
Payer: MEDICARE

## 2024-03-27 LAB — INR PPP: 2.6

## 2024-03-27 NOTE — PROGRESS NOTES
Anticoagulation Clinic Progress Note    Anticoagulation Summary  As of 3/27/2024      INR goal:  2.0-3.0   TTR:  77.0% (5.5 y)   INR used for dosin.60 (3/27/2024)   Warfarin maintenance plan:  1.25 mg every Sun, Tue, Thu; 2.5 mg all other days   Weekly warfarin total:  13.75 mg   No change documented:  Jayant White, PharmD   Plan last modified:  Elise Davis RPH (2022)   Next INR check:  4/3/2024   Priority:  Maintenance   Target end date:  Indefinite    Indications    Long-term (current) use of anticoagulants (Resolved) [Z79.01]                 Anticoagulation Episode Summary       INR check location:      Preferred lab:      Send INR reminders to:  [facility] HOME TEST POOL    Comments:  [facility] --- SPEAK WITH DAUGHTER (LYNETTE) **DO NOT SPEAK WITH PATIENT (poor memory)** **CALL EVERY TIME**          Anticoagulation Care Providers       Provider Role Specialty Phone number    Rafaela Leija MD Referring Cardiology [phone]            Clinic Interview:  No pertinent clinical findings have been reported.    INR History:      3/6/2024     2:50 PM 3/13/2024    12:00 AM 3/13/2024     3:00 PM 3/20/2024    12:00 AM 3/20/2024     3:24 PM 3/27/2024    12:00 AM 3/27/2024     3:32 PM   Anticoagulation Monitoring   INR 3.20  2.90  3.00  2.60   INR Date 3/6/2024  3/13/2024  3/20/2024  3/27/2024   INR Goal 2.0-3.0  2.0-3.0  2.0-3.0  2.0-3.0   Trend Same  Same  Same  Same   Last Week Total 13.75 mg  12.5 mg  13.75 mg  13.75 mg   Next Week Total 12.5 mg  13.75 mg  13.75 mg  13.75 mg   Sun 1.25 mg  1.25 mg  1.25 mg  1.25 mg   Mon 2.5 mg  2.5 mg  2.5 mg  2.5 mg   Tue 1.25 mg  -  1.25 mg  1.25 mg   Wed 2.5 mg  2.5 mg  2.5 mg  2.5 mg   Thu 1.25 mg  1.25 mg  1.25 mg  1.25 mg   Fri 1.25 mg (3/8)  2.5 mg  2.5 mg  2.5 mg   Sat 2.5 mg  2.5 mg  2.5 mg  2.5 mg   Historical INR  2.90      3.00      2.60            This result is from an external source.       Plan:  1. INR is therapeutic today- see above in  Anticoagulation Summary.    Diana MARISEL Santael to continue their warfarin regimen- see above in Anticoagulation Summary.  2. Follow up in 1 week  3. Left secure voicemail with instructions. They have been instructed to call if any changes in medications, doses, concerns, etc.     Jayant White, PharmD

## 2024-04-02 RX ORDER — CARVEDILOL 6.25 MG/1
TABLET ORAL
Qty: 180 TABLET | Refills: 0 | Status: SHIPPED | OUTPATIENT
Start: 2024-04-02

## 2024-04-02 RX ORDER — WARFARIN SODIUM 2.5 MG/1
TABLET ORAL
Qty: 80 TABLET | Refills: 1 | Status: SHIPPED | OUTPATIENT
Start: 2024-04-02

## 2024-04-04 LAB — INR PPP: 2.9

## 2024-04-05 ENCOUNTER — ANTICOAGULATION VISIT (OUTPATIENT)
Dept: PHARMACY | Facility: HOSPITAL | Age: 89
End: 2024-04-05
Payer: MEDICARE

## 2024-04-05 NOTE — PROGRESS NOTES
Anticoagulation Clinic Progress Note    Anticoagulation Summary  As of 2024      INR goal:  2.0-3.0   TTR:  77.1% (5.5 y)   INR used for dosin.90 (2024)   Warfarin maintenance plan:  1.25 mg every Sun, Tue, Thu; 2.5 mg all other days   Weekly warfarin total:  13.75 mg   No change documented:  Elise Davis RPH   Plan last modified:  Elise Davis RPH (2022)   Next INR check:  2024   Priority:  Maintenance   Target end date:  Indefinite    Indications    Long-term (current) use of anticoagulants (Resolved) [Z79.01]                 Anticoagulation Episode Summary       INR check location:      Preferred lab:      [facility]    Comments:  [facility] --- SPEAK WITH DAUGHTER (LYNETTE) **DO NOT SPEAK WITH PATIENT (poor memory)** **CALL EVERY TIME**          Anticoagulation Care Providers       Provider Role Specialty Phone number    Rafaela Leija MD Referring Cardiology [phone]            Clinic Interview:  No pertinent clinical findings have been reported.    INR History:      3/13/2024     3:00 PM 3/20/2024    12:00 AM 3/20/2024     3:24 PM 3/27/2024    12:00 AM 3/27/2024     3:32 PM 2024    12:00 AM 2024     8:42 AM   Anticoagulation Monitoring   INR 2.90  3.00  2.60  2.90   INR Date 3/13/2024  3/20/2024  3/27/2024  2024   INR Goal 2.0-3.0  2.0-3.0  2.0-3.0  2.0-3.0   Trend Same  Same  Same  Same   Last Week Total 12.5 mg  13.75 mg  13.75 mg  13.75 mg   Next Week Total 13.75 mg  13.75 mg  13.75 mg  13.75 mg   Sun 1.25 mg  1.25 mg  1.25 mg  1.25 mg   Mon 2.5 mg  2.5 mg  2.5 mg  2.5 mg   Tue -  1.25 mg  1.25 mg  1.25 mg   Wed 2.5 mg  2.5 mg  2.5 mg  2.5 mg   Thu 1.25 mg  1.25 mg  1.25 mg  -   Fri 2.5 mg  2.5 mg  2.5 mg  2.5 mg   Sat 2.5 mg  2.5 mg  2.5 mg  2.5 mg   Historical INR  3.00      2.60      2.90            This result is from an external source.       Plan:  1. INR is therapeutic today- see above in  Anticoagulation Summary.    Diana Avalos to continue their warfarin regimen- see above in Anticoagulation Summary.  2. Follow up in 1 week  3. Lvm with instructions. They have been instructed to call if any changes in medications, doses, concerns, etc. Patient expresses understanding and has no further questions at this time.    Elise Davis, [facility]

## 2024-04-11 LAB — INR PPP: 2.3

## 2024-04-12 ENCOUNTER — ANTICOAGULATION VISIT (OUTPATIENT)
Dept: PHARMACY | Facility: HOSPITAL | Age: 89
End: 2024-04-12
Payer: MEDICARE

## 2024-04-12 PROCEDURE — G0249 PROVIDE INR TEST MATER/EQUIP: HCPCS

## 2024-04-12 NOTE — PROGRESS NOTES
Anticoagulation Clinic Progress Note    Anticoagulation Summary  As of 2024      INR goal:  2.0-3.0   TTR:  77.2% (5.5 y)   INR used for dosin.30 (2024)   Warfarin maintenance plan:  1.25 mg every Sun, Tue, Thu; 2.5 mg all other days   Weekly warfarin total:  13.75 mg   No change documented:  Elise Davis RPH   Plan last modified:  Elise Davis RPH (2022)   Next INR check:  2024   Priority:  Maintenance   Target end date:  Indefinite    Indications    Long-term (current) use of anticoagulants (Resolved) [Z79.01]                 Anticoagulation Episode Summary       INR check location:      Preferred lab:      Send INR reminders to:  [facility] HOME TEST POOL    Comments:  [facility] --- SPEAK WITH DAUGHTER (LYNETTE) **DO NOT SPEAK WITH PATIENT (poor memory)** **CALL EVERY TIME**          Anticoagulation Care Providers       Provider Role Specialty Phone number    Rafaela Leija MD Referring Cardiology [phone]            Clinic Interview:  No pertinent clinical findings have been reported.    INR History:      3/20/2024     3:24 PM 3/27/2024    12:00 AM 3/27/2024     3:32 PM 2024    12:00 AM 2024     8:42 AM 2024    12:00 AM 2024     8:00 AM   Anticoagulation Monitoring   INR 3.00  2.60  2.90  2.30   INR Date 3/20/2024  3/27/2024  2024  2024   INR Goal 2.0-3.0  2.0-3.0  2.0-3.0  2.0-3.0   Trend Same  Same  Same  Same   Last Week Total 13.75 mg  13.75 mg  13.75 mg  13.75 mg   Next Week Total 13.75 mg  13.75 mg  13.75 mg  13.75 mg   Sun 1.25 mg  1.25 mg  1.25 mg  1.25 mg   Mon 2.5 mg  2.5 mg  2.5 mg  2.5 mg   Tue 1.25 mg  1.25 mg  1.25 mg  1.25 mg   Wed 2.5 mg  2.5 mg  2.5 mg  2.5 mg   Thu 1.25 mg  1.25 mg  -  -   Fri 2.5 mg  2.5 mg  2.5 mg  2.5 mg   Sat 2.5 mg  2.5 mg  2.5 mg  2.5 mg   Historical INR  2.60      2.90      2.30            This result is from an external source.       Plan:  1. INR is therapeutic today- see above in  Anticoagulation Summary.    Diana Avalos to continue their warfarin regimen- see above in Anticoagulation Summary.  2. Follow up in 1 week  3.  They have been instructed to call if any changes in medications, doses, concerns, etc. Patient expresses understanding and has no further questions at this time.    Elise Davis, [facility]   Yes

## 2024-04-17 ENCOUNTER — ANTICOAGULATION VISIT (OUTPATIENT)
Dept: PHARMACY | Facility: HOSPITAL | Age: 89
End: 2024-04-17
Payer: MEDICARE

## 2024-04-17 LAB — INR PPP: 2.3

## 2024-04-17 NOTE — PROGRESS NOTES
Anticoagulation Clinic Progress Note    Anticoagulation Summary  As of 2024      INR goal:  2.0-3.0   TTR:  77.2% (5.5 y)   INR used for dosin.30 (2024)   Warfarin maintenance plan:  1.25 mg every Sun, Tue, Thu; 2.5 mg all other days   Weekly warfarin total:  13.75 mg   No change documented:  Elise Davis RPH   Plan last modified:  Elise Davis RPH (2022)   Next INR check:  2024   Priority:  Maintenance   Target end date:  Indefinite    Indications    Long-term (current) use of anticoagulants (Resolved) [Z79.01]                 Anticoagulation Episode Summary       INR check location:      Preferred lab:      Send INR reminders to:  [facility] HOME TEST POOL    Comments:  [facility] --- SPEAK WITH DAUGHTER (LYNETTE) **DO NOT SPEAK WITH PATIENT (poor memory)** **CALL EVERY TIME**          Anticoagulation Care Providers       Provider Role Specialty Phone number    Rafaela Leija MD Referring Cardiology [phone]            Clinic Interview:  No pertinent clinical findings have been reported.    INR History:      3/27/2024     3:32 PM 2024    12:00 AM 2024     8:42 AM 2024    12:00 AM 2024     8:00 AM 2024    12:00 AM 2024     3:04 PM   Anticoagulation Monitoring   INR 2.60  2.90  2.30  2.30   INR Date 3/27/2024  2024  2024  2024   INR Goal 2.0-3.0  2.0-3.0  2.0-3.0  2.0-3.0   Trend Same  Same  Same  Same   Last Week Total 13.75 mg  13.75 mg  13.75 mg  13.75 mg   Next Week Total 13.75 mg  13.75 mg  13.75 mg  13.75 mg   Sun 1.25 mg  1.25 mg  1.25 mg  1.25 mg   Mon 2.5 mg  2.5 mg  2.5 mg  2.5 mg   Tue 1.25 mg  1.25 mg  1.25 mg  1.25 mg   Wed 2.5 mg  2.5 mg  2.5 mg  2.5 mg   Thu 1.25 mg  -  -  1.25 mg   Fri 2.5 mg  2.5 mg  2.5 mg  2.5 mg   Sat 2.5 mg  2.5 mg  2.5 mg  2.5 mg   Historical INR  2.90      2.30      2.30            This result is from an external source.       Plan:  1. INR is therapeutic today- see above in  Anticoagulation Summary.    Diana Avalos to continue their warfarin regimen- see above in Anticoagulation Summary.  2. Follow up in 1 week  3. Pt has agreed to only be called if INR out of range. They have been instructed to call if any changes in medications, doses, concerns, etc. Patient expresses understanding and has no further questions at this time.    Elise aDvis, [facility]

## 2024-04-24 LAB — INR PPP: 2.8

## 2024-04-25 ENCOUNTER — ANTICOAGULATION VISIT (OUTPATIENT)
Dept: PHARMACY | Facility: HOSPITAL | Age: 89
End: 2024-04-25
Payer: MEDICARE

## 2024-04-25 NOTE — PROGRESS NOTES
Anticoagulation Clinic Progress Note    Anticoagulation Summary  As of 2024      INR goal:  2.0-3.0   TTR:  77.3% (5.6 y)   INR used for dosin.80 (2024)   Warfarin maintenance plan:  1.25 mg every Sun, Tue, Thu; 2.5 mg all other days   Weekly warfarin total:  13.75 mg   No change documented:  Elise Davis RPH   Plan last modified:  Elise Davis RPH (2022)   Next INR check:  2024   Priority:  Maintenance   Target end date:  Indefinite    Indications    Long-term (current) use of anticoagulants (Resolved) [Z79.01]                 Anticoagulation Episode Summary       INR check location:      Preferred lab:      [facility]:   [facility]    Comments:  [facility] --- SPEAK WITH DAUGHTER (LYNETTE) **DO NOT SPEAK WITH PATIENT (poor memory)** **CALL EVERY TIME**          Anticoagulation Care Providers       Provider Role Specialty Phone number    Rafaela Leija MD Referring Cardiology [phone]            Clinic Interview:  No pertinent clinical findings have been reported.    INR History:      2024     8:42 AM 2024    12:00 AM 2024     8:00 AM 2024    12:00 AM 2024     3:04 PM 2024    12:00 AM 2024     8:10 AM   Anticoagulation Monitoring   INR 2.90  2.30  2.30  2.80   INR Date 2024   INR Goal 2.0-3.0  2.0-3.0  2.0-3.0  2.0-3.0   Trend Same  Same  Same  Same   Last Week Total 13.75 mg  13.75 mg  13.75 mg  13.75 mg   Next Week Total 13.75 mg  13.75 mg  13.75 mg  13.75 mg   Sun 1.25 mg  1.25 mg  1.25 mg  1.25 mg   Mon 2.5 mg  2.5 mg  2.5 mg  2.5 mg   Tue 1.25 mg  1.25 mg  1.25 mg  1.25 mg   Wed 2.5 mg  2.5 mg  2.5 mg  -   Thu -  -  1.25 mg  1.25 mg   Fri 2.5 mg  2.5 mg  2.5 mg  2.5 mg   Sat 2.5 mg  2.5 mg  2.5 mg  2.5 mg   Historical INR  2.30      2.30      2.80            This result is from an external source.       Plan:  1. INR is therapeutic today- see above in Anticoagulation  Summary.    Diana Avalos to continue their warfarin regimen- see above in Anticoagulation Summary.  2. Follow up in 1 week  3. Pt has agreed to only be called if INR out of range. They have been instructed to call if any changes in medications, doses, concerns, etc. Patient expresses understanding and has no further questions at this time.    Elise Davis, [facility]

## 2024-05-02 ENCOUNTER — ANTICOAGULATION VISIT (OUTPATIENT)
Dept: PHARMACY | Facility: HOSPITAL | Age: 89
End: 2024-05-02
Payer: MEDICARE

## 2024-05-02 LAB — INR PPP: 2.6

## 2024-05-02 NOTE — PROGRESS NOTES
Anticoagulation Clinic Progress Note    Anticoagulation Summary  As of 2024      INR goal:  2.0-3.0   TTR:  77.4% (5.6 y)   INR used for dosin.60 (2024)   Warfarin maintenance plan:  1.25 mg every Sun, Tue, Thu; 2.5 mg all other days   Weekly warfarin total:  13.75 mg   No change documented:  Christianne Vera, PharmD   Plan last modified:  Elise Davis RPH (2022)   Next INR check:  2024   Priority:  Maintenance   Target end date:  Indefinite    Indications    Long-term (current) use of anticoagulants (Resolved) [Z79.01]                 Anticoagulation Episode Summary       INR check location:      Preferred lab:      Send INR reminders to:  [facility] HOME TEST POOL    Comments:  [facility] --- SPEAK WITH DAUGHTER (LYNETTE) **DO NOT SPEAK WITH PATIENT (poor memory)** **CALL EVERY TIME**          Anticoagulation Care Providers       Provider Role Specialty Phone number    Rafaela Leija MD Referring Cardiology [phone]            Clinic Interview:  No pertinent clinical findings have been reported.    INR History:      2024     8:00 AM 2024    12:00 AM 2024     3:04 PM 2024    12:00 AM 2024     8:10 AM 2024    12:00 AM 2024     9:54 AM   Anticoagulation Monitoring   INR 2.30  2.30  2.80  2.60   INR Date 2024   INR Goal 2.0-3.0  2.0-3.0  2.0-3.0  2.0-3.0   Trend Same  Same  Same  Same   Last Week Total 13.75 mg  13.75 mg  13.75 mg  13.75 mg   Next Week Total 13.75 mg  13.75 mg  13.75 mg  13.75 mg   Sun 1.25 mg  1.25 mg  1.25 mg  1.25 mg   Mon 2.5 mg  2.5 mg  2.5 mg  2.5 mg   Tue 1.25 mg  1.25 mg  1.25 mg  1.25 mg   Wed 2.5 mg  2.5 mg  -  2.5 mg   Thu -  1.25 mg  1.25 mg  1.25 mg   Fri 2.5 mg  2.5 mg  2.5 mg  2.5 mg   Sat 2.5 mg  2.5 mg  2.5 mg  2.5 mg   Historical INR  2.30      2.80      2.60            This result is from an external source.       Plan:  1. INR is therapeutic today- see above in  Anticoagulation Summary.    Diana Avalos to continue their warfarin regimen- see above in Anticoagulation Summary.  2. Follow up in 1 week  3. Pt has agreed to only be called if INR out of range. They have been instructed to call if any changes in medications, doses, concerns, etc. Patient expresses understanding and has no further questions at this time.    Christianne Vera, PharmD

## 2024-05-08 LAB — INR PPP: 2.5

## 2024-05-09 ENCOUNTER — ANTICOAGULATION VISIT (OUTPATIENT)
Dept: PHARMACY | Facility: HOSPITAL | Age: 89
End: 2024-05-09
Payer: MEDICARE

## 2024-05-15 ENCOUNTER — ANTICOAGULATION VISIT (OUTPATIENT)
Dept: PHARMACY | Facility: HOSPITAL | Age: 89
End: 2024-05-15
Payer: MEDICARE

## 2024-05-15 LAB — INR PPP: 2.4

## 2024-05-15 PROCEDURE — G0249 PROVIDE INR TEST MATER/EQUIP: HCPCS

## 2024-05-15 NOTE — PROGRESS NOTES
Anticoagulation Clinic Progress Note    Anticoagulation Summary  As of 5/15/2024      INR goal:  2.0-3.0   TTR:  77.6% (5.6 y)   INR used for dosin.40 (5/15/2024)   Warfarin maintenance plan:  1.25 mg every Sun, Tue, Thu; 2.5 mg all other days   Weekly warfarin total:  13.75 mg   No change documented:  Jayant White, PharmD   Plan last modified:  Elise Davis RPH (2022)   Next INR check:  2024   Priority:  Maintenance   Target end date:  Indefinite    Indications    Long-term (current) use of anticoagulants (Resolved) [Z79.01]                 Anticoagulation Episode Summary       INR check location:      Preferred lab:      Send INR reminders to:  [facility] HOME TEST POOL    Comments:  [facility] --- SPEAK WITH DAUGHTER (LYNETTE) **DO NOT SPEAK WITH PATIENT (poor memory)** **CALL EVERY TIME**          Anticoagulation Care Providers       Provider Role Specialty Phone number    Rafaela Leija MD Referring Cardiology [phone]            Clinic Interview:  No pertinent clinical findings have been reported.    INR History:      2024     8:10 AM 2024    12:00 AM 2024     9:54 AM 2024    12:00 AM 2024     8:51 AM 5/15/2024    12:00 AM 5/15/2024     2:45 PM   Anticoagulation Monitoring   INR 2.80  2.60  2.50  2.40   INR Date 2024  2024  2024  5/15/2024   INR Goal 2.0-3.0  2.0-3.0  2.0-3.0  2.0-3.0   Trend Same  Same  Same  Same   Last Week Total 13.75 mg  13.75 mg  13.75 mg  13.75 mg   Next Week Total 13.75 mg  13.75 mg  13.75 mg  13.75 mg   Sun 1.25 mg  1.25 mg  1.25 mg  1.25 mg   Mon 2.5 mg  2.5 mg  2.5 mg  2.5 mg   Tue 1.25 mg  1.25 mg  1.25 mg  1.25 mg   Wed -  2.5 mg  -  2.5 mg   Thu 1.25 mg  1.25 mg  1.25 mg  1.25 mg   Fri 2.5 mg  2.5 mg  2.5 mg  2.5 mg   Sat 2.5 mg  2.5 mg  2.5 mg  2.5 mg   Historical INR  2.60      2.50      2.40            This result is from an external source.       Plan:  1. INR is therapeutic today- see above in  Anticoagulation Summary.    Diana JOINER Robbie to continue their warfarin regimen- see above in Anticoagulation Summary.  2. Follow up in 1 week  3. Left HIPAA-compliant voicemail with instructions. They have been instructed to call if any changes in medications, doses, concerns, etc.     Jayant White, [facility]

## 2024-05-24 ENCOUNTER — ANTICOAGULATION VISIT (OUTPATIENT)
Dept: PHARMACY | Facility: HOSPITAL | Age: 89
End: 2024-05-24
Payer: MEDICARE

## 2024-05-24 LAB — INR PPP: 2.2

## 2024-05-24 NOTE — PROGRESS NOTES
Anticoagulation Clinic Progress Note    Anticoagulation Summary  As of 2024      INR goal:  2.0-3.0   TTR:  77.7% (5.6 y)   INR used for dosin.20 (2024)   Warfarin maintenance plan:  1.25 mg every Sun, Tue, Thu; 2.5 mg all other days   Weekly warfarin total:  13.75 mg   No change documented:  Elise Davis RPH   Plan last modified:  Elise Davis RPH (2022)   Next INR check:  2024   Priority:  Maintenance   Target end date:  Indefinite    Indications    Long-term (current) use of anticoagulants (Resolved) [Z79.01]                 Anticoagulation Episode Summary       INR check location:      Preferred lab:      [facility]    Comments:  [facility] --- SPEAK WITH DAUGHTER (LYNETTE) **DO NOT SPEAK WITH PATIENT (poor memory)** **CALL EVERY TIME**          Anticoagulation Care Providers       Provider Role Specialty Phone number    Rafaela Leija MD Referring Cardiology [phone]            Clinic Interview:  No pertinent clinical findings have been reported.    INR History:      2024     9:54 AM 2024    12:00 AM 2024     8:51 AM 5/15/2024    12:00 AM 5/15/2024     2:45 PM 2024    12:00 AM 2024     9:37 AM   Anticoagulation Monitoring   INR 2.60  2.50  2.40  2.20   INR Date 2024  2024  5/15/2024  2024   INR Goal 2.0-3.0  2.0-3.0  2.0-3.0  2.0-3.0   Trend Same  Same  Same  Same   Last Week Total 13.75 mg  13.75 mg  13.75 mg  13.75 mg   Next Week Total 13.75 mg  13.75 mg  13.75 mg  13.75 mg   Sun 1.25 mg  1.25 mg  1.25 mg  1.25 mg   Mon 2.5 mg  2.5 mg  2.5 mg  2.5 mg   Tue 1.25 mg  1.25 mg  1.25 mg  1.25 mg   Wed 2.5 mg  -  2.5 mg  2.5 mg   Thu 1.25 mg  1.25 mg  1.25 mg  1.25 mg   Fri 2.5 mg  2.5 mg  2.5 mg  2.5 mg   Sat 2.5 mg  2.5 mg  2.5 mg  2.5 mg   Historical INR  2.50      2.40      2.20            This result is from an external source.       Plan:  1. INR is therapeutic today- see above in  Anticoagulation Summary.    Diana Avalos to continue their warfarin regimen- see above in Anticoagulation Summary.  2. Follow up in 1 week  3. LVM with instructions. They have been instructed to call if any changes in medications, doses, concerns, etc. Patient expresses understanding and has no further questions at this time.    Elise Davis, [facility]

## 2024-05-29 LAB — INR PPP: 2

## 2024-05-30 ENCOUNTER — ANTICOAGULATION VISIT (OUTPATIENT)
Dept: PHARMACY | Facility: HOSPITAL | Age: 89
End: 2024-05-30
Payer: MEDICARE

## 2024-05-30 NOTE — PROGRESS NOTES
Anticoagulation Clinic Progress Note    Anticoagulation Summary  As of 2024      INR goal:  2.0-3.0   TTR:  77.7% (5.6 y)   INR used for dosin.00 (2024)   Warfarin maintenance plan:  1.25 mg every Sun, Tue, Thu; 2.5 mg all other days   Weekly warfarin total:  13.75 mg   No change documented:  Elise Davis RPH   Plan last modified:  Elise Davis RPH (2022)   Next INR check:  2024   Priority:  Maintenance   Target end date:  Indefinite    Indications    Long-term (current) use of anticoagulants (Resolved) [Z79.01]                 Anticoagulation Episode Summary       INR check location:      Preferred lab:      [facility]:   [facility]    Comments:  [facility] --- SPEAK WITH DAUGHTER (LYNETTE) **DO NOT SPEAK WITH PATIENT (poor memory)** **CALL EVERY TIME**          Anticoagulation Care Providers       Provider Role Specialty Phone number    Rafaela Leija MD Referring Cardiology [phone]            Clinic Interview:  No pertinent clinical findings have been reported.    INR History:      2024     8:51 AM 5/15/2024    12:00 AM 5/15/2024     2:45 PM 2024    12:00 AM 2024     9:37 AM 2024    12:00 AM 2024     8:10 AM   Anticoagulation Monitoring   INR 2.50  2.40  2.20  2.00   INR Date 2024  5/15/2024  2024  2024   INR Goal 2.0-3.0  2.0-3.0  2.0-3.0  2.0-3.0   Trend Same  Same  Same  Same   Last Week Total 13.75 mg  13.75 mg  13.75 mg  13.75 mg   Next Week Total 13.75 mg  13.75 mg  13.75 mg  13.75 mg   Sun 1.25 mg  1.25 mg  1.25 mg  1.25 mg   Mon 2.5 mg  2.5 mg  2.5 mg  2.5 mg   Tue 1.25 mg  1.25 mg  1.25 mg  1.25 mg   Wed -  2.5 mg  2.5 mg  -   Thu 1.25 mg  1.25 mg  1.25 mg  1.25 mg   Fri 2.5 mg  2.5 mg  2.5 mg  2.5 mg   Sat 2.5 mg  2.5 mg  2.5 mg  2.5 mg   Historical INR  2.40      2.20      2.00            This result is from an external source.       Plan:  1. INR is therapeutic today- see above in  Anticoagulation Summary.    Diana Avalos to continue their warfarin regimen- see above in Anticoagulation Summary.  2. Follow up in 1 week  3. Pt has agreed to only be called if INR out of range. They have been instructed to call if any changes in medications, doses, concerns, etc. Patient expresses understanding and has no further questions at this time.    Elise Davis, [facility]

## 2024-06-06 ENCOUNTER — ANTICOAGULATION VISIT (OUTPATIENT)
Dept: PHARMACY | Facility: HOSPITAL | Age: 89
End: 2024-06-06
Payer: MEDICARE

## 2024-06-06 LAB — INR PPP: 2.2

## 2024-06-06 NOTE — PROGRESS NOTES
Anticoagulation Clinic Progress Note    Anticoagulation Summary  As of 2024      INR goal:  2.0-3.0   TTR:  77.8% (5.7 y)   INR used for dosin.20 (2024)   Warfarin maintenance plan:  1.25 mg every Sun, Tue, Thu; 2.5 mg all other days   Weekly warfarin total:  13.75 mg   No change documented:  Jayant White, PharmD   Plan last modified:  Elise Davis RPH (2022)   Next INR check:  2024   Priority:  Maintenance   Target end date:  Indefinite    Indications    Long-term (current) use of anticoagulants (Resolved) [Z79.01]                 Anticoagulation Episode Summary       INR check location:      Preferred lab:      Send INR reminders to:  [facility] HOME TEST POOL    Comments:  [facility] --- SPEAK WITH DAUGHTER (LYNETTE) **DO NOT SPEAK WITH PATIENT (poor memory)** **CALL EVERY TIME**          Anticoagulation Care Providers       Provider Role Specialty Phone number    Rafaela Leija MD Referring Cardiology [phone]            Clinic Interview:  No pertinent clinical findings have been reported.    INR History:      5/15/2024     2:45 PM 2024    12:00 AM 2024     9:37 AM 2024    12:00 AM 2024     8:10 AM 2024    12:00 AM 2024     1:09 PM   Anticoagulation Monitoring   INR 2.40  2.20  2.00  2.20   INR Date 5/15/2024  2024  2024  2024   INR Goal 2.0-3.0  2.0-3.0  2.0-3.0  2.0-3.0   Trend Same  Same  Same  Same   Last Week Total 13.75 mg  13.75 mg  13.75 mg  13.75 mg   Next Week Total 13.75 mg  13.75 mg  13.75 mg  13.75 mg   Sun 1.25 mg  1.25 mg  1.25 mg  1.25 mg   Mon 2.5 mg  2.5 mg  2.5 mg  2.5 mg   Tue 1.25 mg  1.25 mg  1.25 mg  1.25 mg   Wed 2.5 mg  2.5 mg  -  2.5 mg   Thu 1.25 mg  1.25 mg  1.25 mg  1.25 mg   Fri 2.5 mg  2.5 mg  2.5 mg  2.5 mg   Sat 2.5 mg  2.5 mg  2.5 mg  2.5 mg   Historical INR  2.20      2.00      2.20            This result is from an external source.       Plan:  1. INR is therapeutic today- see above in  Anticoagulation Summary.    Diana A Robbie to continue their warfarin regimen- see above in Anticoagulation Summary.  2. Follow up in 1 week  3. Left secure voicemail for Magui (daughter) with instructions. They have been instructed to call if any changes in medications, doses, concerns, etc.     Jayant White, PharmD

## 2024-06-20 ENCOUNTER — TELEPHONE (OUTPATIENT)
Dept: CARDIOLOGY | Facility: CLINIC | Age: 89
End: 2024-06-20

## 2024-06-20 NOTE — TELEPHONE ENCOUNTER
Caller: Magui Cassidy (Daughter)    Relationship to patient: Emergency Contact    Best call back number: [phone]    Type of visit: DEVICE CHECK     Additional notes: PT'S DAUGHTER IS CALLING TO SCHEDULE AN APPT TO GET THE PT'S PACEMAKER CHECKED

## 2024-06-21 ENCOUNTER — ANTICOAGULATION VISIT (OUTPATIENT)
Dept: PHARMACY | Facility: HOSPITAL | Age: 89
End: 2024-06-21
Payer: MEDICARE

## 2024-06-21 LAB — INR PPP: 2.3

## 2024-06-21 PROCEDURE — G0249 PROVIDE INR TEST MATER/EQUIP: HCPCS

## 2024-06-21 NOTE — PROGRESS NOTES
Anticoagulation Clinic Progress Note    Anticoagulation Summary  As of 2024      INR goal:  2.0-3.0   TTR:  78.0% (5.7 y)   INR used for dosin.30 (2024)   Warfarin maintenance plan:  1.25 mg every Sun, Tue, Thu; 2.5 mg all other days   Weekly warfarin total:  13.75 mg   No change documented:  Jayant White, PharmD   Plan last modified:  Elise Davsi RPH (2022)   Next INR check:  2024   Priority:  Maintenance   Target end date:  Indefinite    Indications    Long-term (current) use of anticoagulants (Resolved) [Z79.01]                 Anticoagulation Episode Summary       INR check location:      Preferred lab:      Send INR reminders to:  [facility] HOME TEST POOL    Comments:  [facility] --- SPEAK WITH DAUGHTER (LYNETTE) **DO NOT SPEAK WITH PATIENT (poor memory)** **CALL EVERY TIME**          Anticoagulation Care Providers       Provider Role Specialty Phone number    Rafaela Leija MD Referring Cardiology [phone]            Clinic Interview:  No pertinent clinical findings have been reported.    INR History:      2024     9:37 AM 2024    12:00 AM 2024     8:10 AM 2024    12:00 AM 2024     1:09 PM 2024    12:00 AM 2024     2:45 PM   Anticoagulation Monitoring   INR 2.20  2.00  2.20  2.30   INR Date 2024   INR Goal 2.0-3.0  2.0-3.0  2.0-3.0  2.0-3.0   Trend Same  Same  Same  Same   Last Week Total 13.75 mg  13.75 mg  13.75 mg  13.75 mg   Next Week Total 13.75 mg  13.75 mg  13.75 mg  13.75 mg   Sun 1.25 mg  1.25 mg  1.25 mg  1.25 mg   Mon 2.5 mg  2.5 mg  2.5 mg  2.5 mg   Tue 1.25 mg  1.25 mg  1.25 mg  1.25 mg   Wed 2.5 mg  -  2.5 mg  2.5 mg   Thu 1.25 mg  1.25 mg  1.25 mg  1.25 mg   Fri 2.5 mg  2.5 mg  2.5 mg  2.5 mg   Sat 2.5 mg  2.5 mg  2.5 mg  2.5 mg   Historical INR  2.00      2.20      2.30            This result is from an external source.       Plan:  1. INR is therapeutic today- see above in  Anticoagulation Summary.    Diana MARISEL Santael to continue their warfarin regimen- see above in Anticoagulation Summary.  2. Follow up in 1 week  3. Left secure voicemail with instructions. They have been instructed to call if any changes in medications, doses, concerns, etc.     Jayant White, PharmD

## 2024-06-26 ENCOUNTER — ANTICOAGULATION VISIT (OUTPATIENT)
Dept: PHARMACY | Facility: HOSPITAL | Age: 89
End: 2024-06-26
Payer: MEDICARE

## 2024-06-26 LAB — INR PPP: 2.6

## 2024-06-26 NOTE — PROGRESS NOTES
Anticoagulation Clinic Progress Note    Anticoagulation Summary  As of 2024      INR goal:  2.0-3.0   TTR:  78.0% (5.7 y)   INR used for dosin.60 (2024)   Warfarin maintenance plan:  1.25 mg every Sun, Tue, Thu; 2.5 mg all other days   Weekly warfarin total:  13.75 mg   No change documented:  Elise Davis RPH   Plan last modified:  Elise Davis RPH (2022)   Next INR check:  7/3/2024   Priority:  Maintenance   Target end date:  Indefinite    Indications    Long-term (current) use of anticoagulants (Resolved) [Z79.01]                 Anticoagulation Episode Summary       INR check location:      Preferred lab:      [facility]    Comments:  [facility] --- SPEAK WITH DAUGHTER (LYNETTE) **DO NOT SPEAK WITH PATIENT (poor memory)** **CALL EVERY TIME**          Anticoagulation Care Providers       Provider Role Specialty Phone number    Rafaela Leija MD Referring Cardiology [phone]            Clinic Interview:  No pertinent clinical findings have been reported.    INR History:      2024     8:10 AM 2024    12:00 AM 2024     1:09 PM 2024    12:00 AM 2024     2:45 PM 2024    12:00 AM 2024     2:47 PM   Anticoagulation Monitoring   INR 2.00  2.20  2.30  2.60   INR Date 2024   INR Goal 2.0-3.0  2.0-3.0  2.0-3.0  2.0-3.0   Trend Same  Same  Same  Same   Last Week Total 13.75 mg  13.75 mg  13.75 mg  13.75 mg   Next Week Total 13.75 mg  13.75 mg  13.75 mg  13.75 mg   Sun 1.25 mg  1.25 mg  1.25 mg  1.25 mg   Mon 2.5 mg  2.5 mg  2.5 mg  2.5 mg   Tue 1.25 mg  1.25 mg  1.25 mg  1.25 mg   Wed -  2.5 mg  2.5 mg  2.5 mg   Thu 1.25 mg  1.25 mg  1.25 mg  1.25 mg   Fri 2.5 mg  2.5 mg  2.5 mg  2.5 mg   Sat 2.5 mg  2.5 mg  2.5 mg  2.5 mg   Historical INR  2.20      2.30      2.60            This result is from an external source.       Plan:  1. INR is therapeutic today- see above in  Anticoagulation Summary.    Diana Avalos to continue their warfarin regimen- see above in Anticoagulation Summary.  2. Follow up in 1 week  3. They have been instructed to call if any changes in medications, doses, concerns, etc. Patient expresses understanding and has no further questions at this time.    Elise Davis, [facility]

## 2024-07-02 RX ORDER — CARVEDILOL 6.25 MG/1
TABLET ORAL
Qty: 180 TABLET | Refills: 0 | Status: SHIPPED | OUTPATIENT
Start: 2024-07-02

## 2024-07-03 NOTE — PLAN OF CARE
"Problem: Fall Risk (Adult)  Goal: Identify Related Risk Factors and Signs and Symptoms  Outcome: Ongoing (interventions implemented as appropriate)    Goal: Absence of Fall  Outcome: Ongoing (interventions implemented as appropriate)      Problem: Patient Care Overview  Goal: Plan of Care Review  Outcome: Ongoing (interventions implemented as appropriate)   01/06/19 0430   Coping/Psychosocial   Plan of Care Reviewed With patient   Plan of Care Review   Progress no change   OTHER   Outcome Summary Pt admitted from ER for chest pain. Pt states she was at [employer] and started having an aching in her left shoulder area. When that resolved, it started again in her right shoulder and arm. Pt states it lasted about 20-30 min. Pt family states she \"seemed off\" when it happened. Pt is A&O but pt's family states there is short term memory loss. This is evident by pt asking why she has been admitted to hospital. Pt's family states this is normal for her. Called Dr. Fung for orders. Pt to have echo, EKG and stress test done. Pt currently resting in bed. VSS, will continue to monitor pt.     Goal: Individualization and Mutuality  Outcome: Ongoing (interventions implemented as appropriate)    Goal: Discharge Needs Assessment  Outcome: Ongoing (interventions implemented as appropriate)    Goal: Interprofessional Rounds/Family Conf  Outcome: Ongoing (interventions implemented as appropriate)      Problem: Cardiac: ACS (Acute Coronary Syndrome) (Adult)  Goal: Signs and Symptoms of Listed Potential Problems Will be Absent, Minimized or Managed (Cardiac: ACS)  Outcome: Ongoing (interventions implemented as appropriate)      Problem: Pain, Acute (Adult)  Goal: Identify Related Risk Factors and Signs and Symptoms  Outcome: Ongoing (interventions implemented as appropriate)    Goal: Acceptable Pain Control/Comfort Level  Outcome: Ongoing (interventions implemented as appropriate)        " no peripheral edema

## 2024-07-05 LAB — INR PPP: 2.2

## 2024-07-08 ENCOUNTER — ANTICOAGULATION VISIT (OUTPATIENT)
Dept: PHARMACY | Facility: HOSPITAL | Age: 89
End: 2024-07-08
Payer: MEDICARE

## 2024-07-08 NOTE — PROGRESS NOTES
Anticoagulation Clinic Progress Note    Anticoagulation Summary  As of 2024      INR goal:  2.0-3.0   TTR:  78.1% (5.7 y)   INR used for dosin.20 (2024)   Warfarin maintenance plan:  1.25 mg every Sun, Tue, Thu; 2.5 mg all other days   Weekly warfarin total:  13.75 mg   No change documented:  Christianne Vera, PharmD   Plan last modified:  Elise Davis RPH (2022)   Next INR check:  2024   Priority:  Maintenance   Target end date:  Indefinite    Indications    Long-term (current) use of anticoagulants (Resolved) [Z79.01]                 Anticoagulation Episode Summary       INR check location:      Preferred lab:      Send INR reminders to:  [facility] HOME TEST POOL    Comments:  [facility] --- SPEAK WITH DAUGHTER (LYNETTE) **DO NOT SPEAK WITH PATIENT (poor memory)** **CALL EVERY TIME**          Anticoagulation Care Providers       Provider Role Specialty Phone number    Rafaela Leija MD Referring Cardiology [phone]            Clinic Interview:  No pertinent clinical findings have been reported.    INR History:      2024     1:09 PM 2024    12:00 AM 2024     2:45 PM 2024    12:00 AM 2024     2:47 PM 2024    12:00 AM 2024     8:26 AM   Anticoagulation Monitoring   INR 2.20  2.30  2.60  2.20   INR Date 2024   INR Goal 2.0-3.0  2.0-3.0  2.0-3.0  2.0-3.0   Trend Same  Same  Same  Same   Last Week Total 13.75 mg  13.75 mg  13.75 mg  13.75 mg   Next Week Total 13.75 mg  13.75 mg  13.75 mg  13.75 mg   Sun 1.25 mg  1.25 mg  1.25 mg  -   Mon 2.5 mg  2.5 mg  2.5 mg  2.5 mg   Tue 1.25 mg  1.25 mg  1.25 mg  1.25 mg   Wed 2.5 mg  2.5 mg  2.5 mg  2.5 mg   Thu 1.25 mg  1.25 mg  1.25 mg  1.25 mg   Fri 2.5 mg  2.5 mg  2.5 mg  -   Sat 2.5 mg  2.5 mg  2.5 mg  -   Historical INR  2.30      2.60      2.20  C          C Corrected result    This result is from an external source.       Plan:  1. INR is therapeutic today- see  above in Anticoagulation Summary.    Diana Avalos to continue their warfarin regimen- see above in Anticoagulation Summary.  2. Follow up in 1 week  3. Pt has agreed to only be called if INR out of range. They have been instructed to call if any changes in medications, doses, concerns, etc. Patient expresses understanding and has no further questions at this time.    Christianne Vera, PharmD

## 2024-07-10 LAB — INR PPP: 2.3

## 2024-07-11 ENCOUNTER — ANTICOAGULATION VISIT (OUTPATIENT)
Dept: PHARMACY | Facility: HOSPITAL | Age: 89
End: 2024-07-11
Payer: MEDICARE

## 2024-07-11 NOTE — PROGRESS NOTES
Anticoagulation Clinic Progress Note    Anticoagulation Summary  As of 2024      INR goal:  2.0-3.0   TTR:  78.2% (5.8 y)   INR used for dosin.30 (7/10/2024)   Warfarin maintenance plan:  1.25 mg every Sun, Tue, Thu; 2.5 mg all other days   Weekly warfarin total:  13.75 mg   No change documented:  Elise Davis RPH   Plan last modified:  Elise Davis RPH (2022)   Next INR check:  2024   Priority:  Maintenance   Target end date:  Indefinite    Indications    Long-term (current) use of anticoagulants (Resolved) [Z79.01]                 Anticoagulation Episode Summary       INR check location:      Preferred lab:      [facility]:   [facility]    Comments:  [facility] --- SPEAK WITH DAUGHTER (LYNETTE) **DO NOT SPEAK WITH PATIENT (poor memory)** **CALL EVERY TIME**          Anticoagulation Care Providers       Provider Role Specialty Phone number    Rafaela Leija MD Referring Cardiology [phone]            Clinic Interview:  No pertinent clinical findings have been reported.    INR History:      2024     2:45 PM 2024    12:00 AM 2024     2:47 PM 2024    12:00 AM 2024     8:26 AM 7/10/2024    12:00 AM 2024     8:35 AM   Anticoagulation Monitoring   INR 2.30  2.60  2.20  2.30   INR Date 2024  2024  2024  7/10/2024   INR Goal 2.0-3.0  2.0-3.0  2.0-3.0  2.0-3.0   Trend Same  Same  Same  Same   Last Week Total 13.75 mg  13.75 mg  13.75 mg  13.75 mg   Next Week Total 13.75 mg  13.75 mg  13.75 mg  13.75 mg   Sun 1.25 mg  1.25 mg  -  1.25 mg   Mon 2.5 mg  2.5 mg  2.5 mg  2.5 mg   Tue 1.25 mg  1.25 mg  1.25 mg  1.25 mg   Wed 2.5 mg  2.5 mg  2.5 mg  -   Thu 1.25 mg  1.25 mg  1.25 mg  1.25 mg   Fri 2.5 mg  2.5 mg  -  2.5 mg   Sat 2.5 mg  2.5 mg  -  2.5 mg   Historical INR  2.60      2.20  C     2.30           C Corrected result    This result is from an external source.       Plan:  1. INR is therapeutic today- see above  in Anticoagulation Summary.    Diana Avalos to continue their warfarin regimen- see above in Anticoagulation Summary.  2. Follow up in 1 week  3. Pt has agreed to only be called if INR out of range. They have been instructed to call if any changes in medications, doses, concerns, etc. Patient expresses understanding and has no further questions at this time.    Elise Davis, [facility]

## 2024-07-22 ENCOUNTER — ANTICOAGULATION VISIT (OUTPATIENT)
Dept: PHARMACY | Facility: HOSPITAL | Age: 89
End: 2024-07-22
Payer: MEDICARE

## 2024-07-22 LAB — INR PPP: 2.2

## 2024-07-22 PROCEDURE — G0249 PROVIDE INR TEST MATER/EQUIP: HCPCS

## 2024-07-22 RX ORDER — FUROSEMIDE 20 MG/1
20 TABLET ORAL DAILY
Qty: 90 TABLET | Refills: 3 | Status: SHIPPED | OUTPATIENT
Start: 2024-07-22

## 2024-07-22 NOTE — PROGRESS NOTES
Anticoagulation Clinic Progress Note    Anticoagulation Summary  As of 2024      INR goal:  2.0-3.0   TTR:  78.3% (5.8 y)   INR used for dosin.20 (2024)   Warfarin maintenance plan:  1.25 mg every Sun, Tue, Thu; 2.5 mg all other days   Weekly warfarin total:  13.75 mg   No change documented:  Elise Davis RPH   Plan last modified:  Elise Davis RPH (2022)   Next INR check:  2024   Priority:  Maintenance   Target end date:  Indefinite    Indications    Long-term (current) use of anticoagulants (Resolved) [Z79.01]                 Anticoagulation Episode Summary       INR check location:      Preferred lab:      [facility]    Comments:  [facility] --- SPEAK WITH DAUGHTER (LYNETTE) **DO NOT SPEAK WITH PATIENT (poor memory)** **CALL EVERY TIME**          Anticoagulation Care Providers       Provider Role Specialty Phone number    Rafaela Leija MD Referring Cardiology [phone]            Clinic Interview:  No pertinent clinical findings have been reported.    INR History:      2024     2:47 PM 2024    12:00 AM 2024     8:26 AM 7/10/2024    12:00 AM 2024     8:35 AM 2024    12:00 AM 2024     8:30 AM   Anticoagulation Monitoring   INR 2.60  2.20  2.30  2.20   INR Date 2024  2024  7/10/2024  2024   INR Goal 2.0-3.0  2.0-3.0  2.0-3.0  2.0-3.0   Trend Same  Same  Same  Same   Last Week Total 13.75 mg  13.75 mg  13.75 mg  13.75 mg   Next Week Total 13.75 mg  13.75 mg  13.75 mg  13.75 mg   Sun 1.25 mg  -  1.25 mg  1.25 mg   Mon 2.5 mg  2.5 mg  2.5 mg  2.5 mg   Tue 1.25 mg  1.25 mg  1.25 mg  1.25 mg   Wed 2.5 mg  2.5 mg  -  2.5 mg   Thu 1.25 mg  1.25 mg  1.25 mg  1.25 mg   Fri 2.5 mg  -  2.5 mg  2.5 mg   Sat 2.5 mg  -  2.5 mg  2.5 mg   Historical INR  2.20  C     2.30      2.20           C Corrected result    This result is from an external source.       Plan:  1. INR is therapeutic today- see above  in Anticoagulation Summary.    Diana Avalos to continue their warfarin regimen- see above in Anticoagulation Summary.  2. Follow up in 1 week  3. Pt has agreed to only be called if INR out of range. They have been instructed to call if any changes in medications, doses, concerns, etc. Patient expresses understanding and has no further questions at this time.    Elise Davis, [facility]

## 2024-07-25 LAB — INR PPP: 2.4

## 2024-07-26 ENCOUNTER — ANTICOAGULATION VISIT (OUTPATIENT)
Dept: PHARMACY | Facility: HOSPITAL | Age: 89
End: 2024-07-26
Payer: MEDICARE

## 2024-07-26 NOTE — PROGRESS NOTES
Anticoagulation Clinic Progress Note    Anticoagulation Summary  As of 2024      INR goal:  2.0-3.0   TTR:  78.3% (5.8 y)   INR used for dosin.40 (2024)   Warfarin maintenance plan:  1.25 mg every Sun, Tue, Thu; 2.5 mg all other days   Weekly warfarin total:  13.75 mg   No change documented:  Elise Davis RPH   Plan last modified:  Elise Davis RPH (2022)   Next INR check:  2024   Priority:  Maintenance   Target end date:  Indefinite    Indications    Long-term (current) use of anticoagulants (Resolved) [Z79.01]                 Anticoagulation Episode Summary       INR check location:      Preferred lab:      [facility]    Comments:  [facility] --- SPEAK WITH DAUGHTER (LYNETTE) **DO NOT SPEAK WITH PATIENT (poor memory)** **CALL EVERY TIME**          Anticoagulation Care Providers       Provider Role Specialty Phone number    Rafaela Leija MD Referring Cardiology [phone]            Clinic Interview:  No pertinent clinical findings have been reported.    INR History:      2024     8:26 AM 7/10/2024    12:00 AM 2024     8:35 AM 2024    12:00 AM 2024     8:30 AM 2024    12:00 AM 2024     8:50 AM   Anticoagulation Monitoring   INR 2.20  2.30  2.20  2.40   INR Date 2024  7/10/2024  2024  2024   INR Goal 2.0-3.0  2.0-3.0  2.0-3.0  2.0-3.0   Trend Same  Same  Same  Same   Last Week Total 13.75 mg  13.75 mg  13.75 mg  13.75 mg   Next Week Total 13.75 mg  13.75 mg  13.75 mg  13.75 mg   Sun -  1.25 mg  1.25 mg  1.25 mg   Mon 2.5 mg  2.5 mg  2.5 mg  2.5 mg   Tue 1.25 mg  1.25 mg  1.25 mg  1.25 mg   Wed 2.5 mg  -  2.5 mg  2.5 mg   Thu 1.25 mg  1.25 mg  1.25 mg  -   Fri -  2.5 mg  2.5 mg  2.5 mg   Sat -  2.5 mg  2.5 mg  2.5 mg   Historical INR  2.30      2.20      2.40            This result is from an external source.       Plan:  1. INR is therapeutic today- see above in Anticoagulation Summary.     Diana Avalos to continue their warfarin regimen- see above in Anticoagulation Summary.  2. Follow up in 1 week  3. Pt has agreed to only be called if INR out of range. They have been instructed to call if any changes in medications, doses, concerns, etc. Patient expresses understanding and has no further questions at this time.    Elise Davis, [facility]

## 2024-08-02 ENCOUNTER — ANTICOAGULATION VISIT (OUTPATIENT)
Dept: PHARMACY | Facility: HOSPITAL | Age: 89
End: 2024-08-02
Payer: MEDICARE

## 2024-08-02 LAB — INR PPP: 2

## 2024-08-02 NOTE — PROGRESS NOTES
Anticoagulation Clinic Progress Note    Anticoagulation Summary  As of 2024      INR goal:  2.0-3.0   TTR:  78.4% (5.8 y)   INR used for dosin.00 (2024)   Warfarin maintenance plan:  1.25 mg every Sun, Tue, Thu; 2.5 mg all other days   Weekly warfarin total:  13.75 mg   No change documented:  Elise Davis RPH   Plan last modified:  Elise Davis RPH (2022)   Next INR check:  2024   Priority:  Maintenance   Target end date:  Indefinite    Indications    Long-term (current) use of anticoagulants (Resolved) [Z79.01]                 Anticoagulation Episode Summary       INR check location:      Preferred lab:      Send INR reminders to:  [facility] HOME TEST POOL    Comments:  [facility] --- SPEAK WITH DAUGHTER (LYNETTE) **DO NOT SPEAK WITH PATIENT (poor memory)** **CALL EVERY TIME**          Anticoagulation Care Providers       Provider Role Specialty Phone number    Rafaela Leija MD Referring Cardiology [phone]            Clinic Interview:  No pertinent clinical findings have been reported.    INR History:      2024     8:35 AM 2024    12:00 AM 2024     8:30 AM 2024    12:00 AM 2024     8:50 AM 2024    12:00 AM 2024     1:41 PM   Anticoagulation Monitoring   INR 2.30  2.20  2.40  2.00   INR Date 7/10/2024  2024  2024  2024   INR Goal 2.0-3.0  2.0-3.0  2.0-3.0  2.0-3.0   Trend Same  Same  Same  Same   Last Week Total 13.75 mg  13.75 mg  13.75 mg  13.75 mg   Next Week Total 13.75 mg  13.75 mg  13.75 mg  13.75 mg   Sun 1.25 mg  1.25 mg  1.25 mg  1.25 mg   Mon 2.5 mg  2.5 mg  2.5 mg  2.5 mg   Tue 1.25 mg  1.25 mg  1.25 mg  1.25 mg   Wed -  2.5 mg  2.5 mg  2.5 mg   Thu 1.25 mg  1.25 mg  -  1.25 mg   Fri 2.5 mg  2.5 mg  2.5 mg  2.5 mg   Sat 2.5 mg  2.5 mg  2.5 mg  2.5 mg   Historical INR  2.20      2.40      2.00            This result is from an external source.       Plan:  1. INR is therapeutic today- see above in  Anticoagulation Summary.    Diana Avalos to continue their warfarin regimen- see above in Anticoagulation Summary.  2. Follow up in 1 week  3. Pt has agreed to only be called if INR out of range. They have been instructed to call if any changes in medications, doses, concerns, etc. Patient expresses understanding and has no further questions at this time.    Elise Davis, [facility]

## 2024-08-07 LAB — INR PPP: 2

## 2024-08-08 ENCOUNTER — ANTICOAGULATION VISIT (OUTPATIENT)
Dept: PHARMACY | Facility: HOSPITAL | Age: 89
End: 2024-08-08
Payer: MEDICARE

## 2024-08-08 NOTE — PROGRESS NOTES
Anticoagulation Clinic Progress Note    Anticoagulation Summary  As of 2024      INR goal:  2.0-3.0   TTR:  78.4% (5.8 y)   INR used for dosin.00 (2024)   Warfarin maintenance plan:  1.25 mg every Sun, Tue, Thu; 2.5 mg all other days   Weekly warfarin total:  13.75 mg   No change documented:  Elise Davis RPH   Plan last modified:  Elise Davis RPH (2022)   Next INR check:  2024   Priority:  Maintenance   Target end date:  Indefinite    Indications    Long-term (current) use of anticoagulants (Resolved) [Z79.01]                 Anticoagulation Episode Summary       INR check location:      Preferred lab:      [facility]    Comments:  [facility] --- SPEAK WITH DAUGHTER (LYNETTE) **DO NOT SPEAK WITH PATIENT (poor memory)**           Anticoagulation Care Providers       Provider Role Specialty Phone number    Rafaela Leija MD Referring Cardiology [phone]            Clinic Interview:  No pertinent clinical findings have been reported.    INR History:      2024     8:30 AM 2024    12:00 AM 2024     8:50 AM 2024    12:00 AM 2024     1:41 PM 2024    12:00 AM 2024     8:20 AM   Anticoagulation Monitoring   INR 2.20  2.40  2.00  2.00   INR Date 2024   INR Goal 2.0-3.0  2.0-3.0  2.0-3.0  2.0-3.0   Trend Same  Same  Same  Same   Last Week Total 13.75 mg  13.75 mg  13.75 mg  13.75 mg   Next Week Total 13.75 mg  13.75 mg  13.75 mg  13.75 mg   Sun 1.25 mg  1.25 mg  1.25 mg  1.25 mg   Mon 2.5 mg  2.5 mg  2.5 mg  2.5 mg   Tue 1.25 mg  1.25 mg  1.25 mg  1.25 mg   Wed 2.5 mg  2.5 mg  2.5 mg  -   Thu 1.25 mg  -  1.25 mg  1.25 mg   Fri 2.5 mg  2.5 mg  2.5 mg  2.5 mg   Sat 2.5 mg  2.5 mg  2.5 mg  2.5 mg   Historical INR  2.40      2.00      2.00            This result is from an external source.       Plan:  1. INR is therapeutic today- see above in Anticoagulation Summary.    Diana  A Robbie to continue their warfarin regimen- see above in Anticoagulation Summary.  2. Follow up in 1 week  3. Pt has agreed to only be called if INR out of range. They have been instructed to call if any changes in medications, doses, concerns, etc. Patient expresses understanding and has no further questions at this time.    Elise Davis, [facility]

## 2024-08-16 ENCOUNTER — ANTICOAGULATION VISIT (OUTPATIENT)
Dept: PHARMACY | Facility: HOSPITAL | Age: 89
End: 2024-08-16
Payer: MEDICARE

## 2024-08-16 LAB — INR PPP: 2.1

## 2024-08-16 NOTE — PROGRESS NOTES
Anticoagulation Clinic Progress Note    Anticoagulation Summary  As of 2024      INR goal:  2.0-3.0   TTR:  78.5% (5.9 y)   INR used for dosin.10 (2024)   Warfarin maintenance plan:  1.25 mg every Sun, Tue, Thu; 2.5 mg all other days   Weekly warfarin total:  13.75 mg   No change documented:  Jayant White, PharmD   Plan last modified:  Elise Davis RPH (2022)   Next INR check:  2024   Priority:  Maintenance   Target end date:  Indefinite    Indications    Long-term (current) use of anticoagulants (Resolved) [Z79.01]                 Anticoagulation Episode Summary       INR check location:      Preferred lab:      [facility]:   [facility]    Comments:  [facility] --- SPEAK WITH DAUGHTER (LYNETTE) **DO NOT SPEAK WITH PATIENT (poor memory)**           Anticoagulation Care Providers       Provider Role Specialty Phone number    Rafaela Leija MD Referring Cardiology [phone]            Clinic Interview:  No pertinent clinical findings have been reported.    INR History:      2024     8:50 AM 2024    12:00 AM 2024     1:41 PM 2024    12:00 AM 2024     8:20 AM 2024    12:00 AM 2024     3:14 PM   Anticoagulation Monitoring   INR 2.40  2.00  2.00  2.10   INR Date 2024   INR Goal 2.0-3.0  2.0-3.0  2.0-3.0  2.0-3.0   Trend Same  Same  Same  Same   Last Week Total 13.75 mg  13.75 mg  13.75 mg  13.75 mg   Next Week Total 13.75 mg  13.75 mg  13.75 mg  13.75 mg   Sun 1.25 mg  1.25 mg  1.25 mg  1.25 mg   Mon 2.5 mg  2.5 mg  2.5 mg  2.5 mg   Tue 1.25 mg  1.25 mg  1.25 mg  1.25 mg   Wed 2.5 mg  2.5 mg  -  2.5 mg   Thu -  1.25 mg  1.25 mg  1.25 mg   Fri 2.5 mg  2.5 mg  2.5 mg  2.5 mg   Sat 2.5 mg  2.5 mg  2.5 mg  2.5 mg   Historical INR  2.00      2.00      2.10            This result is from an external source.       Plan:  1. INR is therapeutic today- see above in Anticoagulation Summary.     Diana Avalos to continue their warfarin regimen- see above in Anticoagulation Summary.  2. Follow up in 1 week  3. Pt has agreed to only be called if INR out of range. They have been instructed to call if any changes in medications, doses, concerns, etc. Patient expresses understanding and has no further questions at this time.    Jayant White, PharmD

## 2024-08-23 ENCOUNTER — ANTICOAGULATION VISIT (OUTPATIENT)
Dept: PHARMACY | Facility: HOSPITAL | Age: 89
End: 2024-08-23
Payer: MEDICARE

## 2024-08-23 LAB — INR PPP: 2.6

## 2024-08-23 PROCEDURE — G0249 PROVIDE INR TEST MATER/EQUIP: HCPCS

## 2024-08-23 NOTE — PROGRESS NOTES
Anticoagulation Clinic Progress Note    Anticoagulation Summary  As of 2024      INR goal:  2.0-3.0   TTR:  78.6% (5.9 y)   INR used for dosin.60 (2024)   Warfarin maintenance plan:  1.25 mg every Sun, Tue, Thu; 2.5 mg all other days   Weekly warfarin total:  13.75 mg   No change documented:  Elise Davis RPH   Plan last modified:  Elise Davis RPH (2022)   Next INR check:  2024   Priority:  Maintenance   Target end date:  Indefinite    Indications    Long-term (current) use of anticoagulants (Resolved) [Z79.01]                 Anticoagulation Episode Summary       INR check location:      Preferred lab:      [facility]:   [facility]    Comments:  [facility] --- SPEAK WITH DAUGHTER (LYNETTE) **DO NOT SPEAK WITH PATIENT (poor memory)**           Anticoagulation Care Providers       Provider Role Specialty Phone number    Rafaela Leija MD Referring Cardiology [phone]            Clinic Interview:  No pertinent clinical findings have been reported.    INR History:      2024     1:41 PM 2024    12:00 AM 2024     8:20 AM 2024    12:00 AM 2024     3:14 PM 2024    12:00 AM 2024     1:15 PM   Anticoagulation Monitoring   INR 2.00  2.00  2.10  2.60   INR Date 2024   INR Goal 2.0-3.0  2.0-3.0  2.0-3.0  2.0-3.0   Trend Same  Same  Same  Same   Last Week Total 13.75 mg  13.75 mg  13.75 mg  13.75 mg   Next Week Total 13.75 mg  13.75 mg  13.75 mg  13.75 mg   Sun 1.25 mg  1.25 mg  1.25 mg  1.25 mg   Mon 2.5 mg  2.5 mg  2.5 mg  2.5 mg   Tue 1.25 mg  1.25 mg  1.25 mg  1.25 mg   Wed 2.5 mg  -  2.5 mg  2.5 mg   Thu 1.25 mg  1.25 mg  1.25 mg  1.25 mg   Fri 2.5 mg  2.5 mg  2.5 mg  2.5 mg   Sat 2.5 mg  2.5 mg  2.5 mg  2.5 mg   Historical INR  2.00      2.10      2.60            This result is from an external source.       Plan:  1. INR is therapeutic today- see above in Anticoagulation Summary.     Diana Avalos to continue their warfarin regimen- see above in Anticoagulation Summary.  2. Follow up in 1 week  3. Pt has agreed to only be called if INR out of range. They have been instructed to call if any changes in medications, doses, concerns, etc. Patient expresses understanding and has no further questions at this time.    Elise Davis, [facility]

## 2024-08-30 ENCOUNTER — ANTICOAGULATION VISIT (OUTPATIENT)
Dept: PHARMACY | Facility: HOSPITAL | Age: 89
End: 2024-08-30
Payer: MEDICARE

## 2024-08-30 LAB — INR PPP: 2

## 2024-08-30 NOTE — PROGRESS NOTES
Anticoagulation Clinic Progress Note    Anticoagulation Summary  As of 2024      INR goal:  2.0-3.0   TTR:  78.7% (5.9 y)   INR used for dosin.00 (2024)   Warfarin maintenance plan:  1.25 mg every Sun, Tue, Thu; 2.5 mg all other days   Weekly warfarin total:  13.75 mg   No change documented:  Christianne Vera, PharmD   Plan last modified:  Elise Davis RPH (2022)   Next INR check:  2024   Priority:  Maintenance   Target end date:  Indefinite    Indications    Long-term (current) use of anticoagulants (Resolved) [Z79.01]                 Anticoagulation Episode Summary       INR check location:      Preferred lab:      Send INR reminders to:  [facility] HOME TEST POOL    Comments:  [facility] --- SPEAK WITH DAUGHTER (LYNETTE) **DO NOT SPEAK WITH PATIENT (poor memory)**           Anticoagulation Care Providers       Provider Role Specialty Phone number    Rafaela Leija MD Referring Cardiology [phone]            Clinic Interview:  No pertinent clinical findings have been reported.    INR History:      2024     8:20 AM 2024    12:00 AM 2024     3:14 PM 2024    12:00 AM 2024     1:15 PM 2024    12:00 AM 2024     2:26 PM   Anticoagulation Monitoring   INR 2.00  2.10  2.60  2.00   INR Date 2024   INR Goal 2.0-3.0  2.0-3.0  2.0-3.0  2.0-3.0   Trend Same  Same  Same  Same   Last Week Total 13.75 mg  13.75 mg  13.75 mg  13.75 mg   Next Week Total 13.75 mg  13.75 mg  13.75 mg  13.75 mg   Sun 1.25 mg  1.25 mg  1.25 mg  1.25 mg   Mon 2.5 mg  2.5 mg  2.5 mg  2.5 mg   Tue 1.25 mg  1.25 mg  1.25 mg  1.25 mg   Wed -  2.5 mg  2.5 mg  2.5 mg   Thu 1.25 mg  1.25 mg  1.25 mg  1.25 mg   Fri 2.5 mg  2.5 mg  2.5 mg  2.5 mg   Sat 2.5 mg  2.5 mg  2.5 mg  2.5 mg   Historical INR  2.10      2.60      2.00            This result is from an external source.       Plan:  1. INR is therapeutic today- see above in Anticoagulation  Summary.    Diana Avalos to continue their warfarin regimen- see above in Anticoagulation Summary.  2. Follow up in 1 week  3. Pt has agreed to only be called if INR out of range. They have been instructed to call if any changes in medications, doses, concerns, etc. Patient expresses understanding and has no further questions at this time.    Christianne Vera, PharmD

## 2024-09-05 ENCOUNTER — CLINICAL SUPPORT NO REQUIREMENTS (OUTPATIENT)
Age: 89
End: 2024-09-05
Payer: MEDICARE

## 2024-09-05 ENCOUNTER — OFFICE VISIT (OUTPATIENT)
Dept: CARDIOLOGY | Facility: CLINIC | Age: 89
End: 2024-09-05
Payer: MEDICARE

## 2024-09-05 VITALS
BODY MASS INDEX: 22.76 KG/M2 | OXYGEN SATURATION: 98 % | SYSTOLIC BLOOD PRESSURE: 110 MMHG | HEIGHT: 67 IN | WEIGHT: 145 LBS | DIASTOLIC BLOOD PRESSURE: 76 MMHG | HEART RATE: 73 BPM

## 2024-09-05 DIAGNOSIS — I47.20 VENTRICULAR TACHYCARDIA: ICD-10-CM

## 2024-09-05 DIAGNOSIS — Z95.0 PRESENCE OF BIVENTRICULAR CARDIAC PACEMAKER: Primary | ICD-10-CM

## 2024-09-05 DIAGNOSIS — I34.0 NONRHEUMATIC MITRAL VALVE REGURGITATION: ICD-10-CM

## 2024-09-05 DIAGNOSIS — Z95.0 CARDIAC RESYNCHRONIZATION THERAPY PACEMAKER (CRT-P) IN PLACE: ICD-10-CM

## 2024-09-05 DIAGNOSIS — I42.8 NICM (NONISCHEMIC CARDIOMYOPATHY): ICD-10-CM

## 2024-09-05 DIAGNOSIS — I27.20 PULMONARY HYPERTENSION: ICD-10-CM

## 2024-09-05 DIAGNOSIS — I48.21 PERMANENT ATRIAL FIBRILLATION: Primary | ICD-10-CM

## 2024-09-05 DIAGNOSIS — I36.1 NONRHEUMATIC TRICUSPID VALVE REGURGITATION: ICD-10-CM

## 2024-09-05 PROBLEM — R06.02 SHORTNESS OF BREATH: Status: RESOLVED | Noted: 2022-08-11 | Resolved: 2024-09-05

## 2024-09-05 PROBLEM — I44.2 THIRD DEGREE AV BLOCK: Status: RESOLVED | Noted: 2019-07-31 | Resolved: 2024-09-05

## 2024-09-05 PROCEDURE — 1160F RVW MEDS BY RX/DR IN RCRD: CPT | Performed by: NURSE PRACTITIONER

## 2024-09-05 PROCEDURE — 99214 OFFICE O/P EST MOD 30 MIN: CPT | Performed by: NURSE PRACTITIONER

## 2024-09-05 PROCEDURE — 1159F MED LIST DOCD IN RCRD: CPT | Performed by: NURSE PRACTITIONER

## 2024-09-05 PROCEDURE — 93000 ELECTROCARDIOGRAM COMPLETE: CPT | Performed by: NURSE PRACTITIONER

## 2024-09-05 RX ORDER — LEVOTHYROXINE SODIUM 75 UG/1
75 TABLET ORAL DAILY
COMMUNITY
Start: 2024-08-07 | End: 2025-08-07

## 2024-09-05 RX ORDER — CARVEDILOL 6.25 MG/1
6.25 TABLET ORAL 2 TIMES DAILY WITH MEALS
Qty: 180 TABLET | Refills: 3 | Status: SHIPPED | OUTPATIENT
Start: 2024-09-05

## 2024-09-05 NOTE — PROGRESS NOTES
Date of Office Visit: 2024  Encounter Provider: KIKA Vidal  Place of Service: [facility]  Patient Name: Diana Avalos  :1932  Primary Cardiologist: Dr. Rafaela Leija    Chief Complaint   Patient presents with    Atrial Fibrillation    Annual Exam   :     HPI: Diana Avalos is a 89 y.o. female who presents today for annual cardiac follow-up visit.  I reviewed her medical records.    She has a history of breast cancer.  She has 8 children!    She has known permanent atrial fibrillation (), status post AV node ablation (), single-chamber Medtronic pacemaker placement (), and upgrade to CRT-P device ().  She has remained on warfarin with INRs followed at the [facility] anticoagulation clinic.  She has history of nonischemic cardiomyopathy with improved ejection fraction and valvular heart disease.    In 2022, echocardiogram showed EF 57%, mild LVH, RV mild to moderately dilated, RV wall thickness consistent with mild hypertrophy, left atrium volume mildly increased, right atrium severely dilated, aortic valve sclerosis, mild mitral regurgitation, mild tricuspid regurgitation, and mild pulmonary hypertension.    She presents today with her daughter accompanying her.  She still lives at home and her daughter is with her most of the day and her son is with her at nighttime.  Pacemaker check today looked good.  She denies chest pain, shortness of air, PND, orthopnea, palpitations, edema, dizziness, syncope, or bleeding.      Past Medical History:   Diagnosis Date    Acute on chronic systolic congestive heart failure     Anemia     Arthritis     Asthma     Atrial fibrillation     With a history of an atrial clot    Breast cancer     Left, stage IIB    Clotting disorder     Controlled with Warfarin    Congenital heart disease     Deep vein thrombosis     60 years ago    Disease of thyroid gland     Hypothyroidism    Essential  hypertension     Hypothyroidism     Irritable bowel     Mitral regurgitation     NICM (nonischemic cardiomyopathy)     On warfarin therapy     Osteoporosis     Pneumothorax 06/14/2018    Pulmonary hypertension     Resolved per echocardiogram 1/2019    Third degree AV block     Tricuspid regurgitation     Ventricular tachycardia 01/17/2019    Nonsustained       Past Surgical History:   Procedure Laterality Date    CARDIAC ELECTROPHYSIOLOGY PROCEDURE N/A 06/12/2018    Procedure: Device Upgrade-Upgrade to CRT-P-BIV Pacamaker Medtronic Has existing single chamber Medtronic pacemaker;  Surgeon: Leonardo David MD;  Location:  INVASIVE LOCATION;  Service: Cardiovascular    CHOLECYSTECTOMY  2000    EYE SURGERY  2005    cataracts, Dr. Miramontes    INSERT / REPLACE / REMOVE PACEMAKER  2011    MASTECTOMY Left 10/2014    PACEMAKER IMPLANTATION  2012    Dr. Leija       Social History     Socioeconomic History    Marital status:      Spouse name: Rick    Number of children: 8   Tobacco Use    Smoking status: Never     Passive exposure: Never    Smokeless tobacco: Never   Vaping Use    Vaping status: Never Used   Substance and Sexual Activity    Alcohol use: Yes     Alcohol/week: 1.0 standard drink of alcohol     Types: 1 Shots of liquor per week     Comment: 1 High Ball each night    Drug use: Never    Sexual activity: Not Currently     Partners: Male     Birth control/protection: None       Family History   Problem Relation Age of Onset    Colon cancer Mother 75    Colon cancer Sister 79    Hypertension Sister     Cholelithiasis Sister         2 sisters       The following portion of the patient's history were reviewed and updated as appropriate: past medical history, past surgical history, past social history, past family history, allergies, current medications, and problem list.    Review of Systems   Constitutional: Negative.   Cardiovascular: Negative.    Respiratory: Negative.     Hematologic/Lymphatic:  "Negative.    Neurological: Negative.        Allergies   Allergen Reactions    Iodinated Contrast Media Hives    Amiodarone Nausea Only    Codeine Nausea Only    Dabigatran Etexilate Mesylate Nausea Only    Digoxin Nausea Only         Current Outpatient Medications:     carvedilol (COREG) 6.25 MG tablet, Take 1 tablet by mouth 2 (Two) Times a Day With Meals., Disp: 180 tablet, Rfl: 3    Cholecalciferol 50 MCG (2000 UT) capsule, Take  by mouth., Disp: , Rfl:     Diclofenac Sodium (VOLTAREN) 1 % gel gel, Apply 4 g topically to the appropriate area as directed Daily As Needed., Disp: , Rfl:     diphenhydrAMINE HCl, Sleep, 25 MG capsule, Take  by mouth., Disp: , Rfl:     furosemide (LASIX) 20 MG tablet, TAKE 1 TABLET BY MOUTH DAILY, Disp: 90 tablet, Rfl: 3    levothyroxine (SYNTHROID, LEVOTHROID) 75 MCG tablet, Take 1 tablet by mouth Daily., Disp: , Rfl:     nitroglycerin (NITROSTAT) 0.4 MG SL tablet, Place 1 tablet under the tongue As Needed for Chest Pain. Take no more than 3 doses in 15 minutes., Disp: 30 tablet, Rfl: 2    warfarin (COUMADIN) 2.5 MG tablet, TAKE ONE HALF TABLET BY MOUTH ON SUNDAYS, TUESDAYS, AND THURSDAYS, AND TAKE ONE TABLET ON ALL OTHER DAYS, Disp: 80 tablet, Rfl: 1         Objective:     Vitals:    09/05/24 1336   BP: 110/76   BP Location: Left arm   Patient Position: Sitting   Cuff Size: Adult   Pulse: 73   SpO2: 98%   Weight: 65.8 kg (145 lb)   Height: 170.2 cm (67.01\")     Body mass index is 22.7 kg/m².    PHYSICAL EXAM:    Vitals Reviewed.   General Appearance: No acute distress, well developed and well nourished.  Appears younger than stated age.  Eyes: Glasses.   HENT: No hearing loss noted.    Respiratory: No signs of respiratory distress. Respiration rhythm and depth normal.  Clear to auscultation.   Cardiovascular:  Jugular Venous Pressure: Normal  Heart Rate and Rhythm: Irregular.  Heart Sounds: Normal S1 and S2. No S3 or S4 noted.  Murmurs: No murmurs noted. No rubs, thrills, or gallops. "   Lower Extremities: No edema noted.  Musculoskeletal: Normal movement of extremities.  Skin: General appearance normal.    Psychiatric: Patient alert and oriented to person, place, and time. Speech and behavior appropriate. Normal mood and affect.  Soft-spoken and so sweet.      ECG 12 Lead    Date/Time: 2024 1:35 PM  Performed by: Mercedes Phipps APRN    Authorized by: Mercedes Phipps APRN  Comparison: compared with previous ECG from 2023  Similar to previous ECG  Rhythm: atrial fibrillation and paced  Rate: normal  BPM: 73  Conduction: conduction normal  ST Segments: ST segments normal  T Waves: T waves normal  QRS axis: normal  Pacin% capture  Clinical impression: abnormal EKG            Assessment:       Diagnosis Plan   1. Permanent atrial fibrillation        2. NICM (nonischemic cardiomyopathy)        3. Cardiac resynchronization therapy pacemaker (CRT-P) in place        4. Nonrheumatic mitral valve regurgitation        5. Pulmonary hypertension        6. Nonrheumatic tricuspid valve regurgitation        7. Ventricular tachycardia               Plan:       1.  Permanent Atrial Fibrillation: Status post AV node ablation and pacemaker placement in 2012.  [id number] or 8.  Remains on warfarin with INRs followed at the [facility] anticoagulation clinic.    2.  History of nonischemic cardiomyopathy diagnosed in .  EF 35-40%.  EF has normalized per echocardiogram in .  Euvolemic.    3.  CRT-P device.  Monitored in our rhythm clinic.    .  Abnormal echocardiogram in 2022: EF 57%, mild LVH, RV mild to moderately dilated, RV wall thickness consistent with mild hypertrophy, left atrium volume mildly increased, right atrium severely dilated, aortic valve sclerosis, mild mitral regurgitation, mild tricuspid regurgitation, and mild pulmonary hypertension.  Stable.    7.  History of ventricular tachycardia: Remains on beta-blocker therapy.    8.  Hypertension: Blood pressure  well-controlled on current medications.    9.  She is doing so well on her current medication regimen and feels great.  Follow-up with Dr. Leija in 1 year.  Call with any cardiac concerns.        As always, it has been a pleasure to participate in your patient's care. Thank you.         Sincerely,         KIKA Castelan  [facility]      Dictated utilizing Dragon Dictation

## 2024-09-06 LAB — INR PPP: 2.1

## 2024-09-09 ENCOUNTER — ANTICOAGULATION VISIT (OUTPATIENT)
Dept: PHARMACY | Facility: HOSPITAL | Age: 89
End: 2024-09-09
Payer: MEDICARE

## 2024-09-09 NOTE — PROGRESS NOTES
Anticoagulation Clinic Progress Note    Anticoagulation Summary  As of 2024      INR goal:  2.0-3.0   TTR:  78.7% (5.9 y)   INR used for dosin.10 (2024)   Warfarin maintenance plan:  1.25 mg every Sun, Tue, Thu; 2.5 mg all other days   Weekly warfarin total:  13.75 mg   No change documented:  Elise Davis RPH   Plan last modified:  Elise Davis RPH (2022)   Next INR check:  2024   Priority:  Maintenance   Target end date:  Indefinite    Indications    Long-term (current) use of anticoagulants (Resolved) [Z79.01]                 Anticoagulation Episode Summary       INR check location:      Preferred lab:      [facility]:   [facility]    Comments:  [facility] --- SPEAK WITH DAUGHTER (LYNETTE) **DO NOT SPEAK WITH PATIENT (poor memory)**           Anticoagulation Care Providers       Provider Role Specialty Phone number    Rafaela Leija MD Referring Cardiology [phone]            Clinic Interview:  No pertinent clinical findings have been reported.    INR History:      2024     3:14 PM 2024    12:00 AM 2024     1:15 PM 2024    12:00 AM 2024     2:26 PM 2024    12:00 AM 2024     8:38 AM   Anticoagulation Monitoring   INR 2.10  2.60  2.00  2.10   INR Date 2024   INR Goal 2.0-3.0  2.0-3.0  2.0-3.0  2.0-3.0   Trend Same  Same  Same  Same   Last Week Total 13.75 mg  13.75 mg  13.75 mg  13.75 mg   Next Week Total 13.75 mg  13.75 mg  13.75 mg  13.75 mg   Sun 1.25 mg  1.25 mg  1.25 mg  -   Mon 2.5 mg  2.5 mg  2.5 mg  2.5 mg   Tue 1.25 mg  1.25 mg  1.25 mg  1.25 mg   Wed 2.5 mg  2.5 mg  2.5 mg  2.5 mg   Thu 1.25 mg  1.25 mg  1.25 mg  1.25 mg   Fri 2.5 mg  2.5 mg  2.5 mg  -   Sat 2.5 mg  2.5 mg  2.5 mg  -   Historical INR  2.60      2.00      2.10            This result is from an external source.       Plan:  1. INR is therapeutic today- see above in Anticoagulation Summary.    Diana JOINER  Robbie to continue their warfarin regimen- see above in Anticoagulation Summary.  2. Follow up in 1 week  3. Pt has agreed to only be called if INR out of range. They have been instructed to call if any changes in medications, doses, concerns, etc. Patient expresses understanding and has no further questions at this time.    Elise Davis [facility]

## 2024-09-13 ENCOUNTER — ANTICOAGULATION VISIT (OUTPATIENT)
Dept: PHARMACY | Facility: HOSPITAL | Age: 89
End: 2024-09-13
Payer: MEDICARE

## 2024-09-13 LAB — INR PPP: 2.1

## 2024-09-13 NOTE — PROGRESS NOTES
Anticoagulation Clinic Progress Note    Anticoagulation Summary  As of 2024      INR goal:  2.0-3.0   TTR:  78.8% (5.9 y)   INR used for dosin.10 (2024)   Warfarin maintenance plan:  1.25 mg every Sun, Tue, Thu; 2.5 mg all other days   Weekly warfarin total:  13.75 mg   No change documented:  Christianne Vera, PharmD   Plan last modified:  Elise Davis RPH (2022)   Next INR check:  2024   Priority:  Maintenance   Target end date:  Indefinite    Indications    Long-term (current) use of anticoagulants (Resolved) [Z79.01]                 Anticoagulation Episode Summary       INR check location:      Preferred lab:      Send INR reminders to:  [facility] HOME TEST POOL    Comments:  [facility] --- SPEAK WITH DAUGHTER (LYNETTE) **DO NOT SPEAK WITH PATIENT (poor memory)**           Anticoagulation Care Providers       Provider Role Specialty Phone number    Rafaela Leija MD Referring Cardiology [phone]            Clinic Interview:  No pertinent clinical findings have been reported.    INR History:      2024     1:15 PM 2024    12:00 AM 2024     2:26 PM 2024    12:00 AM 2024     8:38 AM 2024    12:00 AM 2024     3:28 PM   Anticoagulation Monitoring   INR 2.60  2.00  2.10  2.10   INR Date 2024   INR Goal 2.0-3.0  2.0-3.0  2.0-3.0  2.0-3.0   Trend Same  Same  Same  Same   Last Week Total 13.75 mg  13.75 mg  13.75 mg  13.75 mg   Next Week Total 13.75 mg  13.75 mg  13.75 mg  13.75 mg   Sun 1.25 mg  1.25 mg  -  1.25 mg   Mon 2.5 mg  2.5 mg  2.5 mg  2.5 mg   Tue 1.25 mg  1.25 mg  1.25 mg  1.25 mg   Wed 2.5 mg  2.5 mg  2.5 mg  2.5 mg   Thu 1.25 mg  1.25 mg  1.25 mg  1.25 mg   Fri 2.5 mg  2.5 mg  -  2.5 mg   Sat 2.5 mg  2.5 mg  -  2.5 mg   Historical INR  2.00      2.10      2.10            This result is from an external source.       Plan:  1. INR is therapeutic today- see above in Anticoagulation Summary.     Diana Avalos to continue their warfarin regimen- see above in Anticoagulation Summary.  2. Follow up in 1 week  3. Pt has agreed to only be called if INR out of range. They have been instructed to call if any changes in medications, doses, concerns, etc. Patient expresses understanding and has no further questions at this time.    Christianne Vera, PharmD

## 2024-09-20 ENCOUNTER — ANTICOAGULATION VISIT (OUTPATIENT)
Dept: PHARMACY | Facility: HOSPITAL | Age: 89
End: 2024-09-20
Payer: MEDICARE

## 2024-09-20 LAB — INR PPP: 2.1

## 2024-09-20 PROCEDURE — G0249 PROVIDE INR TEST MATER/EQUIP: HCPCS

## 2024-09-28 LAB — INR PPP: 2.6

## 2024-09-30 ENCOUNTER — ANTICOAGULATION VISIT (OUTPATIENT)
Dept: PHARMACY | Facility: HOSPITAL | Age: 89
End: 2024-09-30
Payer: MEDICARE

## 2024-09-30 RX ORDER — CARVEDILOL 6.25 MG/1
6.25 TABLET ORAL 2 TIMES DAILY WITH MEALS
Qty: 180 TABLET | Refills: 3 | Status: SHIPPED | OUTPATIENT
Start: 2024-09-30

## 2024-09-30 NOTE — PROGRESS NOTES
Anticoagulation Clinic Progress Note    Anticoagulation Summary  As of 2024      INR goal:  2.0-3.0   TTR:  79.0% (6 y)   INR used for dosin.60 (2024)   Warfarin maintenance plan:  1.25 mg every Sun, Tue, Thu; 2.5 mg all other days   Weekly warfarin total:  13.75 mg   No change documented:  Jayant White, PharmD   Plan last modified:  Elise Davis RPH (2022)   Next INR check:  10/4/2024   Priority:  Maintenance   Target end date:  Indefinite    Indications    Long-term (current) use of anticoagulants (Resolved) [Z79.01]                 Anticoagulation Episode Summary       INR check location:      Preferred lab:      Send INR reminders to:   [facility] HOME TEST POOL    Comments:  [facility] --- SPEAK WITH DAUGHTER (LYNETTE) **DO NOT SPEAK WITH PATIENT (poor memory)**           Anticoagulation Care Providers       Provider Role Specialty Phone number    Rafaela Leija MD Referring Cardiology [phone]            Clinic Interview:  No pertinent clinical findings have been reported.    INR History:      2024     8:38 AM 2024    12:00 AM 2024     3:28 PM 2024    12:00 AM 2024     2:00 PM 2024    12:00 AM 2024     8:42 AM   Anticoagulation Monitoring   INR 2.10  2.10  2.10  2.60   INR Date 2024   INR Goal 2.0-3.0  2.0-3.0  2.0-3.0  2.0-3.0   Trend Same  Same  Same  Same   Last Week Total 13.75 mg  13.75 mg  13.75 mg  13.75 mg   Next Week Total 13.75 mg  13.75 mg  13.75 mg  13.75 mg   Sun -  1.25 mg  1.25 mg  -   Mon 2.5 mg  2.5 mg  2.5 mg  2.5 mg   Tue 1.25 mg  1.25 mg  1.25 mg  1.25 mg   Wed 2.5 mg  2.5 mg  2.5 mg  2.5 mg   Thu 1.25 mg  1.25 mg  1.25 mg  1.25 mg   Fri -  2.5 mg  2.5 mg  -   Sat -  2.5 mg  2.5 mg  -   Historical INR  2.10      2.10      2.60            This result is from an external source.       Plan:  1. INR was therapeutic 24 - see above in Anticoagulation Summary.    Diana Avalos to  Crutches given, bandage applied to knees and elbow to cover abrasions. continue their warfarin regimen- see above in Anticoagulation Summary.  2. Follow up in 1 week  3. Pt has agreed to only be called if INR out of range. They have been instructed to call if any changes in medications, doses, concerns, etc. Patient expresses understanding and has no further questions at this time.    Jayant White, PharmD

## 2024-10-04 ENCOUNTER — ANTICOAGULATION VISIT (OUTPATIENT)
Dept: PHARMACY | Facility: HOSPITAL | Age: 89
End: 2024-10-04
Payer: MEDICARE

## 2024-10-04 LAB — INR PPP: 1.9

## 2024-10-04 NOTE — PROGRESS NOTES
Anticoagulation Clinic Progress Note    Anticoagulation Summary  As of 10/4/2024      INR goal:  2.0-3.0   TTR:  79.0% (6 y)   INR used for dosin.90 (10/4/2024)   Warfarin maintenance plan:  1.25 mg every Sun, Tue, Thu; 2.5 mg all other days   Weekly warfarin total:  13.75 mg   Plan last modified:  Elise Davis RPH (2022)   Next INR check:  10/11/2024   Priority:  Maintenance   Target end date:  Indefinite    Indications    Long-term (current) use of anticoagulants (Resolved) [Z79.01]                 Anticoagulation Episode Summary       INR check location:      Preferred lab:      Send INR reminders to:  [facility] HOME TEST POOL    Comments:  [facility] --- SPEAK WITH DAUGHTER (LYNETTE) **DO NOT SPEAK WITH PATIENT (poor memory)**           Anticoagulation Care Providers       Provider Role Specialty Phone number    Rafaela Leija MD Referring Cardiology [phone]            Clinic Interview:  Patient Findings     Negatives:  Signs/symptoms of thrombosis, Signs/symptoms of bleeding,   Laboratory test error suspected, Change in health, Change in alcohol use,   Change in activity, Upcoming invasive procedure, Emergency department   visit, Upcoming dental procedure, Missed doses, Extra doses, Change in   medications, Change in diet/appetite, Hospital admission, Bruising, Other   complaints      Clinical Outcomes     Negatives:  Major bleeding event, Thromboembolic event,   Anticoagulation-related hospital admission, Anticoagulation-related ED   visit, Anticoagulation-related fatality        INR History:      2024     3:28 PM 2024    12:00 AM 2024     2:00 PM 2024    12:00 AM 2024     8:42 AM 10/4/2024    12:00 AM 10/4/2024     3:23 PM   Anticoagulation Monitoring   INR 2.10  2.10  2.60  1.90   INR Date 2024  2024  2024  10/4/2024   INR Goal 2.0-3.0  2.0-3.0  2.0-3.0  2.0-3.0   Trend Same  Same  Same  Same   Last Week Total 13.75 mg  13.75 mg  13.75  mg  13.75 mg   Next Week Total 13.75 mg  13.75 mg  13.75 mg  15 mg   Sun 1.25 mg  1.25 mg  -  1.25 mg   Mon 2.5 mg  2.5 mg  2.5 mg  2.5 mg   Tue 1.25 mg  1.25 mg  1.25 mg  1.25 mg   Wed 2.5 mg  2.5 mg  2.5 mg  2.5 mg   Thu 1.25 mg  1.25 mg  1.25 mg  1.25 mg   Fri 2.5 mg  2.5 mg  -  3.75 mg (10/4)   Sat 2.5 mg  2.5 mg  -  2.5 mg   Historical INR  2.10      2.60      1.90            This result is from an external source.       Plan:  1. INR is Subtherapeutic today- see above in Anticoagulation Summary.   Will instruct Diana Avalos to Increase their warfarin regimen- see above in Anticoagulation Summary.  No answer, left voicemail per request. Boost to 3.75 mg today then resume, rck 1 week  2. Follow up in 1 weeks  3. They have been instructed to call if any changes in medications, doses, concerns, etc. Patient expresses understanding and has no further questions at this time.    Elise Davis [facility]

## 2024-10-11 ENCOUNTER — ANTICOAGULATION VISIT (OUTPATIENT)
Dept: PHARMACY | Facility: HOSPITAL | Age: 89
End: 2024-10-11
Payer: MEDICARE

## 2024-10-11 LAB — INR PPP: 2.4

## 2024-10-11 NOTE — PROGRESS NOTES
Anticoagulation Clinic Progress Note    Anticoagulation Summary  As of 10/11/2024      INR goal:  2.0-3.0   TTR:  79.0% (6 y)   INR used for dosin.40 (10/11/2024)   Warfarin maintenance plan:  1.25 mg every Sun, e, Thu; 2.5 mg all other days   Weekly warfarin total:  13.75 mg   No change documented:  Elise Davis RPH   Plan last modified:  Elise Davis RPH (2022)   Next INR check:  10/18/2024   Priority:  Maintenance   Target end date:  Indefinite    Indications    Long-term (current) use of anticoagulants (Resolved) [Z79.01]                 Anticoagulation Episode Summary       INR check location:      Preferred lab:      Send INR reminders to:   [facility] HOME TEST POOL    Comments:  [facility] --- SPEAK WITH DAUGHTER (LYNETTE) **DO NOT SPEAK WITH PATIENT (poor memory)**           Anticoagulation Care Providers       Provider Role Specialty Phone number    Rafaela Leija MD Referring Cardiology [phone]            Clinic Interview:  Patient Findings     Negatives:  Signs/symptoms of thrombosis, Signs/symptoms of bleeding,   Laboratory test error suspected, Change in health, Change in alcohol use,   Change in activity, Upcoming invasive procedure, Emergency department   visit, Upcoming dental procedure, Missed doses, Extra doses, Change in   medications, Change in diet/appetite, Hospital admission, Bruising, Other   complaints      Clinical Outcomes     Negatives:  Major bleeding event, Thromboembolic event,   Anticoagulation-related hospital admission, Anticoagulation-related ED   visit, Anticoagulation-related fatality        INR History:      2024     2:00 PM 2024    12:00 AM 2024     8:42 AM 10/4/2024    12:00 AM 10/4/2024     3:23 PM 10/11/2024    12:00 AM 10/11/2024     2:47 PM   Anticoagulation Monitoring   INR 2.10  2.60  1.90  2.40   INR Date 2024  2024  10/4/2024  10/11/2024   INR Goal 2.0-3.0  2.0-3.0  2.0-3.0  2.0-3.0   Trend Same  Same   Same  Same   Last Week Total 13.75 mg  13.75 mg  13.75 mg  15 mg   Next Week Total 13.75 mg  13.75 mg  15 mg  13.75 mg   Sun 1.25 mg  -  1.25 mg  1.25 mg   Mon 2.5 mg  2.5 mg  2.5 mg  2.5 mg   Tue 1.25 mg  1.25 mg  1.25 mg  1.25 mg   Wed 2.5 mg  2.5 mg  2.5 mg  2.5 mg   Thu 1.25 mg  1.25 mg  1.25 mg  1.25 mg   Fri 2.5 mg  -  3.75 mg (10/4)  2.5 mg   Sat 2.5 mg  -  2.5 mg  2.5 mg   Historical INR  2.60      1.90      2.40            This result is from an external source.       Plan:  1. INR is Therapeutic today- see above in Anticoagulation Summary.   Will instruct Diana Avalos to Continue their warfarin regimen- see above in Anticoagulation Summary.      No changes, resume 1.25 mg on sun, tues, thurs and 2.5 mg herman IGLESIAS 1 week   2. Follow up in 1 weeks  3. They have been instructed to call if any changes in medications, doses, concerns, etc. Patient expresses understanding and has no further questions at this time.    lEise Davis RP

## 2024-10-18 ENCOUNTER — ANTICOAGULATION VISIT (OUTPATIENT)
Dept: PHARMACY | Facility: HOSPITAL | Age: 89
End: 2024-10-18
Payer: MEDICARE

## 2024-10-18 LAB — INR PPP: 2.4

## 2024-10-18 PROCEDURE — G0249 PROVIDE INR TEST MATER/EQUIP: HCPCS

## 2024-10-18 NOTE — PROGRESS NOTES
Anticoagulation Clinic Progress Note    Anticoagulation Summary  As of 10/18/2024      INR goal:  2.0-3.0   TTR:  79.0% (6 y)   INR used for dosin.40 (10/18/2024)   Warfarin maintenance plan:  1.25 mg every Sun, Tue, Thu; 2.5 mg all other days   Weekly warfarin total:  13.75 mg   No change documented:  Elise Davis RPH   Plan last modified:  Elise Davis RPH (2022)   Next INR check:  10/25/2024   Priority:  Maintenance   Target end date:  Indefinite    Indications    Long-term (current) use of anticoagulants (Resolved) [Z79.01]                 Anticoagulation Episode Summary       INR check location:  --    Preferred lab:  --    Send INR reminders to:  [facility] HOME TEST POOL    Comments:  [facility] --- SPEAK WITH DAUGHTER (LYNETTE) **DO NOT SPEAK WITH PATIENT (poor memory)**           Anticoagulation Care Providers       Provider Role Specialty Phone number    Rafaela Leija MD Referring Cardiology [phone]            Clinic Interview:  No pertinent clinical findings have been reported.    INR History:      2024     8:42 AM 10/4/2024    12:00 AM 10/4/2024     3:23 PM 10/11/2024    12:00 AM 10/11/2024     2:47 PM 10/18/2024    12:00 AM 10/18/2024     3:15 PM   Anticoagulation Monitoring   INR 2.60  1.90  2.40  2.40   INR Date 2024  10/4/2024  10/11/2024  10/18/2024   INR Goal 2.0-3.0  2.0-3.0  2.0-3.0  2.0-3.0   Trend Same  Same  Same  Same   Last Week Total 13.75 mg  13.75 mg  15 mg  13.75 mg   Next Week Total 13.75 mg  15 mg  13.75 mg  13.75 mg   Sun -  1.25 mg  1.25 mg  1.25 mg   Mon 2.5 mg  2.5 mg  2.5 mg  2.5 mg   Tue 1.25 mg  1.25 mg  1.25 mg  1.25 mg   Wed 2.5 mg  2.5 mg  2.5 mg  2.5 mg   Thu 1.25 mg  1.25 mg  1.25 mg  1.25 mg   Fri -  3.75 mg (10/4)  2.5 mg  2.5 mg   Sat -  2.5 mg  2.5 mg  2.5 mg   Historical INR  1.90      2.40      2.40            This result is from an external source.       Plan:  1. INR is therapeutic today- see above in Anticoagulation  Summary.    Diana Avalos to continue their warfarin regimen- see above in Anticoagulation Summary.  2. Follow up in 1 week  3. Pt has agreed to only be called if INR out of range. They have been instructed to call if any changes in medications, doses, concerns, etc. Patient expresses understanding and has no further questions at this time.    Elise Davis, [facility]

## 2024-10-25 LAB — INR PPP: 2.4

## 2024-10-28 ENCOUNTER — ANTICOAGULATION VISIT (OUTPATIENT)
Dept: PHARMACY | Facility: HOSPITAL | Age: 89
End: 2024-10-28
Payer: MEDICARE

## 2024-10-28 NOTE — PROGRESS NOTES
Anticoagulation Clinic Progress Note    Anticoagulation Summary  As of 10/28/2024      INR goal:  2.0-3.0   TTR:  79.1% (6.1 y)   INR used for dosin.40 (10/25/2024)   Warfarin maintenance plan:  1.25 mg every Sun, Tue, Thu; 2.5 mg all other days   Weekly warfarin total:  13.75 mg   No change documented:  Elise Davis RPH   Plan last modified:  Elise Davis RPH (2022)   Next INR check:  2024   Priority:  Maintenance   Target end date:  Indefinite    Indications    Long-term (current) use of anticoagulants (Resolved) [Z79.01]                 Anticoagulation Episode Summary       INR check location:  --    Preferred lab:  --    Send INR reminders to:  [facility] HOME TEST POOL    Comments:  [facility] --- SPEAK WITH DAUGHTER (LYNETTE) **DO NOT SPEAK WITH PATIENT (poor memory)**           Anticoagulation Care Providers       Provider Role Specialty Phone number    Rafaela Leija MD Referring Cardiology [phone]            Clinic Interview:  No pertinent clinical findings have been reported.    INR History:      10/4/2024     3:23 PM 10/11/2024    12:00 AM 10/11/2024     2:47 PM 10/18/2024    12:00 AM 10/18/2024     3:15 PM 10/25/2024    12:00 AM 10/28/2024     8:37 AM   Anticoagulation Monitoring   INR 1.90  2.40  2.40  2.40   INR Date 10/4/2024  10/11/2024  10/18/2024  10/25/2024   INR Goal 2.0-3.0  2.0-3.0  2.0-3.0  2.0-3.0   Trend Same  Same  Same  Same   Last Week Total 13.75 mg  15 mg  13.75 mg  13.75 mg   Next Week Total 15 mg  13.75 mg  13.75 mg  13.75 mg   Sun 1.25 mg  1.25 mg  1.25 mg  -   Mon 2.5 mg  2.5 mg  2.5 mg  2.5 mg   Tue 1.25 mg  1.25 mg  1.25 mg  1.25 mg   Wed 2.5 mg  2.5 mg  2.5 mg  2.5 mg   Thu 1.25 mg  1.25 mg  1.25 mg  1.25 mg   Fri 3.75 mg (10/4)  2.5 mg  2.5 mg  -   Sat 2.5 mg  2.5 mg  2.5 mg  -   Historical INR  2.40      2.40      2.40            This result is from an external source.       Plan:  1. INR is therapeutic today- see above in  Anticoagulation Summary.    Diana Avalos to continue their warfarin regimen- see above in Anticoagulation Summary.  2. Follow up in 1 week  3. Pt has agreed to only be called if INR out of range. They have been instructed to call if any changes in medications, doses, concerns, etc. Patient expresses understanding and has no further questions at this time.    Elise Davis, [facility]

## 2024-11-04 ENCOUNTER — ANTICOAGULATION VISIT (OUTPATIENT)
Dept: PHARMACY | Facility: HOSPITAL | Age: 89
End: 2024-11-04
Payer: MEDICARE

## 2024-11-04 LAB — INR PPP: 2.4

## 2024-11-04 NOTE — PROGRESS NOTES
Anticoagulation Clinic Progress Note    Anticoagulation Summary  As of 2024      INR goal:  2.0-3.0   TTR:  79.2% (6.1 y)   INR used for dosin.40 (2024)   Warfarin maintenance plan:  1.25 mg every Sun, Tue, Thu; 2.5 mg all other days   Weekly warfarin total:  13.75 mg   No change documented:  Elise Davis RPH   Plan last modified:  Elise Davis RPH (2022)   Next INR check:  2024   Priority:  Maintenance   Target end date:  Indefinite    Indications    Long-term (current) use of anticoagulants (Resolved) [Z79.01]                 Anticoagulation Episode Summary       INR check location:  --    Preferred lab:  --    Send INR reminders to:  [facility] HOME TEST POOL    Comments:  [facility] --- SPEAK WITH DAUGHTER (LYNETTE) **DO NOT SPEAK WITH PATIENT (poor memory)**           Anticoagulation Care Providers       Provider Role Specialty Phone number    Rafaela Leija MD Referring Cardiology [phone]            Clinic Interview:  No pertinent clinical findings have been reported.    INR History:      10/11/2024     2:47 PM 10/18/2024    12:00 AM 10/18/2024     3:15 PM 10/25/2024    12:00 AM 10/28/2024     8:37 AM 2024    12:00 AM 2024     2:14 PM   Anticoagulation Monitoring   INR 2.40  2.40  2.40  2.40   INR Date 10/11/2024  10/18/2024  10/25/2024  2024   INR Goal 2.0-3.0  2.0-3.0  2.0-3.0  2.0-3.0   Trend Same  Same  Same  Same   Last Week Total 15 mg  13.75 mg  13.75 mg  13.75 mg   Next Week Total 13.75 mg  13.75 mg  13.75 mg  13.75 mg   Sun 1.25 mg  1.25 mg  -  1.25 mg   Mon 2.5 mg  2.5 mg  2.5 mg  2.5 mg   Tue 1.25 mg  1.25 mg  1.25 mg  1.25 mg   Wed 2.5 mg  2.5 mg  2.5 mg  2.5 mg   Thu 1.25 mg  1.25 mg  1.25 mg  1.25 mg   Fri 2.5 mg  2.5 mg  -  2.5 mg   Sat 2.5 mg  2.5 mg  -  2.5 mg   Historical INR  2.40      2.40      2.40            This result is from an external source.       Plan:  1. INR is therapeutic today- see above in Anticoagulation  Summary.    Diana Avalos to continue their warfarin regimen- see above in Anticoagulation Summary.  2. Follow up in 1 week  3. Pt has agreed to only be called if INR out of range. They have been instructed to call if any changes in medications, doses, concerns, etc. Patient expresses understanding and has no further questions at this time.    Elise Davis, [facility]

## 2024-11-08 RX ORDER — WARFARIN SODIUM 2.5 MG/1
TABLET ORAL
Qty: 80 TABLET | Refills: 1 | Status: SHIPPED | OUTPATIENT
Start: 2024-11-08

## 2024-11-12 LAB — INR PPP: 2.6

## 2024-11-13 ENCOUNTER — ANTICOAGULATION VISIT (OUTPATIENT)
Dept: PHARMACY | Facility: HOSPITAL | Age: 89
End: 2024-11-13
Payer: MEDICARE

## 2024-11-13 NOTE — PROGRESS NOTES
Anticoagulation Clinic Progress Note    Anticoagulation Summary  As of 2024      INR goal:  2.0-3.0   TTR:  79.3% (6.1 y)   INR used for dosin.60 (2024)   Warfarin maintenance plan:  1.25 mg every Sun, Tue, Thu; 2.5 mg all other days   Weekly warfarin total:  13.75 mg   No change documented:  Jayant White, PharmD   Plan last modified:  Elise Davis RPH (2022)   Next INR check:  2024   Priority:  Maintenance   Target end date:  Indefinite    Indications    Long-term (current) use of anticoagulants (Resolved) [Z79.01]                 Anticoagulation Episode Summary       INR check location:  --    Preferred lab:  --    Send INR reminders to:  [facility] HOME TEST POOL    Comments:  [facility] --- SPEAK WITH DAUGHTER (LYNETTE) **DO NOT SPEAK WITH PATIENT (poor memory)**           Anticoagulation Care Providers       Provider Role Specialty Phone number    Rafaela Leija MD Referring Cardiology [phone]            Clinic Interview:  No pertinent clinical findings have been reported.    INR History:      10/18/2024     3:15 PM 10/25/2024    12:00 AM 10/28/2024     8:37 AM 2024    12:00 AM 2024     2:14 PM 2024    12:00 AM 2024     8:37 AM   Anticoagulation Monitoring   INR 2.40  2.40  2.40  2.60   INR Date 10/18/2024  10/25/2024  2024  2024   INR Goal 2.0-3.0  2.0-3.0  2.0-3.0  2.0-3.0   Trend Same  Same  Same  Same   Last Week Total 13.75 mg  13.75 mg  13.75 mg  13.75 mg   Next Week Total 13.75 mg  13.75 mg  13.75 mg  13.75 mg   Sun 1.25 mg  -  1.25 mg  1.25 mg   Mon 2.5 mg  2.5 mg  2.5 mg  2.5 mg   Tue 1.25 mg  1.25 mg  1.25 mg  -   Wed 2.5 mg  2.5 mg  2.5 mg  2.5 mg   Thu 1.25 mg  1.25 mg  1.25 mg  1.25 mg   Fri 2.5 mg  -  2.5 mg  2.5 mg   Sat 2.5 mg  -  2.5 mg  2.5 mg   Historical INR  2.40      2.40      2.60            This result is from an external source.       Plan:  1. INR was therapeutic yesterday evening - see above in  Anticoagulation Summary.    Diana A Robbie to continue their warfarin regimen- see above in Anticoagulation Summary.  2. Follow up in 1 week  3. Left secure voicemail for Magui (daughter) with instructions. They have been instructed to call if any changes in medications, doses, concerns, etc.     Jayant White, PharmD

## 2024-11-19 ENCOUNTER — ANTICOAGULATION VISIT (OUTPATIENT)
Dept: PHARMACY | Facility: HOSPITAL | Age: 89
End: 2024-11-19
Payer: MEDICARE

## 2024-11-19 LAB — INR PPP: 2.4

## 2024-11-19 PROCEDURE — G0249 PROVIDE INR TEST MATER/EQUIP: HCPCS

## 2024-11-19 NOTE — PROGRESS NOTES
Anticoagulation Clinic Progress Note    Anticoagulation Summary  As of 2024      INR goal:  2.0-3.0   TTR:  79.3% (6.1 y)   INR used for dosin.40 (2024)   Warfarin maintenance plan:  1.25 mg every Sun, Tue, Thu; 2.5 mg all other days   Weekly warfarin total:  13.75 mg   No change documented:  Elise Davis RPH   Plan last modified:  Elise Davis RPH (2022)   Next INR check:  2024   Priority:  Maintenance   Target end date:  Indefinite    Indications    Long-term (current) use of anticoagulants (Resolved) [Z79.01]                 Anticoagulation Episode Summary       INR check location:  --    Preferred lab:  --    Send INR reminders to:  [facility] HOME TEST POOL    Comments:  [facility] --- SPEAK WITH DAUGHTER (LYNETTE) **DO NOT SPEAK WITH PATIENT (poor memory)**           Anticoagulation Care Providers       Provider Role Specialty Phone number    Rafaela Leija MD Referring Cardiology [phone]            Clinic Interview:  No pertinent clinical findings have been reported.    INR History:      10/28/2024     8:37 AM 2024    12:00 AM 2024     2:14 PM 2024    12:00 AM 2024     8:37 AM 2024    12:00 AM 2024     3:00 PM   Anticoagulation Monitoring   INR 2.40  2.40  2.60  2.40   INR Date 10/25/2024  2024  2024  2024   INR Goal 2.0-3.0  2.0-3.0  2.0-3.0  2.0-3.0   Trend Same  Same  Same  Same   Last Week Total 13.75 mg  13.75 mg  13.75 mg  13.75 mg   Next Week Total 13.75 mg  13.75 mg  13.75 mg  13.75 mg   Sun -  1.25 mg  1.25 mg  1.25 mg   Mon 2.5 mg  2.5 mg  2.5 mg  2.5 mg   Tue 1.25 mg  1.25 mg  -  1.25 mg   Wed 2.5 mg  2.5 mg  2.5 mg  2.5 mg   Thu 1.25 mg  1.25 mg  1.25 mg  1.25 mg   Fri -  2.5 mg  2.5 mg  2.5 mg   Sat -  2.5 mg  2.5 mg  2.5 mg   Historical INR  2.40      2.60      2.40            This result is from an external source.       Plan:  1. INR is therapeutic today- see above in Anticoagulation  Summary.    Diana Avalos to continue their warfarin regimen- see above in Anticoagulation Summary.  2. Follow up in 1 week  3. Pt has agreed to only be called if INR out of range. They have been instructed to call if any changes in medications, doses, concerns, etc. Patient expresses understanding and has no further questions at this time.    Elise Davis, [facility]

## 2024-12-03 ENCOUNTER — ANTICOAGULATION VISIT (OUTPATIENT)
Dept: PHARMACY | Facility: HOSPITAL | Age: 89
End: 2024-12-03
Payer: MEDICARE

## 2024-12-03 LAB — INR PPP: 1.8

## 2024-12-03 NOTE — PROGRESS NOTES
Anticoagulation Clinic Progress Note    Anticoagulation Summary  As of 12/3/2024      INR goal:  2.0-3.0   TTR:  79.3% (6.2 y)   INR used for dosin.80 (12/3/2024)   Warfarin maintenance plan:  1.25 mg every Sun, Tue, Thu; 2.5 mg all other days   Weekly warfarin total:  13.75 mg   No change documented:  Jayant White, PharmD   Plan last modified:  Elise Davis RPH (2022)   Next INR check:  12/10/2024   Priority:  Maintenance   Target end date:  Indefinite    Indications    Long-term (current) use of anticoagulants (Resolved) [Z79.01]                 Anticoagulation Episode Summary       INR check location:  --    Preferred lab:  --    Send INR reminders to:  [facility] HOME TEST POOL    Comments:  [facility] --- SPEAK WITH DAUGHTER (LYNETTE) **DO NOT SPEAK WITH PATIENT (poor memory)**           Anticoagulation Care Providers       Provider Role Specialty Phone number    Rafaela Leija MD Referring Cardiology [phone]            INR History:      2024     2:14 PM 2024    12:00 AM 2024     8:37 AM 2024    12:00 AM 2024     3:00 PM 12/3/2024    12:00 AM 12/3/2024     3:25 PM   Anticoagulation Monitoring   INR 2.40  2.60  2.40  1.80   INR Date 2024  2024  2024  12/3/2024   INR Goal 2.0-3.0  2.0-3.0  2.0-3.0  2.0-3.0   Trend Same  Same  Same  Same   Last Week Total 13.75 mg  13.75 mg  13.75 mg  13.75 mg   Next Week Total 13.75 mg  13.75 mg  13.75 mg  13.75 mg   Sun 1.25 mg  1.25 mg  1.25 mg  1.25 mg   Mon 2.5 mg  2.5 mg  2.5 mg  2.5 mg   Tue 1.25 mg  -  1.25 mg  1.25 mg   Wed 2.5 mg  2.5 mg  2.5 mg  2.5 mg   Thu 1.25 mg  1.25 mg  1.25 mg  1.25 mg   Fri 2.5 mg  2.5 mg  2.5 mg  2.5 mg   Sat 2.5 mg  2.5 mg  2.5 mg  2.5 mg   Historical INR  2.60      2.40      1.80            This result is from an external source.       Plan:  1. INR is Subtherapeutic today- see above in Anticoagulation Summary.   Will instruct Diana Avalos to Change their  warfarin regimen (2.5 mg today, then resume 1.25 mg Sun/Tues/Thurs, 2.5 mg all other days) - see above in Anticoagulation Summary.  2. Follow up in 1 week.  3. Left voicemail per patient's daughter's (Magui) permission when she submitted today's INR result.They have been instructed to call if any changes in medications, doses, concerns, etc.     Jayant White, PharmD

## 2024-12-09 ENCOUNTER — ANTICOAGULATION VISIT (OUTPATIENT)
Dept: PHARMACY | Facility: HOSPITAL | Age: 89
End: 2024-12-09
Payer: MEDICARE

## 2024-12-09 LAB — INR PPP: 2.7

## 2024-12-09 NOTE — PROGRESS NOTES
Anticoagulation Clinic Progress Note    Anticoagulation Summary  As of 2024      INR goal:  2.0-3.0   TTR:  79.3% (6.2 y)   INR used for dosin.70 (2024)   Warfarin maintenance plan:  1.25 mg every Sun, Tue, Thu; 2.5 mg all other days   Weekly warfarin total:  13.75 mg   No change documented:  Nikole Tiwari [facility]   Plan last modified:  Elise Davis [facility] (2022)   Next INR check:  2024   Priority:  Maintenance   Target end date:  Indefinite    Indications    Long-term (current) use of anticoagulants (Resolved) [Z79.01]                 Anticoagulation Episode Summary       INR check location:  --    Preferred lab:  --    Send INR reminders to:  [facility] HOME TEST POOL    Comments:  [facility] --- SPEAK WITH DAUGHTER (LYNETTE) **DO NOT SPEAK WITH PATIENT (poor memory)**           Anticoagulation Care Providers       Provider Role Specialty Phone number    Rafaela Leija MD Referring Cardiology [phone]            Drug interactions: has remained unchanged.  Diet: has remained unchanged.    Clinic Interview:  No pertinent clinical findings have been reported.    INR History:      2024     8:37 AM 2024    12:00 AM 2024     3:00 PM 12/3/2024    12:00 AM 12/3/2024     3:25 PM 2024    12:00 AM 2024     2:33 PM   Anticoagulation Monitoring   INR 2.60  2.40  1.80  2.70   INR Date 2024  2024  12/3/2024  2024   INR Goal 2.0-3.0  2.0-3.0  2.0-3.0  2.0-3.0   Trend Same  Same  Same  Same   Last Week Total 13.75 mg  13.75 mg  13.75 mg  13.75 mg   Next Week Total 13.75 mg  13.75 mg  13.75 mg  13.75 mg   Sun 1.25 mg  1.25 mg  1.25 mg  1.25 mg   Mon 2.5 mg  2.5 mg  2.5 mg  2.5 mg   Tue -  1.25 mg  1.25 mg  1.25 mg   Wed 2.5 mg  2.5 mg  2.5 mg  2.5 mg   Thu 1.25 mg  1.25 mg  1.25 mg  1.25 mg   Fri 2.5 mg  2.5 mg  2.5 mg  2.5 mg   Sat 2.5 mg  2.5 mg  2.5 mg  2.5 mg   Historical INR  2.40      1.80      2.70            This result is from an  external source.       Plan:  1. INR is Therapeutic today- see above in Anticoagulation Summary.   Will instruct Diana Avalos to Continue their warfarin regimen- see above in Anticoagulation Summary.  2. Follow up in 1 weeks  3. Pt has agreed to only be called if INR out of range. They have been instructed to call if any changes in medications, doses, concerns, etc. Patient expresses understanding and has no further questions at this time.    Nikole Tiwari [facility]

## 2024-12-16 ENCOUNTER — ANTICOAGULATION VISIT (OUTPATIENT)
Dept: PHARMACY | Facility: HOSPITAL | Age: 89
End: 2024-12-16
Payer: MEDICARE

## 2024-12-16 LAB — INR PPP: 2.9

## 2024-12-16 NOTE — PROGRESS NOTES
Anticoagulation Clinic Progress Note    Anticoagulation Summary  As of 2024      INR goal:  2.0-3.0   TTR:  79.3% (6.2 y)   INR used for dosin.90 (2024)   Warfarin maintenance plan:  1.25 mg every Sun, Tue, Thu; 2.5 mg all other days   Weekly warfarin total:  13.75 mg   No change documented:  Elise Davis RPH   Plan last modified:  Elise Davis RPH (2022)   Next INR check:  2024   Priority:  Maintenance   Target end date:  Indefinite    Indications    Long-term (current) use of anticoagulants (Resolved) [Z79.01]                 Anticoagulation Episode Summary       INR check location:  --    Preferred lab:  --    Send INR reminders to:  [facility] HOME TEST POOL    Comments:  [facility] --- SPEAK WITH DAUGHTER (LYNETTE) **DO NOT SPEAK WITH PATIENT (poor memory)**           Anticoagulation Care Providers       Provider Role Specialty Phone number    Rafaela Leija MD Referring Cardiology [phone]            Clinic Interview:  No pertinent clinical findings have been reported.    INR History:      2024     3:00 PM 12/3/2024    12:00 AM 12/3/2024     3:25 PM 2024    12:00 AM 2024     2:33 PM 2024    12:00 AM 2024     1:51 PM   Anticoagulation Monitoring   INR 2.40  1.80  2.70  2.90   INR Date 2024  12/3/2024  2024  2024   INR Goal 2.0-3.0  2.0-3.0  2.0-3.0  2.0-3.0   Trend Same  Same  Same  Same   Last Week Total 13.75 mg  13.75 mg  13.75 mg  13.75 mg   Next Week Total 13.75 mg  13.75 mg  13.75 mg  13.75 mg   Sun 1.25 mg  1.25 mg  1.25 mg  1.25 mg   Mon 2.5 mg  2.5 mg  2.5 mg  2.5 mg   Tue 1.25 mg  1.25 mg  1.25 mg  1.25 mg   Wed 2.5 mg  2.5 mg  2.5 mg  2.5 mg   Thu 1.25 mg  1.25 mg  1.25 mg  1.25 mg   Fri 2.5 mg  2.5 mg  2.5 mg  2.5 mg   Sat 2.5 mg  2.5 mg  2.5 mg  2.5 mg   Historical INR  1.80      2.70      2.90            This result is from an external source.       Plan:  1. INR is therapeutic today- see above in  Anticoagulation Summary.    Diana Avalos to continue their warfarin regimen- see above in Anticoagulation Summary.  2. Follow up in 1 week  3. They have been instructed to call if any changes in medications, doses, concerns, etc. Patient expresses understanding and has no further questions at this time.    Elise Davis, [facility]

## 2024-12-30 ENCOUNTER — ANTICOAGULATION VISIT (OUTPATIENT)
Dept: PHARMACY | Facility: HOSPITAL | Age: 89
End: 2024-12-30
Payer: MEDICARE

## 2024-12-30 LAB — INR PPP: 2.9

## 2024-12-30 PROCEDURE — G0249 PROVIDE INR TEST MATER/EQUIP: HCPCS

## 2024-12-30 NOTE — PROGRESS NOTES
Anticoagulation Clinic Progress Note    Anticoagulation Summary  As of 2024      INR goal:  2.0-3.0   TTR:  79.5% (6.2 y)   INR used for dosin.90 (2024)   Warfarin maintenance plan:  1.25 mg every Sun, Tue, Thu; 2.5 mg all other days   Weekly warfarin total:  13.75 mg   No change documented:  Jayant White, PharmD   Plan last modified:  Elise Davis RPH (2022)   Next INR check:  2025   Priority:  Maintenance   Target end date:  Indefinite    Indications    Long-term (current) use of anticoagulants (Resolved) [Z79.01]                 Anticoagulation Episode Summary       INR check location:  --    Preferred lab:  --    Send INR reminders to:  [facility] HOME TEST POOL    Comments:  [facility] --- SPEAK WITH DAUGHTER (LYNETTE) **DO NOT SPEAK WITH PATIENT (poor memory)**           Anticoagulation Care Providers       Provider Role Specialty Phone number    Rafaela Leija MD Referring Cardiology [phone]            Clinic Interview:  No pertinent clinical findings have been reported.    INR History:      12/3/2024     3:25 PM 2024    12:00 AM 2024     2:33 PM 2024    12:00 AM 2024     1:51 PM 2024    12:00 AM 2024     2:15 PM   Anticoagulation Monitoring   INR 1.80  2.70  2.90  2.90   INR Date 12/3/2024  2024  2024  2024   INR Goal 2.0-3.0  2.0-3.0  2.0-3.0  2.0-3.0   Trend Same  Same  Same  Same   Last Week Total 13.75 mg  13.75 mg  13.75 mg  13.75 mg   Next Week Total 13.75 mg  13.75 mg  13.75 mg  13.75 mg   Sun 1.25 mg  1.25 mg  1.25 mg  1.25 mg   Mon 2.5 mg  2.5 mg  2.5 mg  2.5 mg   Tue 1.25 mg  1.25 mg  1.25 mg  1.25 mg   Wed 2.5 mg  2.5 mg  2.5 mg  2.5 mg   Thu 1.25 mg  1.25 mg  1.25 mg  1.25 mg   Fri 2.5 mg  2.5 mg  2.5 mg  2.5 mg   Sat 2.5 mg  2.5 mg  2.5 mg  2.5 mg   Historical INR  2.70      2.90      2.90            This result is from an external source.       Plan:  1. INR is therapeutic today- see above in  Anticoagulation Summary.    Diana Santael to continue their warfarin regimen- see above in Anticoagulation Summary.  2. Follow up in 1 week  3. Left secure voicemail for Magui (daughter). They have been instructed to call if any changes in medications, doses, concerns, etc.     Jayant White, PharmD

## 2025-01-07 ENCOUNTER — ANTICOAGULATION VISIT (OUTPATIENT)
Dept: PHARMACY | Facility: HOSPITAL | Age: OVER 89
End: 2025-01-07
Payer: MEDICARE

## 2025-01-07 NOTE — PROGRESS NOTES
"Anticoagulation Clinic Progress Note    Anticoagulation Summary  As of 1/7/2025      INR goal:  2.0-3.0   TTR:  79.5% (6.2 y)   INR used for dosing:  No new INR was available at the time of this encounter.   Warfarin maintenance plan:  1.25 mg every Sun, Tue, Thu; 2.5 mg all other days   Weekly warfarin total:  13.75 mg   Plan last modified:  Elise Davis [facility] (8/30/2022)   Next INR check:  1/8/2025   Priority:  Maintenance   Target end date:  Indefinite    Indications    Long-term (current) use of anticoagulants (Resolved) [Z79.01]                 Anticoagulation Episode Summary       INR check location:  --    Preferred lab:  --    Send INR reminders to:   [facility] HOME TEST POOL    Comments:  [facility] --- SPEAK WITH DAUGHTER (LYNETTE) **DO NOT SPEAK WITH PATIENT (poor memory)**           Anticoagulation Care Providers       Provider Role Specialty Phone number    Rafaela Leija MD Referring Cardiology [phone]            Clinic Interview:  Patient Findings     Positives:  Laboratory test error suspected, Other complaints    Negatives:  Signs/symptoms of thrombosis, Signs/symptoms of bleeding,   Change in health, Change in alcohol use, Change in activity, Upcoming   invasive procedure, Emergency department visit, Upcoming dental procedure,   Missed doses, Extra doses, Change in medications, Change in diet/appetite,   Hospital admission, Bruising    Comments:  DaughterLynette, called to report she performed the INR test   today, and it displayed \"No Clot Detected\". She denies s/sx of bleeding.   This is an uncharacteristic result for her, and there is suspicion it may   be a testing error. Unfortunately, this was her last test strips, and they   are unable to leave the house today due to severe winter weather. She is   agreeable to picking up more test strips tomorrow from our clinic.   Unfortunately, she has already taken her warfarin today.       Clinical Outcomes     Negatives:  Major " "bleeding event, Thromboembolic event,   Anticoagulation-related hospital admission, Anticoagulation-related ED   visit, Anticoagulation-related fatality    Comments:  Daughter, Magui, called to report she performed the INR test   today, and it displayed \"No Clot Detected\". She denies s/sx of bleeding.   This is an uncharacteristic result for her, and there is suspicion it may   be a testing error. Unfortunately, this was her last test strips, and they   are unable to leave the house today due to severe winter weather. She is   agreeable to picking up more test strips tomorrow from our clinic.   Unfortunately, she has already taken her warfarin today.         INR History:      12/9/2024    12:00 AM 12/9/2024     2:33 PM 12/16/2024    12:00 AM 12/16/2024     1:51 PM 12/30/2024    12:00 AM 12/30/2024     2:15 PM 1/7/2025     3:19 PM   Anticoagulation Monitoring   INR  2.70  2.90  2.90 --   INR Date  12/9/2024 12/16/2024 12/30/2024    INR Goal  2.0-3.0  2.0-3.0  2.0-3.0 2.0-3.0   Trend  Same  Same  Same Same   Last Week Total  13.75 mg  13.75 mg  13.75 mg 13.75 mg   Next Week Total  13.75 mg  13.75 mg  13.75 mg 11.25 mg   Sun  1.25 mg  1.25 mg  1.25 mg -   Mon  2.5 mg  2.5 mg  2.5 mg -   Tue  1.25 mg  1.25 mg  1.25 mg 1.25 mg   Wed  2.5 mg  2.5 mg  2.5 mg -   Thu  1.25 mg  1.25 mg  1.25 mg -   Fri  2.5 mg  2.5 mg  2.5 mg -   Sat  2.5 mg  2.5 mg  2.5 mg -   Historical INR 2.70      2.90      2.90             This result is from an external source.       Plan:  1. INR is possibly Supratherapeutic today - see above in Anticoagulation Summary.   Will instruct Diana Avalos to HOLD their warfarin regimen- see above in Anticoagulation Summary. Unfortunately, she has already taken her warfarin today; however, she is agreeable to holding warfarin until another INR test can be completed tomorrow.   2. Follow up in 1 day. She is agreeable to picking up more test strips tomorrow from our clinic so that a repeat INR test may be " performed.    3. They have been instructed to call if any changes in medications, doses, concerns, etc. Patient expresses understanding and has no further questions at this time.    Jayant White, [facility]

## 2025-01-08 ENCOUNTER — ANTICOAGULATION VISIT (OUTPATIENT)
Dept: PHARMACY | Facility: HOSPITAL | Age: OVER 89
End: 2025-01-08
Payer: MEDICARE

## 2025-01-08 LAB — INR PPP: 3

## 2025-01-08 NOTE — PROGRESS NOTES
Anticoagulation Clinic Progress Note    Anticoagulation Summary  As of 1/8/2025      INR goal:  2.0-3.0   TTR:  79.5% (6.3 y)   INR used for dosing:  3.00 (1/8/2025)   Warfarin maintenance plan:  1.25 mg every Sun, Tue, Thu; 2.5 mg all other days   Weekly warfarin total:  13.75 mg   Plan last modified:  Elise Davis RPH (8/30/2022)   Next INR check:  1/15/2025   Priority:  Maintenance   Target end date:  Indefinite    Indications    Long-term (current) use of anticoagulants (Resolved) [Z79.01]                 Anticoagulation Episode Summary       INR check location:  --    Preferred lab:  --    Send INR reminders to:  [facility] HOME TEST POOL    Comments:  [facility] --- SPEAK WITH DAUGHTER (LYNETTE) **DO NOT SPEAK WITH PATIENT (poor memory)**           Anticoagulation Care Providers       Provider Role Specialty Phone number    Rafaela Leija MD Referring Cardiology [phone]            Clinic Interview:  No pertinent clinical findings have been reported.    INR History:      12/16/2024    12:00 AM 12/16/2024     1:51 PM 12/30/2024    12:00 AM 12/30/2024     2:15 PM 1/7/2025     3:19 PM 1/8/2025    12:00 AM 1/8/2025     3:08 PM   Anticoagulation Monitoring   INR  2.90  2.90 --  3.00   INR Date  12/16/2024 12/30/2024 1/8/2025   INR Goal  2.0-3.0  2.0-3.0 2.0-3.0  2.0-3.0   Trend  Same  Same Same  Same   Last Week Total  13.75 mg  13.75 mg 13.75 mg  13.75 mg   Next Week Total  13.75 mg  13.75 mg 11.25 mg  13.75 mg   Sun  1.25 mg  1.25 mg -  1.25 mg   Mon  2.5 mg  2.5 mg -  2.5 mg   Tue  1.25 mg  1.25 mg 1.25 mg  1.25 mg   Wed  2.5 mg  2.5 mg -  2.5 mg   Thu  1.25 mg  1.25 mg -  1.25 mg   Fri  2.5 mg  2.5 mg -  2.5 mg   Sat  2.5 mg  2.5 mg -  2.5 mg   Historical INR 2.90      2.90       3.00            This result is from an external source.       Plan:  1. INR is therapeutic today- see above in Anticoagulation Summary.    Diana Avalos to continue their warfarin regimen- see above in  Anticoagulation Summary.  Patients daughter tested INR yesterday. Unfortunately, the machine gave a result of no clot detected. Patient had already taken her warfarin. Advised another INR today which was 3.0. No clot detected was likely an error. Continue regular dose and recheck in 1 week   2. Follow up in 1 week  3. They have been instructed to call if any changes in medications, doses, concerns, etc. Patient expresses understanding and has no further questions at this time.    Elise Davis, [facility]

## 2025-01-14 ENCOUNTER — ANTICOAGULATION VISIT (OUTPATIENT)
Dept: PHARMACY | Facility: HOSPITAL | Age: OVER 89
End: 2025-01-14
Payer: MEDICARE

## 2025-01-14 LAB — INR PPP: 1.5

## 2025-01-14 NOTE — PROGRESS NOTES
Anticoagulation Clinic Progress Note    Anticoagulation Summary  As of 2025      INR goal:  2.0-3.0   TTR:  79.5% (6.3 y)   INR used for dosin.50 (2025)   Warfarin maintenance plan:  1.25 mg every Sun, Tue, Thu; 2.5 mg all other days   Weekly warfarin total:  13.75 mg   Plan last modified:  Elise Davis RPH (2022)   Next INR check:  2025   Priority:  Maintenance   Target end date:  Indefinite    Indications    Long-term (current) use of anticoagulants (Resolved) [Z79.01]                 Anticoagulation Episode Summary       INR check location:  --    Preferred lab:  --    Send INR reminders to:   [facility] HOME TEST POOL    Comments:  [facility] --- SPEAK WITH DAUGHTER (LYNETTE) **DO NOT SPEAK WITH PATIENT (poor memory)**           Anticoagulation Care Providers       Provider Role Specialty Phone number    Rafaela Leija MD Referring Cardiology [phone]            Clinic Interview:  Patient Findings     Positives:  Missed doses, Extra doses, Change in medications    Negatives:  Signs/symptoms of thrombosis, Signs/symptoms of bleeding,   Laboratory test error suspected, Change in health, Change in alcohol use,   Change in activity, Upcoming invasive procedure, Emergency department   visit, Upcoming dental procedure, Change in diet/appetite, Hospital   admission, Bruising, Other complaints      Clinical Outcomes     Negatives:  Major bleeding event, Thromboembolic event,   Anticoagulation-related hospital admission, Anticoagulation-related ED   visit, Anticoagulation-related fatality        INR History:      2024    12:00 AM 2024     2:15 PM 2025     3:19 PM 2025    12:00 AM 2025     3:08 PM 2025    12:00 AM 2025     3:14 PM   Anticoagulation Monitoring   INR  2.90 --  3.00  1.50   INR Date  2024   INR Goal  2.0-3.0 2.0-3.0  2.0-3.0  2.0-3.0   Trend  Same Same  Same  Same   Last Week Total  13.75 mg 13.75 mg   13.75 mg  13.75 mg   Next Week Total  13.75 mg 11.25 mg  13.75 mg  15 mg   Sun  1.25 mg -  1.25 mg  1.25 mg   Mon  2.5 mg -  2.5 mg  2.5 mg   Tue  1.25 mg 1.25 mg  1.25 mg  2.5 mg (1/14)   Wed  2.5 mg -  2.5 mg  2.5 mg   Thu  1.25 mg -  1.25 mg  1.25 mg   Fri  2.5 mg -  2.5 mg  2.5 mg   Sat  2.5 mg -  2.5 mg  2.5 mg   Historical INR 2.90       3.00      1.50            This result is from an external source.       Plan:  1. INR is Subtherapeutic today- see above in Anticoagulation Summary.   Will instruct Diana Avalos to Increase their warfarin regimen- see above in Anticoagulation Summary.  Taking mucinex, Instead of taking the warfarin on 1/8, she took both doses of Wednesday and Thursdays dose on 1/9 for a total of 3.75 mg. Boost to 2.5 mg then resume, rck 1 week   2. Follow up in 1 weeks  3. They have been instructed to call if any changes in medications, doses, concerns, etc. Patient expresses understanding and has no further questions at this time.    Elise Davis [facility]

## 2025-01-17 PROCEDURE — 93297 REM INTERROG DEV EVAL ICPMS: CPT | Performed by: INTERNAL MEDICINE

## 2025-01-21 LAB — INR PPP: 3.1

## 2025-01-22 ENCOUNTER — ANTICOAGULATION VISIT (OUTPATIENT)
Dept: PHARMACY | Facility: HOSPITAL | Age: OVER 89
End: 2025-01-22
Payer: MEDICARE

## 2025-01-22 NOTE — PROGRESS NOTES
Anticoagulation Clinic Progress Note    Anticoagulation Summary  As of 1/22/2025      INR goal:  2.0-3.0   TTR:  79.4% (6.3 y)   INR used for dosing:  3.10 (1/21/2025)   Warfarin maintenance plan:  1.25 mg every Sun, Tue, Thu; 2.5 mg all other days   Weekly warfarin total:  13.75 mg   No change documented:  Jayant White, PharmD   Plan last modified:  Elise Davis RPH (8/30/2022)   Next INR check:  1/28/2025   Priority:  Maintenance   Target end date:  Indefinite    Indications    Long-term (current) use of anticoagulants (Resolved) [Z79.01]                 Anticoagulation Episode Summary       INR check location:  --    Preferred lab:  --    Send INR reminders to:  [facility] HOME TEST POOL    Comments:  [facility] --- SPEAK WITH DAUGHTER (LYNETTE) **DO NOT SPEAK WITH PATIENT (poor memory)**           Anticoagulation Care Providers       Provider Role Specialty Phone number    Rafaela Leija MD Referring Cardiology [phone]            Clinic Interview:  Patient Findings     Positives:  Change in health    Negatives:  Signs/symptoms of thrombosis, Signs/symptoms of bleeding,   Laboratory test error suspected, Change in alcohol use, Change in   activity, Upcoming invasive procedure, Emergency department visit,   Upcoming dental procedure, Missed doses, Extra doses, Change in   medications, Change in diet/appetite, Hospital admission, Bruising, Other   complaints    Comments:  Reports her cold symptoms are persistent; however, they are   mild and there has been very little to no impact on her food intake.       Clinical Outcomes     Negatives:  Major bleeding event, Thromboembolic event,   Anticoagulation-related hospital admission, Anticoagulation-related ED   visit, Anticoagulation-related fatality    Comments:  Reports her cold symptoms are persistent; however, they are   mild and there has been very little to no impact on her food intake.         INR History:      1/7/2025     3:19 PM 1/8/2025     12:00 AM 1/8/2025     3:08 PM 1/14/2025    12:00 AM 1/14/2025     3:14 PM 1/21/2025    12:00 AM 1/22/2025     8:36 AM   Anticoagulation Monitoring   INR --  3.00  1.50  3.10   INR Date   1/8/2025 1/14/2025 1/21/2025   INR Goal 2.0-3.0  2.0-3.0  2.0-3.0  2.0-3.0   Trend Same  Same  Same  Same   Last Week Total 13.75 mg  13.75 mg  13.75 mg  13.75 mg   Next Week Total 11.25 mg  13.75 mg  15 mg  13.75 mg   Sun -  1.25 mg  1.25 mg  1.25 mg   Mon -  2.5 mg  2.5 mg  2.5 mg   Tue 1.25 mg  1.25 mg  2.5 mg (1/14)  -   Wed -  2.5 mg  2.5 mg  2.5 mg   Thu -  1.25 mg  1.25 mg  1.25 mg   Fri -  2.5 mg  2.5 mg  2.5 mg   Sat -  2.5 mg  2.5 mg  2.5 mg   Historical INR  3.00      1.50      3.10            This result is from an external source.       Plan:  1. INR was Supratherapeutic yesterday- see above in Anticoagulation Summary.   Will instruct Diana Avalos to Continue their warfarin regimen- see above in Anticoagulation Summary.  2. Follow up in 1 week.  3. They have been instructed to call if any changes in medications, doses, concerns, etc. Patient expresses understanding and has no further questions at this time.    Jayant White, PharmD

## 2025-01-28 ENCOUNTER — ANTICOAGULATION VISIT (OUTPATIENT)
Dept: PHARMACY | Facility: HOSPITAL | Age: OVER 89
End: 2025-01-28
Payer: MEDICARE

## 2025-01-28 LAB — INR PPP: 1.9

## 2025-01-28 PROCEDURE — G0249 PROVIDE INR TEST MATER/EQUIP: HCPCS

## 2025-01-28 NOTE — PROGRESS NOTES
Anticoagulation Clinic Progress Note    Anticoagulation Summary  As of 2025      INR goal:  2.0-3.0   TTR:  79.5% (6.3 y)   INR used for dosin.90 (2025)   Warfarin maintenance plan:  1.25 mg every Sun, e, Thu; 2.5 mg all other days   Weekly warfarin total:  13.75 mg   No change documented:  Elise Davis RPH   Plan last modified:  Elise Davis RPH (2022)   Next INR check:  2025   Priority:  Maintenance   Target end date:  Indefinite    Indications    Long-term (current) use of anticoagulants (Resolved) [Z79.01]                 Anticoagulation Episode Summary       INR check location:  --    Preferred lab:  --    Send INR reminders to:  [facility] HOME TEST POOL    Comments:  [facility] --- SPEAK WITH DAUGHTER (LYNETTE) **DO NOT SPEAK WITH PATIENT (poor memory)**           Anticoagulation Care Providers       Provider Role Specialty Phone number    Rafaela Leija MD Referring Cardiology [phone]            Clinic Interview:  Patient Findings     Negatives:  Signs/symptoms of thrombosis, Signs/symptoms of bleeding,   Laboratory test error suspected, Change in health, Change in alcohol use,   Change in activity, Upcoming invasive procedure, Emergency department   visit, Upcoming dental procedure, Missed doses, Extra doses, Change in   medications, Change in diet/appetite, Hospital admission, Bruising, Other   complaints      Clinical Outcomes     Negatives:  Major bleeding event, Thromboembolic event,   Anticoagulation-related hospital admission, Anticoagulation-related ED   visit, Anticoagulation-related fatality        INR History:      2025     3:08 PM 2025    12:00 AM 2025     3:14 PM 2025    12:00 AM 2025     8:36 AM 2025    12:00 AM 2025     3:45 PM   Anticoagulation Monitoring   INR 3.00  1.50  3.10  1.90   INR Date 2025   INR Goal 2.0-3.0  2.0-3.0  2.0-3.0  2.0-3.0   Trend Same  Same  Same   Same   Last Week Total 13.75 mg  13.75 mg  13.75 mg  13.75 mg   Next Week Total 13.75 mg  15 mg  13.75 mg  13.75 mg   Sun 1.25 mg  1.25 mg  1.25 mg  1.25 mg   Mon 2.5 mg  2.5 mg  2.5 mg  2.5 mg   Tue 1.25 mg  2.5 mg (1/14)  -  1.25 mg   Wed 2.5 mg  2.5 mg  2.5 mg  2.5 mg   Thu 1.25 mg  1.25 mg  1.25 mg  1.25 mg   Fri 2.5 mg  2.5 mg  2.5 mg  2.5 mg   Sat 2.5 mg  2.5 mg  2.5 mg  2.5 mg   Historical INR  1.50      3.10      1.90            This result is from an external source.       Plan:  1. INR is Subtherapeutic today- see above in Anticoagulation Summary.   Will instruct Diana Avalos to Continue their warfarin regimen- see above in Anticoagulation Summary.      no changes, continue same as INR is fluctuating, rck 1 week   2. Follow up in 1 weeks  3. They have been instructed to call if any changes in medications, doses, concerns, etc. Patient expresses understanding and has no further questions at this time.    Elise Davis [facility]

## 2025-02-03 ENCOUNTER — ANTICOAGULATION VISIT (OUTPATIENT)
Dept: PHARMACY | Facility: HOSPITAL | Age: OVER 89
End: 2025-02-03
Payer: MEDICARE

## 2025-02-03 LAB — INR PPP: 2.6

## 2025-02-03 NOTE — PROGRESS NOTES
Anticoagulation Clinic Progress Note    Anticoagulation Summary  As of 2/3/2025      INR goal:  2.0-3.0   TTR:  79.5% (6.3 y)   INR used for dosin.60 (2/3/2025)   Warfarin maintenance plan:  1.25 mg every Sun, Tue, Thu; 2.5 mg all other days   Weekly warfarin total:  13.75 mg   No change documented:  Elise Davis RPH   Plan last modified:  Elise Davis RPH (2022)   Next INR check:  2/10/2025   Priority:  Maintenance   Target end date:  Indefinite    Indications    Long-term (current) use of anticoagulants (Resolved) [Z79.01]                 Anticoagulation Episode Summary       INR check location:  --    Preferred lab:  --    Send INR reminders to:  [facility] HOME TEST POOL    Comments:  [facility] --- SPEAK WITH DAUGHTER (LYNETTE) **DO NOT SPEAK WITH PATIENT (poor memory)**           Anticoagulation Care Providers       Provider Role Specialty Phone number    Rafaela Leija MD Referring Cardiology [phone]            Clinic Interview:  No pertinent clinical findings have been reported.    INR History:      2025     3:14 PM 2025    12:00 AM 2025     8:36 AM 2025    12:00 AM 2025     3:45 PM 2/3/2025    12:00 AM 2/3/2025     2:42 PM   Anticoagulation Monitoring   INR 1.50  3.10  1.90  2.60   INR Date 2025  2025  2025  2/3/2025   INR Goal 2.0-3.0  2.0-3.0  2.0-3.0  2.0-3.0   Trend Same  Same  Same  Same   Last Week Total 13.75 mg  13.75 mg  13.75 mg  13.75 mg   Next Week Total 15 mg  13.75 mg  13.75 mg  13.75 mg   Sun 1.25 mg  1.25 mg  1.25 mg  1.25 mg   Mon 2.5 mg  2.5 mg  2.5 mg  2.5 mg   Tue 2.5 mg ()  -  1.25 mg  1.25 mg   Wed 2.5 mg  2.5 mg  2.5 mg  2.5 mg   Thu 1.25 mg  1.25 mg  1.25 mg  1.25 mg   Fri 2.5 mg  2.5 mg  2.5 mg  2.5 mg   Sat 2.5 mg  2.5 mg  2.5 mg  2.5 mg   Historical INR  3.10      1.90      2.60            This result is from an external source.       Plan:  1. INR is therapeutic today- see above in Anticoagulation  Summary.    Diana Avalos to continue their warfarin regimen- see above in Anticoagulation Summary.  2. Follow up in 1 week  3. Pt has agreed to only be called if INR out of range. They have been instructed to call if any changes in medications, doses, concerns, etc. Patient expresses understanding and has no further questions at this time.    Elise Davis, [facility]

## 2025-02-10 ENCOUNTER — ANTICOAGULATION VISIT (OUTPATIENT)
Dept: PHARMACY | Facility: HOSPITAL | Age: OVER 89
End: 2025-02-10
Payer: MEDICARE

## 2025-02-10 LAB — INR PPP: 2.6

## 2025-02-10 NOTE — PROGRESS NOTES
Anticoagulation Clinic Progress Note    Anticoagulation Summary  As of 2/10/2025      INR goal:  2.0-3.0   TTR:  79.5% (6.4 y)   INR used for dosin.60 (2/10/2025)   Warfarin maintenance plan:  1.25 mg every Sun, Tue, Thu; 2.5 mg all other days   Weekly warfarin total:  13.75 mg   No change documented:  Elise Davis RPH   Plan last modified:  Elise Davis RPH (2022)   Next INR check:  2025   Priority:  Maintenance   Target end date:  Indefinite    Indications    Long-term (current) use of anticoagulants (Resolved) [Z79.01]                 Anticoagulation Episode Summary       INR check location:  --    Preferred lab:  --    Send INR reminders to:  [facility] HOME TEST POOL    Comments:  [facility] --- SPEAK WITH DAUGHTER (LYNETTE) **DO NOT SPEAK WITH PATIENT (poor memory)**           Anticoagulation Care Providers       Provider Role Specialty Phone number    Rafaela Leija MD Referring Cardiology [phone]            Clinic Interview:  No pertinent clinical findings have been reported.    INR History:      2025     8:36 AM 2025    12:00 AM 2025     3:45 PM 2/3/2025    12:00 AM 2/3/2025     2:42 PM 2/10/2025    12:00 AM 2/10/2025     2:19 PM   Anticoagulation Monitoring   INR 3.10  1.90  2.60  2.60   INR Date 2025  2025  2/3/2025  2/10/2025   INR Goal 2.0-3.0  2.0-3.0  2.0-3.0  2.0-3.0   Trend Same  Same  Same  Same   Last Week Total 13.75 mg  13.75 mg  13.75 mg  13.75 mg   Next Week Total 13.75 mg  13.75 mg  13.75 mg  13.75 mg   Sun 1.25 mg  1.25 mg  1.25 mg  1.25 mg   Mon 2.5 mg  2.5 mg  2.5 mg  2.5 mg   Tue -  1.25 mg  1.25 mg  1.25 mg   Wed 2.5 mg  2.5 mg  2.5 mg  2.5 mg   Thu 1.25 mg  1.25 mg  1.25 mg  1.25 mg   Fri 2.5 mg  2.5 mg  2.5 mg  2.5 mg   Sat 2.5 mg  2.5 mg  2.5 mg  2.5 mg   Historical INR  1.90      2.60      2.60            This result is from an external source.       Plan:  1. INR is therapeutic today- see above in Anticoagulation  Summary.    Diana Avalos to continue their warfarin regimen- see above in Anticoagulation Summary.  2. Follow up in 1 week  3. Pt has agreed to only be called if INR out of range. They have been instructed to call if any changes in medications, doses, concerns, etc. Patient expresses understanding and has no further questions at this time.    Elise Davis, [facility]

## 2025-02-17 ENCOUNTER — ANTICOAGULATION VISIT (OUTPATIENT)
Dept: PHARMACY | Facility: HOSPITAL | Age: OVER 89
End: 2025-02-17
Payer: MEDICARE

## 2025-02-17 LAB — INR PPP: 2.3

## 2025-02-17 NOTE — PROGRESS NOTES
Anticoagulation Clinic Progress Note    Anticoagulation Summary  As of 2025      INR goal:  2.0-3.0   TTR:  79.6% (6.4 y)   INR used for dosin.30 (2025)   Warfarin maintenance plan:  1.25 mg every Sun, Tue, Thu; 2.5 mg all other days   Weekly warfarin total:  13.75 mg   No change documented:  Elise Davis RPH   Plan last modified:  Elise Davis RPH (2022)   Next INR check:  2025   Priority:  Maintenance   Target end date:  Indefinite    Indications    Long-term (current) use of anticoagulants (Resolved) [Z79.01]                 Anticoagulation Episode Summary       INR check location:  --    Preferred lab:  --    Send INR reminders to:  [facility] HOME TEST POOL    Comments:  [facility] --- SPEAK WITH DAUGHTER (LYNETTE) **DO NOT SPEAK WITH PATIENT (poor memory)**           Anticoagulation Care Providers       Provider Role Specialty Phone number    Rafaela Leija MD Referring Cardiology [phone]            Clinic Interview:  No pertinent clinical findings have been reported.    INR History:      2025     3:45 PM 2/3/2025    12:00 AM 2/3/2025     2:42 PM 2/10/2025    12:00 AM 2/10/2025     2:19 PM 2025    12:00 AM 2025     1:50 PM   Anticoagulation Monitoring   INR 1.90  2.60  2.60  2.30   INR Date 2025  2/3/2025  2/10/2025  2025   INR Goal 2.0-3.0  2.0-3.0  2.0-3.0  2.0-3.0   Trend Same  Same  Same  Same   Last Week Total 13.75 mg  13.75 mg  13.75 mg  13.75 mg   Next Week Total 13.75 mg  13.75 mg  13.75 mg  13.75 mg   Sun 1.25 mg  1.25 mg  1.25 mg  1.25 mg   Mon 2.5 mg  2.5 mg  2.5 mg  2.5 mg   Tue 1.25 mg  1.25 mg  1.25 mg  1.25 mg   Wed 2.5 mg  2.5 mg  2.5 mg  2.5 mg   Thu 1.25 mg  1.25 mg  1.25 mg  1.25 mg   Fri 2.5 mg  2.5 mg  2.5 mg  2.5 mg   Sat 2.5 mg  2.5 mg  2.5 mg  2.5 mg   Historical INR  2.60      2.60      2.30            This result is from an external source.       Plan:  1. INR is therapeutic today- see above in  Anticoagulation Summary.    Diana Avalos to continue their warfarin regimen- see above in Anticoagulation Summary.  2. Follow up in 1 week  3. Pt has agreed to only be called if INR out of range. They have been instructed to call if any changes in medications, doses, concerns, etc. Patient expresses understanding and has no further questions at this time.    Elise Davis, [facility]

## 2025-02-25 ENCOUNTER — ANTICOAGULATION VISIT (OUTPATIENT)
Dept: PHARMACY | Facility: HOSPITAL | Age: OVER 89
End: 2025-02-25
Payer: MEDICARE

## 2025-02-25 LAB — INR PPP: 2.4

## 2025-02-25 PROCEDURE — G0249 PROVIDE INR TEST MATER/EQUIP: HCPCS

## 2025-02-25 NOTE — PROGRESS NOTES
Anticoagulation Clinic Progress Note    Anticoagulation Summary  As of 2025      INR goal:  2.0-3.0   TTR:  79.7% (6.4 y)   INR used for dosin.40 (2025)   Warfarin maintenance plan:  1.25 mg every Sun, Tue, Thu; 2.5 mg all other days   Weekly warfarin total:  13.75 mg   No change documented:  Elise Davis RPH   Plan last modified:  Elise Davis RPH (2022)   Next INR check:  3/4/2025   Priority:  Maintenance   Target end date:  Indefinite    Indications    Long-term (current) use of anticoagulants (Resolved) [Z79.01]                 Anticoagulation Episode Summary       INR check location:  --    Preferred lab:  --    Send INR reminders to:  [facility] HOME TEST POOL    Comments:  [facility] --- SPEAK WITH DAUGHTER (LYNETTE) **DO NOT SPEAK WITH PATIENT (poor memory)**           Anticoagulation Care Providers       Provider Role Specialty Phone number    Rafaela Leija MD Referring Cardiology [phone]            Clinic Interview:  No pertinent clinical findings have been reported.    INR History:      2/3/2025     2:42 PM 2/10/2025    12:00 AM 2/10/2025     2:19 PM 2025    12:00 AM 2025     1:50 PM 2025    12:00 AM 2025     3:00 PM   Anticoagulation Monitoring   INR 2.60  2.60  2.30  2.40   INR Date 2/3/2025  2/10/2025  2025  2025   INR Goal 2.0-3.0  2.0-3.0  2.0-3.0  2.0-3.0   Trend Same  Same  Same  Same   Last Week Total 13.75 mg  13.75 mg  13.75 mg  13.75 mg   Next Week Total 13.75 mg  13.75 mg  13.75 mg  13.75 mg   Sun 1.25 mg  1.25 mg  1.25 mg  1.25 mg   Mon 2.5 mg  2.5 mg  2.5 mg  2.5 mg   Tue 1.25 mg  1.25 mg  1.25 mg  1.25 mg   Wed 2.5 mg  2.5 mg  2.5 mg  2.5 mg   Thu 1.25 mg  1.25 mg  1.25 mg  1.25 mg   Fri 2.5 mg  2.5 mg  2.5 mg  2.5 mg   Sat 2.5 mg  2.5 mg  2.5 mg  2.5 mg   Historical INR  2.60      2.30      2.40            This result is from an external source.       Plan:  1. INR is therapeutic today- see above in  Anticoagulation Summary.    Diana Avalos to continue their warfarin regimen- see above in Anticoagulation Summary.  2. Follow up in 1 week  3. Pt has agreed to only be called if INR out of range. They have been instructed to call if any changes in medications, doses, concerns, etc. Patient expresses understanding and has no further questions at this time.    Elise Davis, [facility]

## 2025-03-03 ENCOUNTER — ANTICOAGULATION VISIT (OUTPATIENT)
Dept: PHARMACY | Facility: HOSPITAL | Age: OVER 89
End: 2025-03-03
Payer: MEDICARE

## 2025-03-03 LAB — INR PPP: 2.8

## 2025-03-03 NOTE — PROGRESS NOTES
Anticoagulation Clinic Progress Note    Anticoagulation Summary  As of 3/3/2025      INR goal:  2.0-3.0   TTR:  79.7% (6.4 y)   INR used for dosin.80 (3/3/2025)   Warfarin maintenance plan:  1.25 mg every Sun, Tue, Thu; 2.5 mg all other days   Weekly warfarin total:  13.75 mg   No change documented:  Jayant White, PharmD   Plan last modified:  Elise Davis RPH (2022)   Next INR check:  3/10/2025   Priority:  Maintenance   Target end date:  Indefinite    Indications    Long-term (current) use of anticoagulants (Resolved) [Z79.01]                 Anticoagulation Episode Summary       INR check location:  --    Preferred lab:  --    Send INR reminders to:  [facility] HOME TEST POOL    Comments:  [facility] --- SPEAK WITH DAUGHTER (LYNETTE) **DO NOT SPEAK WITH PATIENT (poor memory)**           Anticoagulation Care Providers       Provider Role Specialty Phone number    Rafaela Leija MD Referring Cardiology [phone]            Clinic Interview:  No pertinent clinical findings have been reported.    INR History:      2/10/2025     2:19 PM 2025    12:00 AM 2025     1:50 PM 2025    12:00 AM 2025     3:00 PM 3/3/2025    12:00 AM 3/3/2025     2:39 PM   Anticoagulation Monitoring   INR 2.60  2.30  2.40  2.80   INR Date 2/10/2025  2025  2025  3/3/2025   INR Goal 2.0-3.0  2.0-3.0  2.0-3.0  2.0-3.0   Trend Same  Same  Same  Same   Last Week Total 13.75 mg  13.75 mg  13.75 mg  13.75 mg   Next Week Total 13.75 mg  13.75 mg  13.75 mg  13.75 mg   Sun 1.25 mg  1.25 mg  1.25 mg  1.25 mg   Mon 2.5 mg  2.5 mg  2.5 mg  2.5 mg   Tue 1.25 mg  1.25 mg  1.25 mg  1.25 mg   Wed 2.5 mg  2.5 mg  2.5 mg  2.5 mg   Thu 1.25 mg  1.25 mg  1.25 mg  1.25 mg   Fri 2.5 mg  2.5 mg  2.5 mg  2.5 mg   Sat 2.5 mg  2.5 mg  2.5 mg  2.5 mg   Historical INR  2.30      2.40      2.80            This result is from an external source.       Plan:  1. INR is therapeutic today- see above in Anticoagulation  Summary.    Diana MARISEL Avalos to continue their warfarin regimen- see above in Anticoagulation Summary.  2. Follow up in 1 week  3. Pt has agreed to only be called if INR out of range. Attempted to contact by phone; unable to leave message. They have been instructed to call if any changes in medications, doses, concerns, etc.     Jayant White, PharmD

## 2025-03-10 ENCOUNTER — ANTICOAGULATION VISIT (OUTPATIENT)
Dept: PHARMACY | Facility: HOSPITAL | Age: OVER 89
End: 2025-03-10
Payer: MEDICARE

## 2025-03-10 LAB — INR PPP: 3.1

## 2025-03-10 NOTE — PROGRESS NOTES
Anticoagulation Clinic Progress Note    Anticoagulation Summary  As of 3/10/2025      INR goal:  2.0-3.0   TTR:  79.7% (6.4 y)   INR used for dosing:  3.10 (3/10/2025)   Warfarin maintenance plan:  1.25 mg every Sun, Tue, Thu; 2.5 mg all other days   Weekly warfarin total:  13.75 mg   No change documented:  Elise Davis RPH   Plan last modified:  Elise Davis RPH (8/30/2022)   Next INR check:  3/17/2025   Priority:  Maintenance   Target end date:  Indefinite    Indications    Long-term (current) use of anticoagulants (Resolved) [Z79.01]                 Anticoagulation Episode Summary       INR check location:  --    Preferred lab:  --    Send INR reminders to:  [facility] HOME TEST POOL    Comments:  [facility] --- SPEAK WITH DAUGHTER (LYNETTE) **DO NOT SPEAK WITH PATIENT (poor memory)**           Anticoagulation Care Providers       Provider Role Specialty Phone number    Rafaela Leija MD Referring Cardiology [phone]            Clinic Interview:  Patient Findings     Negatives:  Signs/symptoms of thrombosis, Signs/symptoms of bleeding,   Laboratory test error suspected, Change in health, Change in alcohol use,   Change in activity, Upcoming invasive procedure, Emergency department   visit, Upcoming dental procedure, Missed doses, Extra doses, Change in   medications, Change in diet/appetite, Hospital admission, Bruising, Other   complaints      Clinical Outcomes     Negatives:  Major bleeding event, Thromboembolic event,   Anticoagulation-related hospital admission, Anticoagulation-related ED   visit, Anticoagulation-related fatality        INR History:      2/17/2025     1:50 PM 2/25/2025    12:00 AM 2/25/2025     3:00 PM 3/3/2025    12:00 AM 3/3/2025     2:39 PM 3/10/2025    12:00 AM 3/10/2025     3:38 PM   Anticoagulation Monitoring   INR 2.30  2.40  2.80  3.10   INR Date 2/17/2025  2/25/2025  3/3/2025  3/10/2025   INR Goal 2.0-3.0  2.0-3.0  2.0-3.0  2.0-3.0   Trend Same  Same  Same   Same   Last Week Total 13.75 mg  13.75 mg  13.75 mg  13.75 mg   Next Week Total 13.75 mg  13.75 mg  13.75 mg  13.75 mg   Sun 1.25 mg  1.25 mg  1.25 mg  1.25 mg   Mon 2.5 mg  2.5 mg  2.5 mg  2.5 mg   Tue 1.25 mg  1.25 mg  1.25 mg  1.25 mg   Wed 2.5 mg  2.5 mg  2.5 mg  2.5 mg   Thu 1.25 mg  1.25 mg  1.25 mg  1.25 mg   Fri 2.5 mg  2.5 mg  2.5 mg  2.5 mg   Sat 2.5 mg  2.5 mg  2.5 mg  2.5 mg   Historical INR  2.40      2.80      3.10            This result is from an external source.       Plan:  1. INR is Supratherapeutic today- see above in Anticoagulation Summary.   Will instruct Diana Avalos to Continue their warfarin regimen- see above in Anticoagulation Summary.  Unable to reach, Called in very close to closing. LVM to continue the 1.25 mg on sun, tues, thurs and 2.5 mg HARRIS, herman 1 week   2. Follow up in 1 weeks  3. They have been instructed to call if any changes in medications, doses, concerns, etc. Patient expresses understanding and has no further questions at this time.    Elise Davis RP

## 2025-03-17 ENCOUNTER — ANTICOAGULATION VISIT (OUTPATIENT)
Dept: PHARMACY | Facility: HOSPITAL | Age: OVER 89
End: 2025-03-17
Payer: MEDICARE

## 2025-03-17 LAB — INR PPP: 2.8

## 2025-03-17 NOTE — PROGRESS NOTES
Anticoagulation Clinic Progress Note    Anticoagulation Summary  As of 3/17/2025      INR goal:  2.0-3.0   TTR:  79.6% (6.4 y)   INR used for dosin.80 (3/17/2025)   Warfarin maintenance plan:  1.25 mg every Sun, Tue, Thu; 2.5 mg all other days   Weekly warfarin total:  13.75 mg   No change documented:  [facility]   Plan last modified:  [facility] (2022)   Next INR check:  3/24/2025   Priority:  Maintenance   Target end date:  Indefinite    Indications    Long-term (current) use of anticoagulants (Resolved) [Z79.01]                 Anticoagulation Episode Summary       INR check location:  --    Preferred lab:  --    Send INR reminders to:  [facility] HOME TEST POOL    Comments:  [facility] --- SPEAK WITH DAUGHTER (LYNETTE) **DO NOT SPEAK WITH PATIENT (poor memory)**           Anticoagulation Care Providers       Provider Role Specialty Phone number    Rafaela Leija MD Referring Cardiology [phone]            Drug interactions: has remained unchanged.  Diet: has remained unchanged.    Clinic Interview:  No pertinent clinical findings have been reported.    INR History:      2025     3:00 PM 3/3/2025    12:00 AM 3/3/2025     2:39 PM 3/10/2025    12:00 AM 3/10/2025     3:38 PM 3/17/2025    12:00 AM 3/17/2025     3:36 PM   Anticoagulation Monitoring   INR 2.40  2.80  3.10  2.80   INR Date 2025  3/3/2025  3/10/2025  3/17/2025   INR Goal 2.0-3.0  2.0-3.0  2.0-3.0  2.0-3.0   Trend Same  Same  Same  Same   Last Week Total 13.75 mg  13.75 mg  13.75 mg  13.75 mg   Next Week Total 13.75 mg  13.75 mg  13.75 mg  13.75 mg   Sun 1.25 mg  1.25 mg  1.25 mg  1.25 mg   Mon 2.5 mg  2.5 mg  2.5 mg  2.5 mg   Tue 1.25 mg  1.25 mg  1.25 mg  1.25 mg   Wed 2.5 mg  2.5 mg  2.5 mg  2.5 mg   Thu 1.25 mg  1.25 mg  1.25 mg  1.25 mg   Fri 2.5 mg  2.5 mg  2.5 mg  2.5 mg   Sat 2.5 mg  2.5 mg  2.5 mg  2.5 mg   Historical INR  2.80      3.10      2.80            This result is from an external  source.       Plan:  1. INR is Therapeutic today- see above in Anticoagulation Summary.   Left HIPAA approved VM with [facility] . Cont same aminah in 1 wk- see above in Anticoagulation Summary.  2. Follow up in 1 weeks  3. Pt has agreed to only be called if INR out of range. They have been instructed to call if any changes in medications, doses, concerns, etc. Patient expresses understanding and has no further questions at this time.    Nikole Tiwari, [facility]

## 2025-03-25 ENCOUNTER — ANTICOAGULATION VISIT (OUTPATIENT)
Dept: PHARMACY | Facility: HOSPITAL | Age: OVER 89
End: 2025-03-25
Payer: MEDICARE

## 2025-03-25 LAB — INR PPP: 2.5

## 2025-03-25 PROCEDURE — G0249 PROVIDE INR TEST MATER/EQUIP: HCPCS

## 2025-03-25 NOTE — PROGRESS NOTES
Anticoagulation Clinic Progress Note    Anticoagulation Summary  As of 3/25/2025      INR goal:  2.0-3.0   TTR:  79.7% (6.5 y)   INR used for dosin.50 (3/25/2025)   Warfarin maintenance plan:  1.25 mg every Sun, Tue, Thu; 2.5 mg all other days   Weekly warfarin total:  13.75 mg   No change documented:  Elise Davis RPH   Plan last modified:  Elise Davis RPH (2022)   Next INR check:  2025   Priority:  Maintenance   Target end date:  Indefinite    Indications    Long-term (current) use of anticoagulants (Resolved) [Z79.01]                 Anticoagulation Episode Summary       INR check location:  --    Preferred lab:  --    Send INR reminders to:  [facility] HOME TEST POOL    Comments:  [facility] --- SPEAK WITH DAUGHTER (LYNETTE) **DO NOT SPEAK WITH PATIENT (poor memory)**           Anticoagulation Care Providers       Provider Role Specialty Phone number    Rafaela Leija MD Referring Cardiology [phone]            Clinic Interview:  No pertinent clinical findings have been reported.    INR History:      3/3/2025     2:39 PM 3/10/2025    12:00 AM 3/10/2025     3:38 PM 3/17/2025    12:00 AM 3/17/2025     3:36 PM 3/25/2025    12:00 AM 3/25/2025     3:45 PM   Anticoagulation Monitoring   INR 2.80  3.10  2.80  2.50   INR Date 3/3/2025  3/10/2025  3/17/2025  3/25/2025   INR Goal 2.0-3.0  2.0-3.0  2.0-3.0  2.0-3.0   Trend Same  Same  Same  Same   Last Week Total 13.75 mg  13.75 mg  13.75 mg  13.75 mg   Next Week Total 13.75 mg  13.75 mg  13.75 mg  13.75 mg   Sun 1.25 mg  1.25 mg  1.25 mg  1.25 mg   Mon 2.5 mg  2.5 mg  2.5 mg  2.5 mg   Tue 1.25 mg  1.25 mg  1.25 mg  1.25 mg   Wed 2.5 mg  2.5 mg  2.5 mg  2.5 mg   Thu 1.25 mg  1.25 mg  1.25 mg  1.25 mg   Fri 2.5 mg  2.5 mg  2.5 mg  2.5 mg   Sat 2.5 mg  2.5 mg  2.5 mg  2.5 mg   Historical INR  3.10      2.80      2.50            This result is from an external source.       Plan:  1. INR is therapeutic today- see above in  Anticoagulation Summary.    Diana Avalos to continue their warfarin regimen- see above in Anticoagulation Summary.  2. Follow up in 1 week  3. Pt has agreed to only be called if INR out of range. They have been instructed to call if any changes in medications, doses, concerns, etc. Patient expresses understanding and has no further questions at this time.    Elise Davis, [facility]

## 2025-04-07 ENCOUNTER — ANTICOAGULATION VISIT (OUTPATIENT)
Dept: PHARMACY | Facility: HOSPITAL | Age: OVER 89
End: 2025-04-07
Payer: MEDICARE

## 2025-04-07 LAB — INR PPP: 2.9

## 2025-04-07 NOTE — PROGRESS NOTES
Anticoagulation Clinic Progress Note    Anticoagulation Summary  As of 2025      INR goal:  2.0-3.0   TTR:  79.8% (6.5 y)   INR used for dosin.90 (2025)   Warfarin maintenance plan:  1.25 mg every Sun, Tue, Thu; 2.5 mg all other days   Weekly warfarin total:  13.75 mg   No change documented:  Elise Davis RPH   Plan last modified:  Elise Davis RPH (2022)   Next INR check:  2025   Priority:  Maintenance   Target end date:  Indefinite    Indications    Long-term (current) use of anticoagulants (Resolved) [Z79.01]                 Anticoagulation Episode Summary       INR check location:  --    Preferred lab:  --    Send INR reminders to:  [facility] HOME TEST POOL    Comments:  [facility] --- SPEAK WITH DAUGHTER (LYNETTE) **DO NOT SPEAK WITH PATIENT (poor memory)**           Anticoagulation Care Providers       Provider Role Specialty Phone number    Rafaela Leija MD Referring Cardiology [phone]            Clinic Interview:  No pertinent clinical findings have been reported.    INR History:      3/10/2025     3:38 PM 3/17/2025    12:00 AM 3/17/2025     3:36 PM 3/25/2025    12:00 AM 3/25/2025     3:45 PM 2025    12:00 AM 2025     3:24 PM   Anticoagulation Monitoring   INR 3.10  2.80  2.50  2.90   INR Date 3/10/2025  3/17/2025  3/25/2025  2025   INR Goal 2.0-3.0  2.0-3.0  2.0-3.0  2.0-3.0   Trend Same  Same  Same  Same   Last Week Total 13.75 mg  13.75 mg  13.75 mg  13.75 mg   Next Week Total 13.75 mg  13.75 mg  13.75 mg  13.75 mg   Sun 1.25 mg  1.25 mg  1.25 mg  1.25 mg   Mon 2.5 mg  2.5 mg  2.5 mg  2.5 mg   Tue 1.25 mg  1.25 mg  1.25 mg  1.25 mg   Wed 2.5 mg  2.5 mg  2.5 mg  2.5 mg   Thu 1.25 mg  1.25 mg  1.25 mg  1.25 mg   Fri 2.5 mg  2.5 mg  2.5 mg  2.5 mg   Sat 2.5 mg  2.5 mg  2.5 mg  2.5 mg   Historical INR  2.80      2.50      2.90            This result is from an external source.       Plan:  1. INR is therapeutic today- see above in Anticoagulation  Summary.    Diana Avalos to continue their warfarin regimen- see above in Anticoagulation Summary.  2. Follow up in 1 week  3. Pt has agreed to only be called if INR out of range. They have been instructed to call if any changes in medications, doses, concerns, etc. Patient expresses understanding and has no further questions at this time.    Elise Davis, [facility]

## 2025-04-14 ENCOUNTER — ANTICOAGULATION VISIT (OUTPATIENT)
Dept: PHARMACY | Facility: HOSPITAL | Age: OVER 89
End: 2025-04-14
Payer: MEDICARE

## 2025-04-14 LAB — INR PPP: 3.5

## 2025-04-14 NOTE — PROGRESS NOTES
Anticoagulation Clinic Progress Note    Anticoagulation Summary  As of 4/14/2025      INR goal:  2.0-3.0   TTR:  79.6% (6.5 y)   INR used for dosing:  3.50 (4/14/2025)   Warfarin maintenance plan:  1.25 mg every Sun, Tue, Thu; 2.5 mg all other days   Weekly warfarin total:  13.75 mg   Plan last modified:  Elise Davis RPH (8/30/2022)   Next INR check:  4/21/2025   Priority:  Maintenance   Target end date:  Indefinite    Indications    Long-term (current) use of anticoagulants (Resolved) [Z79.01]                 Anticoagulation Episode Summary       INR check location:  --    Preferred lab:  --    Send INR reminders to:   [facility] HOME TEST POOL    Comments:  [facility] --- SPEAK WITH DAUGHTER (LYNETTE) **DO NOT SPEAK WITH PATIENT (poor memory)**           Anticoagulation Care Providers       Provider Role Specialty Phone number    Rafaela Leija MD Referring Cardiology [phone]            Clinic Interview:  Patient Findings     Negatives:  Signs/symptoms of thrombosis, Signs/symptoms of bleeding,   Laboratory test error suspected, Change in health, Change in alcohol use,   Change in activity, Upcoming invasive procedure, Emergency department   visit, Upcoming dental procedure, Missed doses, Extra doses, Change in   medications, Change in diet/appetite, Hospital admission, Bruising, Other   complaints      Clinical Outcomes     Negatives:  Major bleeding event, Thromboembolic event,   Anticoagulation-related hospital admission, Anticoagulation-related ED   visit, Anticoagulation-related fatality        INR History:      3/17/2025     3:36 PM 3/25/2025    12:00 AM 3/25/2025     3:45 PM 4/7/2025    12:00 AM 4/7/2025     3:24 PM 4/14/2025    12:00 AM 4/14/2025     2:28 PM   Anticoagulation Monitoring   INR 2.80  2.50  2.90  3.50   INR Date 3/17/2025  3/25/2025  4/7/2025  4/14/2025   INR Goal 2.0-3.0  2.0-3.0  2.0-3.0  2.0-3.0   Trend Same  Same  Same  Same   Last Week Total 13.75 mg  13.75 mg   13.75 mg  13.75 mg   Next Week Total 13.75 mg  13.75 mg  13.75 mg  12.5 mg   Sun 1.25 mg  1.25 mg  1.25 mg  1.25 mg   Mon 2.5 mg  2.5 mg  2.5 mg  2.5 mg   Tue 1.25 mg  1.25 mg  1.25 mg  Hold (4/15)   Wed 2.5 mg  2.5 mg  2.5 mg  2.5 mg   Thu 1.25 mg  1.25 mg  1.25 mg  1.25 mg   Fri 2.5 mg  2.5 mg  2.5 mg  2.5 mg   Sat 2.5 mg  2.5 mg  2.5 mg  2.5 mg   Historical INR  2.50      2.90      3.50            This result is from an external source.       Plan:  1. INR is Supratherapeutic today- see above in Anticoagulation Summary.   Will instruct Diana Avalos to Change their warfarin regimen- see above in Anticoagulation Summary.   Denies any changes, already taken warfarin this AM. Confirmed dose. Hold warfarin tomorrow, rck 1 week   2. Follow up in 1 weeks  3. They have been instructed to call if any changes in medications, doses, concerns, etc. Patient expresses understanding and has no further questions at this time.    Elise Davis [facility]

## 2025-04-21 LAB — INR PPP: 3.1

## 2025-04-22 ENCOUNTER — ANTICOAGULATION VISIT (OUTPATIENT)
Dept: PHARMACY | Facility: HOSPITAL | Age: OVER 89
End: 2025-04-22
Payer: MEDICARE

## 2025-04-22 NOTE — PROGRESS NOTES
Anticoagulation Clinic Progress Note    Anticoagulation Summary  As of 4/22/2025      INR goal:  2.0-3.0   TTR:  79.4% (6.5 y)   INR used for dosing:  3.10 (4/21/2025)   Warfarin maintenance plan:  1.25 mg every Sun, Tue, Thu; 2.5 mg all other days   Weekly warfarin total:  13.75 mg   No change documented:  Nikole Tiwari [facility]   Plan last modified:  Elise Davis [facility] (8/30/2022)   Next INR check:  4/28/2025   Priority:  Maintenance   Target end date:  Indefinite    Indications    Long-term (current) use of anticoagulants (Resolved) [Z79.01]                 Anticoagulation Episode Summary       INR check location:  --    Preferred lab:  --    Send INR reminders to:  [facility] HOME TEST POOL    Comments:  [facility] --- SPEAK WITH DAUGHTER (LYNETTE) **DO NOT SPEAK WITH PATIENT (poor memory)**           Anticoagulation Care Providers       Provider Role Specialty Phone number    Rafaela Leija MD Referring Cardiology [phone]            Drug interactions: has remained unchanged.  Diet: has remained unchanged.    Clinic Interview:  No pertinent clinical findings have been reported.    INR History:      3/25/2025     3:45 PM 4/7/2025    12:00 AM 4/7/2025     3:24 PM 4/14/2025    12:00 AM 4/14/2025     2:28 PM 4/21/2025    12:00 AM 4/22/2025     8:43 AM   Anticoagulation Monitoring   INR 2.50  2.90  3.50  3.10   INR Date 3/25/2025  4/7/2025  4/14/2025  4/21/2025   INR Goal 2.0-3.0  2.0-3.0  2.0-3.0  2.0-3.0   Trend Same  Same  Same  Same   Last Week Total 13.75 mg  13.75 mg  13.75 mg  12.5 mg   Next Week Total 13.75 mg  13.75 mg  12.5 mg  13.75 mg   Sun 1.25 mg  1.25 mg  1.25 mg  1.25 mg   Mon 2.5 mg  2.5 mg  2.5 mg  -   Tue 1.25 mg  1.25 mg  Hold (4/15)  1.25 mg   Wed 2.5 mg  2.5 mg  2.5 mg  2.5 mg   Thu 1.25 mg  1.25 mg  1.25 mg  1.25 mg   Fri 2.5 mg  2.5 mg  2.5 mg  2.5 mg   Sat 2.5 mg  2.5 mg  2.5 mg  2.5 mg   Historical INR  2.90      3.50      3.10            This result is from an external  source.       Plan:  1. INR is  3.1  today- see above in Anticoagulation Summary.   Left HIPAA approved VM with [facility] . Cont same aminah in 1 wk- see above in Anticoagulation Summary.  2. Follow up in 1 weeks  3. Pt has agreed to only be called if INR out of range. They have been instructed to call if any changes in medications, doses, concerns, etc. Patient expresses understanding and has no further questions at this time.    Nikole Tiwari, [facility]

## 2025-04-28 RX ORDER — WARFARIN SODIUM 2.5 MG/1
TABLET ORAL
Qty: 70 TABLET | Refills: 1 | Status: SHIPPED | OUTPATIENT
Start: 2025-04-28

## 2025-04-29 ENCOUNTER — ANTICOAGULATION VISIT (OUTPATIENT)
Dept: PHARMACY | Facility: HOSPITAL | Age: OVER 89
End: 2025-04-29
Payer: MEDICARE

## 2025-04-29 LAB — INR PPP: 2.3

## 2025-04-29 PROCEDURE — G0249 PROVIDE INR TEST MATER/EQUIP: HCPCS

## 2025-04-29 NOTE — PROGRESS NOTES
Anticoagulation Clinic Progress Note    Anticoagulation Summary  As of 2025      INR goal:  2.0-3.0   TTR:  79.4% (6.6 y)   INR used for dosin.30 (2025)   Warfarin maintenance plan:  1.25 mg every Sun, Tue, Thu; 2.5 mg all other days   Weekly warfarin total:  13.75 mg   No change documented:  Elise Davis RPH   Plan last modified:  Elise Davis RPH (2022)   Next INR check:  2025   Priority:  Maintenance   Target end date:  Indefinite    Indications    Long-term (current) use of anticoagulants (Resolved) [Z79.01]                 Anticoagulation Episode Summary       INR check location:  --    Preferred lab:  --    Send INR reminders to:  [facility] HOME TEST POOL    Comments:  [facility] --- SPEAK WITH DAUGHTER (LYNETTE) **DO NOT SPEAK WITH PATIENT (poor memory)**           Anticoagulation Care Providers       Provider Role Specialty Phone number    Rafaela Leija MD Referring Cardiology [phone]            Clinic Interview:  No pertinent clinical findings have been reported.    INR History:      2025     3:24 PM 2025    12:00 AM 2025     2:28 PM 2025    12:00 AM 2025     8:43 AM 2025    12:00 AM 2025     8:00 AM   Anticoagulation Monitoring   INR 2.90  3.50  3.10  2.30   INR Date 2025   INR Goal 2.0-3.0  2.0-3.0  2.0-3.0  2.0-3.0   Trend Same  Same  Same  Same   Last Week Total 13.75 mg  13.75 mg  12.5 mg  13.75 mg   Next Week Total 13.75 mg  12.5 mg  13.75 mg  13.75 mg   Sun 1.25 mg  1.25 mg  1.25 mg  1.25 mg   Mon 2.5 mg  2.5 mg  -  2.5 mg   Tue 1.25 mg  Hold (4/15)  1.25 mg  1.25 mg   Wed 2.5 mg  2.5 mg  2.5 mg  2.5 mg   Thu 1.25 mg  1.25 mg  1.25 mg  1.25 mg   Fri 2.5 mg  2.5 mg  2.5 mg  2.5 mg   Sat 2.5 mg  2.5 mg  2.5 mg  2.5 mg   Historical INR  3.50      3.10      2.30            This result is from an external source.       Plan:  1. INR is therapeutic today- see above in Anticoagulation  Summary.    Diana Avalos to continue their warfarin regimen- see above in Anticoagulation Summary.  2. Follow up in 1 week  3. Pt has agreed to only be called if INR out of range. They have been instructed to call if any changes in medications, doses, concerns, etc. Patient expresses understanding and has no further questions at this time.    Elise Davis, [facility]

## 2025-05-05 ENCOUNTER — ANTICOAGULATION VISIT (OUTPATIENT)
Dept: PHARMACY | Facility: HOSPITAL | Age: OVER 89
End: 2025-05-05
Payer: MEDICARE

## 2025-05-05 LAB — INR PPP: 2.5

## 2025-05-05 NOTE — PROGRESS NOTES
Anticoagulation Clinic Progress Note    Anticoagulation Summary  As of 2025      INR goal:  2.0-3.0   TTR:  79.5% (6.6 y)   INR used for dosin.50 (2025)   Warfarin maintenance plan:  1.25 mg every Sun, Tue, Thu; 2.5 mg all other days   Weekly warfarin total:  13.75 mg   No change documented:  Elise Davis RPH   Plan last modified:  Elise Davis RPH (2022)   Next INR check:  2025   Priority:  Maintenance   Target end date:  Indefinite    Indications    Long-term (current) use of anticoagulants (Resolved) [Z79.01]                 Anticoagulation Episode Summary       INR check location:  --    Preferred lab:  --    Send INR reminders to:  [facility] HOME TEST POOL    Comments:  [facility] --- SPEAK WITH DAUGHTER (LYNETTE) **DO NOT SPEAK WITH PATIENT (poor memory)**           Anticoagulation Care Providers       Provider Role Specialty Phone number    Rafaela Leija MD Referring Cardiology [phone]            Clinic Interview:  No pertinent clinical findings have been reported.    INR History:      2025     2:28 PM 2025    12:00 AM 2025     8:43 AM 2025    12:00 AM 2025     8:00 AM 2025    12:00 AM 2025     3:24 PM   Anticoagulation Monitoring   INR 3.50  3.10  2.30  2.50   INR Date 2025   INR Goal 2.0-3.0  2.0-3.0  2.0-3.0  2.0-3.0   Trend Same  Same  Same  Same   Last Week Total 13.75 mg  12.5 mg  13.75 mg  13.75 mg   Next Week Total 12.5 mg  13.75 mg  13.75 mg  13.75 mg   Sun 1.25 mg  1.25 mg  1.25 mg  1.25 mg   Mon 2.5 mg  -  2.5 mg  2.5 mg   Tue Hold (4/15)  1.25 mg  1.25 mg  1.25 mg   Wed 2.5 mg  2.5 mg  2.5 mg  2.5 mg   Thu 1.25 mg  1.25 mg  1.25 mg  1.25 mg   Fri 2.5 mg  2.5 mg  2.5 mg  2.5 mg   Sat 2.5 mg  2.5 mg  2.5 mg  2.5 mg   Historical INR  3.10      2.30      2.50            This result is from an external source.       Plan:  1. INR is therapeutic today- see above in Anticoagulation  Summary.    Diana Avalos to continue their warfarin regimen- see above in Anticoagulation Summary.  2. Follow up in 1 week  3. Pt has agreed to only be called if INR out of range. They have been instructed to call if any changes in medications, doses, concerns, etc. Patient expresses understanding and has no further questions at this time.    Elise Davis, [facility]

## 2025-05-13 LAB — INR PPP: 3

## 2025-05-14 ENCOUNTER — ANTICOAGULATION VISIT (OUTPATIENT)
Dept: PHARMACY | Facility: HOSPITAL | Age: OVER 89
End: 2025-05-14
Payer: MEDICARE

## 2025-05-14 NOTE — PROGRESS NOTES
Anticoagulation Clinic Progress Note    Anticoagulation Summary  As of 5/14/2025      INR goal:  2.0-3.0   TTR:  79.5% (6.6 y)   INR used for dosing:  3.00 (5/13/2025)   Warfarin maintenance plan:  1.25 mg every Sun, Tue, Thu; 2.5 mg all other days   Weekly warfarin total:  13.75 mg   No change documented:  Elise Davis RPH   Plan last modified:  Elise Davis RPH (8/30/2022)   Next INR check:  5/20/2025   Priority:  Maintenance   Target end date:  Indefinite    Indications    Long-term (current) use of anticoagulants (Resolved) [Z79.01]                 Anticoagulation Episode Summary       INR check location:  --    Preferred lab:  --    Send INR reminders to:  [facility] HOME TEST POOL    Comments:  [facility] --- SPEAK WITH DAUGHTER (LYNETTE) **DO NOT SPEAK WITH PATIENT (poor memory)**           Anticoagulation Care Providers       Provider Role Specialty Phone number    Rafaela Leija MD Referring Cardiology [phone]            Clinic Interview:  No pertinent clinical findings have been reported.    INR History:      4/22/2025     8:43 AM 4/29/2025    12:00 AM 4/29/2025     8:00 AM 5/5/2025    12:00 AM 5/5/2025     3:24 PM 5/13/2025    12:00 AM 5/14/2025     8:25 AM   Anticoagulation Monitoring   INR 3.10  2.30  2.50  3.00   INR Date 4/21/2025 4/29/2025 5/5/2025 5/13/2025   INR Goal 2.0-3.0  2.0-3.0  2.0-3.0  2.0-3.0   Trend Same  Same  Same  Same   Last Week Total 12.5 mg  13.75 mg  13.75 mg  13.75 mg   Next Week Total 13.75 mg  13.75 mg  13.75 mg  13.75 mg   Sun 1.25 mg  1.25 mg  1.25 mg  1.25 mg   Mon -  2.5 mg  2.5 mg  2.5 mg   Tue 1.25 mg  1.25 mg  1.25 mg  -   Wed 2.5 mg  2.5 mg  2.5 mg  2.5 mg   Thu 1.25 mg  1.25 mg  1.25 mg  1.25 mg   Fri 2.5 mg  2.5 mg  2.5 mg  2.5 mg   Sat 2.5 mg  2.5 mg  2.5 mg  2.5 mg   Historical INR  2.30      2.50      3.00            This result is from an external source.       Plan:  1. INR is therapeutic today- see above in Anticoagulation Summary.     Diana Avalos to continue their warfarin regimen- see above in Anticoagulation Summary.  2. Follow up in 1 week  3. Pt has agreed to only be called if INR out of range. They have been instructed to call if any changes in medications, doses, concerns, etc. Patient expresses understanding and has no further questions at this time.    Elise Davis, [facility]

## 2025-05-19 ENCOUNTER — ANTICOAGULATION VISIT (OUTPATIENT)
Dept: PHARMACY | Facility: HOSPITAL | Age: OVER 89
End: 2025-05-19
Payer: MEDICARE

## 2025-05-19 LAB — INR PPP: 3.4

## 2025-05-19 NOTE — PROGRESS NOTES
Anticoagulation Clinic Progress Note    Anticoagulation Summary  As of 5/19/2025      INR goal:  2.0-3.0   TTR:  79.4% (6.6 y)   INR used for dosing:  3.40 (5/19/2025)   Warfarin maintenance plan:  1.25 mg every Sun, Tue, Thu; 2.5 mg all other days   Weekly warfarin total:  13.75 mg   Plan last modified:  Elise Davis RPH (8/30/2022)   Next INR check:  5/26/2025   Priority:  Maintenance   Target end date:  Indefinite    Indications    Long-term (current) use of anticoagulants (Resolved) [Z79.01]                 Anticoagulation Episode Summary       INR check location:  --    Preferred lab:  --    Send INR reminders to:   [facility] HOME TEST POOL    Comments:  [facility] --- SPEAK WITH DAUGHTER (LYNETTE) **DO NOT SPEAK WITH PATIENT (poor memory)**           Anticoagulation Care Providers       Provider Role Specialty Phone number    Rafaela Leija MD Referring Cardiology [phone]            Clinic Interview:  Patient Findings     Negatives:  Signs/symptoms of thrombosis, Signs/symptoms of bleeding,   Laboratory test error suspected, Change in health, Change in alcohol use,   Change in activity, Upcoming invasive procedure, Emergency department   visit, Upcoming dental procedure, Missed doses, Extra doses, Change in   medications, Change in diet/appetite, Hospital admission, Bruising, Other   complaints      Clinical Outcomes     Negatives:  Major bleeding event, Thromboembolic event,   Anticoagulation-related hospital admission, Anticoagulation-related ED   visit, Anticoagulation-related fatality        INR History:      4/29/2025     8:00 AM 5/5/2025    12:00 AM 5/5/2025     3:24 PM 5/13/2025    12:00 AM 5/14/2025     8:25 AM 5/19/2025    12:00 AM 5/19/2025     3:32 PM   Anticoagulation Monitoring   INR 2.30  2.50  3.00  3.40   INR Date 4/29/2025 5/5/2025 5/13/2025 5/19/2025   INR Goal 2.0-3.0  2.0-3.0  2.0-3.0  2.0-3.0   Trend Same  Same  Same  Same   Last Week Total 13.75 mg  13.75 mg   13.75 mg  13.75 mg   Next Week Total 13.75 mg  13.75 mg  13.75 mg  12.5 mg   Sun 1.25 mg  1.25 mg  1.25 mg  1.25 mg   Mon 2.5 mg  2.5 mg  2.5 mg  2.5 mg   Tue 1.25 mg  1.25 mg  -  Hold (5/20)   Wed 2.5 mg  2.5 mg  2.5 mg  2.5 mg   Thu 1.25 mg  1.25 mg  1.25 mg  1.25 mg   Fri 2.5 mg  2.5 mg  2.5 mg  2.5 mg   Sat 2.5 mg  2.5 mg  2.5 mg  2.5 mg   Historical INR  2.50      3.00      3.40            This result is from an external source.       Plan:  1. INR is Supratherapeutic today- see above in Anticoagulation Summary.   Will instruct Diana Avalos to Change their warfarin regimen- see above in Anticoagulation Summary.      Patient has PNA and has been taking amoxicillin. Hold warfarin tomorrow as patient already took warfarin today, Rck 1 week   2. Follow up in 1 weeks  3. They have been instructed to call if any changes in medications, doses, concerns, etc. Patient expresses understanding and has no further questions at this time.    Elise Davis [facility]

## 2025-05-27 ENCOUNTER — APPOINTMENT (OUTPATIENT)
Dept: PHARMACY | Facility: HOSPITAL | Age: OVER 89
End: 2025-05-27
Payer: MEDICARE

## 2025-05-27 ENCOUNTER — ANTICOAGULATION VISIT (OUTPATIENT)
Dept: PHARMACY | Facility: HOSPITAL | Age: OVER 89
End: 2025-05-27
Payer: MEDICARE

## 2025-05-27 LAB — INR PPP: 2.3

## 2025-05-27 PROCEDURE — G0249 PROVIDE INR TEST MATER/EQUIP: HCPCS

## 2025-05-27 NOTE — PROGRESS NOTES
Anticoagulation Clinic Progress Note    Anticoagulation Summary  As of 2025      INR goal:  2.0-3.0   TTR:  79.3% (6.6 y)   INR used for dosin.30 (2025)   Warfarin maintenance plan:  1.25 mg every Sun, Tue, Thu; 2.5 mg all other days   Weekly warfarin total:  13.75 mg   No change documented:  Elise Davis RPH   Plan last modified:  Elise Davis RPH (2022)   Next INR check:  6/3/2025   Priority:  Maintenance   Target end date:  Indefinite    Indications    Long-term (current) use of anticoagulants (Resolved) [Z79.01]                 Anticoagulation Episode Summary       INR check location:  --    Preferred lab:  --    Send INR reminders to:  [facility] HOME TEST POOL    Comments:  [facility] --- SPEAK WITH DAUGHTER (LYNETTE) **DO NOT SPEAK WITH PATIENT (poor memory)**           Anticoagulation Care Providers       Provider Role Specialty Phone number    Rafaela Leija MD Referring Cardiology [phone]            Clinic Interview:  No pertinent clinical findings have been reported.    INR History:      2025     3:24 PM 2025    12:00 AM 2025     8:25 AM 2025    12:00 AM 2025     3:32 PM 2025    12:00 AM 2025     1:45 PM   Anticoagulation Monitoring   INR 2.50  3.00  3.40  2.30   INR Date 2025   INR Goal 2.0-3.0  2.0-3.0  2.0-3.0  2.0-3.0   Trend Same  Same  Same  Same   Last Week Total 13.75 mg  13.75 mg  13.75 mg  12.5 mg   Next Week Total 13.75 mg  13.75 mg  12.5 mg  13.75 mg   Sun 1.25 mg  1.25 mg  1.25 mg  1.25 mg   Mon 2.5 mg  2.5 mg  2.5 mg  2.5 mg   Tue 1.25 mg  -  Hold ()  1.25 mg   Wed 2.5 mg  2.5 mg  2.5 mg  2.5 mg   Thu 1.25 mg  1.25 mg  1.25 mg  1.25 mg   Fri 2.5 mg  2.5 mg  2.5 mg  2.5 mg   Sat 2.5 mg  2.5 mg  2.5 mg  2.5 mg   Historical INR  3.00      3.40      2.30            This result is from an external source.       Plan:  1. INR is therapeutic today- see above in Anticoagulation  Summary.    Diana Avalos to continue their warfarin regimen- see above in Anticoagulation Summary.  2. Follow up in 1 week  3.  They have been instructed to call if any changes in medications, doses, concerns, etc. Patient expresses understanding and has no further questions at this time.    Elise Davis, [facility]

## 2025-06-01 NOTE — PROGRESS NOTES
Anticoagulation Clinic Progress Note    Anticoagulation Summary  As of 2023      INR goal:  2.0-3.0   TTR:  75.8 % (5 y)   INR used for dosin.40 (2023)   Warfarin maintenance plan:  1.25 mg every Sun, Tue, Thu; 2.5 mg all other days   Weekly warfarin total:  13.75 mg   No change documented:  Elise Davis RPH   Plan last modified:  Elise Davis RPH (2022)   Next INR check:  10/3/2023   Priority:  Maintenance   Target end date:  Indefinite    Indications    Long-term (current) use of anticoagulants (Resolved) [Z79.01]                 Anticoagulation Episode Summary       INR check location:      Preferred lab:      [facility]    Comments:  [facility] --- SPEAK WITH DAUGHTER (LYNETTE) **DO NOT SPEAK WITH PATIENT (poor memory)** **CALL EVERY TIME**          Anticoagulation Care Providers       Provider Role Specialty Phone number    Rafaela Leija MD Referring Cardiology [phone]            Clinic Interview:  No pertinent clinical findings have been reported.    INR History:      2023     3:00 PM 2023    12:00 AM 2023     3:38 PM 2023    12:00 AM 2023     2:21 PM 2023    12:00 AM 2023     2:55 PM   Anticoagulation Monitoring   INR 2.40  2.80  2.40  2.40   INR Date 2023   INR Goal 2.0-3.0  2.0-3.0  2.0-3.0  2.0-3.0   Trend Same  Same  Same  Same   Last Week Total 13.75 mg  13.75 mg  13.75 mg  13.75 mg   Next Week Total 13.75 mg  13.75 mg  13.75 mg  13.75 mg   Sun 1.25 mg  1.25 mg  1.25 mg  1.25 mg   Mon 2.5 mg  2.5 mg  2.5 mg  2.5 mg   Tue 1.25 mg  1.25 mg  1.25 mg  1.25 mg   Wed 2.5 mg  2.5 mg  2.5 mg  2.5 mg   Thu 1.25 mg  1.25 mg  1.25 mg  1.25 mg   Fri 2.5 mg  2.5 mg  2.5 mg  2.5 mg   Sat 2.5 mg  2.5 mg  2.5 mg  2.5 mg   Historical INR  2.80      2.40      2.40            This result is from an external source.       Plan:  1. INR is therapeutic today- see above  in Anticoagulation Summary.    Diana Avalos to continue their warfarin regimen- see above in Anticoagulation Summary.  2. Follow up in 1 week  3. They have been instructed to call if any changes in medications, doses, concerns, etc. Patient expresses understanding and has no further questions at this time.    Elise Davis, [facility]   verbal instruction

## 2025-06-02 ENCOUNTER — ANTICOAGULATION VISIT (OUTPATIENT)
Dept: PHARMACY | Facility: HOSPITAL | Age: OVER 89
End: 2025-06-02
Payer: MEDICARE

## 2025-06-02 LAB — INR PPP: 2.4

## 2025-06-02 NOTE — PROGRESS NOTES
Anticoagulation Clinic Progress Note    Anticoagulation Summary  As of 2025      INR goal:  2.0-3.0   TTR:  79.4% (6.7 y)   INR used for dosin.40 (2025)   Warfarin maintenance plan:  1.25 mg every Sun, Tue, Thu; 2.5 mg all other days   Weekly warfarin total:  13.75 mg   No change documented:  Elise Davis RPH   Plan last modified:  Elise Davis RPH (2022)   Next INR check:  2025   Priority:  Maintenance   Target end date:  Indefinite    Indications    Long-term (current) use of anticoagulants (Resolved) [Z79.01]                 Anticoagulation Episode Summary       INR check location:  --    Preferred lab:  --    Send INR reminders to:  [facility] HOME TEST POOL    Comments:  [facility] --- SPEAK WITH DAUGHTER (LYNETTE) **DO NOT SPEAK WITH PATIENT (poor memory)**           Anticoagulation Care Providers       Provider Role Specialty Phone number    Rafaela Leija MD Referring Cardiology [phone]            Clinic Interview:  No pertinent clinical findings have been reported.    INR History:      2025     8:25 AM 2025    12:00 AM 2025     3:32 PM 2025    12:00 AM 2025     1:45 PM 2025    12:00 AM 2025     2:10 PM   Anticoagulation Monitoring   INR 3.00  3.40  2.30  2.40   INR Date 2025   INR Goal 2.0-3.0  2.0-3.0  2.0-3.0  2.0-3.0   Trend Same  Same  Same  Same   Last Week Total 13.75 mg  13.75 mg  12.5 mg  13.75 mg   Next Week Total 13.75 mg  12.5 mg  13.75 mg  13.75 mg   Sun 1.25 mg  1.25 mg  1.25 mg  1.25 mg   Mon 2.5 mg  2.5 mg  2.5 mg  2.5 mg   Tue -  Hold ()  1.25 mg  1.25 mg   Wed 2.5 mg  2.5 mg  2.5 mg  2.5 mg   Thu 1.25 mg  1.25 mg  1.25 mg  1.25 mg   Fri 2.5 mg  2.5 mg  2.5 mg  2.5 mg   Sat 2.5 mg  2.5 mg  2.5 mg  2.5 mg   Historical INR  3.40      2.30      2.40            This result is from an external source.       Plan:  1. INR is therapeutic today- see above in Anticoagulation  Summary.    Diana Avalos to continue their warfarin regimen- see above in Anticoagulation Summary.  2. Follow up in 1 week  3. Pt has agreed to only be called if INR out of range. They have been instructed to call if any changes in medications, doses, concerns, etc. Patient expresses understanding and has no further questions at this time.    Elise Davis, [facility]

## 2025-06-07 LAB — INR PPP: 2.8

## 2025-06-10 ENCOUNTER — ANTICOAGULATION VISIT (OUTPATIENT)
Dept: PHARMACY | Facility: HOSPITAL | Age: OVER 89
End: 2025-06-10
Payer: MEDICARE

## 2025-06-10 NOTE — PROGRESS NOTES
Anticoagulation Clinic Progress Note    Anticoagulation Summary  As of 6/10/2025      INR goal:  2.0-3.0   TTR:  79.4% (6.7 y)   INR used for dosin.80 (2025)   Warfarin maintenance plan:  1.25 mg every Sun, Tue, Thu; 2.5 mg all other days   Weekly warfarin total:  13.75 mg   No change documented:  Elise Davis RPH   Plan last modified:  Elise Davis RPH (2022)   Next INR check:  2025   Priority:  Maintenance   Target end date:  Indefinite    Indications    Long-term (current) use of anticoagulants (Resolved) [Z79.01]                 Anticoagulation Episode Summary       INR check location:  --    Preferred lab:  --    Send INR reminders to:  [facility] [place] HOME TEST POOL    Comments:  [facility] --- SPEAK WITH DAUGHTER (LYNETTE) **DO NOT SPEAK WITH PATIENT (poor memory)**           Anticoagulation Care Providers       Provider Role Specialty Phone number    Rafaela Leija MD Referring Cardiology [phone]            Clinic Interview:  No pertinent clinical findings have been reported.    INR History:      2025     3:32 PM 2025    12:00 AM 2025     1:45 PM 2025    12:00 AM 2025     2:10 PM 2025    12:00 AM 6/10/2025     8:24 AM   Anticoagulation Monitoring   INR 3.40  2.30  2.40  2.80   INR Date 2025   INR Goal 2.0-3.0  2.0-3.0  2.0-3.0  2.0-3.0   Trend Same  Same  Same  Same   Last Week Total 13.75 mg  12.5 mg  13.75 mg  13.75 mg   Next Week Total 12.5 mg  13.75 mg  13.75 mg  13.75 mg   Sun 1.25 mg  1.25 mg  1.25 mg  -   Mon 2.5 mg  2.5 mg  2.5 mg  -   Tue Hold ()  1.25 mg  1.25 mg  1.25 mg   Wed 2.5 mg  2.5 mg  2.5 mg  2.5 mg   Thu 1.25 mg  1.25 mg  1.25 mg  1.25 mg   Fri 2.5 mg  2.5 mg  2.5 mg  2.5 mg   Sat 2.5 mg  2.5 mg  2.5 mg  -   Historical INR  2.30      2.40      2.80            This result is from an external source.       Plan:  1. INR is therapeutic today- see above in Anticoagulation Summary.     Diana Avalos to continue their warfarin regimen- see above in Anticoagulation Summary.  2. Follow up in 1 week  3. Pt has agreed to only be called if INR out of range. They have been instructed to call if any changes in medications, doses, concerns, etc. Patient expresses understanding and has no further questions at this time.    Elise Davis, [facility]

## 2025-06-17 ENCOUNTER — ANTICOAGULATION VISIT (OUTPATIENT)
Dept: PHARMACY | Facility: HOSPITAL | Age: OVER 89
End: 2025-06-17
Payer: MEDICARE

## 2025-06-17 LAB — INR PPP: 2.7

## 2025-06-17 NOTE — PROGRESS NOTES
Anticoagulation Clinic Progress Note    Anticoagulation Summary  As of 2025      INR goal:  2.0-3.0   TTR:  79.5% (6.7 y)   INR used for dosin.70 (2025)   Warfarin maintenance plan:  1.25 mg every Sun, Tue, Thu; 2.5 mg all other days   Weekly warfarin total:  13.75 mg   No change documented:  Elise Davis RPH   Plan last modified:  Elise Davis RPH (2022)   Next INR check:  2025   Priority:  Maintenance   Target end date:  Indefinite    Indications    Long-term (current) use of anticoagulants (Resolved) [Z79.01]                 Anticoagulation Episode Summary       INR check location:  --    Preferred lab:  --    Send INR reminders to:  [facility] HOME TEST POOL    Comments:  [facility] --- SPEAK WITH DAUGHTER (LYNETTE) **DO NOT SPEAK WITH PATIENT (poor memory)**           Anticoagulation Care Providers       Provider Role Specialty Phone number    Rafaela Leija MD Referring Cardiology [phone]            Clinic Interview:  No pertinent clinical findings have been reported.    INR History:      2025     1:45 PM 2025    12:00 AM 2025     2:10 PM 2025    12:00 AM 6/10/2025     8:24 AM 2025    12:00 AM 2025     3:22 PM   Anticoagulation Monitoring   INR 2.30  2.40  2.80  2.70   INR Date 2025   INR Goal 2.0-3.0  2.0-3.0  2.0-3.0  2.0-3.0   Trend Same  Same  Same  Same   Last Week Total 12.5 mg  13.75 mg  13.75 mg  13.75 mg   Next Week Total 13.75 mg  13.75 mg  13.75 mg  13.75 mg   Sun 1.25 mg  1.25 mg  -  1.25 mg   Mon 2.5 mg  2.5 mg  -  2.5 mg   Tue 1.25 mg  1.25 mg  1.25 mg  1.25 mg   Wed 2.5 mg  2.5 mg  2.5 mg  2.5 mg   Thu 1.25 mg  1.25 mg  1.25 mg  1.25 mg   Fri 2.5 mg  2.5 mg  2.5 mg  2.5 mg   Sat 2.5 mg  2.5 mg  -  2.5 mg   Historical INR  2.40      2.80      2.70            This result is from an external source.       Plan:  1. INR is therapeutic today- see above in Anticoagulation Summary.     Diana Avalos to continue their warfarin regimen- see above in Anticoagulation Summary.  2. Follow up in 1 week  3. Pt has agreed to only be called if INR out of range. They have been instructed to call if any changes in medications, doses, concerns, etc. Patient expresses understanding and has no further questions at this time.    Elise Davis, [facility]

## 2025-06-30 ENCOUNTER — ANTICOAGULATION VISIT (OUTPATIENT)
Dept: PHARMACY | Facility: HOSPITAL | Age: OVER 89
End: 2025-06-30
Payer: MEDICARE

## 2025-06-30 LAB — INR PPP: 2.3

## 2025-06-30 PROCEDURE — G0249 PROVIDE INR TEST MATER/EQUIP: HCPCS

## 2025-06-30 NOTE — PROGRESS NOTES
Anticoagulation Clinic Progress Note    Anticoagulation Summary  As of 2025      INR goal:  2.0-3.0   TTR:  79.6% (6.7 y)   INR used for dosin.30 (2025)   Warfarin maintenance plan:  1.25 mg every Sun, Tue, Thu; 2.5 mg all other days   Weekly warfarin total:  13.75 mg   No change documented:  Elise Davis RPH   Plan last modified:  Elise Davis RPH (2022)   Next INR check:  2025   Priority:  Maintenance   Target end date:  Indefinite    Indications    Long-term (current) use of anticoagulants (Resolved) [Z79.01]                 Anticoagulation Episode Summary       INR check location:  --    Preferred lab:  --    Send INR reminders to:  [facility] HOME TEST POOL    Comments:  [facility] --- SPEAK WITH DAUGHTER (LYNETTE) **DO NOT SPEAK WITH PATIENT (poor memory)**           Anticoagulation Care Providers       Provider Role Specialty Phone number    Rafaela Leija MD Referring Cardiology [phone]            Clinic Interview:  No pertinent clinical findings have been reported.    INR History:      2025     2:10 PM 2025    12:00 AM 6/10/2025     8:24 AM 2025    12:00 AM 2025     3:22 PM 2025    12:00 AM 2025     3:00 PM   Anticoagulation Monitoring   INR 2.40  2.80  2.70  2.30   INR Date 2025   INR Goal 2.0-3.0  2.0-3.0  2.0-3.0  2.0-3.0   Trend Same  Same  Same  Same   Last Week Total 13.75 mg  13.75 mg  13.75 mg  13.75 mg   Next Week Total 13.75 mg  13.75 mg  13.75 mg  13.75 mg   Sun 1.25 mg  -  1.25 mg  1.25 mg   Mon 2.5 mg  -  2.5 mg  2.5 mg   Tue 1.25 mg  1.25 mg  1.25 mg  1.25 mg   Wed 2.5 mg  2.5 mg  2.5 mg  2.5 mg   Thu 1.25 mg  1.25 mg  1.25 mg  1.25 mg   Fri 2.5 mg  2.5 mg  2.5 mg  2.5 mg   Sat 2.5 mg  -  2.5 mg  2.5 mg   Historical INR  2.80      2.70      2.30            This result is from an external source.       Plan:  1. INR is therapeutic today- see above in Anticoagulation Summary.     Diana Avalos to continue their warfarin regimen- see above in Anticoagulation Summary.  2. Follow up in 1 week  3. Pt has agreed to only be called if INR out of range. They have been instructed to call if any changes in medications, doses, concerns, etc. Patient expresses understanding and has no further questions at this time.    Elise Davis, [facility]

## 2025-07-08 ENCOUNTER — ANTICOAGULATION VISIT (OUTPATIENT)
Dept: PHARMACY | Facility: HOSPITAL | Age: OVER 89
End: 2025-07-08
Payer: MEDICARE

## 2025-07-08 LAB — INR PPP: 2.4

## 2025-07-08 NOTE — PROGRESS NOTES
Anticoagulation Clinic Progress Note    Anticoagulation Summary  As of 2025      INR goal:  2.0-3.0   TTR:  79.7% (6.8 y)   INR used for dosin.40 (2025)   Warfarin maintenance plan:  1.25 mg every Sun, Tue, Thu; 2.5 mg all other days   Weekly warfarin total:  13.75 mg   No change documented:  Elise Davis RPH   Plan last modified:  Elise Davis RPH (2022)   Next INR check:  7/15/2025   Priority:  Maintenance   Target end date:  Indefinite    Indications    Long-term (current) use of anticoagulants (Resolved) [Z79.01]                 Anticoagulation Episode Summary       INR check location:  --    Preferred lab:  --    Send INR reminders to:  [facility] HOME TEST POOL    Comments:  [facility] --- SPEAK WITH DAUGHTER (LYNETTE) **DO NOT SPEAK WITH PATIENT (poor memory)**           Anticoagulation Care Providers       Provider Role Specialty Phone number    Rafaela Leija MD Referring Cardiology [phone]            Clinic Interview:  No pertinent clinical findings have been reported.    INR History:      6/10/2025     8:24 AM 2025    12:00 AM 2025     3:22 PM 2025    12:00 AM 2025     3:00 PM 2025    12:00 AM 2025     2:59 PM   Anticoagulation Monitoring   INR 2.80  2.70  2.30  2.40   INR Date 2025   INR Goal 2.0-3.0  2.0-3.0  2.0-3.0  2.0-3.0   Trend Same  Same  Same  Same   Last Week Total 13.75 mg  13.75 mg  13.75 mg  13.75 mg   Next Week Total 13.75 mg  13.75 mg  13.75 mg  13.75 mg   Sun -  1.25 mg  1.25 mg  1.25 mg   Mon -  2.5 mg  2.5 mg  2.5 mg   Tue 1.25 mg  1.25 mg  1.25 mg  1.25 mg   Wed 2.5 mg  2.5 mg  2.5 mg  2.5 mg   Thu 1.25 mg  1.25 mg  1.25 mg  1.25 mg   Fri 2.5 mg  2.5 mg  2.5 mg  2.5 mg   Sat -  2.5 mg  2.5 mg  2.5 mg   Historical INR  2.70      2.30      2.40            This result is from an external source.       Plan:  1. INR is therapeutic today- see above in Anticoagulation Summary.     Diana Avalos to continue their warfarin regimen- see above in Anticoagulation Summary.  2. Follow up in 1 week  3. Pt has agreed to only be called if INR out of range. They have been instructed to call if any changes in medications, doses, concerns, etc. Patient expresses understanding and has no further questions at this time.    Elise Davis, [facility]

## 2025-07-14 ENCOUNTER — ANTICOAGULATION VISIT (OUTPATIENT)
Dept: PHARMACY | Facility: HOSPITAL | Age: OVER 89
End: 2025-07-14
Payer: MEDICARE

## 2025-07-14 LAB
INR PPP: 2.2
MC_CV_MDC_IDC_RATE_1: 150
MC_CV_MDC_IDC_RATE_1: 171
MC_CV_MDC_IDC_THERAPIES: NORMAL
MC_CV_MDC_IDC_ZONE_ID: 2
MC_CV_MDC_IDC_ZONE_ID: 6
MDC_IDC_MSMT_BATTERY_REMAINING_LONGEVITY: 13 MO
MDC_IDC_MSMT_BATTERY_RRT_TRIGGER: 2.6
MDC_IDC_MSMT_BATTERY_STATUS: NORMAL
MDC_IDC_MSMT_BATTERY_VOLTAGE: 2.78
MDC_IDC_MSMT_LEADCHNL_LV_DTM: NORMAL
MDC_IDC_MSMT_LEADCHNL_LV_IMPEDANCE_VALUE: 855
MDC_IDC_MSMT_LEADCHNL_LV_PACING_THRESHOLD_AMPLITUDE: 2.5
MDC_IDC_MSMT_LEADCHNL_LV_PACING_THRESHOLD_POLARITY: NORMAL
MDC_IDC_MSMT_LEADCHNL_LV_PACING_THRESHOLD_PULSEWIDTH: 0.4
MDC_IDC_MSMT_LEADCHNL_RA_IMPEDANCE_VALUE: 3325
MDC_IDC_MSMT_LEADCHNL_RV_DTM: NORMAL
MDC_IDC_MSMT_LEADCHNL_RV_IMPEDANCE_VALUE: 532
MDC_IDC_MSMT_LEADCHNL_RV_PACING_THRESHOLD_AMPLITUDE: 0.62
MDC_IDC_MSMT_LEADCHNL_RV_PACING_THRESHOLD_POLARITY: NORMAL
MDC_IDC_MSMT_LEADCHNL_RV_PACING_THRESHOLD_PULSEWIDTH: 0.4
MDC_IDC_MSMT_LEADCHNL_RV_SENSING_INTR_AMPL: 3.38
MDC_IDC_PG_IMPLANT_DTM: NORMAL
MDC_IDC_PG_MFG: NORMAL
MDC_IDC_PG_MODEL: NORMAL
MDC_IDC_PG_SERIAL: NORMAL
MDC_IDC_PG_TYPE: NORMAL
MDC_IDC_SESS_DTM: NORMAL
MDC_IDC_SESS_TYPE: NORMAL
MDC_IDC_SET_BRADY_LOWRATE: 70
MDC_IDC_SET_BRADY_MAX_SENSOR_RATE: 120
MDC_IDC_SET_BRADY_MODE: NORMAL
MDC_IDC_SET_CRT_LVRV_DELAY: 20
MDC_IDC_SET_CRT_PACED_CHAMBERS: NORMAL
MDC_IDC_SET_LEADCHNL_LV_PACING_AMPLITUDE: 4.5
MDC_IDC_SET_LEADCHNL_LV_PACING_POLARITY: NORMAL
MDC_IDC_SET_LEADCHNL_LV_PACING_PULSEWIDTH: 0.4
MDC_IDC_SET_LEADCHNL_RA_SENSING_POLARITY: NORMAL
MDC_IDC_SET_LEADCHNL_RA_SENSING_SENSITIVITY: 2.1
MDC_IDC_SET_LEADCHNL_RV_PACING_AMPLITUDE: 2
MDC_IDC_SET_LEADCHNL_RV_PACING_POLARITY: NORMAL
MDC_IDC_SET_LEADCHNL_RV_PACING_PULSEWIDTH: 0.4
MDC_IDC_SET_LEADCHNL_RV_SENSING_POLARITY: NORMAL
MDC_IDC_SET_LEADCHNL_RV_SENSING_SENSITIVITY: 0.9
MDC_IDC_SET_ZONE_STATUS: NORMAL
MDC_IDC_SET_ZONE_STATUS: NORMAL
MDC_IDC_SET_ZONE_TYPE: NORMAL
MDC_IDC_SET_ZONE_TYPE: NORMAL
MDC_IDC_STAT_AT_BURDEN_PERCENT: 0
MDC_IDC_STAT_BRADY_RA_PERCENT_PACED: 0

## 2025-07-14 NOTE — PROGRESS NOTES
Anticoagulation Clinic Progress Note    Anticoagulation Summary  As of 2025      INR goal:  2.0-3.0   TTR:  79.7% (6.8 y)   INR used for dosin.20 (2025)   Warfarin maintenance plan:  1.25 mg every Sun, Tue, Thu; 2.5 mg all other days   Weekly warfarin total:  13.75 mg   No change documented:  Elise Davis RPH   Plan last modified:  Elise Davis RPH (2022)   Next INR check:  2025   Priority:  Maintenance   Target end date:  Indefinite    Indications    Long-term (current) use of anticoagulants (Resolved) [Z79.01]                 Anticoagulation Episode Summary       INR check location:  --    Preferred lab:  --    Send INR reminders to:  [facility] HOME TEST POOL    Comments:  [facility] --- SPEAK WITH DAUGHTER (LYNETTE) **DO NOT SPEAK WITH PATIENT (poor memory)**           Anticoagulation Care Providers       Provider Role Specialty Phone number    Rafaela Leija MD Referring Cardiology [phone]            Clinic Interview:  No pertinent clinical findings have been reported.    INR History:      2025     3:22 PM 2025    12:00 AM 2025     3:00 PM 2025    12:00 AM 2025     2:59 PM 2025    12:00 AM 2025     2:39 PM   Anticoagulation Monitoring   INR 2.70  2.30  2.40  2.20   INR Date 2025   INR Goal 2.0-3.0  2.0-3.0  2.0-3.0  2.0-3.0   Trend Same  Same  Same  Same   Last Week Total 13.75 mg  13.75 mg  13.75 mg  13.75 mg   Next Week Total 13.75 mg  13.75 mg  13.75 mg  13.75 mg   Sun 1.25 mg  1.25 mg  1.25 mg  1.25 mg   Mon 2.5 mg  2.5 mg  2.5 mg  2.5 mg   Tue 1.25 mg  1.25 mg  1.25 mg  1.25 mg   Wed 2.5 mg  2.5 mg  2.5 mg  2.5 mg   Thu 1.25 mg  1.25 mg  1.25 mg  1.25 mg   Fri 2.5 mg  2.5 mg  2.5 mg  2.5 mg   Sat 2.5 mg  2.5 mg  2.5 mg  2.5 mg   Historical INR  2.30      2.40      2.20            This result is from an external source.       Plan:  1. INR is therapeutic today- see above in  Anticoagulation Summary.    Diana Avalos to continue their warfarin regimen- see above in Anticoagulation Summary.  2. Follow up in 1 week  3. Pt has agreed to only be called if INR out of range. They have been instructed to call if any changes in medications, doses, concerns, etc. Patient expresses understanding and has no further questions at this time.    Elise Davis, [facility]

## 2025-07-22 LAB — INR PPP: 1.8

## 2025-07-23 ENCOUNTER — ANTICOAGULATION VISIT (OUTPATIENT)
Dept: PHARMACY | Facility: HOSPITAL | Age: OVER 89
End: 2025-07-23
Payer: MEDICARE

## 2025-07-29 LAB — INR PPP: 2.4

## 2025-07-30 ENCOUNTER — ANTICOAGULATION VISIT (OUTPATIENT)
Dept: PHARMACY | Facility: HOSPITAL | Age: OVER 89
End: 2025-07-30
Payer: MEDICARE

## 2025-07-30 PROCEDURE — G0249 PROVIDE INR TEST MATER/EQUIP: HCPCS

## 2025-07-30 NOTE — PROGRESS NOTES
Anticoagulation Clinic Progress Note    Anticoagulation Summary  As of 2025      INR goal:  2.0-3.0   TTR:  79.6% (6.8 y)   INR used for dosin.40 (2025)   Warfarin maintenance plan:  1.25 mg every Sun, Tue, Thu; 2.5 mg all other days   Weekly warfarin total:  13.75 mg   No change documented:  Elise Davis RPH   Plan last modified:  Elise Davis RPH (2022)   Next INR check:  2025   Priority:  Maintenance   Target end date:  Indefinite    Indications    Long-term (current) use of anticoagulants (Resolved) [Z79.01]                 Anticoagulation Episode Summary       INR check location:  --    Preferred lab:  --    Send INR reminders to:  [facility] HOME TEST POOL    Comments:  [facility] --- SPEAK WITH DAUGHTER (LYNETTE) **DO NOT SPEAK WITH PATIENT (poor memory)**           Anticoagulation Care Providers       Provider Role Specialty Phone number    Rafaela Leija MD Referring Cardiology [phone]            Clinic Interview:  No pertinent clinical findings have been reported.    INR History:      2025     2:59 PM 2025    12:00 AM 2025     2:39 PM 2025    12:00 AM 2025     8:45 AM 2025    12:00 AM 2025     8:45 AM   Anticoagulation Monitoring   INR 2.40  2.20  1.80  2.40   INR Date 2025   INR Goal 2.0-3.0  2.0-3.0  2.0-3.0  2.0-3.0   Trend Same  Same  Same  Same   Last Week Total 13.75 mg  13.75 mg  13.75 mg  15 mg   Next Week Total 13.75 mg  13.75 mg  15 mg  13.75 mg   Sun 1.25 mg  1.25 mg  1.25 mg  1.25 mg   Mon 2.5 mg  2.5 mg  2.5 mg  2.5 mg   Tue 1.25 mg  1.25 mg  -  -   Wed 2.5 mg  2.5 mg  3.75 mg ()  2.5 mg   Thu 1.25 mg  1.25 mg  1.25 mg  1.25 mg   Fri 2.5 mg  2.5 mg  2.5 mg  2.5 mg   Sat 2.5 mg  2.5 mg  2.5 mg  2.5 mg   Historical INR  2.20      1.80      2.40            This result is from an external source.       Plan:  1. INR is therapeutic today- see above in Anticoagulation  Summary.    Diana Avalos to continue their warfarin regimen- see above in Anticoagulation Summary.  2. Follow up in 1 week  3. Pt has agreed to only be called if INR out of range. They have been instructed to call if any changes in medications, doses, concerns, etc. Patient expresses understanding and has no further questions at this time.    Elise Davis, [facility]

## 2025-08-05 ENCOUNTER — ANTICOAGULATION VISIT (OUTPATIENT)
Dept: PHARMACY | Facility: HOSPITAL | Age: OVER 89
End: 2025-08-05
Payer: MEDICARE

## 2025-08-05 LAB — INR PPP: 2.4

## 2025-08-06 RX ORDER — FUROSEMIDE 20 MG/1
20 TABLET ORAL DAILY
Qty: 90 TABLET | Refills: 0 | Status: SHIPPED | OUTPATIENT
Start: 2025-08-06

## 2025-08-12 ENCOUNTER — ANTICOAGULATION VISIT (OUTPATIENT)
Dept: PHARMACY | Facility: HOSPITAL | Age: OVER 89
End: 2025-08-12
Payer: MEDICARE

## 2025-08-12 LAB — INR PPP: 2.5

## 2025-08-19 ENCOUNTER — ANTICOAGULATION VISIT (OUTPATIENT)
Dept: PHARMACY | Facility: HOSPITAL | Age: OVER 89
End: 2025-08-19
Payer: MEDICARE

## 2025-08-19 LAB — INR PPP: 2.4

## 2025-08-25 ENCOUNTER — ANTICOAGULATION VISIT (OUTPATIENT)
Dept: PHARMACY | Facility: HOSPITAL | Age: OVER 89
End: 2025-08-25
Payer: MEDICARE

## 2025-08-25 LAB — INR PPP: 2.7

## (undated) DEVICE — SLITTER CATH GUIDE ATTAIN ADJ

## (undated) DEVICE — INTRO TEAR AWAY/LVD W/SD PRT 9.5F 13CM

## (undated) DEVICE — ELECTRD ECG CARBON/SNP RL FM A/ 5PK

## (undated) DEVICE — AIRLIFE™ NASAL OXYGEN CANNULA CURVED, NONFLARED TIP WITH 21 FOOT (6.4 M) CRUSH-RESISTANT TUBING, OVER-THE-EAR STYLE: Brand: AIRLIFE™

## (undated) DEVICE — PENCL E/S HNDSWCH ROCKR CB

## (undated) DEVICE — SOL IRR NACL 0.9PCT BT 1000ML

## (undated) DEVICE — Device

## (undated) DEVICE — Device: Brand: WEBSTER

## (undated) DEVICE — BALN CORNRY SINUS 6F 1X80CM

## (undated) DEVICE — LOU PACE DEFIB: Brand: MEDLINE INDUSTRIES, INC.

## (undated) DEVICE — RADIFOCUS GLIDEWIRE: Brand: GLIDEWIRE

## (undated) DEVICE — CATH GUIDE ATTAIN COMMND SUREVLV MP9F50CM

## (undated) DEVICE — CATH DS ATTAIN SELECT2 SUREVALVE TP/CRV 7F 130DEG

## (undated) DEVICE — GUIDE WIRE WITH HYDROPHILIC COATING: Brand: ACUITY WHISPER VIEW™

## (undated) DEVICE — LIMB HOLDER, WRIST/ANKLE: Brand: DEROYAL